# Patient Record
Sex: FEMALE | Race: WHITE | NOT HISPANIC OR LATINO | Employment: OTHER | URBAN - METROPOLITAN AREA
[De-identification: names, ages, dates, MRNs, and addresses within clinical notes are randomized per-mention and may not be internally consistent; named-entity substitution may affect disease eponyms.]

---

## 2023-06-14 ENCOUNTER — APPOINTMENT (INPATIENT)
Dept: MRI IMAGING | Facility: HOSPITAL | Age: 78
DRG: 065 | End: 2023-06-14
Payer: MEDICARE

## 2023-06-14 ENCOUNTER — APPOINTMENT (EMERGENCY)
Dept: CT IMAGING | Facility: HOSPITAL | Age: 78
DRG: 065 | End: 2023-06-14
Payer: MEDICARE

## 2023-06-14 ENCOUNTER — APPOINTMENT (EMERGENCY)
Dept: RADIOLOGY | Facility: HOSPITAL | Age: 78
DRG: 065 | End: 2023-06-14
Payer: MEDICARE

## 2023-06-14 ENCOUNTER — HOSPITAL ENCOUNTER (INPATIENT)
Facility: HOSPITAL | Age: 78
LOS: 5 days | Discharge: HOME | DRG: 065 | End: 2023-06-19
Attending: EMERGENCY MEDICINE | Admitting: FAMILY MEDICINE
Payer: MEDICARE

## 2023-06-14 ENCOUNTER — APPOINTMENT (INPATIENT)
Dept: NON INVASIVE DIAGNOSTICS | Facility: HOSPITAL | Age: 78
DRG: 065 | End: 2023-06-14
Payer: MEDICARE

## 2023-06-14 DIAGNOSIS — I48.91 A-FIB (HCC): ICD-10-CM

## 2023-06-14 DIAGNOSIS — I16.1 HYPERTENSIVE EMERGENCY WITHOUT CONGESTIVE HEART FAILURE: ICD-10-CM

## 2023-06-14 DIAGNOSIS — E87.6 HYPOKALEMIA: ICD-10-CM

## 2023-06-14 DIAGNOSIS — R29.90 STROKE-LIKE SYMPTOMS: ICD-10-CM

## 2023-06-14 DIAGNOSIS — I63.9 ISCHEMIC STROKE (HCC): ICD-10-CM

## 2023-06-14 DIAGNOSIS — I63.9 LEFT BASAL GANGLIA EMBOLIC STROKE (HCC): Primary | ICD-10-CM

## 2023-06-14 PROBLEM — R77.8 ELEVATED TROPONIN: Status: ACTIVE | Noted: 2023-06-14

## 2023-06-14 PROBLEM — R79.89 ELEVATED TROPONIN: Status: ACTIVE | Noted: 2023-06-14

## 2023-06-14 PROBLEM — I16.0 HYPERTENSIVE URGENCY: Status: ACTIVE | Noted: 2023-06-14

## 2023-06-14 LAB
2HR DELTA HS TROPONIN: -1 NG/L
4HR DELTA HS TROPONIN: 3 NG/L
ANION GAP SERPL CALCULATED.3IONS-SCNC: 9 MMOL/L (ref 4–13)
AORTIC ROOT: 3.5 CM
AORTIC VALVE MEAN VELOCITY: 6.9 M/S
APICAL FOUR CHAMBER EJECTION FRACTION: 54 %
APTT PPP: 25 SECONDS (ref 23–37)
AV LVOT MEAN GRADIENT: 1 MMHG
AV LVOT PEAK GRADIENT: 1 MMHG
AV MEAN GRADIENT: 2 MMHG
AV PEAK GRADIENT: 3 MMHG
BUN SERPL-MCNC: 25 MG/DL (ref 5–25)
CALCIUM SERPL-MCNC: 10 MG/DL (ref 8.4–10.2)
CARDIAC TROPONIN I PNL SERPL HS: 58 NG/L
CARDIAC TROPONIN I PNL SERPL HS: 59 NG/L
CARDIAC TROPONIN I PNL SERPL HS: 62 NG/L
CHLORIDE SERPL-SCNC: 104 MMOL/L (ref 96–108)
CO2 SERPL-SCNC: 28 MMOL/L (ref 21–32)
CREAT SERPL-MCNC: 0.89 MG/DL (ref 0.6–1.3)
DOP CALC AO VTI: 14.1 CM
DOP CALC LVOT PEAK VEL VTI: 9.72 CM
DOP CALC LVOT PEAK VEL: 0.54 M/S
E WAVE DECELERATION TIME: 181 MS
ERYTHROCYTE [DISTWIDTH] IN BLOOD BY AUTOMATED COUNT: 13.2 % (ref 11.6–15.1)
FRACTIONAL SHORTENING: 28 % (ref 28–44)
GFR SERPL CREATININE-BSD FRML MDRD: 63 ML/MIN/1.73SQ M
GLUCOSE SERPL-MCNC: 111 MG/DL (ref 65–140)
GLUCOSE SERPL-MCNC: 122 MG/DL (ref 65–140)
HCT VFR BLD AUTO: 48.3 % (ref 34.8–46.1)
HGB BLD-MCNC: 16.1 G/DL (ref 11.5–15.4)
INR PPP: 0.93 (ref 0.84–1.19)
INTERVENTRICULAR SEPTUM IN DIASTOLE (PARASTERNAL SHORT AXIS VIEW): 1.2 CM
INTERVENTRICULAR SEPTUM: 1.2 CM (ref 0.6–1.1)
LA/AORTA RATIO 2D: 1.29
LAAS-AP2: 31.8 CM2
LAAS-AP4: 29.7 CM2
LEFT ATRIUM SIZE: 4.5 CM
LEFT INTERNAL DIMENSION IN SYSTOLE: 3.1 CM (ref 2.1–4)
LEFT VENTRICLE DIASTOLIC VOLUME (MOD BIPLANE): 84 ML
LEFT VENTRICLE SYSTOLIC VOLUME (MOD BIPLANE): 40 ML
LEFT VENTRICULAR INTERNAL DIMENSION IN DIASTOLE: 4.3 CM (ref 3.5–6)
LEFT VENTRICULAR POSTERIOR WALL IN END DIASTOLE: 1.1 CM
LEFT VENTRICULAR STROKE VOLUME: 47 ML
LV EF: 53 %
LVSV (TEICH): 47 ML
MCH RBC QN AUTO: 30.9 PG (ref 26.8–34.3)
MCHC RBC AUTO-ENTMCNC: 33.3 G/DL (ref 31.4–37.4)
MCV RBC AUTO: 93 FL (ref 82–98)
MV PEAK E VEL: 86 CM/S
MV STENOSIS PRESSURE HALF TIME: 53 MS
MV VALVE AREA P 1/2 METHOD: 4.15 CM2
PLATELET # BLD AUTO: 431 THOUSANDS/UL (ref 149–390)
PLATELET # BLD AUTO: 480 THOUSANDS/UL (ref 149–390)
PMV BLD AUTO: 9 FL (ref 8.9–12.7)
PMV BLD AUTO: 9 FL (ref 8.9–12.7)
POTASSIUM SERPL-SCNC: 3.4 MMOL/L (ref 3.5–5.3)
PROTHROMBIN TIME: 13.1 SECONDS (ref 11.6–14.5)
RBC # BLD AUTO: 5.21 MILLION/UL (ref 3.81–5.12)
RIGHT VENTRICLE ID DIMENSION: 4 CM
SL CV LV EF: 55
SL CV PED ECHO LEFT VENTRICLE DIASTOLIC VOLUME (MOD BIPLANE) 2D: 84 ML
SL CV PED ECHO LEFT VENTRICLE SYSTOLIC VOLUME (MOD BIPLANE) 2D: 37 ML
SODIUM SERPL-SCNC: 141 MMOL/L (ref 135–147)
TR MAX PG: 24 MMHG
TR PEAK VELOCITY: 2.5 M/S
TRICUSPID ANNULAR PLANE SYSTOLIC EXCURSION: 2.4 CM
TRICUSPID VALVE PEAK REGURGITATION VELOCITY: 2.45 M/S
WBC # BLD AUTO: 13.14 THOUSAND/UL (ref 4.31–10.16)

## 2023-06-14 PROCEDURE — 99285 EMERGENCY DEPT VISIT HI MDM: CPT

## 2023-06-14 PROCEDURE — 99223 1ST HOSP IP/OBS HIGH 75: CPT | Performed by: PSYCHIATRY & NEUROLOGY

## 2023-06-14 PROCEDURE — 93306 TTE W/DOPPLER COMPLETE: CPT

## 2023-06-14 PROCEDURE — 80048 BASIC METABOLIC PNL TOTAL CA: CPT | Performed by: EMERGENCY MEDICINE

## 2023-06-14 PROCEDURE — 85049 AUTOMATED PLATELET COUNT: CPT | Performed by: FAMILY MEDICINE

## 2023-06-14 PROCEDURE — 92610 EVALUATE SWALLOWING FUNCTION: CPT

## 2023-06-14 PROCEDURE — 85610 PROTHROMBIN TIME: CPT | Performed by: EMERGENCY MEDICINE

## 2023-06-14 PROCEDURE — 84484 ASSAY OF TROPONIN QUANT: CPT | Performed by: EMERGENCY MEDICINE

## 2023-06-14 PROCEDURE — 96361 HYDRATE IV INFUSION ADD-ON: CPT

## 2023-06-14 PROCEDURE — 36415 COLL VENOUS BLD VENIPUNCTURE: CPT | Performed by: EMERGENCY MEDICINE

## 2023-06-14 PROCEDURE — 70496 CT ANGIOGRAPHY HEAD: CPT

## 2023-06-14 PROCEDURE — 70551 MRI BRAIN STEM W/O DYE: CPT

## 2023-06-14 PROCEDURE — 93306 TTE W/DOPPLER COMPLETE: CPT | Performed by: INTERNAL MEDICINE

## 2023-06-14 PROCEDURE — 70498 CT ANGIOGRAPHY NECK: CPT

## 2023-06-14 PROCEDURE — 71045 X-RAY EXAM CHEST 1 VIEW: CPT

## 2023-06-14 PROCEDURE — 96374 THER/PROPH/DIAG INJ IV PUSH: CPT

## 2023-06-14 PROCEDURE — 82948 REAGENT STRIP/BLOOD GLUCOSE: CPT

## 2023-06-14 PROCEDURE — 99223 1ST HOSP IP/OBS HIGH 75: CPT | Performed by: FAMILY MEDICINE

## 2023-06-14 PROCEDURE — 85730 THROMBOPLASTIN TIME PARTIAL: CPT | Performed by: EMERGENCY MEDICINE

## 2023-06-14 PROCEDURE — G1004 CDSM NDSC: HCPCS

## 2023-06-14 PROCEDURE — 85027 COMPLETE CBC AUTOMATED: CPT | Performed by: EMERGENCY MEDICINE

## 2023-06-14 PROCEDURE — 93005 ELECTROCARDIOGRAM TRACING: CPT

## 2023-06-14 RX ORDER — ASPIRIN 81 MG/1
81 TABLET, CHEWABLE ORAL DAILY
Status: DISCONTINUED | OUTPATIENT
Start: 2023-06-15 | End: 2023-06-14

## 2023-06-14 RX ORDER — HEPARIN SODIUM 5000 [USP'U]/ML
5000 INJECTION, SOLUTION INTRAVENOUS; SUBCUTANEOUS EVERY 8 HOURS SCHEDULED
Status: DISCONTINUED | OUTPATIENT
Start: 2023-06-14 | End: 2023-06-19 | Stop reason: HOSPADM

## 2023-06-14 RX ORDER — ASPIRIN 81 MG/1
81 TABLET, CHEWABLE ORAL DAILY
Status: DISCONTINUED | OUTPATIENT
Start: 2023-06-15 | End: 2023-06-19

## 2023-06-14 RX ORDER — ATORVASTATIN CALCIUM 40 MG/1
40 TABLET, FILM COATED ORAL EVERY EVENING
Status: DISCONTINUED | OUTPATIENT
Start: 2023-06-15 | End: 2023-06-14

## 2023-06-14 RX ORDER — SODIUM CHLORIDE, SODIUM GLUCONATE, SODIUM ACETATE, POTASSIUM CHLORIDE, MAGNESIUM CHLORIDE, SODIUM PHOSPHATE, DIBASIC, AND POTASSIUM PHOSPHATE .53; .5; .37; .037; .03; .012; .00082 G/100ML; G/100ML; G/100ML; G/100ML; G/100ML; G/100ML; G/100ML
100 INJECTION, SOLUTION INTRAVENOUS CONTINUOUS
Status: DISCONTINUED | OUTPATIENT
Start: 2023-06-14 | End: 2023-06-15

## 2023-06-14 RX ORDER — ATORVASTATIN CALCIUM 40 MG/1
40 TABLET, FILM COATED ORAL
Status: DISCONTINUED | OUTPATIENT
Start: 2023-06-15 | End: 2023-06-19 | Stop reason: HOSPADM

## 2023-06-14 RX ORDER — LABETALOL HYDROCHLORIDE 5 MG/ML
10 INJECTION, SOLUTION INTRAVENOUS EVERY 4 HOURS PRN
Status: DISCONTINUED | OUTPATIENT
Start: 2023-06-14 | End: 2023-06-17

## 2023-06-14 RX ORDER — CLOPIDOGREL BISULFATE 75 MG/1
75 TABLET ORAL DAILY
Status: DISCONTINUED | OUTPATIENT
Start: 2023-06-15 | End: 2023-06-15

## 2023-06-14 RX ORDER — LABETALOL HYDROCHLORIDE 5 MG/ML
10 INJECTION, SOLUTION INTRAVENOUS ONCE
Status: COMPLETED | OUTPATIENT
Start: 2023-06-14 | End: 2023-06-14

## 2023-06-14 RX ORDER — CLOPIDOGREL BISULFATE 75 MG/1
300 TABLET ORAL ONCE
Status: COMPLETED | OUTPATIENT
Start: 2023-06-14 | End: 2023-06-14

## 2023-06-14 RX ORDER — ASPIRIN 300 MG/1
300 SUPPOSITORY RECTAL DAILY
Status: DISCONTINUED | OUTPATIENT
Start: 2023-06-15 | End: 2023-06-14

## 2023-06-14 RX ADMIN — SODIUM CHLORIDE 1000 ML: 0.9 INJECTION, SOLUTION INTRAVENOUS at 07:31

## 2023-06-14 RX ADMIN — LABETALOL HYDROCHLORIDE 10 MG: 5 INJECTION, SOLUTION INTRAVENOUS at 19:39

## 2023-06-14 RX ADMIN — HEPARIN SODIUM 5000 UNITS: 5000 INJECTION INTRAVENOUS; SUBCUTANEOUS at 21:15

## 2023-06-14 RX ADMIN — CLOPIDOGREL BISULFATE 300 MG: 75 TABLET ORAL at 15:19

## 2023-06-14 RX ADMIN — LABETALOL HYDROCHLORIDE 10 MG: 5 INJECTION, SOLUTION INTRAVENOUS at 07:51

## 2023-06-14 RX ADMIN — HEPARIN SODIUM 5000 UNITS: 5000 INJECTION INTRAVENOUS; SUBCUTANEOUS at 13:03

## 2023-06-14 RX ADMIN — LABETALOL HYDROCHLORIDE 10 MG: 5 INJECTION, SOLUTION INTRAVENOUS at 10:47

## 2023-06-14 RX ADMIN — IOHEXOL 80 ML: 350 INJECTION, SOLUTION INTRAVENOUS at 08:22

## 2023-06-14 RX ADMIN — SODIUM CHLORIDE, SODIUM GLUCONATE, SODIUM ACETATE, POTASSIUM CHLORIDE, MAGNESIUM CHLORIDE, SODIUM PHOSPHATE, DIBASIC, AND POTASSIUM PHOSPHATE 100 ML/HR: .53; .5; .37; .037; .03; .012; .00082 INJECTION, SOLUTION INTRAVENOUS at 10:47

## 2023-06-14 NOTE — PLAN OF CARE
Problem: MOBILITY - ADULT  Goal: Maintain or return to baseline ADL function  Description: INTERVENTIONS:  -  Assess patient's ability to carry out ADLs; assess patient's baseline for ADL function and identify physical deficits which impact ability to perform ADLs (bathing, care of mouth/teeth, toileting, grooming, dressing, etc )  - Assess/evaluate cause of self-care deficits   - Assess range of motion  - Assess patient's mobility; develop plan if impaired  - Assess patient's need for assistive devices and provide as appropriate  - Encourage maximum independence but intervene and supervise when necessary  - Involve family in performance of ADLs  - Assess for home care needs following discharge   - Consider OT consult to assist with ADL evaluation and planning for discharge  - Provide patient education as appropriate  Outcome: Progressing  Goal: Maintains/Returns to pre admission functional level  Description: INTERVENTIONS:  - Perform BMAT or MOVE assessment daily    - Set and communicate daily mobility goal to care team and patient/family/caregiver  - Collaborate with rehabilitation services on mobility goals if consulted  - Perform Range of Motion  times a day  - Reposition patient every  hours    - Dangle patient  times a day  - Stand patient  times a day  - Ambulate patient  times a day  - Out of bed to chair  times a day   - Out of bed for meals  times a day  - Out of bed for toileting  - Record patient progress and toleration of activity level   Outcome: Progressing     Problem: PAIN - ADULT  Goal: Verbalizes/displays adequate comfort level or baseline comfort level  Description: Interventions:  - Encourage patient to monitor pain and request assistance  - Assess pain using appropriate pain scale  - Administer analgesics based on type and severity of pain and evaluate response  - Implement non-pharmacological measures as appropriate and evaluate response  - Consider cultural and social influences on pain and pain management  - Notify physician/advanced practitioner if interventions unsuccessful or patient reports new pain  Outcome: Progressing     Problem: INFECTION - ADULT  Goal: Absence or prevention of progression during hospitalization  Description: INTERVENTIONS:  - Assess and monitor for signs and symptoms of infection  - Monitor lab/diagnostic results  - Monitor all insertion sites, i e  indwelling lines, tubes, and drains  - Monitor endotracheal if appropriate and nasal secretions for changes in amount and color  - [place] appropriate cooling/warming therapies per order  - Administer medications as ordered  - Instruct and encourage patient and family to use good hand hygiene technique  - Identify and instruct in appropriate isolation precautions for identified infection/condition  Outcome: Progressing  Goal: Absence of fever/infection during neutropenic period  Description: INTERVENTIONS:  - Monitor WBC    Outcome: Progressing     Problem: SAFETY ADULT  Goal: Maintain or return to baseline ADL function  Description: INTERVENTIONS:  -  Assess patient's ability to carry out ADLs; assess patient's baseline for ADL function and identify physical deficits which impact ability to perform ADLs (bathing, care of mouth/teeth, toileting, grooming, dressing, etc )  - Assess/evaluate cause of self-care deficits   - Assess range of motion  - Assess patient's mobility; develop plan if impaired  - Assess patient's need for assistive devices and provide as appropriate  - Encourage maximum independence but intervene and supervise when necessary  - Involve family in performance of ADLs  - Assess for home care needs following discharge   - Consider OT consult to assist with ADL evaluation and planning for discharge  - Provide patient education as appropriate  Outcome: Progressing  Goal: Maintains/Returns to pre admission functional level  Description: INTERVENTIONS:  - Perform BMAT or MOVE assessment daily    - Set and communicate daily mobility goal to care team and patient/family/caregiver  - Collaborate with rehabilitation services on mobility goals if consulted  - Perform Range of Motion  times a day  - Reposition patient every  hours    - Dangle patient  times a day  - Stand patient  times a day  - Ambulate patient  times a day  - Out of bed to chair  times a day   - Out of bed for meals  times a day  - Out of bed for toileting  - Record patient progress and toleration of activity level   Outcome: Progressing  Goal: Patient will remain free of falls  Description: INTERVENTIONS:  - Educate patient/family on patient safety including physical limitations  - Instruct patient to call for assistance with activity   - Consult OT/PT to assist with strengthening/mobility   - Keep Call bell within reach  - Keep bed low and locked with side rails adjusted as appropriate  - Keep care items and personal belongings within reach  - Initiate and maintain comfort rounds  - Make Fall Risk Sign visible to staff  - Offer Toileting every  Hours, in advance of need  - Initiate/Maintain alarm  - Obtain necessary fall risk management equipment  - Apply yellow socks and bracelet for high fall risk patients  - Consider moving patient to room near nurses station  Outcome: Progressing     Problem: DISCHARGE PLANNING  Goal: Discharge to home or other facility with appropriate resources  Description: INTERVENTIONS:  - Identify barriers to discharge w/patient and caregiver  - Arrange for needed discharge resources and transportation as appropriate  - Identify discharge learning needs (meds, wound care, etc )  - Arrange for interpretive services to assist at discharge as needed  - Refer to Case Management Department for coordinating discharge planning if the patient needs post-hospital services based on physician/advanced practitioner order or complex needs related to functional status, cognitive ability, or social support system  Outcome: Progressing Problem: Knowledge Deficit  Goal: Patient/family/caregiver demonstrates understanding of disease process, treatment plan, medications, and discharge instructions  Description: Complete learning assessment and assess knowledge base  Interventions:  - Provide teaching at level of understanding  - Provide teaching via preferred learning methods  Outcome: Progressing     Problem: Neurological Deficit  Goal: Neurological status is stable or improving  Description: Interventions:  - Monitor and assess patient's level of consciousness, motor function, sensory function, and level of assistance needed for ADLs  - Monitor and report changes from baseline  Collaborate with interdisciplinary team to initiate plan and implement interventions as ordered  - Provide and maintain a safe environment  - Consider seizure precautions  - Consider fall precautions  - Consider aspiration precautions  - Consider bleeding precautions  Outcome: Progressing     Problem: Activity Intolerance/Impaired Mobility  Goal: Mobility/activity is maintained at optimum level for patient  Description: Interventions:  - Assess and monitor patient  barriers to mobility and need for assistive/adaptive devices  - Assess patient's emotional response to limitations  - Collaborate with interdisciplinary team and initiate plans and interventions as ordered  - Encourage independent activity per ability   - Maintain proper body alignment  - Perform active/passive rom as tolerated/ordered  - Plan activities to conserve energy   - Turn patient as appropriate  Outcome: Progressing     Problem: Communication Impairment  Goal: Ability to express needs and understand communication  Description: Assess patient's communication skills and ability to understand information  Patient will demonstrate use of effective communication techniques, alternative methods of communication and understanding even if not able to speak       - Encourage communication and provide alternate methods of communication as needed  - Collaborate with case management/ for discharge needs  - Include patient/family/caregiver in decisions related to communication  Outcome: Progressing     Problem: Potential for Aspiration  Goal: Non-ventilated patient's risk of aspiration is minimized  Description: Assess and monitor vital signs, respiratory status, and labs (WBC)  Monitor for signs of aspiration (tachypnea, cough, rales, wheezing, cyanosis, fever)  - Assess and monitor patient's ability to swallow  - Place patient up in chair to eat if possible  - HOB up at 90 degrees to eat if unable to get patient up into chair   - Supervise patient during oral intake  - Instruct patient/ family to take small bites  - Instruct patient/ family to take small single sips when taking liquids  - Follow patient-specific strategies generated by speech pathologist   Outcome: Progressing  Goal: Ventilated patient's risk of aspiration is minimized  Description: Assess and monitor vital signs, respiratory status, airway cuff pressure, and labs (WBC)  Monitor for signs of aspiration (tachypnea, cough, rales, wheezing, cyanosis, fever)  - Elevate head of bed 30 degrees if patient has tube feeding   - Monitor tube feeding  Outcome: Progressing     Problem: Nutrition  Goal: Nutrition/Hydration status is improving  Description: Monitor and assess patient's nutrition/hydration status for malnutrition (ex- brittle hair, bruises, dry skin, pale skin and conjunctiva, muscle wasting, smooth red tongue, and disorientation)  Collaborate with interdisciplinary team and initiate plan and interventions as ordered  Monitor patient's weight and dietary intake as ordered or per policy  Utilize nutrition screening tool and intervene per policy  Determine patient's food preferences and provide high-protein, high-caloric foods as appropriate       - Assist patient with eating   - Allow adequate time for meals   - Encourage patient to take dietary supplement as ordered  - Collaborate with clinical nutritionist   - Include patient/family/caregiver in decisions related to nutrition    Outcome: Progressing

## 2023-06-14 NOTE — H&P
[place]  H&P  Name: Ainsley Gonzalez 78 y o  female I MRN: [id number]  Unit/Bed#: -01 I Date of Admission: 6/14/2023   Date of Service: 6/14/2023 I Hospital Day: 0      Assessment/Plan   * Stroke-like symptoms  Assessment & Plan  · 61-year-old female, with no PMH, no PCP follow-up for years, presents with dysarthria, word finding difficulty  · Noted blood pressure 271/121  · Chest x-ray shows no effusion, or pulmonary edema  · EKG reveals sinus tachycardia  · CTA head and neck negative for acute infarct, however advanced chronic microangiopathic changes with chronic lacunar infarcts  · Admitted under stroke pathway  · Patient failed dysphagia screening in ER, placed n p o  and IV fluid  · Patient was given aspirin 300 mg rectal in ED  · If patient passes swallow eval, will need Plavix and aspirin daily  · If passed swallow eval, also needed atorvastatin 40 mg daily  · Obtain MRI brain  · Obtain echocardiogram  · Continue telemetry monitor, obtain lipid, A1c  · PT/OT/ST  · As per neurology, does not need permissive BP      Elevated troponin  Assessment & Plan  Noted mildly elevated troponin at 58 > 59  Patient denies any chest pain or dyspnea  EKG sinus tachycardia  continue telemetry monitor, troponin trend ST elevation  Obtain echocardiogram  This is likely secondary to hypertensive urgency    Hypertensive urgency  Assessment & Plan  Presented with blood pressure 217/121  As per neurology, does not need permissive BP  However will slowly reduce blood pressure  Currently patient failed dysphagia eval   Continue IV labetalol as needed for systolic BP above 683       VTE Prophylaxis: Heparin  / sequential compression device   Code Status: Full code    Discussion with family: Called , went to voice message    Anticipated Length of Stay:  Patient will be admitted on an Inpatient basis with an anticipated length of stay of  > 2 midnights     Justification for [facility] QUIG[facility] Stay: Strokelike symptoms    Total Time for Visit, including Counseling / Coordination of Care: 60 minutes  Greater than 50% of this total time spent on direct patient counseling and coordination of care  Chief Complaint:   Difficulty speaking    History of Present Illness:    History obtained from patient and ER documentation  Unable to reach  over the phone  Katlin Novak is a 78 y o  female with no PMH, no outpatient follow-up with medical provider, presented to ER with her  due to difficulty with speech difficulty, dysarthria, word finding difficulty started 3 days ago  Denies any weakness of extremities, denies ambulatory dysfunction  Patient also is alert, and oriented to time self place and date  In ER, patient noted to have blood pressure of 217/121  CTA head and neck was negative for acute stroke  It does show advanced chronic microangiopathic changes with chronic lacunar infarct  Admission requested for stroke pathway and work-up  On my exam, patient is alert, oriented x4  Patient does exhibit word finding difficulty, dysarthria  Review of Systems:    Review of Systems   Constitutional: Negative for chills and fever  HENT: Negative for ear pain and sore throat  Eyes: Negative for pain and visual disturbance  Respiratory: Negative for cough and shortness of breath  Cardiovascular: Negative for chest pain and palpitations  Gastrointestinal: Negative for abdominal pain and vomiting  Genitourinary: Negative for dysuria and hematuria  Musculoskeletal: Negative for arthralgias and back pain  Skin: Negative for color change and rash  Neurological: Positive for facial asymmetry and speech difficulty  Negative for seizures and syncope  All other systems reviewed and are negative  Past Medical and Surgical History:     History reviewed  No pertinent past medical history  History reviewed  No pertinent surgical history      Meds/Allergies:    Prior to "Admission medications    Not on File     I have reviewed home medications using allscripts  Allergies: No Known Allergies    Social History:     Marital Status: /Civil Union   Occupation: Patient currently works as a  for her  who is a   Patient Pre-hospital Living Situation: Lives with   Patient Pre-hospital Level of Mobility: Independent prior to this symptoms  Patient Pre-hospital Diet Restrictions: None  Substance Use History:   Social History     Substance and Sexual Activity   Alcohol Use Yes   • Alcohol/week: 4 0 standard drinks of alcohol   • Types: 4 Glasses of wine per week     Social History     Tobacco Use   Smoking Status Never   Smokeless Tobacco Never     Social History     Substance and Sexual Activity   Drug Use Never       Family History:    History reviewed  No pertinent family history  Physical Exam:     Vitals:   Blood Pressure: (!) 196/110 (06/14/23 1333)  Pulse: 96 (06/14/23 1333)  Temperature: 97 6 °F (36 4 °C) (06/14/23 0710)  Respirations: 22 (06/14/23 1000)  Height: 5' 6\" (167 6 cm) (06/14/23 1101)  Weight - Scale: 55 1 kg (121 lb 7 6 oz) (06/14/23 1106)  SpO2: 96 % (06/14/23 1333)    Physical Exam  Vitals and nursing note reviewed  Constitutional:       General: She is not in acute distress  Appearance: She is well-developed  HENT:      Head: Normocephalic and atraumatic  Eyes:      Conjunctiva/sclera: Conjunctivae normal    Cardiovascular:      Rate and Rhythm: Normal rate and regular rhythm  Heart sounds: No murmur heard  Pulmonary:      Effort: Pulmonary effort is normal  No respiratory distress  Breath sounds: Normal breath sounds  Abdominal:      Palpations: Abdomen is soft  Tenderness: There is no abdominal tenderness  Musculoskeletal:         General: No swelling  Cervical back: Neck supple  Skin:     General: Skin is warm and dry  Capillary Refill: Capillary refill takes less than 2 seconds   " Neurological:      Mental Status: She is alert and oriented to person, place, and time  Comments: No sensory deficit  No focal weakness noted on upper or lower extremity  Dysarthria noted   Psychiatric:         Mood and Affect: Mood normal            Additional Data:     Lab Results: I have personally reviewed pertinent reports  Results from last 7 days   Lab Units 06/14/23  1057 06/14/23  0721   HEMATOCRIT %  --  48 3*   HEMOGLOBIN g/dL  --  16 1*   PLATELETS Thousands/uL 431* 480*   WBC Thousand/uL  --  13 14*     Results from last 7 days   Lab Units 06/14/23  0721   ANION GAP mmol/L 9   BUN mg/dL 25   CALCIUM mg/dL 10 0   CHLORIDE mmol/L 104   CO2 mmol/L 28   CREATININE mg/dL 0 89   GLUCOSE RANDOM mg/dL 111   POTASSIUM mmol/L 3 4*   SODIUM mmol/L 141     Results from last 7 days   Lab Units 06/14/23  0721   INR  0 93     Results from last 7 days   Lab Units 06/14/23  0719   POC GLUCOSE mg/dl 122               Imaging: I have personally reviewed pertinent reports  CTA head and neck with and without contrast   Final Result by Umm Farr DO (06/14 4532)      Advanced chronic microangiopathic changes with chronic lacunar infarcts  No acute intracranial normality  No significant carotid or vertebral artery stenosis  No large vessel occlusion or aneurysm identified  Scattered vascular calcifications intracranial compatible with intracranial atherosclerosis without focal stenosis  Workstation performed: [id number]         X-ray chest 1 view portable   ED Interpretation by Gloria Lopez DO (06/14 5408)   Slightly rotated  Possible cardiomegaly  No infiltrates, effusion, CHF      MRI Inpatient Order    (Results Pending)       EKG, Pathology, and Other Studies Reviewed on Admission:   · EKG: Sinus tachycardia    Allscripts / Epic Records Reviewed: Yes     ** Please Note: This note has been constructed using a voice recognition system   **

## 2023-06-14 NOTE — SPEECH THERAPY NOTE
Speech Language/Pathology    Speech-Language Pathology Bedside Swallow Evaluation      Patient Name: Homero Schmidt    [id number] Date: 6/14/2023     Problem List  Principal Problem:    Stroke-like symptoms  Active Problems:    Hypertensive urgency    Elevated troponin      Past Medical History  History reviewed  No pertinent past medical history  Past Surgical History  History reviewed  No pertinent surgical history  Summary   Pt presented with s/s suggestive of mild oral and suspected moderate-severe pharyngeal dysphagia  Reduced oral opening for retrieval  Decreased mastication w/ slowed lingual manipulation  Mild oral residue requiring liquid wash  Swallows suspected delayed/reduced  +throat clearing and strong cough to thin liquids and nectar thick liquids today  Cough x1 w/ hard/dry solid  S/w pt regarding potential dysphagia diet modifications at this time 2/2 noted oropharyngeal difficulties/signs of aspiration  Pt agreeable at this time  Risk/s for Aspiration: Mod      Recommended Diet: soft/level 3 diet and honey thick liquids   Recommended Form of Meds: crushed with puree   Aspiration precautions and swallowing strategies: upright posture, only feed when fully alert, slow rate of feeding and small bites/sips  Other Recommendations: Continue frequent oral care         Current Medical Status  Pt is a 78 y o  female who presented to [address] [place] [address] with no PMH, no outpatient follow-up with medical provider, presented to ER with her  due to difficulty with speech difficulty, dysarthria, word finding difficulty started 3 days ago  Denies any weakness of extremities, denies ambulatory dysfunction  Patient also is alert, and oriented to time self place and date  In ER, patient noted to have blood pressure of 217/121  CTA head and neck was negative for acute stroke  It does show advanced chronic microangiopathic changes with chronic lacunar infarct    Admission requested for stroke pathway and work-up  On my exam, patient is alert, oriented x4  Patient does exhibit word finding difficulty, dysarthria      Current Precautions: Allergies:  No known food allergies    Past medical history:  Please see H&P for details    Special Studies:  CTA head and neck 6/14: Advanced chronic microangiopathic changes with chronic lacunar infarcts  No acute intracranial normality      No significant carotid or vertebral artery stenosis  No large vessel occlusion or aneurysm identified  Scattered vascular calcifications intracranial compatible with intracranial atherosclerosis without focal stenosis        Social/Education/Vocational Hx:  Pt lives home w/ spouse    Swallow Information   Current Risks for Dysphagia & Aspiration: stroke-like symptoms, dysarthria  Current Symptoms/Concerns: coughing with po and failed RN dysphagia screen   Current Diet: NPO   Baseline Diet: regular diet and thin liquids      Baseline Assessment   Behavior/Cognition: alert  Speech/Language Status: able to participate in basic conversation and able to follow commands  Patient Positioning: upright in bed  Pain Status/Interventions/Response to Interventions:  No report of or nonverbal indications of pain  Swallow Mechanism Exam  Facial: right facial droop  Labial: decreased ROM right side  Lingual: right sided tongue deviation  Velum: symmetrical  Mandible: adequate ROM  Dentition: adequate  Vocal quality:clear/adequate   Volitional Cough: weak   Respiratory Status: on RA      Consistencies Assessed and Performance   Consistencies Administered: thin liquids, nectar thick, honey thick, puree, soft solids and hard solids    Oral Stage: mild  Mastication was adequate with the materials administered today  Bolus formation and transfer were functional with no significant oral residue noted  No overt s/s reduced oral control  Pharyngeal Stage: moderate-severe  Swallow Mechanics:  Swallowing initiation suspected delayed   Laryngeal rise was palpated and judged to be reduced  Throat clear and cough w/ thin, throat clear and cough w/ nectar  Cough x1 w/ dry/hard solid  Esophageal Concerns: none reported    Strategies and Efficacy: -     Summary and Recommendations (see above)    Results Reviewed with: patient, RN and MD     Treatment Recommended: Yes     Frequency of treatment: As able/appropriate     Patient Stated Goal: none stated     Dysphagia LTG  -Patient will demonstrate safe and effective oral intake (without overt s/s significant oral/pharyngeal dysphagia including s/s penetration or aspiration) for the highest appropriate diet level       Short Term Goals:  -Pt will tolerate Dysphagia 3/advanced (dental soft) diet and honey thick liquid with no significant s/s oral or pharyngeal dysphagia across 1-3 diagnostic session/s     -Patient will tolerate trials of upgraded food and/or liquid texture with no significant s/s of oral or pharyngeal dysphagia including aspiration across 1-3 diagnostic sessions     -Patient will comply with a Video/Modified Barium Swallow study for more complete assessment of swallowing anatomy/physiology/aspiration risk and to assess efficacy of treatment techniques so as to best guide treatment plan          Speech Therapy Prognosis   Prognosis: Guarded    Prognosis Considerations: age, medical status, prior medical history and cognitive status

## 2023-06-14 NOTE — ASSESSMENT & PLAN NOTE
· 72-year-old female, with no PMH, no PCP follow-up for years, presents with dysarthria, word finding difficulty  · Noted blood pressure 271/121  · Chest x-ray shows no effusion, or pulmonary edema  · EKG reveals sinus tachycardia  · CTA head and neck negative for acute infarct, however advanced chronic microangiopathic changes with chronic lacunar infarcts  · Admitted under stroke pathway  · Patient failed dysphagia screening in ER, placed n p o  and IV fluid  · Patient was given aspirin 300 mg rectal in ED  · If patient passes swallow eval, will need Plavix and aspirin daily  · If passed swallow eval, also needed atorvastatin 40 mg daily    · Obtain MRI brain  · Obtain echocardiogram  · Continue telemetry monitor, obtain lipid, A1c  · PT/OT/ST  · As per neurology, does not need permissive BP

## 2023-06-14 NOTE — ASSESSMENT & PLAN NOTE
Presented with blood pressure 217/121  As per neurology, does not need permissive BP  However will slowly reduce blood pressure  Currently patient failed dysphagia eval   Continue IV labetalol as needed for systolic BP above 058

## 2023-06-14 NOTE — ED PROVIDER NOTES
History  Chief Complaint   Patient presents with   • CVA/TIA-like Symptoms     Difficulty speaking/ slow speech for 2 days  Some confusion, sleeping a lot and not eating  74-year-old female from home presents with her    helps with history  Patient has had difficulty speaking for the past 3 days  She is moving much more slowly than normal although has not fallen  She has not eaten anything in the past 3 days except for a milkshake yesterday  Patient's admits to having difficulty saying what she wants to say  She says she has all the birds in her head but cannot see them at times  On exam she has mild expressive aphasia  She has very slight dysarthria but it appears to be due to a dry mouth  Patient denies headache, change in vision, vertigo, diplopia, chest pain, palpitations, incontinence and focal neurologic changes  She has not seen a doctor in many years and takes no medications  No known allergies  Her  endorses this history and states there has been no other recent illness, injury, change in diet or medications  History provided by:  Spouse and patient   used: No        None       History reviewed  No pertinent past medical history  History reviewed  No pertinent surgical history  History reviewed  No pertinent family history  I have reviewed and agree with the history as documented  E-Cigarette/Vaping   • E-Cigarette Use Never User      E-Cigarette/Vaping Substances   • Nicotine No    • THC No    • CBD No    • Flavoring No    • Other No    • Unknown No      Social History     Tobacco Use   • Smoking status: Never   • Smokeless tobacco: Never   Vaping Use   • Vaping Use: Never used   Substance Use Topics   • Alcohol use: Yes     Alcohol/week: 4 0 standard drinks of alcohol     Types: 4 Glasses of wine per week   • Drug use: Never       Review of Systems   Constitutional: Positive for activity change and appetite change   Negative for fever    Respiratory: Negative for cough and shortness of breath  Cardiovascular: Negative for chest pain and palpitations  Gastrointestinal: Positive for diarrhea ( Briefly)  Negative for abdominal pain and vomiting  Musculoskeletal: Negative for neck pain and neck stiffness  Neurological: Positive for speech difficulty  Negative for dizziness, seizures, syncope, numbness and headaches  All other systems reviewed and are negative  Physical Exam  Physical Exam  Vitals and nursing note reviewed  Constitutional:       General: She is not in acute distress  Appearance: She is well-developed and normal weight  She is not ill-appearing or diaphoretic  HENT:      Head: Normocephalic and atraumatic  Right Ear: External ear normal       Left Ear: External ear normal       Mouth/Throat:      Mouth: Mucous membranes are dry  Eyes:      General: No scleral icterus  Conjunctiva/sclera: Conjunctivae normal       Pupils: Pupils are equal, round, and reactive to light  Neck:      Vascular: No carotid bruit  Cardiovascular:      Rate and Rhythm: Regular rhythm  Tachycardia present  Pulses: Normal pulses  Heart sounds: No murmur heard  Pulmonary:      Effort: Pulmonary effort is normal       Breath sounds: Normal breath sounds  Abdominal:      General: Bowel sounds are normal       Palpations: Abdomen is soft  There is no mass  Tenderness: There is no abdominal tenderness  Musculoskeletal:         General: No tenderness  Normal range of motion  Cervical back: Normal range of motion and neck supple  Right lower leg: No edema  Left lower leg: No edema  Skin:     General: Skin is warm and dry  Capillary Refill: Capillary refill takes less than 2 seconds  Findings: No rash  Neurological:      Mental Status: She is alert and oriented to person, place, and time  Cranial Nerves: No cranial nerve deficit  Sensory: No sensory deficit  Motor: No weakness  Coordination: Coordination normal       Gait: Gait abnormal       Deep Tendon Reflexes: Reflexes are normal and symmetric     Psychiatric:         Mood and Affect: Mood normal          Behavior: Behavior normal          Vital Signs  ED Triage Vitals [06/14/23 0710]   Temperature Pulse Respirations Blood Pressure SpO2   97 6 °F (36 4 °C) 94 20 (!) 202/120 98 %      Temp src Heart Rate Source Patient Position - Orthostatic VS BP Location FiO2 (%)   -- Monitor Lying Left arm --      Pain Score       No Pain           Vitals:    06/14/23 0800 06/14/23 0830 06/14/23 0845 06/14/23 0900   BP: (!) 179/108 (!) 195/114 (!) 200/97 (!) 180/116   Pulse: 93 94 95 100   Patient Position - Orthostatic VS: Sitting  Sitting Sitting         Visual Acuity  Visual Acuity    Flowsheet Row Most Recent Value   L Pupil Size (mm) 2   R Pupil Size (mm) 2          ED Medications  Medications   sodium chloride 0 9 % bolus 1,000 mL (0 mL Intravenous Stopped 6/14/23 0900)   labetalol (NORMODYNE) injection 10 mg (10 mg Intravenous Given 6/14/23 0751)   iohexol (OMNIPAQUE) 350 MG/ML injection (MULTI-DOSE) 80 mL (80 mL Intravenous Given 6/14/23 0822)       Diagnostic Studies  Results Reviewed     Procedure Component Value Units Date/Time    HS Troponin I 2hr [[id number]] Collected: 06/14/23 0936    Lab Status: No result Specimen: Blood from Arm, Left     HS Troponin 0hr (reflex protocol) [[id number]]  (Abnormal) Collected: 06/14/23 0721    Lab Status: Final result Specimen: Blood from Arm, Left Updated: 06/14/23 0839     hs TnI 0hr 59 ng/L     HS Troponin I 4hr [[id number]]     Lab Status: No result Specimen: Blood     Basic metabolic panel [[id number]]  (Abnormal) Collected: 06/14/23 0721    Lab Status: Final result Specimen: Blood from Arm, Left Updated: 06/14/23 0809     Sodium 141 mmol/L      Potassium 3 4 mmol/L      Chloride 104 mmol/L      CO2 28 mmol/L      ANION GAP 9 mmol/L      BUN 25 mg/dL      Creatinine 0 89 mg/dL Glucose 111 mg/dL      Calcium 10 0 mg/dL      eGFR 63 ml/min/1 73sq m     Narrative:      [place] guidelines for Chronic Kidney Disease (CKD):   •  Stage 1 with normal or high GFR (GFR > 90 mL/min/1 73 square meters)  •  Stage 2 Mild CKD (GFR = 60-89 mL/min/1 73 square meters)  •  Stage 3A Moderate CKD (GFR = 45-59 mL/min/1 73 square meters)  •  Stage 3B Moderate CKD (GFR = 30-44 mL/min/1 73 square meters)  •  Stage 4 Severe CKD (GFR = 15-29 mL/min/1 73 square meters)  •  Stage 5 End Stage CKD (GFR <15 mL/min/1 73 square meters)  Note: GFR calculation is accurate only with a steady state creatinine    Protime-INR [[id number]]  (Normal) Collected: 06/14/23 0721    Lab Status: Final result Specimen: Blood from Arm, Left Updated: 06/14/23 0759     Protime 13 1 seconds      INR 0 93    APTT [[id number]]  (Normal) Collected: 06/14/23 0721    Lab Status: Final result Specimen: Blood from Arm, Left Updated: 06/14/23 0759     PTT 25 seconds     CBC and Platelet [[id number]]  (Abnormal) Collected: 06/14/23 0721    Lab Status: Final result Specimen: Blood from Arm, Left Updated: 06/14/23 0727     WBC 13 14 Thousand/uL      RBC 5 21 Million/uL      Hemoglobin 16 1 g/dL      Hematocrit 48 3 %      MCV 93 fL      MCH 30 9 pg      MCHC 33 3 g/dL      RDW 13 2 %      Platelets 251 Thousands/uL      MPV 9 0 fL     Fingerstick Glucose (POCT) [[id number]]  (Normal) Collected: 06/14/23 0719    Lab Status: Final result Updated: 06/14/23 0720     POC Glucose 122 mg/dl                  CTA head and neck with and without contrast   Final Result by [facility],  (06/14 1079)      Advanced chronic microangiopathic changes with chronic lacunar infarcts  No acute intracranial normality  No significant carotid or vertebral artery stenosis  No large vessel occlusion or aneurysm identified   Scattered vascular calcifications intracranial compatible with intracranial atherosclerosis without focal stenosis  Workstation performed: [id number]         X-ray chest 1 view portable   ED Interpretation by Korey Billingsley DO (06/14 8604)   Slightly rotated  Possible cardiomegaly    No infiltrates, effusion, CHF                 Procedures  ECG 12 Lead Documentation Only    Date/Time: 6/14/2023 7:17 AM    Performed by: Korey Billingsley DO  Authorized by: Korey Billingsley DO    ECG reviewed by me, the ED Provider: yes    Patient location:  ED  Previous ECG:     Previous ECG:  Unavailable    Comparison to cardiac monitor: Yes    Rhythm:     Rhythm: sinus tachycardia    Ectopy:     Ectopy: PAC    QRS:     QRS axis:  Normal    QRS intervals:  Normal  Conduction:     Conduction: normal    ST segments:     ST segments:  Normal  T waves:     T waves: non-specific    Q waves:     Q waves:  V2    CriticalCare Time    Date/Time: 6/14/2023 9:16 AM    Performed by: Korey Billingsley DO  Authorized by: Korey Billingsley DO    Critical care provider statement:     Critical care time (minutes):  55    Critical care start time:  6/14/2023 7:13 AM    Critical care end time:  6/14/2023 9:17 AM    Critical care time was exclusive of:  Separately billable procedures and treating other patients and teaching time    Critical care was necessary to treat or prevent imminent or life-threatening deterioration of the following conditions:  CNS failure or compromise    Critical care was time spent personally by me on the following activities:  Obtaining history from patient or surrogate, development of treatment plan with patient or surrogate, discussions with consultants, evaluation of patient's response to treatment, examination of patient, ordering and review of laboratory studies, ordering and review of radiographic studies, re-evaluation of patient's condition and review of old charts    I assumed direction of critical care for this patient from another provider in my specialty: no               ED Course  ED Course as of 06/14/23 0940   Wed Jun 14, 2023   0908 Discussed with neurology  Recommend slowly controlling blood pressure and his dual antiplatelet therapy  Patient did not pass her dysphagia test at the bedside however  Neurology recommends aspirin 300 mg rectally daily until able to tolerate dual antiplatelet therapy  Recommend stroke pathway  Stroke Assessment     Row Name 06/14/23 0731             NIH Stroke Scale    Interval Baseline      Level of Consciousness (1a ) 0      LOC Questions (1b ) 0      LOC Commands (1c ) 0      Best Gaze (2 ) 0      Visual (3 ) 0      Facial Palsy (4 ) 0      Motor Arm, Left (5a ) 0      Motor Arm, Right (5b ) 0      Motor Leg, Left (6a ) 0      Motor Leg, Right (6b ) 0      Limb Ataxia (7 ) 0      Sensory (8 ) 0      Best Language (9 ) 1      Dysarthria (10 ) 0      Extinction and Inattention (11 ) (Formerly Neglect) 0      Total 1              Flowsheet Row Most Recent Value   Thrombolytic Decision Options    Thrombolytic Decision Patient not a candidate  Patient is not a candidate options Unclear time of onset outside appropriate time window  SBIRT 20yo+    Flowsheet Row Most Recent Value   Initial Alcohol Screen: US AUDIT-C     1  How often do you have a drink containing alcohol? 3 Filed at: 06/14/2023 0716   2  How many drinks containing alcohol do you have on a typical day you are drinking? 0 Filed at: 06/14/2023 0716   3a  Male UNDER 65: How often do you have five or more drinks on one occasion? 0 Filed at: 06/14/2023 0716   3b  FEMALE Any Age, or MALE 65+: How often do you have 4 or more drinks on one occassion? 0 Filed at: 06/14/2023 0716   Audit-C Score 3 Filed at: 06/14/2023 7341   ANAM: How many times in the past year have you    Used an illegal drug or used a prescription medication for non-medical reasons?  Never Filed at: 06/14/2023 3822                    Medical Decision Making  44-year-old female with no known medical problems presents with 3-day history of expressive aphasia with decreased oral intake and physical activity  Blood pressure elevated  Concern for hypertensive emergency, intracranial hemorrhage, ischemic stroke, electrolyte abnormality, dehydration, acute kidney injury, ACS, dysrhythmia  Will order CT of head, CTA of head and neck, EKG, chest x-ray and labs  Will consult neurology  We will repeat him vitals  If blood pressure remains elevated will start to cautiously lower blood pressure with IV agent to approximately 725 systolic or above  Patient remained stable  Blood pressure diminished with IV labetalol  IV fluids infusing  Labs imaging reviewed  Seen here by neurology  They gave recommendations  Will discuss with Slim for admission  Hypertensive emergency without congestive heart failure: acute illness or injury that poses a threat to life or bodily functions  Hypokalemia: acute illness or injury  Ischemic stroke [facility]): acute illness or injury that poses a threat to life or bodily functions  Amount and/or Complexity of Data Reviewed  Independent Historian: spouse  Labs: ordered  Decision-making details documented in ED Course  Radiology: ordered and independent interpretation performed  Decision-making details documented in ED Course  ECG/medicine tests: ordered and independent interpretation performed  Decision-making details documented in ED Course  Discussion of management or test interpretation with external provider(s): Discussed with neurology consultants and with [facility] attending  Risk  Prescription drug management  Decision regarding hospitalization            Disposition  Final diagnoses:   Hypertensive emergency without congestive heart failure   Ischemic stroke ([facility] [address] )   Hypokalemia     Time reflects when diagnosis was documented in both MDM as applicable and the Disposition within this note     Time User Action Codes Description Comment    6/14/2023  7:47 AM Anna Thapa Add [I16 1] Hypertensive emergency without congestive heart failure     6/14/2023  9:10 AM Wicho Schroeder Add [I63 9] Ischemic stroke ([facility] [address] )     6/14/2023  9:10 AM Wicho Schroeder Add [E87 6] Hypokalemia       ED Disposition     ED Disposition   Admit    Condition   Stable    Date/Time   Wed Jun 14, 2023  9:21 AM    Comment   Case was discussed with Dr Cat Gu and the patient's admission status was agreed to be Admission Status: inpatient status to the service of Dr Cat Gu   Follow-up Information    None         Patient's Medications    No medications on file       No discharge procedures on file      PDMP Review     None          ED Provider  Electronically Signed by           Renzo Peck DO  06/14/23 0940

## 2023-06-14 NOTE — ASSESSMENT & PLAN NOTE
78year old female with no documented medical history (per ED team, has not sought medical care/PCP evaluation in many years) and known alcohol use  Presented this morning, 6/14, with few days duration of language dysfunction (slow speech, word finding difficulty, dysarthria); concurrently with poor oral intake and overall bradykinesia at home  6/14 neuro exam with minimal expressive aphasia, dysarthria and R facial droop/tongue deviation  Failed initial dysphagia screen with nursing  Notable accelerated hypertension this morning (max /121)  Work-up:   · CTA head/neck with no acute intracranial pathology, does reveal chronic lacunar infarcts and advanced microvascular disease  From vessel standpoint without LVO nor any critical stenosis/flow restrictive disease  · MRI brain:  · Acute infarct in left basal ganglia    · Multifocal chronic lacunar infarcts in bilateral basal ganglia and bilateral thalami (etat lacunaire pattern), chronic small infarcts in bilateral cerebellum (left larger than right), severe chronic microangiopathy, and hypertensive arteriopathy  · Chronic cystic encephalomalacia and gliosis in bilateral anterior frontal lobes (right worse than left) and right anterior temporal lobe, likely sequela of remote trauma  · Mild dolichoectasia of basilar artery, unchanged  · LDL 56  · 6/14 Serum studies: WBC 13, Plts 480/431  · Echo: EF 55%, normal sized atria bilaterally  · 6/15 Afib noted on tele overnight with rates in the 80s  · TSH WNL    Symptoms consistent with acute left basal ganglia infarct  Afib noted on the monitor overnight, which is thought to be likely etiology  Imaging also reveals multiple chronic strokes as well, both cortical and subcortical, in addition to multiple microhemorrhages thought to be secondary to uncontrolled hypertension   Patient will ultimately need both AC and AP; however, will need to ensure patient's BP is controlled prior to initiating dual therapy  Details re: timing of AC/AP initiation noted below  No further inpatient neurologic recommendations      Plan:  · Continue stroke pathway  · Patient initially failed dysphagia screen; now tolerating PO  Speech therapy recs: soft/level 3 diet and honey thick liquids; meds crushed with puree   · Upon review of med history, on 6/14 patient was given Plavix 300mg x 1 and then received Aspirin 81mg and Plavix 75mg 6/15AM   · Transition to single antiplatelet with Aspirin 81mg daily beginning 6/16  · If normotensive by early next week (ideally 6/19 with PCP follow-up on that day) can stop Aspirin and start Eliquis  · At follow-up neurology appointment if patient remaining normotensive, neurology to consider adding back Aspirin in the setting of her small vessel disease and multiple chronic subcortical strokes, while weighing risk with known multiple microhemorrhages thought to be secondary to uncontrolled hypertension  · Continue Lipitor 40 mg daily   · Goal of normotension (as symptoms 2-3 days in duration)  · Will be very important to stress the importance of normotensive goal following discharge with PCP followup and antihypertensive regimen compliance, especially in the setting of multiple microhemorrhages noted on imaging thought to be hypertensive in etiology  · Initiate antihypertensive regimen  · Hemglobin A1C pending  · neuro checks  · telemetry  · provide stroke education  · therapy evaluations

## 2023-06-14 NOTE — CONSULTS
Consultation - Neurology   Socorro Rivas 78 y o  female MRN: [id number]  Unit/Bed#: ED 05 Encounter: [id number]      Assessment/Plan   Stroke-like symptoms  Assessment & Plan  78year old female with no documented medical history (per ED team, has not sought medical care/PCP evaluation in many years); alcohol use  Presented this morning, 6/14, with few days duration of language dysfunction (slow speech, word finding difficulty, dysarthria); concurrently with poor oral intake and overall bradykinesia at home  Neuro exam this morning with minimal expressive aphasia, dysarthria and R facial droop/tongue deviation  Failed initial dysphagia screen with nursing  Notable accelerated hypertension this morning (max /121)  Differential includes: cerebrovascular event, hypertensive encephalopathy  Plan:  -initiate stroke pathway:  -CTA head/neck with no acute intracranial pathology, does reveal chronic lacunar infarcts and advanced microvascular disease   From vessel standpoint without LVO nor any critical stenosis/flow restrictive disease  -obtain MRI brain  -obtain 2D echocardiogram  -per discussion with ED provider, reportedly failed dysphagia screen; thus will recommend 300 mg rectal aspirin if patient cannot reliably take PO medication  -if able to take oral meds today: can load with plavix 300 mg x1  -start DAPT with aspirin 81 mg/plavix 75 mg beginning tomorrow (if taking PO meds, otherwise continue rectal aspirin)  -Lipitor 40 mg daily (if taking PO meds)  -as symptoms 2-3 days in duration, does NOT need permissive hypertension, but would cautiously lower over next 24 hours (status post labetalol in the ED for SBP >210 this morning)  -check hemglobin A1C, lipid panel, TSH  -neuro checks  -telemetry monitoring  -provide stroke education  -therapy evaluations    Discussed plan of care with attending neurologist     Recommendations for outpatient neurological follow up have yet to be "determined  History of Present Illness     Reason for Consult / Principal Problem: Speech difficulty, poor PO intake  Hx and PE limited by: patient limited historian  HPI: Alta Almanza is a 78 y o  female with history as mentioned above in assessment who neurology is asked to evaluate in regards to the above  Given language dysfunction, patient limited historian and no family present at bedside this morning during neurologic exam to provide collateral information  Per discussion with ED provider and note review: patient does not regularly seek medical care and has not done so for many years  Over the past \"2-3 days\" (per patient), she and family have noticed speech difficulty (trouble saying words she is thinking of), dysarthria, poor oral intake (questionable due to dysphagia) and some confusion/bradykinesia compared to baseline  Given said symptoms, she was brought to the ED early this morning for evaluation  Workup this morning with notable hypertension, max /121, afebrile but with leukocytosis  CTA head/neck as mentioned above  To be admitted on stroke pathway  No prior encounters for review; Care everywhere not available for review  No daily medications per patient  Consult to neurology  Consult performed by: Aubrie Silverio PA-C  Consult ordered by: Satish Atkinson DO          Review of Systems   Constitutional: Positive for fatigue  HENT: Negative  Respiratory: Negative  Cardiovascular: Negative  Gastrointestinal: Negative  Musculoskeletal: Negative  Neurological: Positive for speech difficulty  Negative for dizziness, tremors, seizures, syncope, facial asymmetry, weakness, light-headedness, numbness and headaches  Psychiatric/Behavioral: Positive for confusion  Limited ROS; negative aside from the above  Historical Information   History reviewed  No pertinent past medical history  History reviewed  No pertinent surgical history    Social History " "  Social History     Substance and Sexual Activity   Alcohol Use Yes   • Alcohol/week: 4 0 standard drinks of alcohol   • Types: 4 Glasses of wine per week     Social History     Substance and Sexual Activity   Drug Use Never     E-Cigarette/Vaping   • E-Cigarette Use Never User      E-Cigarette/Vaping Substances   • Nicotine No    • THC No    • CBD No    • Flavoring No    • Other No    • Unknown No      Social History     Tobacco Use   Smoking Status Never   Smokeless Tobacco Never     Family History: History reviewed  No pertinent family history  Review of previous medical records was completed  Meds/Allergies   current meds:   Current Facility-Administered Medications   Medication Dose Route Frequency   • sodium chloride 0 9 % bolus 1,000 mL  1,000 mL Intravenous Once    and PTA meds:   None       No Known Allergies    Objective   Vitals:Blood pressure (!) 217/121, pulse (!) 120, temperature 97 6 °F (36 4 °C), resp  rate 20, height 5' 6\" (1 676 m), weight 63 5 kg (140 lb), SpO2 98 %  ,Body mass index is 22 6 kg/m²  No intake or output data in the 24 hours ending 06/14/23 0749    Invasive Devices: Invasive Devices     Peripheral Intravenous Line  Duration           Peripheral IV 06/14/23 Left Antecubital <1 day              Examined alongside Dr Martina London  Physical Exam  Constitutional:       Appearance: Normal appearance  HENT:      Head: Normocephalic and atraumatic  Eyes:      Extraocular Movements: Extraocular movements intact and EOM normal       Conjunctiva/sclera: Conjunctivae normal       Pupils: Pupils are equal, round, and reactive to light  Cardiovascular:      Rate and Rhythm: Tachycardia present  Rhythm irregular  Pulmonary:      Effort: Pulmonary effort is normal    Musculoskeletal:         General: Normal range of motion  Cervical back: Normal range of motion and neck supple  Skin:     General: Skin is warm and dry  Neurological:      Mental Status: She is alert        " "Coordination: Finger-Nose-Finger Test and Heel to Shin Test normal        Neurologic Exam     Mental Status   Minimal drowsy, but engaged throughout exam    Oriented to self, location, month/year; repeats phrases, names objects correctly  Just minimal difficulty with expression; mild aphasia  She is dysarthric but intelligible with responses  Following simple central/appendicular and cross body commands accurately  Cranial Nerves     CN II   Visual fields full to confrontation  CN III, IV, VI   Pupils are equal, round, and reactive to light  Extraocular motions are normal    Ophthalmoparesis: none  Conjugate gaze: present    CN V   Facial sensation intact  CN VIII   CN VIII normal      CN IX, X   CN IX normal    CN X normal      Slight R tongue deviation and R facial droop  Motor Exam   R UE: full appearing strength  L UE: full appearing strength     Bilateral LE with full strength with hip flexion, dorsiflexion/plantarflexion  Sensory Exam   Light touch normal      Gait, Coordination, and Reflexes     Coordination   Finger to nose coordination: normal  Heel to shin coordination: normal    Tremor   Resting tremor: absent  Intention tremor: absent      Lab Results:   CBC:   Results from last 7 days   Lab Units 06/14/23  0721   HEMATOCRIT % 48 3*   HEMOGLOBIN g/dL 16 1*   MCV fL 93   PLATELETS Thousands/uL 480*   RBC Million/uL 5 21*   WBC Thousand/uL 13 14*   , BMP/CMP:       Invalid input(s): \"ALBUMIN\", Vitamin B12:   , HgBA1C:   , TSH:   , Coagulation:   , Lipid Profile:     Imaging Studies: I have personally reviewed pertinent films in PACS   CTA head and neck with and without contrast   Final Result by Tonya 6 DO (06/14 2977)      Advanced chronic microangiopathic changes with chronic lacunar infarcts  No acute intracranial normality  No significant carotid or vertebral artery stenosis  No large vessel occlusion or aneurysm identified   Scattered vascular calcifications " intracranial compatible with intracranial atherosclerosis without focal stenosis  Workstation performed: [id number]         X-ray chest 1 view portable   ED Interpretation by Hilary Grande DO (06/14 1063)   Slightly rotated  Possible cardiomegaly  No infiltrates, effusion, CHF          EKG, Pathology, and Other Studies: I have personally reviewed pertinent reports  VTE Prophylaxis: None yet, in ED    Code Status: No Order    Total time spent today 45 minutes  Discussed plan of care with patient and ED provider/primary team: initiate stroke pathway, MRI/echo, DAPT if taking PO meds, cautious lowering of BP, neuro checks, therapies, infectious/metabolic workup

## 2023-06-14 NOTE — PLAN OF CARE
Problem: MOBILITY - ADULT  Goal: Maintain or return to baseline ADL function  Description: INTERVENTIONS:  -  Assess patient's ability to carry out ADLs; assess patient's baseline for ADL function and identify physical deficits which impact ability to perform ADLs (bathing, care of mouth/teeth, toileting, grooming, dressing, etc )  - Assess/evaluate cause of self-care deficits   - Assess range of motion  - Assess patient's mobility; develop plan if impaired  - Assess patient's need for assistive devices and provide as appropriate  - Encourage maximum independence but intervene and supervise when necessary  - Involve family in performance of ADLs  - Assess for home care needs following discharge   - Consider OT consult to assist with ADL evaluation and planning for discharge  - Provide patient education as appropriate  Outcome: Progressing     Problem: PAIN - ADULT  Goal: Verbalizes/displays adequate comfort level or baseline comfort level  Description: Interventions:  - Encourage patient to monitor pain and request assistance  - Assess pain using appropriate pain scale  - Administer analgesics based on type and severity of pain and evaluate response  - Implement non-pharmacological measures as appropriate and evaluate response  - Consider cultural and social influences on pain and pain management  - Notify physician/advanced practitioner if interventions unsuccessful or patient reports new pain  Outcome: Progressing     Problem: INFECTION - ADULT  Goal: Absence or prevention of progression during hospitalization  Description: INTERVENTIONS:  - Assess and monitor for signs and symptoms of infection  - Monitor lab/diagnostic results  - Monitor all insertion sites, i e  indwelling lines, tubes, and drains  - Monitor endotracheal if appropriate and nasal secretions for changes in amount and color  - Cashion appropriate cooling/warming therapies per order  - Administer medications as ordered  - Instruct and encourage patient and family to use good hand hygiene technique  - Identify and instruct in appropriate isolation precautions for identified infection/condition  Outcome: Progressing     Problem: SAFETY ADULT  Goal: Maintain or return to baseline ADL function  Description: INTERVENTIONS:  -  Assess patient's ability to carry out ADLs; assess patient's baseline for ADL function and identify physical deficits which impact ability to perform ADLs (bathing, care of mouth/teeth, toileting, grooming, dressing, etc )  - Assess/evaluate cause of self-care deficits   - Assess range of motion  - Assess patient's mobility; develop plan if impaired  - Assess patient's need for assistive devices and provide as appropriate  - Encourage maximum independence but intervene and supervise when necessary  - Involve family in performance of ADLs  - Assess for home care needs following discharge   - Consider OT consult to assist with ADL evaluation and planning for discharge  - Provide patient education as appropriate  Outcome: Progressing

## 2023-06-14 NOTE — CASE MANAGEMENT
Case Management Assessment & Discharge Planning Note    Patient name Jose Lucio  Location [facility] [id number] MRN [id number]  : 1945 Date 2023       Current Admission Date: 2023  Current Admission Diagnosis:Stroke-like symptoms   Patient Active Problem List    Diagnosis Date Noted   • Stroke-like symptoms 2023   • Hypertensive urgency 2023      LOS (days): 0  Geometric Mean LOS (GMLOS) (days):   Days to GMLOS:     OBJECTIVE:              Current admission status: Inpatient  Referral Reason: Stroke    Preferred Pharmacy: No Pharmacies Listed  Primary Care Provider: No primary care provider on file  Primary Insurance: MEDICARE  Secondary Insurance: [employer]ET[employer]    ASSESSMENT:  Active Health Care Proxies    There are no active Health Care Proxies on file  Advance Directives  Does patient have a [address]?: No  Was patient offered paperwork?: Yes (declined)  Does patient currently have a Health Care decision maker?: Yes, please see Health Care Proxy section  Does patient have Advance Directives?: No  Was patient offered paperwork?: Yes (declined)  Primary Contact: [facility]         Readmission Root Cause  30 Day Readmission: No    Patient Information  Admitted from[de-identified] Home  Mental Status: Alert  During Assessment patient was accompanied by: Not accompanied during assessment  Assessment information provided by[de-identified] Patient  Primary Caregiver: Self  Support Systems: Self, Spouse/significant other, Family members  [place] of Residence: [address] [place] [address] do you live in?: [address] [place] [address] entry access options   Select all that apply : Stairs  Number of steps to enter home : 2  Do the steps have railings?: No  Type of Current Residence: 2 story home  Upon entering residence, is there a bedroom on the main floor (no further steps)?: No  A bedroom is located on the following floor levels of residence (select all that apply):: 2nd Floor  Upon entering residence, is there a bathroom on the main floor (no further steps)?: Yes  Number of steps to 2nd floor from main floor: One Flight  In the last 12 months, was there a time when you were not able to pay the mortgage or rent on time?: No  In the last 12 months, how many places have you lived?: 1  In the last 12 months, was there a time when you did not have a steady place to sleep or slept in a shelter (including now)?: No  Homeless/housing insecurity resource given?: N/A  Living Arrangements: Lives w/ Spouse/significant other  Is patient a ?: No    Activities of Daily Living Prior to Admission  Functional Status: Independent  Completes ADLs independently?: Yes  Ambulates independently?: Yes  Does patient use assisted devices?: No  Does patient currently own DME?: Yes  What DME does the patient currently own?: Jennifer Carbajal  Does patient have a history of Outpatient Therapy (PT/OT)?: No  Does the patient have a history of Short-Term Rehab?: No  Does patient have a history of HHC?: No  Does patient currently have [employer]?: No         Patient Information Continued  Income Source: Pension/senior care  Does patient have prescription coverage?: Yes  Within the past 12 months, you worried that your food would run out before you got the money to buy more : Never true  Within the past 12 months, the food you bought just didn't last and you didn't have money to get more : Never true  Food insecurity resource given?: N/A  Does patient receive dialysis treatments?: No  Does patient have a history of substance abuse?: No  Does patient have a history of Mental Health Diagnosis?: No         Means of Transportation  Means of Transport to Appts[de-identified] Drives Self  In the past 12 months, has lack of transportation kept you from medical appointments or from getting medications?: No  In the past 12 months, has lack of transportation kept you from meetings, work, or from getting things needed for daily living?: No  Was application for public transport provided?: N/A        DISCHARGE DETAILS:    Discharge planning discussed with[de-identified] patient  Freedom of Choice: Yes  Comments - Freedom of Choice: discussed dc planning and role of case managment  Discussed Pt/Ot to eval and possible recommendations  CM contacted family/caregiver?: No- see comments (pt alert and able to participate in assessment)  Were Treatment Team discharge recommendations reviewed with patient/caregiver?: Yes  Did patient/caregiver verbalize understanding of patient care needs?: Yes       Contacts  Reason/Outcome: Discharge [address]         Is the patient interested in [facility] at discharge?:  (Pending Pt/Ot recs)    DME Referral Provided  Referral made for DME?: No    Other Referral/Resources/Interventions Provided:  Interventions: HHC, Short Term Rehab  Referral Comments: Pt is hoping to have no needs  She is open to dicussing recomendations from Pt/Ot once eval is completed            Discharge Destination Plan[de-identified] Home, Home with [address], Short Term Rehab  Transport at Discharge : Family                                      Additional Comments: Cm met with pt in the ED  She reports that PTA she was indpt at baseline  No DME needed  Pt reports she lives with her spouse in a 2s with a flight of stairs to bed and shower  There are about 2 kwaku the home  Pt beleives she has a walker at home but it is not used  She states she has no hx of HHC/STR/PT/Ot/MH/DA  Pt does not have a PCP  Cm discussed adding [facility] to her follow up and pt agreed that she will make an appointment to establish care  Pt reports that she drives and she uses [address]  Cm and pt disccused that Pt/Ot will eval pt and make recommendations for dc  CM reviewed that pt could be recommended HHC or rehab  Pt would prefer to go home with no needs but she is open to discussing the recommendations once evaluated  Pt still with difficulty speaking

## 2023-06-14 NOTE — ASSESSMENT & PLAN NOTE
Noted mildly elevated troponin at 58 > 59  Patient denies any chest pain or dyspnea    EKG sinus tachycardia  continue telemetry monitor, troponin trend ST elevation  Obtain echocardiogram  This is likely secondary to hypertensive urgency

## 2023-06-15 ENCOUNTER — APPOINTMENT (INPATIENT)
Dept: RADIOLOGY | Facility: HOSPITAL | Age: 78
DRG: 065 | End: 2023-06-15
Payer: MEDICARE

## 2023-06-15 PROBLEM — Z86.73 HISTORY OF MULTIPLE STROKES: Status: ACTIVE | Noted: 2023-06-15

## 2023-06-15 PROBLEM — I63.9 LEFT BASAL GANGLIA EMBOLIC STROKE (HCC): Status: ACTIVE | Noted: 2023-06-14

## 2023-06-15 PROBLEM — I48.91 NEW ONSET A-FIB (HCC): Status: ACTIVE | Noted: 2023-06-15

## 2023-06-15 LAB
ATRIAL RATE: 122 BPM
ATRIAL RATE: 71 BPM
ATRIAL RATE: 81 BPM
ATRIAL RATE: 84 BPM
CHOLEST SERPL-MCNC: 119 MG/DL
EST. AVERAGE GLUCOSE BLD GHB EST-MCNC: 108 MG/DL
HBA1C MFR BLD: 5.4 %
HDLC SERPL-MCNC: 44 MG/DL
LDLC SERPL CALC-MCNC: 56 MG/DL (ref 0–100)
PR INTERVAL: 200 MS
QRS AXIS: -1 DEGREES
QRS AXIS: 0 DEGREES
QRS AXIS: 51 DEGREES
QRS AXIS: 6 DEGREES
QRSD INTERVAL: 70 MS
QRSD INTERVAL: 80 MS
QRSD INTERVAL: 80 MS
QRSD INTERVAL: 84 MS
QT INTERVAL: 330 MS
QT INTERVAL: 376 MS
QT INTERVAL: 386 MS
QT INTERVAL: 386 MS
QTC INTERVAL: 447 MS
QTC INTERVAL: 450 MS
QTC INTERVAL: 464 MS
QTC INTERVAL: 474 MS
T WAVE AXIS: -1 DEGREES
T WAVE AXIS: -1 DEGREES
T WAVE AXIS: -4 DEGREES
T WAVE AXIS: 12 DEGREES
TRIGL SERPL-MCNC: 97 MG/DL
TSH SERPL DL<=0.05 MIU/L-ACNC: 1.09 UIU/ML (ref 0.45–4.5)
VENTRICULAR RATE: 119 BPM
VENTRICULAR RATE: 82 BPM
VENTRICULAR RATE: 85 BPM
VENTRICULAR RATE: 91 BPM

## 2023-06-15 PROCEDURE — 93010 ELECTROCARDIOGRAM REPORT: CPT | Performed by: INTERNAL MEDICINE

## 2023-06-15 PROCEDURE — 84443 ASSAY THYROID STIM HORMONE: CPT | Performed by: PHYSICIAN ASSISTANT

## 2023-06-15 PROCEDURE — 97116 GAIT TRAINING THERAPY: CPT

## 2023-06-15 PROCEDURE — 92611 MOTION FLUOROSCOPY/SWALLOW: CPT

## 2023-06-15 PROCEDURE — 80061 LIPID PANEL: CPT | Performed by: FAMILY MEDICINE

## 2023-06-15 PROCEDURE — 99233 SBSQ HOSP IP/OBS HIGH 50: CPT | Performed by: PSYCHIATRY & NEUROLOGY

## 2023-06-15 PROCEDURE — 99223 1ST HOSP IP/OBS HIGH 75: CPT | Performed by: INTERNAL MEDICINE

## 2023-06-15 PROCEDURE — 99233 SBSQ HOSP IP/OBS HIGH 50: CPT | Performed by: INTERNAL MEDICINE

## 2023-06-15 PROCEDURE — 74230 X-RAY XM SWLNG FUNCJ C+: CPT

## 2023-06-15 PROCEDURE — 97167 OT EVAL HIGH COMPLEX 60 MIN: CPT

## 2023-06-15 PROCEDURE — 83036 HEMOGLOBIN GLYCOSYLATED A1C: CPT | Performed by: FAMILY MEDICINE

## 2023-06-15 PROCEDURE — 93005 ELECTROCARDIOGRAM TRACING: CPT

## 2023-06-15 PROCEDURE — 97163 PT EVAL HIGH COMPLEX 45 MIN: CPT

## 2023-06-15 PROCEDURE — 92526 ORAL FUNCTION THERAPY: CPT

## 2023-06-15 RX ORDER — LISINOPRIL 20 MG/1
20 TABLET ORAL DAILY
Status: DISCONTINUED | OUTPATIENT
Start: 2023-06-15 | End: 2023-06-17

## 2023-06-15 RX ORDER — AMLODIPINE BESYLATE 2.5 MG/1
2.5 TABLET ORAL DAILY
Status: DISCONTINUED | OUTPATIENT
Start: 2023-06-15 | End: 2023-06-16

## 2023-06-15 RX ADMIN — LABETALOL HYDROCHLORIDE 10 MG: 5 INJECTION, SOLUTION INTRAVENOUS at 21:26

## 2023-06-15 RX ADMIN — CLOPIDOGREL BISULFATE 75 MG: 75 TABLET ORAL at 08:06

## 2023-06-15 RX ADMIN — ASPIRIN 81 MG CHEWABLE TABLET 81 MG: 81 TABLET CHEWABLE at 08:06

## 2023-06-15 RX ADMIN — LISINOPRIL 20 MG: 20 TABLET ORAL at 13:36

## 2023-06-15 RX ADMIN — HEPARIN SODIUM 5000 UNITS: 5000 INJECTION INTRAVENOUS; SUBCUTANEOUS at 13:36

## 2023-06-15 RX ADMIN — SODIUM CHLORIDE, SODIUM GLUCONATE, SODIUM ACETATE, POTASSIUM CHLORIDE, MAGNESIUM CHLORIDE, SODIUM PHOSPHATE, DIBASIC, AND POTASSIUM PHOSPHATE 100 ML/HR: .53; .5; .37; .037; .03; .012; .00082 INJECTION, SOLUTION INTRAVENOUS at 10:42

## 2023-06-15 RX ADMIN — LABETALOL HYDROCHLORIDE 10 MG: 5 INJECTION, SOLUTION INTRAVENOUS at 07:56

## 2023-06-15 RX ADMIN — HEPARIN SODIUM 5000 UNITS: 5000 INJECTION INTRAVENOUS; SUBCUTANEOUS at 21:26

## 2023-06-15 RX ADMIN — HEPARIN SODIUM 5000 UNITS: 5000 INJECTION INTRAVENOUS; SUBCUTANEOUS at 05:02

## 2023-06-15 RX ADMIN — AMLODIPINE BESYLATE 2.5 MG: 2.5 TABLET ORAL at 13:36

## 2023-06-15 RX ADMIN — ATORVASTATIN CALCIUM 40 MG: 40 TABLET, FILM COATED ORAL at 15:53

## 2023-06-15 NOTE — PLAN OF CARE
Problem: MOBILITY - ADULT  Goal: Maintain or return to baseline ADL function  Description: INTERVENTIONS:  -  Assess patient's ability to carry out ADLs; assess patient's baseline for ADL function and identify physical deficits which impact ability to perform ADLs (bathing, care of mouth/teeth, toileting, grooming, dressing, etc )  - Assess/evaluate cause of self-care deficits   - Assess range of motion  - Assess patient's mobility; develop plan if impaired  - Assess patient's need for assistive devices and provide as appropriate  - Encourage maximum independence but intervene and supervise when necessary  - Involve family in performance of ADLs  - Assess for home care needs following discharge   - Consider OT consult to assist with ADL evaluation and planning for discharge  - Provide patient education as appropriate  Outcome: Progressing  Goal: Maintains/Returns to pre admission functional level  Description: INTERVENTIONS:  - Perform BMAT or MOVE assessment daily    - Set and communicate daily mobility goal to care team and patient/family/caregiver  - Collaborate with rehabilitation services on mobility goals if consulted  - Perform Range of Motion 4 times a day  - Reposition patient every 4 hours    - Dangle patient 4 times a day  - Stand patient 4 times a day  - Ambulate patient 4 times a day  - Out of bed to chair 3 times a day   - Out of bed for meals 3 times a day  - Out of bed for toileting  - Record patient progress and toleration of activity level   Outcome: Progressing     Problem: PAIN - ADULT  Goal: Verbalizes/displays adequate comfort level or baseline comfort level  Description: Interventions:  - Encourage patient to monitor pain and request assistance  - Assess pain using appropriate pain scale  - Administer analgesics based on type and severity of pain and evaluate response  - Implement non-pharmacological measures as appropriate and evaluate response  - Consider cultural and social influences on pain and pain management  - Notify physician/advanced practitioner if interventions unsuccessful or patient reports new pain  Outcome: Progressing     Problem: INFECTION - ADULT  Goal: Absence or prevention of progression during hospitalization  Description: INTERVENTIONS:  - Assess and monitor for signs and symptoms of infection  - Monitor lab/diagnostic results  - Monitor all insertion sites, i e  indwelling lines, tubes, and drains  - Monitor endotracheal if appropriate and nasal secretions for changes in amount and color  - Endeavor appropriate cooling/warming therapies per order  - Administer medications as ordered  - Instruct and encourage patient and family to use good hand hygiene technique  - Identify and instruct in appropriate isolation precautions for identified infection/condition  Outcome: Progressing  Goal: Absence of fever/infection during neutropenic period  Description: INTERVENTIONS:  - Monitor WBC    Outcome: Progressing     Problem: SAFETY ADULT  Goal: Maintain or return to baseline ADL function  Description: INTERVENTIONS:  -  Assess patient's ability to carry out ADLs; assess patient's baseline for ADL function and identify physical deficits which impact ability to perform ADLs (bathing, care of mouth/teeth, toileting, grooming, dressing, etc )  - Assess/evaluate cause of self-care deficits   - Assess range of motion  - Assess patient's mobility; develop plan if impaired  - Assess patient's need for assistive devices and provide as appropriate  - Encourage maximum independence but intervene and supervise when necessary  - Involve family in performance of ADLs  - Assess for home care needs following discharge   - Consider OT consult to assist with ADL evaluation and planning for discharge  - Provide patient education as appropriate  Outcome: Progressing  Goal: Maintains/Returns to pre admission functional level  Description: INTERVENTIONS:  - Perform BMAT or MOVE assessment daily    - Set and communicate daily mobility goal to care team and patient/family/caregiver  - Collaborate with rehabilitation services on mobility goals if consulted  - Perform Range of Motion 4 times a day  - Reposition patient every 4 hours    - Dangle patient 4 times a day  - Stand patient 4 times a day  - Ambulate patient 3 times a day  - Out of bed to chair 3 times a day   - Out of bed for meals 3 times a day  - Out of bed for toileting  - Record patient progress and toleration of activity level   Outcome: Progressing  Goal: Patient will remain free of falls  Description: INTERVENTIONS:  - Educate patient/family on patient safety including physical limitations  - Instruct patient to call for assistance with activity   - Consult OT/PT to assist with strengthening/mobility   - Keep Call bell within reach  - Keep bed low and locked with side rails adjusted as appropriate  - Keep care items and personal belongings within reach  - Initiate and maintain comfort rounds  - Make Fall Risk Sign visible to staff  - Offer Toileting every 2 Hours, in advance of need  - Initiate/Maintain bed alarm  - Obtain necessary fall risk management equipment: sock/bracelet  - Apply yellow socks and bracelet for high fall risk patients  - Consider moving patient to room near nurses station  Outcome: Progressing     Problem: DISCHARGE PLANNING  Goal: Discharge to home or other facility with appropriate resources  Description: INTERVENTIONS:  - Identify barriers to discharge w/patient and caregiver  - Arrange for needed discharge resources and transportation as appropriate  - Identify discharge learning needs (meds, wound care, etc )  - Arrange for interpretive services to assist at discharge as needed  - Refer to Case Management Department for coordinating discharge planning if the patient needs post-hospital services based on physician/advanced practitioner order or complex needs related to functional status, cognitive ability, or social support system  Outcome: Progressing     Problem: Knowledge Deficit  Goal: Patient/family/caregiver demonstrates understanding of disease process, treatment plan, medications, and discharge instructions  Description: Complete learning assessment and assess knowledge base  Interventions:  - Provide teaching at level of understanding  - Provide teaching via preferred learning methods  Outcome: Progressing     Problem: Neurological Deficit  Goal: Neurological status is stable or improving  Description: Interventions:  - Monitor and assess patient's level of consciousness, motor function, sensory function, and level of assistance needed for ADLs  - Monitor and report changes from baseline  Collaborate with interdisciplinary team to initiate plan and implement interventions as ordered  - Provide and maintain a safe environment  - Consider seizure precautions  - Consider fall precautions  - Consider aspiration precautions  - Consider bleeding precautions  Outcome: Progressing     Problem: Activity Intolerance/Impaired Mobility  Goal: Mobility/activity is maintained at optimum level for patient  Description: Interventions:  - Assess and monitor patient  barriers to mobility and need for assistive/adaptive devices  - Assess patient's emotional response to limitations  - Collaborate with interdisciplinary team and initiate plans and interventions as ordered  - Encourage independent activity per ability   - Maintain proper body alignment  - Perform active/passive rom as tolerated/ordered  - Plan activities to conserve energy   - Turn patient as appropriate  Outcome: Progressing     Problem: Communication Impairment  Goal: Ability to express needs and understand communication  Description: Assess patient's communication skills and ability to understand information  Patient will demonstrate use of effective communication techniques, alternative methods of communication and understanding even if not able to speak       - Encourage communication and provide alternate methods of communication as needed  - Collaborate with case management/ for discharge needs  - Include patient/family/caregiver in decisions related to communication  Outcome: Progressing     Problem: Potential for Aspiration  Goal: Non-ventilated patient's risk of aspiration is minimized  Description: Assess and monitor vital signs, respiratory status, and labs (WBC)  Monitor for signs of aspiration (tachypnea, cough, rales, wheezing, cyanosis, fever)  - Assess and monitor patient's ability to swallow  - Place patient up in chair to eat if possible  - HOB up at 90 degrees to eat if unable to get patient up into chair   - Supervise patient during oral intake  - Instruct patient/ family to take small bites  - Instruct patient/ family to take small single sips when taking liquids  - Follow patient-specific strategies generated by speech pathologist   Outcome: Progressing  Goal: Ventilated patient's risk of aspiration is minimized  Description: Assess and monitor vital signs, respiratory status, airway cuff pressure, and labs (WBC)  Monitor for signs of aspiration (tachypnea, cough, rales, wheezing, cyanosis, fever)  - Elevate head of bed 30 degrees if patient has tube feeding   - Monitor tube feeding  Outcome: Progressing     Problem: Nutrition  Goal: Nutrition/Hydration status is improving  Description: Monitor and assess patient's nutrition/hydration status for malnutrition (ex- brittle hair, bruises, dry skin, pale skin and conjunctiva, muscle wasting, smooth red tongue, and disorientation)  Collaborate with interdisciplinary team and initiate plan and interventions as ordered  Monitor patient's weight and dietary intake as ordered or per policy  Utilize nutrition screening tool and intervene per policy  Determine patient's food preferences and provide high-protein, high-caloric foods as appropriate       - Assist patient with eating   - Allow adequate time for meals   - Encourage patient to take dietary supplement as ordered  - Collaborate with clinical nutritionist   - Include patient/family/caregiver in decisions related to nutrition    Outcome: Progressing     Problem: Prexisting or High Potential for Compromised Skin Integrity  Goal: Skin integrity is maintained or improved  Description: INTERVENTIONS:  - Identify patients at risk for skin breakdown  - Assess and monitor skin integrity  - Assess and monitor nutrition and hydration status  - Monitor labs   - Assess for incontinence   - Turn and reposition patient  - Assist with mobility/ambulation  - Relieve pressure over bony prominences  - Avoid friction and shearing  - Provide appropriate hygiene as needed including keeping skin clean and dry  - Evaluate need for skin moisturizer/barrier cream  - Collaborate with interdisciplinary team   - Patient/family teaching  - Consider wound care consult   Outcome: Progressing

## 2023-06-15 NOTE — SPEECH THERAPY NOTE
"Speech Language/Pathology    Speech/Language Pathology Progress Note    Patient Name: See Thomas  WCOAR'E Date: 6/15/2023     Problem List  Principal Problem:    Left basal ganglia embolic stroke [facility])  Active Problems:    Hypertensive urgency    Elevated troponin    History of multiple strokes    New onset a-fib [facility])       Past Medical History  History reviewed  No pertinent past medical history  Past Surgical History  History reviewed  No pertinent surgical history  Subjective:  Pt awake and alert, sitting upright in bed  \"I don't like the thickened too much but I know I have to  \"    Current Diet:  Dysphagia 3 w/ honey thick     Objective:  Pt seen for dx dysphagia tx f/u  Pt assessed w/ trials of nectar thick and thin liquids via cup sip  Adequate labial seal w/ apparent good oral control without anterior loss of material  Prompt transfers of all  Swallows w/ ?able delay  Cough x1 and thorat clear x1 across approx 3 oz thin liquids  Cued for several coughs to clear wet vocal quality prior to trials of nectar thick  Throat clearing noted x2 w/ cup sips of nectar thick  Education provided to pt regarding recommendation for instrumental assessment (MBS) to further assess swallow function/safety given ongoing s/s  Pt agreeable to participation in study at this time - scheduled for 1400 today  Assessment:  Pt continues w/ inconsistent s/s aspiration w/ liquids  Pt would benefit from MBSS to further assess/guide tx plan       Plan/Recommendations:  MBSS scheduled for 1400 today  Further plan/recommendations to follow      "

## 2023-06-15 NOTE — ASSESSMENT & PLAN NOTE
· Newly identified on Tele overnight, controlled rate  · Likely source of acute left basal ganglia stroke  · Plan/timing of initiation of AC as detailed above

## 2023-06-15 NOTE — ASSESSMENT & PLAN NOTE
"· 37-year-old female, with no PMH, no PCP follow-up for years, presents with dysarthria, word finding difficulty  · Chest x-ray shows no effusion, or pulmonary edema  · EKG reveals sinus tachycardia  · CTA head and neck negative for acute infarct, however advanced chronic microangiopathic changes with chronic lacunar infarcts  · MRI shows acute CVA  · Video swallow will be completed by speech therapy      · Okay for oral meds, diet level 3, honey thickened fluid    Detailed plan of care per Dr Elizabeth Galindo :    Aleyda Palmer infarction in L BG extending to vazquez  Numerous chronic infarcts scattered throughout deep areas as well as L cerebellum, which appears embolic; also has frontal encephalomalacia more likely related to traumatic injury  Scattered microhemorrhages are present, primarily within the cerebellar hemispheres and deep matter but a few scattered peripheral microbleeds are also present  Suspect this particular infarct may be due to cardioembolism in the setting of newly diagnosed Afib, and she does have at least one other embolic-appearing infarct that was more likely due to this as well  She also has multiple small vessel ischemic infarcts due to poor risk factor control over the course of many years, which is further supported by extensive white matter disease seen on MRI  While she would likely benefit from long-term [facility] given risk of further embolism with Afib, her case is complicated by poor control of BP as well as significant burden of microhemorrhages on MRI though much or all of this is suspected to be moreso related to hypertensive arteriopathy rather than amyloid angiopathy  Regardless, given her poor follow-up, poor risk factor control, and MRI appearance she is at a higher risk for hemorrhage than the average patient and timing will need to be careful with instituting antithrombotics  Focus at this point should be BP control with plan for long-term normotension   Ideally would see PCP early " "next week and if pt has improved BP control would be ok with initiation of apixaban at that time and can d/c ASA  Upon outpatient follow-up if pt continues to have good BP control and has demonstrated she can tolerate apixaban she can then also have ASA added for additional support regarding her risk of continued small vessel ischemia  Continue on statin   \"          "

## 2023-06-15 NOTE — PHYSICAL THERAPY NOTE
"                                                                                  PHYSICAL THERAPY EVALUATION NOTE      Patient Name: Morgan Quiroz  FYRLV'A Date: 6/15/2023    AGE:   78 y o  Mrn:   [id number]  ADMIT DX:  Hypokalemia [E87 6]  Hypertensive emergency without congestive heart failure [I16 1]  Ischemic stroke ([facility] [address] ) [I63 9]    History reviewed  No pertinent past medical history  Length Of Stay: 1  PHYSICAL THERAPY EVALUATION :   Patient's identity confirmed via 2 patient identifiers (full name and ) at start of session       06/15/23 1318   PT Last Visit   PT Visit Date 06/15/23   Note Type   Note type Evaluation   Pain Assessment   Pain Assessment Tool 0-10   Pain Score No Pain   Restrictions/Precautions   Weight Bearing Precautions Per Order No   Other Precautions Chair Alarm; Bed Alarm   Home Living   Type of [address] [place] [address] Two level;Stairs to enter with rails;Bed/bath upstairs  (2 CONNIE)   Home Equipment Walker;Cane   Additional Comments Pt reports no use of AD at baseline   Prior Function   Level of [place] Independent with ADLs; Independent with functional mobility   Lives With Spouse   IADLs Independent with driving; Independent with meal prep; Independent with medication management   Falls in the last 6 months 0   Vocational Full time employment  ([place] for [employer] firm)   General   Additional Pertinent History MRI of brain: \"Acute infarct in left basal ganglia\"   Family/Caregiver Present No   Cognition   Arousal/Participation Alert   Orientation Level Oriented X4   Memory Decreased recall of recent events   Following Commands Follows multistep commands with increased time or repetition   Comments Pt w/ expressive aphasia and dysarthria   RLE Assessment   RLE Assessment WFL   Strength RLE   RLE Overall Strength 4-/5   LLE Assessment   LLE Assessment WFL   Strength LLE   LLE Overall Strength 4-/5   Vision-Basic Assessment   Current Vision Wears glasses for distance only " Coordination   Movements are Fluid and Coordinated 0   Coordination and Movement Description wide YOLI, stiff   Finger to Nose & Finger to Finger  Intact   Bed Mobility   Supine to Sit Unable to assess   Additional Comments Pt OOB in recliner chair at start and end of session   Transfers   Sit to Stand 5  Supervision   Additional items Assist x 1; Armrests; Increased time required   Stand to Sit 5  Supervision   Additional items Assist x 1; Armrests; Increased time required   Ambulation/Elevation   Gait pattern Decreased foot clearance; Short stride; Excessively slow; Wide YOLI   Gait Assistance 4  Minimal assist  (CGA)   Additional items Assist x 1   Assistive Device None   Distance 25 ft x 2   Ambulation/Elevation Additional Comments see tx note below for much improved ambulation w/ use of RW   Balance   Static Sitting Fair +   Static Standing Fair   Ambulatory Fair -   Activity Tolerance   Activity Tolerance Patient tolerated treatment well   Medical Staff Made Aware JUSTIN Ayala   Nurse Made Aware ANTONIO Enriquez   Assessment   Problem List Decreased strength;Decreased endurance; Impaired balance;Decreased mobility; Decreased cognition;Decreased safety awareness   Assessment Cedric West is a 78 y o  Female who presents to [address] [place] [address] on 6/14/2023 from home w/ c/o change in speech and diagnosis of L basal ganglia embolic stroke  Orders for PT eval and treat received  Pt presents w/ comorbidities of ETOH abuse  At baseline, pt mobilizes I w/ no AD, and reports 0 falls in the last 6 months  Upon evaluation, pt presents w/ the following deficits: weakness, impaired balance and decreased endurance  Upon eval, pt requires S for transfers, and S for gait  Based on this PT evaluation today, patient's discharge recommendation is for Level III   During this admission, pt would benefit from continued skilled inpatient PT in the acute care setting in order to address the abovementioned deficits to maximize function and mobility before DC from acute care  Goals   Patient Goals to go home   STG Expiration Date 06/25/23   Short Term Goal #1 Patient will: Perform all bed mobility tasks modified independent to improve pt's independence w/ repositioning for decrease risk of skin breakdown, Perform all transfers modified independent consistently from various height surfaces in order to improve I w/ engagement w/ real-world environments/situations, Ambulate at least 150 ft  +/- LRAD w/ supervision w/o LOB to facilitate return and engagement w/ previous living environment and Navigate 1 flight of stairs w/ supervision with unilateral handrail to either improve independence w/ entering home and/or so patient can fully access living areas in home   PT Treatment Day 0   Plan   Treatment/Interventions Functional transfer training;LE strengthening/ROM; Elevations; Endurance training; Therapeutic exercise;Cognitive reorientation;Patient/family training;Equipment eval/education; Bed mobility;Gait training   PT Frequency 2-3x/wk   Recommendation   UB Rehab Discharge Recommendation (PT/OT) Level 3   Equipment Recommended Ji Marion)  Walker   AM-PAC Basic Mobility Inpatient   Turning in Flat Bed Without Bedrails 3   Lying on Back to Sitting on Edge of Flat Bed Without Bedrails 3   Moving Bed to Chair 3   Standing Up From Chair Using Arms 3   Walk in Room 3   Climb 3-5 Stairs With Railing 3   Basic Mobility Inpatient Raw Score 18   Basic Mobility Standardized Score 41 05   Highest Level Of Mobility   JH-HLM Goal 6: Walk 10 steps or more   JH-HLM Achieved 7: Walk 25 feet or more   Additional Treatment Session   Start Time 1330   End Time 1338   Treatment Assessment Pt seen for PT intervention  Provided w/ RW for functional mobility  Pt is able to ambulate 150 ft w/ RW w/ constant S  No LOB noted, improved subjective gait speed compared to without AD  Pt is able to perform turns and navigate in hallway without increased level of A   Pt returned to recliner chair at end of session Equipment Use RW   Additional Treatment Day 1   End of Consult   Patient Position at End of Consult Bedside chair;Bed/Chair alarm activated; All needs within reach       The patient's AM-PAC Basic Mobility Inpatient Short Form Raw Score is 18, Standardized Score is 41 05  A standardized score greater than 38 32 (raw score of 16) suggests the patient may benefit from discharge to home which may not coincide with above PT recommendations  However please refer to therapist recommendation for discharge planning given other factors that may influence destination      Pt would benefit from skilled inpatient PT during this admission in order to facilitate progress towards goals to maximize functional independence    Cecy Saeed, PT, DPT

## 2023-06-15 NOTE — QUICK NOTE
Notified of Afib on tele - tele review showing occasional P waves but irregular rhythm consistent with Afib, although rate controlled in the 80s  Rhythm appears unchanged since arrival to floor earlier yesterday  Cardiology consulted  Held on anticoagulation due to present of acute stroke and multiple chronic lacunar infarcts and microhemorrhages on MRI brain  Will await neuro OK to start a/c

## 2023-06-15 NOTE — CONSULTS
Consult - Cardiology   Ainsley Gonzalez 78 y o  female MRN: [id number]  Unit/Bed#: -Michelle Encounter: [id number]    Reason For Consult: Possible new onset atrial fibrillation   Outpatient Cardiologist: None             ASSESSMENT:  1  Dysphagia secondary to acute CVA  a  MRI brain 6/14/2023: Acute infarct in left basal ganglia  Multifocal chronic lacunar infarcts, chronic small infarcts in bilateral cerebellum, severe chronic microangiopathy and hypertensive arteriopathy  Chronic cystic encephalomalacia and gliosis in bilateral anterior frontal lobes and right anterior temporal lobe likely sequela of remote trauma, mild dolichoectasia of basilar artery  b  S/P loading dose of Plavix 300 mg x1 (6/14/2023)  c  Started on atorvastatin 40 mg daily and DAPT with ASA 81 mg and Plavix 75 mg (6/15/2023)   d  Neurology consulted-recommendations appreciated  e  At this time, plan for DAPT until 6/16/2023, then transition to ASA 81 mg only  Plan for outpatient follow up with PCP on 6/19/2023, if patient BP at goal, can stop ASA and start Eliquis at that time  2  Possible new onset atrial fibrillation  a  Asymptomatic  b  EKG shows sinus tachycardia  c  Telemetry reviewed; HR 70-80s with irregularity, possible AF vs SR with PACs, single 5 beat run of NSVT (see below)  d  TTE 6/14/2023: EF 55%, mild MR/mild TR  3  Hypertensive urgency  a  BP on arrival 202/120 >> last /118   b  Permissive hypertension as per neurology team  c  On labetalol 10 mg every 4 hours as needed for goal SBP <180  d  Not on antihypertensives at home    PLAN/ DISCUSSION:     • EKG/telemetry reviewed with attending and confirms atrial fibrillation  • Recommend starting Eliquis 5 mg twice daily for high [id number] of 6 (htn, age+2, cva+2, gender)   However, MRI brain noted CVA with possible micro-hemorrhages and hypertensive disease, in light of this, neurology has recommended holding off starting AC until 6/19/2023 as long as her BP is at goal (see above)  • Patient is currently asymptomatic at this time and is intrinsically rate controlled in the 70-80s  Could consider addition of carvedilol for dual benefit HR/BP control  Overall, would defer to neurology for hypertension management the setting of acute CVA  DIAGNOSTIC DATA:      ECG:             Telemetry:                 History of Present Illness:  Morgan Quiroz is a 78y o  year old female without significant past cardiac history who presents with aphasia over the past 72 hours prior to admission  Patient unable to give history due to aphasia  Her  was at bedside assisting with history today  Per his account she was in her usual state of health until she starting having slurred speech and irregular speech patterns  He finally took her to the ED for evaluation  Neurology was consulted on arrival  She was deemed not a tPA candidate  MRI brain showed left basal ganglia CVA with evidence of multiple prior CVA and possible microhemorrhages  EKG on arrival showed sinus tachycardia with heart rate in the 110s  Overnight on telemetry patient was noted with irregular heart rhythm concerning for atrial fibrillation and thus cardiology was consulted for additional recommendations  Inpatient TTE as part of her CVA work-up showed preserved EF of 55% without significant valvular or regional wall motion abnormalities  Patient is asymptomatic from a cardiac standpoint to include any recent chest pain, discomfort, palpitations, shortness of breath, diaphoresis, syncope or presyncope  Per , patient does not have any history of any cardiac disease or needing to follow-up with a cardiologist before  She has not followed up with her PCP in many years and was not taking any medications for any chronic illnesses prior to arrival     Past Medical History:        History reviewed  No pertinent past medical history  History reviewed  No pertinent surgical history       Allergy:        No Known Allergies    Medications:       Prior to Admission medications    Not on File       Family History:     History reviewed  No pertinent family history  Social History:       Social History     Socioeconomic History   • Marital status: /Civil Union     Spouse name: None   • Number of children: None   • Years of education: None   • Highest education level: None   Occupational History   • None   Tobacco Use   • Smoking status: Never   • Smokeless tobacco: Never   Vaping Use   • Vaping Use: Never used   Substance and Sexual Activity   • Alcohol use: Yes     Alcohol/week: 4 0 standard drinks of alcohol     Types: 4 Glasses of wine per week   • Drug use: Never   • Sexual activity: None   Other Topics Concern   • None   Social History Narrative   • None     Social Determinants of Health     Financial Resource Strain: Not on file   Food Insecurity: No Food Insecurity (6/14/2023)    Hunger Vital Sign    • Worried About Running Out of Food in the Last Year: Never true    • Ran Out of Food in the Last Year: Never true   Transportation Needs: No Transportation Needs (6/14/2023)    PRAPARE - Transportation    • Lack of Transportation (Medical): No    • Lack of Transportation (Non-Medical): No   Physical Activity: Not on file   Stress: Not on file   Social Connections: Not on file   Intimate Partner Violence: Not on file   Housing Stability: Low Risk  (6/14/2023)    Housing Stability Vital Sign    • Unable to Pay for Housing in the Last Year: No    • Number of Places Lived in the Last Year: 1    • Unstable Housing in the Last Year: No       Weights/BMI:    Wt Readings from Last 3 Encounters:   06/14/23 55 1 kg (121 lb 7 6 oz)   , Body mass index is 19 61 kg/m²  ROS:  14 point ROS negative except as outlined above  Remainder review of systems is negative    Exam:  General: Alert, oriented and in no acute distress, cooperative  Head: Normocephalic, atraumatic  Eyes: PERRLA  No icterus  Normal Conjunctiva  Oropharynx: Moist and normal-appearing mucosa  Neck: Supple, symmetrical, trachea midline, JVD not appreciated     Heart: irregularly irregular, no murmur, rub or gallop, S1 & S2 normal   Lungs: Normal air entry, lungs clear to auscultation and no rales, rhonchi or wheezing   Abdomen: Flat, normal findings: bowel sounds normal and soft, non-tender  Lower Limbs:  No pitting edema, 2+ peripheral pulses, capillary refill within normal limits  Musculoskeletal: ROM grossly normal

## 2023-06-15 NOTE — ASSESSMENT & PLAN NOTE
A-fib seen on EKG, neurology note reviewed, hold off on full dose anticoagulation, due to multiple abnormalities  High risk of intracranial hemorrhage      See note above

## 2023-06-15 NOTE — OCCUPATIONAL THERAPY NOTE
Occupational Therapy Evaluation     Patient Name: Carolyn Morley  IZTWEDILLON Date: 6/15/2023  Problem List  Principal Problem:    Left basal ganglia embolic stroke [facility])  Active Problems:    Hypertensive urgency    Elevated troponin    History of multiple strokes    New onset a-fib [facility])    Past Medical History  History reviewed  No pertinent past medical history  Past Surgical History  History reviewed  No pertinent surgical history  06/15/23 1319   OT Last Visit   OT Visit Date 06/15/23   Note Type   Note type Evaluation   Pain Assessment   Pain Assessment Tool 0-10   Pain Score No Pain   Restrictions/Precautions   Weight Bearing Precautions Per Order No   Other Precautions Chair Alarm; Bed Alarm   Home Living   Type of [address] [place] [address] Two level;Stairs to enter with rails;Bed/bath upstairs  (2 CONNIE)   Home Equipment Walker;Cane   Additional Comments Does not use at baseline   Prior Function   Level of [place] Independent with ADLs; Independent with functional mobility; Independent with IADLS   Lives With Spouse   Receives Help From Family   IADLs Independent with driving; Independent with meal prep; Independent with medication management   Falls in the last 6 months 0   Vocational Full time employment   Lifestyle   Autonomy Pt is independent at baseline   Reciprocal Relationships Lives with spouse   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   Intrinsic Gratification [place]   General   Additional Pertinent History Left basal ganglia embolic stroke, h/o multiple CVAs, new onset a-fib, HTN   Family/Caregiver Present No   Subjective   Subjective Pt received OOB to recliner  Pt pleasant & cooperative   Dysarthria noted, subtle aphasia at times   ADL   Eating Assistance 7  Independent   Grooming Assistance 5  Supervision/Setup   [facility] [place] [id number] "Supervision/Setup   LB Dressing Deficit Thread LLE into underwear; Thread RLE into underwear;Pull up over hips   Toileting Assistance  5  Supervision/Setup   Toileting Deficit Supervison/safety;Verbal cueing; Increased time to complete;Perineal hygiene   Additional Comments Pt benefits from VCs & S for safety   Bed Mobility   Additional Comments Received OOB to recliner   Transfers   Sit to Stand 5  Supervision   Additional items Armrests   Stand to Sit 5  Supervision   Additional items Armrests   Toilet transfer 5  Supervision   Additional items Armrests   Functional Mobility   Functional Mobility 5  Supervision   Additional Comments Both without & with DME  Fair balance without DME, fair (+) balance with   Additional items Rolling walker   Balance   Static Sitting Good   Dynamic Sitting Fair +   Static Standing Fair +   Dynamic Standing Fair +   Ambulatory Fair   Activity Tolerance   Activity Tolerance Patient tolerated treatment well   Medical Staff Made Aware PT Mee   Nurse Made Aware ANTONIO Enriquez   RUKAVITA Assessment   RUE Assessment WFL   LUE Assessment   LUE Assessment WFL   Hand Function   Gross Motor Coordination Functional   Fine Motor Coordination Functional   Sensation   Light Touch No apparent deficits   Vision-Basic Assessment   Current Vision No visual deficits  (States she wears glasses \"sometimes\")   Cognition   Overall Cognitive Status WFL  (Appears to be [facility])   Arousal/Participation Alert   Attention Within functional limits   Orientation Level Oriented X4   Memory Decreased recall of precautions   Following Commands Follows one step commands with increased time or repetition   Comments Expressive aphasia & dysarthria  Dysarthria > aphasia  Increased processing time, benefits from VCs   Assessment   Limitation Decreased ADL status; Decreased cognition;Decreased endurance;Decreased self-care trans;Decreased high-level ADLs; Decreased Safe judgement during ADL   Prognosis Good   Assessment Pt is a 78 y o  " [facility], admitted 6/14/2023 w/ Left basal ganglia embolic stroke ([facility] [address] )  OT completed extensive review of pt's medical and social history  Comorbidities affecting pt's functional performance at time of assessment include: hypertensive urgency, elevated troponin, hx of multiple strokes, new onset a-fib, hx of ETOH abuse  Personal factors affecting pt at time of IE include: steps to enter environment, limited home support, difficulty performing ADLS, difficulty performing IADLS  and health management   Prior to admission, pt was living with her spouse in a [facility] with 2 CONNIE  Pt was I w/  ADLS and w/ IADLS, (+) drove, & required use of no DME/AD PTA  Upon evaluation: Pt requires S for functional mobility/transfers, S for UB ADLs and S for LB ADLS 2* the following deficits impacting occupational performance: decreased balance, decreased tolerance, impaired sequencing and decreased safety awareness  Full objective findings from OT assessment regarding body systems outlined above  Pt to benefit from continued skilled OT tx while in the hospital to address deficits as defined above and maximize level of functional independence w/ ADL's and functional mobility  Occupational Performance areas to address include: grooming, bathing/shower, toilet hygiene, dressing, health maintenance, functional mobility, community mobility and clothing management  Based on findings, pt is of high complexity  The patient's raw score on the AM-PAC Daily Activity inpatient short form is 19, standardized score is 40 22, greater than 39 4  Patients at this level are likely to benefit from DC to home  However, please refer to therapist recommendation for discharge planning given other factors that may influence destination  At this time, OT recommendations at time of discharge are DC with Level III resources     Goals   Patient Goals Pt wants to go home   Plan   Treatment Interventions ADL retraining;Functional transfer training; Endurance training;Patient/family training;Equipment evaluation/education; Compensatory technique education;Continued evaluation   Goal Expiration Date 06/25/23   OT Treatment Day 0   OT Frequency 2-3x/wk   Recommendation   UB Rehab Discharge Recommendation (PT/OT) Level 3   Equipment Recommended Bedside commode   Commode Type Standard   AM-PAC Daily Activity Inpatient   Lower Body Dressing 3   Bathing 3   Toileting 3   Upper Body Dressing 3   Grooming 3   Eating 4   Daily Activity Raw Score 19   Daily Activity Standardized Score (Calc for Raw Score >=11) 40 22   AM-PAC Applied Cognition Inpatient   Following a Speech/Presentation 3   Understanding Ordinary Conversation 4   Taking Medications 4   Remembering Where Things Are Placed or Put Away 4   Remembering List of 4-5 Errands 3   Taking Care of Complicated Tasks 3   Applied Cognition Raw Score 21   Applied Cognition Standardized Score 44 3   End of Consult   Education Provided Yes   Patient Position at End of Consult Bedside chair;Bed/Chair alarm activated; All needs within reach   Nurse Communication Nurse aware of consult     Pt will achieve the following goals within 10 days  *Pt will complete LB bathing and dressing with Mod I & DME PRN  *Pt will complete toileting w/ Mod I w/ G hygiene/thoroughness using DME PRN    *Pt will complete bed mobility independently, with bed flat and no side rail to prep for purposeful tasks    *Pt will perform functional transfers with on/off all surfaces with Mod I using DME as needed w/ G balance/safety  *Pt will improve functional mobility during ADL/IADL/leisure tasks to Mod I using DME as needed w/ G balance/safety  *Pt will improve standing balance to G for 8-10 minutes during purposeful activity w/ Mod I & G endurance       *Pt will demonstrate G carryover of pt/caregiver education and training as appropriate w/ Mod I w/o cues w/ good tolerance      *Assess DME needs    Alisson Mins, OTR/L

## 2023-06-15 NOTE — PROCEDURES
Video Swallow Study      Patient Name: Alta Almanza  DTEUY'Y Date: 6/15/2023        Past Medical History  History reviewed  No pertinent past medical history  Past Surgical History  History reviewed  No pertinent surgical history  Modified (Video) Barium Swallow Study    Summary:  Images are on PACS for review  Pt presents w/ mild oral, mod-severe pharyngeal dysphagia  Mildly prolonged mastication and oral organization though brisk lingual manipulation for transfer  Swallow initiation is mod delayed w/ bolus head reaching the pyriforms  Reduced hyo-laryngeal elevation  Delayed and incomplete laryngeal vestibule closure resulting in koffi aspiration before and during the swallow of thin and nectar thick liquids  Cough response is appreciated though is weak and does not effectively eject material from trachea/larynx despite effort  No penetration/aspiration w/ honey thick liquids or solid material      Findings immediately reviewed w/ pt  At this time, pt agreeable to continuing w/ current dysphagia diet modifications and plan for ST f/u  Recommendations:  Diet: Dysphagia 3   Liquids: Honey   Meds: Whole/crush in puree  Strategies: -   Frequent oral care  Upright position  F/u ST tx: Yes   Therapy Prognosis: Fair   Prognosis considerations: Age, current medical, motivation for rx   Aspiration Precautions  Reflux Precautions  Consider consult with: -   Results reviewed with: pt, nursing   Aspiration precautions posted  Repeat MBS as necessary  If a dedicated assessment of the esophagus is desired, consider esophagram/barium swallow or EGD  Goals:  Pt will tolerate least restrictive diet w/out s/s aspiration or oral/pharyngeal difficulties  Patient's goal: to get off thickened liquids       Previous VBS:  No       Does the pt have pain? No   If yes, was nursing made aware/was it addressed?  N/A     Swallow Mechanism Exam  Facial: right facial droop  Labial: decreased ROM right side  Lingual: right sided tongue deviation  Velum: symmetrical  Mandible: adequate ROM  Dentition: adequate  Vocal quality:clear/adequate   Volitional Cough: weak   Respiratory Status: on RA      Swallow Information   Current Risks for Dysphagia & Aspiration: CVA  Current Symptoms/Concerns: coughing with po  Current Diet: soft/level 3 diet and honey thick liquids   Baseline Diet: regular diet and thin liquids      Consistencies Administered:  Pt was viewed sitting upright in the lateral and AP positions  Trials administered were consistent with MBSImP Validated Protocol: 5-mL thin liquid x2, 20-mL cup sip thin, 40-mL sequential swallow thin, 5-mL nectar thick, 20-mL cup sip nectar thick, 40-mL sequential swallow nectar thick, 5-mL Honey thick, 5-mL pudding, ½ cookie coated with 3-mL pudding, 5-mL *honey thick in the AP position, and 5-mL pudding in the AP position  Pt was also given honey thick liquids via straw and additional trials of thin to assess efficacy of strategies         Oral Impairment:  Lip Closure: complete   Tongue Control During Bolus Hold: reduced w/ premature spill over base of tongue into pharynx   Bolus Preparation/Mastication: mild prolonged  Bolus Transport/Lingual Motion: brisk   Oral Residue: mild throughout oral structures   Initiation of the Pharyngeal Swallow: delayed w/ bolus head in the pyriforms     Pharyngeal Impairment:  Soft Palate Elevation: complete   Laryngeal Elevation: reduced   Anterior Hyoid Excursion: mild reduced   Epiglottic Movement: complete   Laryngeal Vestibular Closure: delayed, incomplete   Pharyngeal Stripping Wave: fair   Pharyngeal Contraction: complete   PES Opening: complete   Tongue Base Retraction: reduced   Pharyngeal Residue:  Minimal     Screening of Esophageal Impairment   Esophageal Clearance: complete        Penetration/Aspiration:  Thin: koffi aspiration via tsp and cup sip (PAS 7), trace aspiration w/ sequential sips (PAS 7) Nectar: koffi aspiration w/ cup sip (PAS 7)   Honey: no penetration/aspiration (PAS 1)   Puree: no penetration/aspiration (PAS 1)   Solid: no penetration/aspiration (PAS 1)   Response to Aspiration: cough, not effective in fully ejecting material from larynx   Strategies/Efficacy: prep set w/ effortful swallow not effective, chin tuck effective     8-Point Penetration-Aspiration Scale   1 Material does not enter the airway   2 Material enters the airway, remains above the vocal folds, and is ejected  from the  airway    3 Material enters the airway, remains above the vocal folds, and is not ejected from the airway   4 Material enters the airway, contacts the vocal folds, and is ejected from the airway   5 Material enters the airway, contacts the vocal folds, and is not ejected from the airway    6 Material enters the airway, passes below the vocal folds and is ejected into the larynx or out of the airway    7 Material enters the airway, passes below the vocal folds, and is not ejected from the trachea despite effort    8 Material enters the airway, passes below the vocal folds, and no effort is made to eject

## 2023-06-15 NOTE — CASE MANAGEMENT
Case Management Discharge Planning Note    Patient name C[facility] /-01 MRN [id number]  : 1945 Date 6/15/2023       Current Admission Date: 2023  Current Admission Diagnosis:Left basal ganglia embolic stroke [facility])   Patient Active Problem List    Diagnosis Date Noted   • History of multiple strokes 06/15/2023   • New onset a-fib ([facility] [address] ) 06/15/2023   • Left basal ganglia embolic stroke ([facility] [address] )    • Hypertensive urgency 2023   • Elevated troponin 2023      LOS (days): 1  Geometric Mean LOS (GMLOS) (days): 4 50  Days to GMLOS:3 2     OBJECTIVE:  Risk of Unplanned Readmission Score: 8 69      Current admission status: Inpatient   Preferred Pharmacy:   [facility] DR WILLIAM Crocker  [address]  [facility] [address]  [facility] [address]  Phone: [phone] Fax: [phone]    Primary Care Provider: No primary care provider on file  Primary Insurance: MEDICARE  Secondary Insurance: AETNA    DISCHARGE DETAILS:    Discharge planning discussed with[de-identified] pt     Other Referral/Resources/Interventions Provided:  Interventions: Outpatient OT, Outpatient PT, Outpatient ST  Referral Comments: Pt wants outpt PT, OT, and SLP  SHe states she will tell her family  Treatment Team Recommendation: Home  Discharge Destination Plan[de-identified] Home  Transport at Discharge : Family     Additional Comments: Pt wants outpt PT, OT, and SLP  SHe states she will tell her family

## 2023-06-15 NOTE — PLAN OF CARE
Problem: OCCUPATIONAL THERAPY ADULT  Goal: Performs self-care activities at highest level of function for planned discharge setting  See evaluation for individualized goals  Description: Treatment Interventions: ADL retraining, Functional transfer training, Endurance training, Patient/family training, Equipment evaluation/education, Compensatory technique education, Continued evaluation  Equipment Recommended: Bedside commode       See flowsheet documentation for full assessment, interventions and recommendations  Note: Limitation: Decreased ADL status, Decreased cognition, Decreased endurance, Decreased self-care trans, Decreased high-level ADLs, Decreased Safe judgement during ADL  Prognosis: Good  Assessment: Pt is a 78 y o  female seen for OT evaluation at [facility], admitted 6/14/2023 w/ Left basal ganglia embolic stroke ([facility] [address] )  OT completed extensive review of pt's medical and social history  Comorbidities affecting pt's functional performance at time of assessment include: hypertensive urgency, elevated troponin, hx of multiple strokes, new onset a-fib, hx of ETOH abuse  Personal factors affecting pt at time of IE include: steps to enter environment, limited home support, difficulty performing ADLS, difficulty performing IADLS  and health management   Prior to admission, pt was living with her spouse in a [facility] with 2 CONNIE  Pt was I w/  ADLS and w/ IADLS, (+) drove, & required use of no DME/AD PTA  Upon evaluation: Pt requires S for functional mobility/transfers, S for UB ADLs and S for LB ADLS 2* the following deficits impacting occupational performance: decreased balance, decreased tolerance, impaired sequencing and decreased safety awareness  Full objective findings from OT assessment regarding body systems outlined above   Pt to benefit from continued skilled OT tx while in the hospital to address deficits as defined above and maximize level of functional independence w/ ADL's and functional mobility  Occupational Performance areas to address include: grooming, bathing/shower, toilet hygiene, dressing, health maintenance, functional mobility, community mobility and clothing management  Based on findings, pt is of high complexity  The patient's raw score on the AM-PAC Daily Activity inpatient short form is 19, standardized score is 40 22, greater than 39 4  Patients at this level are likely to benefit from DC to home  However, please refer to therapist recommendation for discharge planning given other factors that may influence destination  At this time, OT recommendations at time of discharge are DC with Level III resources

## 2023-06-15 NOTE — PLAN OF CARE
Problem: PHYSICAL THERAPY ADULT  Goal: Performs mobility at highest level of function for planned discharge setting  See evaluation for individualized goals  Description: Treatment/Interventions: Functional transfer training, LE strengthening/ROM, Elevations, Endurance training, Therapeutic exercise, Cognitive reorientation, Patient/family training, Equipment eval/education, Bed mobility, Gait training  Equipment Recommended: Marino Pelayo       See flowsheet documentation for full assessment, interventions and recommendations  6/15/2023 1440 by Efrain Mobley PT  Note:    Problem List: Decreased strength, Decreased endurance, Impaired balance, Decreased mobility, Decreased cognition, Decreased safety awareness  Assessment: Corie Olivo is a 78 y o  Female who presents to [address] [place] [address] on 6/14/2023 from home w/ c/o change in speech and diagnosis of L basal ganglia embolic stroke  Orders for PT eval and treat received  Pt presents w/ comorbidities of ETOH abuse  At baseline, pt mobilizes I w/ no AD, and reports 0 falls in the last 6 months  Upon evaluation, pt presents w/ the following deficits: weakness, impaired balance and decreased endurance  Upon eval, pt requires S for transfers, and S for gait  Based on this PT evaluation today, patient's discharge recommendation is for Level III  During this admission, pt would benefit from continued skilled inpatient PT in the acute care setting in order to address the abovementioned deficits to maximize function and mobility before DC from acute care  See flowsheet documentation for full assessment

## 2023-06-15 NOTE — PROGRESS NOTES
Progress Note - Neurology   Morgan Quiroz 78 y o  female [id number]  Unit/Bed#: /-01    Assessment/Plan:  * Left basal ganglia embolic stroke [facility])  Assessment & Plan  78year old female with no documented medical history (per ED team, has not sought medical care/PCP evaluation in many years) and known alcohol use  Presented this morning, 6/14, with few days duration of language dysfunction (slow speech, word finding difficulty, dysarthria); concurrently with poor oral intake and overall bradykinesia at home  6/14 neuro exam with minimal expressive aphasia, dysarthria and R facial droop/tongue deviation  Failed initial dysphagia screen with nursing  Notable accelerated hypertension this morning (max /121)  Work-up:   · CTA head/neck with no acute intracranial pathology, does reveal chronic lacunar infarcts and advanced microvascular disease  From vessel standpoint without LVO nor any critical stenosis/flow restrictive disease  · MRI brain:  · Acute infarct in left basal ganglia    · Multifocal chronic lacunar infarcts in bilateral basal ganglia and bilateral thalami (etat lacunaire pattern), chronic small infarcts in bilateral cerebellum (left larger than right), severe chronic microangiopathy, and hypertensive arteriopathy  · Chronic cystic encephalomalacia and gliosis in bilateral anterior frontal lobes (right worse than left) and right anterior temporal lobe, likely sequela of remote trauma  · Mild dolichoectasia of basilar artery, unchanged  · LDL 56  · 6/14 Serum studies: WBC 13, Plts 480/431  · Echo: EF 55%, normal sized atria bilaterally  · 6/15 Afib noted on tele overnight with rates in the 80s  · TSH WNL    Symptoms consistent with acute left basal ganglia infarct  Afib noted on the monitor overnight, which is thought to be likely etiology   Imaging also reveals multiple chronic strokes as well, both cortical and subcortical, in addition to multiple microhemorrhages thought to be secondary to uncontrolled hypertension  Patient will ultimately need both AC and AP; however, will need to ensure patient's BP is controlled prior to initiating dual therapy  Details re: timing of AC/AP initiation noted below  No further inpatient neurologic recommendations      Plan:  · Continue stroke pathway  · Patient initially failed dysphagia screen; now tolerating PO  Speech therapy recs: soft/level 3 diet and honey thick liquids; meds crushed with puree   · Upon review of med history, on 6/14 patient was given Plavix 300mg x 1 and then received Aspirin 81mg and Plavix 75mg 6/15AM   · Transition to single antiplatelet with Aspirin 81mg daily beginning 6/16  · If normotensive by early next week (ideally 6/19 with PCP follow-up on that day) can stop Aspirin and start Eliquis  · At follow-up neurology appointment if patient remaining normotensive, neurology to consider adding back Aspirin in the setting of her small vessel disease and multiple chronic subcortical strokes, while weighing risk with known multiple microhemorrhages thought to be secondary to uncontrolled hypertension  · Continue Lipitor 40 mg daily   · Goal of normotension (as symptoms 2-3 days in duration)  · Will be very important to stress the importance of normotensive goal following discharge with PCP followup and antihypertensive regimen compliance, especially in the setting of multiple microhemorrhages noted on imaging thought to be hypertensive in etiology  · Initiate antihypertensive regimen  · Hemglobin A1C pending  · neuro checks  · telemetry  · provide stroke education  · therapy evaluations    History of multiple strokes  Assessment & Plan  · MRI revealing chronic cortical and subcortical strokes  · Plan as detailed above    New onset a-fib ([address] )  Assessment & Plan  · Newly identified on Tele overnight, controlled rate  · Likely source of acute left basal ganglia stroke  · Plan/timing of initiation of AC as detailed above    Hypertensive urgency  Assessment & Plan  · Goal of normotension      Rohini Abdullahi will need follow up in in 2-3 weeks with neurovascular attending (if not possible, patient can see seasoned AP/AP specializing in stroke)  She will not require outpatient neurological testing  Subjective:   Patient reports she is feeling a bit better today; though she notes she continues to have some difficulty with her speech pronunciation  Systolics 264I-834C  Afib noted on tele overnight with rates in the 80s  History reviewed  No pertinent past medical history  History reviewed  No pertinent surgical history  History reviewed  No pertinent family history  Social History     Socioeconomic History   • Marital status: /Civil Union     Spouse name: None   • Number of children: None   • Years of education: None   • Highest education level: None   Occupational History   • None   Tobacco Use   • Smoking status: Never   • Smokeless tobacco: Never   Vaping Use   • Vaping Use: Never used   Substance and Sexual Activity   • Alcohol use: Yes     Alcohol/week: 4 0 standard drinks of alcohol     Types: 4 Glasses of wine per week   • Drug use: Never   • Sexual activity: None   Other Topics Concern   • None   Social History Narrative   • None     Social Determinants of Health     Financial Resource Strain: Not on file   Food Insecurity: No Food Insecurity (6/14/2023)    Hunger Vital Sign    • Worried About Running Out of Food in the Last Year: Never true    • Ran Out of Food in the Last Year: Never true   Transportation Needs: No Transportation Needs (6/14/2023)    PRAPARE - Transportation    • Lack of Transportation (Medical): No    • Lack of Transportation (Non-Medical):  No   Physical Activity: Not on file   Stress: Not on file   Social Connections: Not on file   Intimate Partner Violence: Not on file   Housing Stability: Low Risk  (6/14/2023)    Housing Stability Vital Sign    • Unable to Pay for Housing in the "Last Year: No    • Number of Places Lived in the Last Year: 1    • Unstable Housing in the Last Year: No       Medications: Reviewed in detail by me  ROS: Review of Systems   Neurological: Positive for speech difficulty  Vitals: BP (!) 188/118   Pulse 95   Temp 97 7 °F (36 5 °C)   Resp 18   Ht 5' 6\" (1 676 m)   Wt 55 1 kg (121 lb 7 6 oz)   SpO2 95%   BMI 19 61 kg/m²     Physical Exam:   Physical Exam  Constitutional:       General: She is not in acute distress  Appearance: Normal appearance  She is well-developed  She is not ill-appearing, toxic-appearing or diaphoretic  HENT:      Head: Normocephalic and atraumatic  Eyes:      General: No scleral icterus  Right eye: No discharge  Left eye: No discharge  Extraocular Movements: Extraocular movements intact and EOM normal       Conjunctiva/sclera: Conjunctivae normal    Pulmonary:      Effort: Pulmonary effort is normal  No respiratory distress  Breath sounds: No stridor  Musculoskeletal:         General: No tenderness or deformity  Normal range of motion  Cervical back: Normal range of motion and neck supple  Lymphadenopathy:      Cervical: No cervical adenopathy  Skin:     General: Skin is warm and dry  Findings: No erythema or rash  Neurological:      Mental Status: She is alert and oriented to person, place, and time  Motor: Motor strength is normal      Coordination: Finger-Nose-Finger Test normal    Psychiatric:         Speech: Speech normal        Neurologic Exam     Mental Status   Oriented to person, place, and time  Follows 2 step commands  Attention: normal  Concentration: normal    Speech: speech is normal (No dysarthria or aphasia)  Level of consciousness: alert  Knowledge: good  Normal comprehension       Cranial Nerves     CN II   Visual acuity: normal (grossly)  Right visual field deficit: none  Left visual field deficit: none     CN III, IV, VI   Extraocular motions are " normal    Nystagmus: none   Conjugate gaze: present    CN V   Facial sensation intact  CN XII   Tongue deviation: right  Flattening of R NL fold with mild lower facial droop  Motor Exam   Muscle bulk: normal  Overall muscle tone: normal  Right arm pronator drift: absent  Left arm pronator drift: upward drift, no pronation  Strength   Strength 5/5 throughout  Sensory Exam   Light touch normal    Right arm vibration: normal  Left arm vibration: normal  Right leg vibration: normal  Left leg vibration: decreased from toes  Temeprature: BUEs intact and symmetric throughout  BLEs diminished distally symmetrically     Gait, Coordination, and Reflexes     Coordination   Finger to nose coordination: normal    Reflexes   Right plantar: normal  Left plantar: normal  DTRs: BUEs 2+, BLEs: patellars 2-3, achilles 2       Labs: Reviewed in detail by me  Imaging: I have personally reviewed pertinent imaging and PACS reports  VTE Prophylaxis: Heparin    Total time spent today 45 minutes  Greater than 50% of total time was spent with the patient and / or family counseling and / or coordination of care   A description of the counseling / coordination of care: explaining imaging findings of acute stroke, chronic strokes, microhemorrhages and discussing uncontrolled hypertension, need for goal of normotension and discussing stroke etiology as well as medication recommendations and timing

## 2023-06-15 NOTE — PROGRESS NOTES
"Justin Watkins  Progress Note  Name: Niru Spencer  MRN: [id number]  Unit/Bed#: -01 I Date of Admission: 6/14/2023   Date of Service: 6/15/2023 I Hospital Day: 1    Assessment/Plan   * Left basal ganglia embolic stroke [facility])  Assessment & Plan  · 51-year-old female, with no PMH, no PCP follow-up for years, presents with dysarthria, word finding difficulty  · Chest x-ray shows no effusion, or pulmonary edema  · EKG reveals sinus tachycardia  · CTA head and neck negative for acute infarct, however advanced chronic microangiopathic changes with chronic lacunar infarcts  · MRI shows acute CVA        · Okay for oral meds, diet level 3, honey thickened fluid    Detailed plan of care per Dr Miguel Cohn :    Lavetta Hoist infarction in L BG extending to vazquez  Numerous chronic infarcts scattered throughout deep areas as well as L cerebellum, which appears embolic; also has frontal encephalomalacia more likely related to traumatic injury  Scattered microhemorrhages are present, primarily within the cerebellar hemispheres and deep matter but a few scattered peripheral microbleeds are also present  Suspect this particular infarct may be due to cardioembolism in the setting of newly diagnosed Afib, and she does have at least one other embolic-appearing infarct that was more likely due to this as well  She also has multiple small vessel ischemic infarcts due to poor risk factor control over the course of many years, which is further supported by extensive white matter disease seen on MRI  While she would likely benefit from long-term [facility] given risk of further embolism with Afib, her case is complicated by poor control of BP as well as significant burden of microhemorrhages on MRI though much or all of this is suspected to be moreso related to hypertensive arteriopathy rather than amyloid angiopathy   Regardless, given her poor follow-up, poor risk factor control, and MRI appearance she is at a higher " "risk for hemorrhage than the average patient and timing will need to be careful with instituting antithrombotics  Focus at this point should be BP control with plan for long-term normotension  Ideally would see PCP early next week and if pt has improved BP control would be ok with initiation of apixaban at that time and can d/c ASA  Upon outpatient follow-up if pt continues to have good BP control and has demonstrated she can tolerate apixaban she can then also have ASA added for additional support regarding her risk of continued small vessel ischemia  Continue on statin  \"            Hypertensive urgency  Assessment & Plan  Blood pressure still uncontrolled, will start amlodipine and lisinopril    Elevated troponin  Assessment & Plan  Non-MI troponin elevation    History of multiple strokes  Assessment & Plan  MRI shows evidence of prior strokes  New onset a-fib [facility])  Assessment & Plan  A-fib seen on EKG, neurology note reviewed, hold off on full dose anticoagulation, due to multiple abnormalities  High risk of intracranial hemorrhage  See note above               VTE Pharmacologic Prophylaxis: VTE Score: 3 Moderate Risk (Score 3-4) - Pharmacological DVT Prophylaxis Ordered: heparin  Patient Centered Rounds: I performed bedside rounds with nursing staff today  Discussions with Specialists or Other Care Team Provider: Neurology note reviewed    Education and Discussions with Family / Patient: Attempted to update  () via phone  Left voicemail  Total Time Spent on Date of Encounter in care of patient: 85 minutes This time was spent on one or more of the following: performing physical exam; counseling and coordination of care; obtaining or reviewing history; documenting in the medical record; reviewing/ordering tests, medications or procedures; communicating with other healthcare professionals and discussing with patient's family/caregivers      Current Length of Stay: 1 " day(s)  Current Patient Status: Inpatient   Certification Statement: The patient will continue to require additional inpatient hospital stay due to Recent acute CVA, uncontrolled blood pressure  Discharge Plan: Anticipate discharge in 48 hrs to rehab facility  Code Status: Level 1 - Full Code    Subjective:   No pain, still with garbled speech, word finding difficulty    Objective:     Vitals:   Temp (24hrs), Av 4 °F (36 9 °C), Min:97 7 °F (36 5 °C), Max:98 9 °F (37 2 °C)    Temp:  [97 7 °F (36 5 °C)-98 9 °F (37 2 °C)] 98 9 °F (37 2 °C)  HR:  [] 93  Resp:  [16-18] 16  BP: (150-188)/() 178/101  SpO2:  [94 %-97 %] 94 %  Body mass index is 19 61 kg/m²  Input and Output Summary (last 24 hours): Intake/Output Summary (Last 24 hours) at 6/15/2023 1525  Last data filed at 6/15/2023 1241  Gross per 24 hour   Intake 270 ml   Output 1750 ml   Net -1480 ml       Physical Exam:   Physical Exam  Vitals and nursing note reviewed  Constitutional:       General: She is not in acute distress  Appearance: She is not ill-appearing, toxic-appearing or diaphoretic  HENT:      Head: Normocephalic and atraumatic  Cardiovascular:      Rate and Rhythm: Normal rate  Pulses: Normal pulses  Pulmonary:      Effort: Pulmonary effort is normal       Breath sounds: Normal breath sounds  Musculoskeletal:         General: Normal range of motion  Cervical back: Normal range of motion  Right lower leg: No edema  Left lower leg: No edema  Neurological:      Mental Status: She is alert  Mental status is at baseline  Motor: Weakness present        Comments: Garbled speech, expressive aphasia noted          Additional Data:     Labs:  Results from last 7 days   Lab Units 23  1057 23  0721   HEMATOCRIT %  --  48 3*   HEMOGLOBIN g/dL  --  16 1*   PLATELETS Thousands/uL 431* 480*   WBC Thousand/uL  --  13 14*     Results from last 7 days   Lab Units 23  0721   ANION GAP mmol/L 9   BUN mg/dL 25   CALCIUM mg/dL 10 0   CHLORIDE mmol/L 104   CO2 mmol/L 28   CREATININE mg/dL 0 89   GLUCOSE RANDOM mg/dL 111   POTASSIUM mmol/L 3 4*   SODIUM mmol/L 141     Results from last 7 days   Lab Units 06/14/23  0721   INR  0 93     Results from last 7 days   Lab Units 06/14/23  0719   POC GLUCOSE mg/dl 122     Results from last 7 days   Lab Units 06/15/23  0503   HEMOGLOBIN A1C % 5 4           Lines/Drains:  Invasive Devices     Peripheral Intravenous Line  Duration           Peripheral IV 06/14/23 Left;Ventral (anterior) Forearm 1 day                  Telemetry:  Telemetry Orders (From admission, onward)             24 Hour Telemetry Monitoring  Continuous x 24 Hours (Telem)        Question:  Reason for 24 Hour Telemetry  Answer:  TIA/Suspected CVA/ Confirmed CVA                          Imaging: Reviewed radiology reports from this admission including: MRI brain    Recent Cultures (last 7 days):         Last 24 Hours Medication List:   Current Facility-Administered Medications   Medication Dose Route Frequency Provider Last Rate   • amLODIPine  2 5 mg Oral Daily Jagdeep Hooper DO     • aspirin  81 mg Oral Daily Brianne Gipson MD     • atorvastatin  40 mg Oral Daily With Wilberto Rivera MD     • heparin (porcine)  5,000 Units Subcutaneous Q8H 2500 Located within [facility] [address], MD     • labetalol  10 mg Intravenous Q4H PRN Brianne Gipson MD     • lisinopril  20 mg Oral Daily Eloise Zuleta DO          Today, Patient Was Seen By: Eloise Zuleta DO    **Please Note: This note may have been constructed using a voice recognition system  **

## 2023-06-16 LAB
ALBUMIN SERPL BCP-MCNC: 3.6 G/DL (ref 3.5–5)
ALP SERPL-CCNC: 42 U/L (ref 34–104)
ALT SERPL W P-5'-P-CCNC: 9 U/L (ref 7–52)
ANION GAP SERPL CALCULATED.3IONS-SCNC: 7 MMOL/L (ref 4–13)
AST SERPL W P-5'-P-CCNC: 14 U/L (ref 13–39)
BASOPHILS # BLD AUTO: 0.06 THOUSANDS/ÂΜL (ref 0–0.1)
BASOPHILS NFR BLD AUTO: 1 % (ref 0–1)
BILIRUB SERPL-MCNC: 1.02 MG/DL (ref 0.2–1)
BUN SERPL-MCNC: 15 MG/DL (ref 5–25)
CALCIUM SERPL-MCNC: 9 MG/DL (ref 8.4–10.2)
CHLORIDE SERPL-SCNC: 108 MMOL/L (ref 96–108)
CO2 SERPL-SCNC: 27 MMOL/L (ref 21–32)
CREAT SERPL-MCNC: 0.56 MG/DL (ref 0.6–1.3)
EOSINOPHIL # BLD AUTO: 0.13 THOUSAND/ÂΜL (ref 0–0.61)
EOSINOPHIL NFR BLD AUTO: 1 % (ref 0–6)
ERYTHROCYTE [DISTWIDTH] IN BLOOD BY AUTOMATED COUNT: 13.2 % (ref 11.6–15.1)
GFR SERPL CREATININE-BSD FRML MDRD: 91 ML/MIN/1.73SQ M
GLUCOSE SERPL-MCNC: 97 MG/DL (ref 65–140)
HCT VFR BLD AUTO: 39.3 % (ref 34.8–46.1)
HGB BLD-MCNC: 13.2 G/DL (ref 11.5–15.4)
IMM GRANULOCYTES # BLD AUTO: 0.03 THOUSAND/UL (ref 0–0.2)
IMM GRANULOCYTES NFR BLD AUTO: 0 % (ref 0–2)
INR PPP: 1.01 (ref 0.84–1.19)
LYMPHOCYTES # BLD AUTO: 2.28 THOUSANDS/ÂΜL (ref 0.6–4.47)
LYMPHOCYTES NFR BLD AUTO: 25 % (ref 14–44)
MAGNESIUM SERPL-MCNC: 2.1 MG/DL (ref 1.9–2.7)
MCH RBC QN AUTO: 31.1 PG (ref 26.8–34.3)
MCHC RBC AUTO-ENTMCNC: 33.6 G/DL (ref 31.4–37.4)
MCV RBC AUTO: 93 FL (ref 82–98)
MONOCYTES # BLD AUTO: 0.59 THOUSAND/ÂΜL (ref 0.17–1.22)
MONOCYTES NFR BLD AUTO: 7 % (ref 4–12)
NEUTROPHILS # BLD AUTO: 5.96 THOUSANDS/ÂΜL (ref 1.85–7.62)
NEUTS SEG NFR BLD AUTO: 66 % (ref 43–75)
NRBC BLD AUTO-RTO: 0 /100 WBCS
PHOSPHATE SERPL-MCNC: 3.8 MG/DL (ref 2.3–4.1)
PLATELET # BLD AUTO: 373 THOUSANDS/UL (ref 149–390)
PMV BLD AUTO: 9.2 FL (ref 8.9–12.7)
POTASSIUM SERPL-SCNC: 3.3 MMOL/L (ref 3.5–5.3)
PROCALCITONIN SERPL-MCNC: <0.05 NG/ML
PROT SERPL-MCNC: 6.3 G/DL (ref 6.4–8.4)
PROTHROMBIN TIME: 14 SECONDS (ref 11.6–14.5)
RBC # BLD AUTO: 4.25 MILLION/UL (ref 3.81–5.12)
SODIUM SERPL-SCNC: 142 MMOL/L (ref 135–147)
WBC # BLD AUTO: 9.05 THOUSAND/UL (ref 4.31–10.16)

## 2023-06-16 PROCEDURE — 99232 SBSQ HOSP IP/OBS MODERATE 35: CPT | Performed by: PHYSICIAN ASSISTANT

## 2023-06-16 PROCEDURE — 83735 ASSAY OF MAGNESIUM: CPT | Performed by: INTERNAL MEDICINE

## 2023-06-16 PROCEDURE — 92523 SPEECH SOUND LANG COMPREHEN: CPT

## 2023-06-16 PROCEDURE — 85610 PROTHROMBIN TIME: CPT | Performed by: INTERNAL MEDICINE

## 2023-06-16 PROCEDURE — 92526 ORAL FUNCTION THERAPY: CPT

## 2023-06-16 PROCEDURE — 80053 COMPREHEN METABOLIC PANEL: CPT | Performed by: INTERNAL MEDICINE

## 2023-06-16 PROCEDURE — 84100 ASSAY OF PHOSPHORUS: CPT | Performed by: INTERNAL MEDICINE

## 2023-06-16 PROCEDURE — 85025 COMPLETE CBC W/AUTO DIFF WBC: CPT | Performed by: INTERNAL MEDICINE

## 2023-06-16 PROCEDURE — 99232 SBSQ HOSP IP/OBS MODERATE 35: CPT | Performed by: INTERNAL MEDICINE

## 2023-06-16 PROCEDURE — 84145 PROCALCITONIN (PCT): CPT | Performed by: INTERNAL MEDICINE

## 2023-06-16 RX ORDER — AMLODIPINE BESYLATE 5 MG/1
5 TABLET ORAL DAILY
Status: DISCONTINUED | OUTPATIENT
Start: 2023-06-16 | End: 2023-06-17

## 2023-06-16 RX ORDER — CARVEDILOL 3.12 MG/1
3.12 TABLET ORAL 2 TIMES DAILY WITH MEALS
Status: DISCONTINUED | OUTPATIENT
Start: 2023-06-16 | End: 2023-06-19 | Stop reason: HOSPADM

## 2023-06-16 RX ADMIN — ASPIRIN 81 MG CHEWABLE TABLET 81 MG: 81 TABLET CHEWABLE at 08:17

## 2023-06-16 RX ADMIN — HEPARIN SODIUM 5000 UNITS: 5000 INJECTION INTRAVENOUS; SUBCUTANEOUS at 05:13

## 2023-06-16 RX ADMIN — AMLODIPINE BESYLATE 5 MG: 5 TABLET ORAL at 08:18

## 2023-06-16 RX ADMIN — CARVEDILOL 3.12 MG: 3.12 TABLET, FILM COATED ORAL at 17:25

## 2023-06-16 RX ADMIN — HEPARIN SODIUM 5000 UNITS: 5000 INJECTION INTRAVENOUS; SUBCUTANEOUS at 21:09

## 2023-06-16 RX ADMIN — ATORVASTATIN CALCIUM 40 MG: 40 TABLET, FILM COATED ORAL at 17:25

## 2023-06-16 RX ADMIN — LISINOPRIL 20 MG: 20 TABLET ORAL at 08:17

## 2023-06-16 RX ADMIN — HEPARIN SODIUM 5000 UNITS: 5000 INJECTION INTRAVENOUS; SUBCUTANEOUS at 13:23

## 2023-06-16 NOTE — PLAN OF CARE
Problem: MOBILITY - ADULT  Goal: Maintain or return to baseline ADL function  Description: INTERVENTIONS:  -  Assess patient's ability to carry out ADLs; assess patient's baseline for ADL function and identify physical deficits which impact ability to perform ADLs (bathing, care of mouth/teeth, toileting, grooming, dressing, etc )  - Assess/evaluate cause of self-care deficits   - Assess range of motion  - Assess patient's mobility; develop plan if impaired  - Assess patient's need for assistive devices and provide as appropriate  - Encourage maximum independence but intervene and supervise when necessary  - Involve family in performance of ADLs  - Assess for home care needs following discharge   - Consider OT consult to assist with ADL evaluation and planning for discharge  - Provide patient education as appropriate  Outcome: Progressing     Problem: PAIN - ADULT  Goal: Verbalizes/displays adequate comfort level or baseline comfort level  Description: Interventions:  - Encourage patient to monitor pain and request assistance  - Assess pain using appropriate pain scale  - Administer analgesics based on type and severity of pain and evaluate response  - Implement non-pharmacological measures as appropriate and evaluate response  - Consider cultural and social influences on pain and pain management  - Notify physician/advanced practitioner if interventions unsuccessful or patient reports new pain  Outcome: Progressing     Problem: INFECTION - ADULT  Goal: Absence or prevention of progression during hospitalization  Description: INTERVENTIONS:  - Assess and monitor for signs and symptoms of infection  - Monitor lab/diagnostic results  - Monitor all insertion sites, i e  indwelling lines, tubes, and drains  - Monitor endotracheal if appropriate and nasal secretions for changes in amount and color  - [place] appropriate cooling/warming therapies per order  - Administer medications as ordered  - Instruct and encourage patient and family to use good hand hygiene technique  - Identify and instruct in appropriate isolation precautions for identified infection/condition  Outcome: Progressing     Problem: Knowledge Deficit  Goal: Patient/family/caregiver demonstrates understanding of disease process, treatment plan, medications, and discharge instructions  Description: Complete learning assessment and assess knowledge base  Interventions:  - Provide teaching at level of understanding  - Provide teaching via preferred learning methods  Outcome: Progressing     Problem: Neurological Deficit  Goal: Neurological status is stable or improving  Description: Interventions:  - Monitor and assess patient's level of consciousness, motor function, sensory function, and level of assistance needed for ADLs  - Monitor and report changes from baseline  Collaborate with interdisciplinary team to initiate plan and implement interventions as ordered  - Provide and maintain a safe environment  - Consider seizure precautions  - Consider fall precautions  - Consider aspiration precautions  - Consider bleeding precautions    Outcome: Progressing

## 2023-06-16 NOTE — ASSESSMENT & PLAN NOTE
Blood pressure still uncontrolled, will start amlodipine and lisinopril    Coreg added by cardiology

## 2023-06-16 NOTE — PROGRESS NOTES
Cardiology Progress Note   Anjana Leary 78 y o  female MRN: [id number]    Unit/Bed#: -Michelle Encounter: [id number]      Assessment:  1  Acute complex CVA  a  MRI brain 6/14/2023: Acute infarct in left basal ganglia  Multifocal chronic lacunar infarcts, chronic small infarcts in bilateral cerebellum, severe chronic microangiopathy and hypertensive arteriopathy  Chronic cystic encephalomalacia and gliosis in bilateral anterior frontal lobes and right anterior temporal lobe likely sequela of remote trauma, mild dolichoectasia of basilar artery  b  S/P loading dose of Plavix 300 mg x1 on admission  c  On ASA and Lipitor (started 6/14/2023)  d  Neurology consulted for recommendations -- Per note 6/15/2023, has likely cardioembolic appearing CVA (from underlying AF) as well as evidence of scattered micro-hemorrhages from uncontrolled hypertension  e  Per neurology, plan to switch ASA with Eliquis on 6/19/2023 if her BP is within normal range  2  New onset atrial fibrillation  a  Admission EKG 6/14/2023: sinus tachycardia with PACs  b  EKG 6/15/2023: atrial fibrillation, HR 91  c  [id number] score is 6 (age+2, cva+2, htn, gender)  d  TTE 6/14/2023: EF 55%, mild MR/mild TR  e  Telemetry reviewed: AF with avg HR 80-90s, 4-beat run of NSVT vs aberrancy noted (6/15/2023)  3  Hypertensive urgency  a  Improving  b  BP on arrival 202/120 >> last /97  c  Not on antihypertensives at home  d  Added for lisinopril 20mg QD (6/15/2023) and Norvasc 5mg (6/16/2023)    Plan/discussion:  · Add Coreg 3 125mg BID today for added HR/BP suppression  · Continue lisinopril 20mg and Norvasc 5mg QD  · Continue atorvastatin 40mg QD   · On ASA 81mg currently; plan to transition to Eliquis 5mg BID as noted above  · Additional workup for AFib to be performed as outpatient  · Staff message sent to schedule follow up with our office in 1-2 weeks    Cardiology signing off at present time  Please call/re-consult as needed   Call with any "questions or concerns in the meantime  Subjective:   Patient seen and examined  Overnight events reviewed  Patient appears comfortable, sitting in her recliner  She is frustrated regarding her broken speech but overall, cooperative and appropriately responding to questions  Objective:     Vitals: Blood pressure 157/97, pulse 99, temperature 97 5 °F (36 4 °C), resp  rate 17, height 5' 6\" (1 676 m), weight 55 1 kg (121 lb 7 6 oz), SpO2 96 %  , Body mass index is 19 61 kg/m² ,   Orthostatic Blood Pressures    Flowsheet Row Most Recent Value   Blood Pressure 157/97 filed at 06/16/2023 1658   Patient Position - Orthostatic VS Lying filed at 06/15/2023 2115            Intake/Output Summary (Last 24 hours) at 6/16/2023 9350  Last data filed at 6/16/2023 0850  Gross per 24 hour   Intake 180 ml   Output 400 ml   Net -220 ml     Physical Exam:  GEN: Homero Schmidt appears well, alert and oriented x 3, pleasant and cooperative   HEENT: Sclera anicteric, conjunctivae pink, mucous membranes moist  Oropharynx clear  NECK: supple, no significant JVD, Trachea midline, no thyromegaly  HEART: irregular rhythm, no murmurs, clicks, gallops or rubs   LUNGS: clear to auscultation bilaterally; no wheezes, rales, or rhonchi  No increased work of breathing or signs of respiratory distress  ABDOMEN: Soft, nontender, nondistended  EXTREMITIES: Skin warm and well perfused, no clubbing, cyanosis, or edema  NEURO: No focal findings  Normal speech  Mood and affect normal    SKIN: Normal without suspicious lesions on exposed skin      Medications:    Current Facility-Administered Medications:   •  amLODIPine (NORVASC) tablet 5 mg, 5 mg, Oral, Daily, Ruddy Hooper DO, 5 mg at 06/16/23 0818  •  aspirin chewable tablet 81 mg, 81 mg, Oral, Daily, Estuardo Martin MD, 81 mg at 06/16/23 0817  •  atorvastatin (LIPITOR) tablet 40 mg, 40 mg, Oral, Daily With Hunter Burnette MD, 40 mg at 06/15/23 1553  •  heparin (porcine) subcutaneous " injection 5,000 Units, 5,000 Units, Subcutaneous, Q8H [facility], 5,000 Units at 06/16/23 0513 **AND** [COMPLETED] Platelet count, , , Once, Hillary Mondragon MD  •  labetalol (NORMODYNE) injection 10 mg, 10 mg, Intravenous, Q4H PRN, Hillary Mondragon MD, 10 mg at 06/15/23 2126  •  lisinopril (ZESTRIL) tablet 20 mg, 20 mg, Oral, Daily, Everette Hooper DO, 20 mg at 06/16/23 0817     Labs & Results:        Results from last 7 days   Lab Units 06/16/23  0453 06/14/23  1057 06/14/23  0721   WBC Thousand/uL 9 05  --  13 14*   HEMOGLOBIN g/dL 13 2  --  16 1*   HEMATOCRIT % 39 3  --  48 3*   PLATELETS Thousands/uL 373 431* 480*     Results from last 7 days   Lab Units 06/15/23  0503   TRIGLYCERIDES mg/dL 97   HDL mg/dL 44*     Results from last 7 days   Lab Units 06/16/23  0453 06/14/23  0721   POTASSIUM mmol/L 3 3* 3 4*   CHLORIDE mmol/L 108 104   CO2 mmol/L 27 28   BUN mg/dL 15 25   CREATININE mg/dL 0 56* 0 89   CALCIUM mg/dL 9 0 10 0   ALK PHOS U/L 42  --    ALT U/L 9  --    AST U/L 14  --      Results from last 7 days   Lab Units 06/16/23  0453 06/14/23  0721   INR  1 01 0 93   PTT seconds  --  25     Results from last 7 days   Lab Units 06/16/23  0453   MAGNESIUM mg/dL 2 1

## 2023-06-16 NOTE — QUICK NOTE
"Discussed updated plan with attending:   Previous recommendations were to pursue prompt PCP follow-up this coming week to review blood pressures, and if normotensive transition from aspirin to full dose Eliquis (given concern for microvascular disease and hypertensive microhemorrhages on MRI brain)  However, patient does not have established PCP, and feel there may be a delay in establishing PCP evaluation beyond early next week; thus will pursue the following plan:    If normotensive BP's by this coming Sunday morning, 6/18, would be ok from neurology standpoint to stop aspirin and start Eliquis 5 mg daily prior to discharge  Would appreciate primary team/CM assistance in ensuring Eliquis is affordable for patient prior to discharge  Would then continue to seek PCP \"new patient evaluation\" in the coming week or few to follow-up her blood pressures and ensure normotension/minimize bleed risk  Neurology will remain available for questions/concerns over the weekend     "

## 2023-06-16 NOTE — ASSESSMENT & PLAN NOTE
"· 79-year-old female, with no PMH, no PCP follow-up for years, presents with dysarthria, word finding difficulty  MRI shows acute CVA    Continue with diet per speech therapy okay with thin liquids      Detailed plan of care per Dr Ramila Rosado :    Yesenia Peal infarction in L BG extending to vazquez  Numerous chronic infarcts scattered throughout deep areas as well as L cerebellum, which appears embolic; also has frontal encephalomalacia more likely related to traumatic injury  Scattered microhemorrhages are present, primarily within the cerebellar hemispheres and deep matter but a few scattered peripheral microbleeds are also present  Suspect this particular infarct may be due to cardioembolism in the setting of newly diagnosed Afib, and she does have at least one other embolic-appearing infarct that was more likely due to this as well  She also has multiple small vessel ischemic infarcts due to poor risk factor control over the course of many years, which is further supported by extensive white matter disease seen on MRI  While she would likely benefit from long-term [facility] given risk of further embolism with Afib, her case is complicated by poor control of BP as well as significant burden of microhemorrhages on MRI though much or all of this is suspected to be moreso related to hypertensive arteriopathy rather than amyloid angiopathy  Regardless, given her poor follow-up, poor risk factor control, and MRI appearance she is at a higher risk for hemorrhage than the average patient and timing will need to be careful with instituting antithrombotics  Focus at this point should be BP control with plan for long-term normotension  Ideally would see PCP early next week and if pt has improved BP control would be ok with initiation of apixaban at that time and can d/c ASA   Upon outpatient follow-up if pt continues to have good BP control and has demonstrated she can tolerate apixaban she can then also have ASA added for " "additional support regarding her risk of continued small vessel ischemia  Continue on statin   \"          "

## 2023-06-16 NOTE — SPEECH THERAPY NOTE
"Speech Language/Pathology    Speech/Language Pathology Progress Note    Patient Name: Tee Salinas  ZPLSE'K Date: 6/16/2023     Problem List  Principal Problem:    Left basal ganglia embolic stroke [facility])  Active Problems:    Hypertensive urgency    Elevated troponin    History of multiple strokes    New onset a-fib [facility])       Past Medical History  History reviewed  No pertinent past medical history  Past Surgical History  History reviewed  No pertinent surgical history  Subjective:  Pt OOB in chair  Just finished motor speech/language evaluation  \"I just can't drink the thickened liquids anymore  \"     Current Diet:  Dysphagia 3 w/ honey thick     Objective:  Pt seen for dx dysphagia tx f/u  MBS findings thoroughly reviewed w/ pt  Education provided on pt specific oropharyngeal impairments and identified aspiration w/ thin and nectar thick liquid consistencies  Further education provided on potential medical complications associated w/ aspiration including possible pna and respiratory distress  Further education provided on risks vs benefits of liquid/solid texture modifications (reduced aspiration risk and subsequent medical implications, increased retention, potential dehydration, etc ) vs resuming thin liquids w/ strategies to optimize swallow safety/minimize risks  Pt reports that she declines liquid modifications 2/2 dislike/poor intake  Pt demonstrates full understanding of potential risk and is accepting of any/all risks at this time  RN Krystyna Blackwell present for demonstration of understanding and acceptance of risk  Demonstrated strategy for chin tuck which was effective on MBS in eliminating aspiration  Pt completed approx 6 oz thin liquids during session w/ use of chin tuck  Min cues required for consistent use of strategy, pt demonstrated strategy 100% of trials  No overt s/s aspiration  Pt agreeable to consistent use of strategy upon upgrade   Education provided on the importance of frequent/thorough " oral care to minimize risk of infection  Pt understanding and agreeable  Physician notified of decision to accept risk/resume thin liquids  Assessment:  Pt w/ complete understanding of current oropharyngeal impairments identified on MBS and current risk of aspiration  Pt understanding options for potential modifications to reduce aspiration risk vs option to accept risk and decline modifications  At this time, pt declining liquid modifications and wishes to accept risk w/ resuming thin w/ use of strategies to optimize safety  Pt able to demonstrate consistent use of chin tuck strategy w/ thin liquids which yielded no overt s/s aspiration       Plan/Recommendations:  Dysphagia 3 w/ thin   Frequent/thorough oral care   ST to f/u as able/appropriate

## 2023-06-16 NOTE — ASSESSMENT & PLAN NOTE
A-fib seen on EKG, neurology note reviewed, hold off on full dose anticoagulation, due to multiple abnormalities  High risk of intracranial hemorrhage

## 2023-06-16 NOTE — PROGRESS NOTES
"Justin Watkins  Progress Note  Name: Mike Velazquez  MRN: [id number]  Unit/Bed#: -01 I Date of Admission: 6/14/2023   Date of Service: 6/16/2023 I Hospital Day: 2    Assessment/Plan   * Left basal ganglia embolic stroke [facility])  Assessment & Plan  · 81-year-old female, with no PMH, no PCP follow-up for years, presents with dysarthria, word finding difficulty  MRI shows acute CVA    Continue with diet per speech therapy okay with thin liquids      Detailed plan of care per Dr Cara Oseguera :    Elige Hamming infarction in L BG extending to vazquez  Numerous chronic infarcts scattered throughout deep areas as well as L cerebellum, which appears embolic; also has frontal encephalomalacia more likely related to traumatic injury  Scattered microhemorrhages are present, primarily within the cerebellar hemispheres and deep matter but a few scattered peripheral microbleeds are also present  Suspect this particular infarct may be due to cardioembolism in the setting of newly diagnosed Afib, and she does have at least one other embolic-appearing infarct that was more likely due to this as well  She also has multiple small vessel ischemic infarcts due to poor risk factor control over the course of many years, which is further supported by extensive white matter disease seen on MRI  While she would likely benefit from long-term [facility] given risk of further embolism with Afib, her case is complicated by poor control of BP as well as significant burden of microhemorrhages on MRI though much or all of this is suspected to be moreso related to hypertensive arteriopathy rather than amyloid angiopathy  Regardless, given her poor follow-up, poor risk factor control, and MRI appearance she is at a higher risk for hemorrhage than the average patient and timing will need to be careful with instituting antithrombotics  Focus at this point should be BP control with plan for long-term normotension   Ideally would see PCP " "early next week and if pt has improved BP control would be ok with initiation of apixaban at that time and can d/c ASA  Upon outpatient follow-up if pt continues to have good BP control and has demonstrated she can tolerate apixaban she can then also have ASA added for additional support regarding her risk of continued small vessel ischemia  Continue on statin  \"            Hypertensive urgency  Assessment & Plan  Blood pressure still uncontrolled, will start amlodipine and lisinopril    Coreg added by cardiology    New onset a-fib [facility])  Assessment & Plan  A-fib seen on EKG, neurology note reviewed, hold off on full dose anticoagulation, due to multiple abnormalities  High risk of intracranial hemorrhage  Elevated troponin  Assessment & Plan  Non-MI troponin elevation    History of multiple strokes  Assessment & Plan  MRI shows evidence of prior strokes  Plan of care discussed with patient and her  at bedside  Cardiology note reviewed, plan of care discussed with nursing team     Of care discussed with case management as well as Occupational Therapy  Code Status: Level 1 - Full Code    Subjective:   Patient has no acute complaints, she is anxious to go home    Objective:     Vitals:   Temp (24hrs), Av 2 °F (36 8 °C), Min:97 5 °F (36 4 °C), Max:98 9 °F (37 2 °C)    Temp:  [97 5 °F (36 4 °C)-98 9 °F (37 2 °C)] 98 2 °F (36 8 °C)  HR:  [88-99] 88  Resp:  [16-20] 20  BP: (157-188)/() 163/90  SpO2:  [94 %-98 %] 97 %  Body mass index is 19 61 kg/m²  Input and Output Summary (last 24 hours): Intake/Output Summary (Last 24 hours) at 2023 1457  Last data filed at 2023 0850  Gross per 24 hour   Intake 30 ml   Output --   Net 30 ml       Physical Exam:   Physical Exam  Vitals and nursing note reviewed  HENT:      Head: Normocephalic and atraumatic  Cardiovascular:      Rate and Rhythm: Normal rate  Pulses: Normal pulses     Pulmonary:      Effort: Pulmonary " effort is normal    Musculoskeletal:      Cervical back: Normal range of motion  Neurological:      Mental Status: She is alert and oriented to person, place, and time  Mental status is at baseline        Comments: Patient with significant dysarthria, also with some expressive aphasia          Additional Data:     Labs:  Results from last 7 days   Lab Units 06/16/23  0453   WBC Thousand/uL 9 05   HEMOGLOBIN g/dL 13 2   HEMATOCRIT % 39 3   PLATELETS Thousands/uL 373   NEUTROS PCT % 66   LYMPHS PCT % 25   MONOS PCT % 7   EOS PCT % 1     Results from last 7 days   Lab Units 06/16/23  0453   SODIUM mmol/L 142   POTASSIUM mmol/L 3 3*   CHLORIDE mmol/L 108   CO2 mmol/L 27   BUN mg/dL 15   CREATININE mg/dL 0 56*   ANION GAP mmol/L 7   CALCIUM mg/dL 9 0   ALBUMIN g/dL 3 6   TOTAL BILIRUBIN mg/dL 1 02*   ALK PHOS U/L 42   ALT U/L 9   AST U/L 14   GLUCOSE RANDOM mg/dL 97     Results from last 7 days   Lab Units 06/16/23  0453   INR  1 01     Results from last 7 days   Lab Units 06/14/23  0719   POC GLUCOSE mg/dl 122     Results from last 7 days   Lab Units 06/15/23  0503   HEMOGLOBIN A1C % 5 4     Results from last 7 days   Lab Units 06/16/23  0453   PROCALCITONIN ng/ml <0 05       Lines/Drains:  Invasive Devices     Peripheral Intravenous Line  Duration           Peripheral IV 06/14/23 Left;Ventral (anterior) Forearm 2 days                Recent Cultures (last 7 days):         Last 24 Hours Medication List:   Current Facility-Administered Medications   Medication Dose Route Frequency Provider Last Rate   • amLODIPine  5 mg Oral Daily Ermias Hooper DO     • aspirin  81 mg Oral Daily Lincoln Farias MD     • atorvastatin  40 mg Oral Daily With Sherlyn Acuña MD     • carvedilol  3 125 mg Oral BID With Meals James Henao PA-C     • heparin (porcine)  5,000 Units Subcutaneous Q8H [address] Lincoln Farias MD     • labetalol  10 mg Intravenous Q4H PRN Lincoln Farias MD     • lisinopril  20 mg Oral Daily Ermias neri DO Today, Patient Was Seen By: Gayatri Parada DO    **Please Note: This note may have been constructed using a voice recognition system  **

## 2023-06-16 NOTE — PLAN OF CARE
Problem: MOBILITY - ADULT  Goal: Maintain or return to baseline ADL function  Description: INTERVENTIONS:  -  Assess patient's ability to carry out ADLs; assess patient's baseline for ADL function and identify physical deficits which impact ability to perform ADLs (bathing, care of mouth/teeth, toileting, grooming, dressing, etc )  - Assess/evaluate cause of self-care deficits   - Assess range of motion  - Assess patient's mobility; develop plan if impaired  - Assess patient's need for assistive devices and provide as appropriate  - Encourage maximum independence but intervene and supervise when necessary  - Involve family in performance of ADLs  - Assess for home care needs following discharge   - Consider OT consult to assist with ADL evaluation and planning for discharge  - Provide patient education as appropriate  Outcome: Progressing  Goal: Maintains/Returns to pre admission functional level  Description: INTERVENTIONS:  - Perform BMAT or MOVE assessment daily    - Set and communicate daily mobility goal to care team and patient/family/caregiver  - Collaborate with rehabilitation services on mobility goals if consulted  - Perform Range of Motion 4 times a day  - Reposition patient every 4 hours    - Dangle patient 4 times a day  - Stand patient 4 times a day  - Ambulate patient 4 times a day  - Out of bed to chair 4 times a day   - Out of bed for meals 3 times a day  - Out of bed for toileting  - Record patient progress and toleration of activity level   Outcome: Progressing     Problem: PAIN - ADULT  Goal: Verbalizes/displays adequate comfort level or baseline comfort level  Description: Interventions:  - Encourage patient to monitor pain and request assistance  - Assess pain using appropriate pain scale  - Administer analgesics based on type and severity of pain and evaluate response  - Implement non-pharmacological measures as appropriate and evaluate response  - Consider cultural and social influences on pain and pain management  - Notify physician/advanced practitioner if interventions unsuccessful or patient reports new pain  Outcome: Progressing     Problem: INFECTION - ADULT  Goal: Absence or prevention of progression during hospitalization  Description: INTERVENTIONS:  - Assess and monitor for signs and symptoms of infection  - Monitor lab/diagnostic results  - Monitor all insertion sites, i e  indwelling lines, tubes, and drains  - Monitor endotracheal if appropriate and nasal secretions for changes in amount and color  - [place] appropriate cooling/warming therapies per order  - Administer medications as ordered  - Instruct and encourage patient and family to use good hand hygiene technique  - Identify and instruct in appropriate isolation precautions for identified infection/condition  Outcome: Progressing  Goal: Absence of fever/infection during neutropenic period  Description: INTERVENTIONS:  - Monitor WBC    Outcome: Progressing     Problem: SAFETY ADULT  Goal: Maintain or return to baseline ADL function  Description: INTERVENTIONS:  -  Assess patient's ability to carry out ADLs; assess patient's baseline for ADL function and identify physical deficits which impact ability to perform ADLs (bathing, care of mouth/teeth, toileting, grooming, dressing, etc )  - Assess/evaluate cause of self-care deficits   - Assess range of motion  - Assess patient's mobility; develop plan if impaired  - Assess patient's need for assistive devices and provide as appropriate  - Encourage maximum independence but intervene and supervise when necessary  - Involve family in performance of ADLs  - Assess for home care needs following discharge   - Consider OT consult to assist with ADL evaluation and planning for discharge  - Provide patient education as appropriate  Outcome: Progressing  Goal: Maintains/Returns to pre admission functional level  Description: INTERVENTIONS:  - Perform BMAT or MOVE assessment daily    - Set and communicate daily mobility goal to care team and patient/family/caregiver  - Collaborate with rehabilitation services on mobility goals if consulted  - Perform Range of Motion 4 times a day  - Reposition patient every 4 hours    - Dangle patient 4 times a day  - Stand patient 4 times a day  - Ambulate patient 4 times a day  - Out of bed to chair 4 times a day   - Out of bed for meals 3 times a day  - Out of bed for toileting  - Record patient progress and toleration of activity level   Outcome: Progressing  Goal: Patient will remain free of falls  Description: INTERVENTIONS:  - Educate patient/family on patient safety including physical limitations  - Instruct patient to call for assistance with activity   - Consult OT/PT to assist with strengthening/mobility   - Keep Call bell within reach  - Keep bed low and locked with side rails adjusted as appropriate  - Keep care items and personal belongings within reach  - Initiate and maintain comfort rounds  - Make Fall Risk Sign visible to staff  - Offer Toileting every 2 Hours, in advance of need  - Initiate/Maintain bed alarm  - Obtain necessary fall risk management equipment: socks/bracllet  - Apply yellow socks and bracelet for high fall risk patients  - Consider moving patient to room near nurses station  Outcome: Progressing     Problem: DISCHARGE PLANNING  Goal: Discharge to home or other facility with appropriate resources  Description: INTERVENTIONS:  - Identify barriers to discharge w/patient and caregiver  - Arrange for needed discharge resources and transportation as appropriate  - Identify discharge learning needs (meds, wound care, etc )  - Arrange for interpretive services to assist at discharge as needed  - Refer to Case Management Department for coordinating discharge planning if the patient needs post-hospital services based on physician/advanced practitioner order or complex needs related to functional status, cognitive ability, or social support system  Outcome: Progressing     Problem: Knowledge Deficit  Goal: Patient/family/caregiver demonstrates understanding of disease process, treatment plan, medications, and discharge instructions  Description: Complete learning assessment and assess knowledge base  Interventions:  - Provide teaching at level of understanding  - Provide teaching via preferred learning methods  Outcome: Progressing     Problem: Neurological Deficit  Goal: Neurological status is stable or improving  Description: Interventions:  - Monitor and assess patient's level of consciousness, motor function, sensory function, and level of assistance needed for ADLs  - Monitor and report changes from baseline  Collaborate with interdisciplinary team to initiate plan and implement interventions as ordered  - Provide and maintain a safe environment  - Consider seizure precautions  - Consider fall precautions  - Consider aspiration precautions  - Consider bleeding precautions  Outcome: Progressing     Problem: Activity Intolerance/Impaired Mobility  Goal: Mobility/activity is maintained at optimum level for patient  Description: Interventions:  - Assess and monitor patient  barriers to mobility and need for assistive/adaptive devices  - Assess patient's emotional response to limitations  - Collaborate with interdisciplinary team and initiate plans and interventions as ordered  - Encourage independent activity per ability   - Maintain proper body alignment  - Perform active/passive rom as tolerated/ordered  - Plan activities to conserve energy   - Turn patient as appropriate  Outcome: Progressing     Problem: Communication Impairment  Goal: Ability to express needs and understand communication  Description: Assess patient's communication skills and ability to understand information  Patient will demonstrate use of effective communication techniques, alternative methods of communication and understanding even if not able to speak       - Encourage communication and provide alternate methods of communication as needed  - Collaborate with case management/ for discharge needs  - Include patient/family/caregiver in decisions related to communication  Outcome: Progressing     Problem: Potential for Aspiration  Goal: Non-ventilated patient's risk of aspiration is minimized  Description: Assess and monitor vital signs, respiratory status, and labs (WBC)  Monitor for signs of aspiration (tachypnea, cough, rales, wheezing, cyanosis, fever)  - Assess and monitor patient's ability to swallow  - Place patient up in chair to eat if possible  - HOB up at 90 degrees to eat if unable to get patient up into chair   - Supervise patient during oral intake  - Instruct patient/ family to take small bites  - Instruct patient/ family to take small single sips when taking liquids  - Follow patient-specific strategies generated by speech pathologist   Outcome: Progressing  Goal: Ventilated patient's risk of aspiration is minimized  Description: Assess and monitor vital signs, respiratory status, airway cuff pressure, and labs (WBC)  Monitor for signs of aspiration (tachypnea, cough, rales, wheezing, cyanosis, fever)  - Elevate head of bed 30 degrees if patient has tube feeding   - Monitor tube feeding  Outcome: Progressing     Problem: Nutrition  Goal: Nutrition/Hydration status is improving  Description: Monitor and assess patient's nutrition/hydration status for malnutrition (ex- brittle hair, bruises, dry skin, pale skin and conjunctiva, muscle wasting, smooth red tongue, and disorientation)  Collaborate with interdisciplinary team and initiate plan and interventions as ordered  Monitor patient's weight and dietary intake as ordered or per policy  Utilize nutrition screening tool and intervene per policy  Determine patient's food preferences and provide high-protein, high-caloric foods as appropriate       - Assist patient with eating   - Allow adequate time for meals   - Encourage patient to take dietary supplement as ordered  - Collaborate with clinical nutritionist   - Include patient/family/caregiver in decisions related to nutrition    Outcome: Progressing     Problem: Prexisting or High Potential for Compromised Skin Integrity  Goal: Skin integrity is maintained or improved  Description: INTERVENTIONS:  - Identify patients at risk for skin breakdown  - Assess and monitor skin integrity  - Assess and monitor nutrition and hydration status  - Monitor labs   - Assess for incontinence   - Turn and reposition patient  - Assist with mobility/ambulation  - Relieve pressure over bony prominences  - Avoid friction and shearing  - Provide appropriate hygiene as needed including keeping skin clean and dry  - Evaluate need for skin moisturizer/barrier cream  - Collaborate with interdisciplinary team   - Patient/family teaching  - Consider wound care consult   Outcome: Progressing     Problem: Potential for Falls  Goal: Patient will remain free of falls  Description: INTERVENTIONS:  - Educate patient/family on patient safety including physical limitations  - Instruct patient to call for assistance with activity   - Consult OT/PT to assist with strengthening/mobility   - Keep Call bell within reach  - Keep bed low and locked with side rails adjusted as appropriate  - Keep care items and personal belongings within reach  - Initiate and maintain comfort rounds  - Make Fall Risk Sign visible to staff  - Offer Toileting every 2 Hours, in advance of need  - Initiate/Maintain bedalarm  - Obtain necessary fall risk management equipment: socks  - Apply yellow socks and bracelet for high fall risk patients  - Consider moving patient to room near nurses station  Outcome: Progressing

## 2023-06-16 NOTE — CASE MANAGEMENT
Case Management Discharge Planning Note    Patient name Keesha Monzon  Location /-01 MRN [id number]  : 1945 Date 2023       Current Admission Date: 2023  Current Admission Diagnosis:Left basal ganglia embolic stroke [facility])   Patient Active Problem List    Diagnosis Date Noted   • History of multiple strokes 06/15/2023   • New onset a-fib ([facility] [address] ) 06/15/2023   • Left basal ganglia embolic stroke ([facility] [address] ) 15/96/5338   • Hypertensive urgency 2023   • Elevated troponin 2023      LOS (days): 2  Geometric Mean LOS (GMLOS) (days): 4 50  Days to GMLOS:2 2     OBJECTIVE:  Risk of Unplanned Readmission Score: 9 83      Current admission status: Inpatient   Preferred Pharmacy:   [address]  [address]  [facility] [address]  Phone: [phone] Fax: [phone]    Primary Care Provider: No primary care provider on file  Primary Insurance: MEDICARE  Secondary Insurance: AETNA    DISCHARGE DETAILS:    IMM Given (Date):: 23  IMM Given to[de-identified] Patient  Additional Comments: CARMEN reviewed IMM with pt  Carmen spoke to pt about setting up a PCP appointment  She does not have an established PCP  She said she would want to use [facility] where her  goes  CARMEN called them to schedule an appointment  Pt would need to  new patient paperwork at their office to register since she cannot complete the online forms  Cm left pt's spouse a message with the same information and added TV PCP office information to pt's d/c instructions

## 2023-06-16 NOTE — SPEECH THERAPY NOTE
Speech Language/Pathology  Speech/Language Pathology  Assessment    Patient Name: Leopoldo Alfonso  CVBJH'B Date: 6/16/2023     Problem List  Principal Problem:    Left basal ganglia embolic stroke [facility])  Active Problems:    Hypertensive urgency    Elevated troponin    History of multiple strokes    New onset a-fib [facility])    Past Medical History  History reviewed  No pertinent past medical history  Past Surgical History  History reviewed  No pertinent surgical history  Current Medical:  78year old female with no documented medical history (per ED team, has not sought medical care/PCP evaluation in many years) and known alcohol use       Presented this morning, 6/14, with few days duration of language dysfunction (slow speech, word finding difficulty, dysarthria); concurrently with poor oral intake and overall bradykinesia at home       6/14 neuro exam with minimal expressive aphasia, dysarthria and R facial droop/tongue deviation  Failed initial dysphagia screen with nursing       Notable accelerated hypertension this morning (max /121)     Work-up:   • CTA head/neck with no acute intracranial pathology, does reveal chronic lacunar infarcts and advanced microvascular disease  From vessel standpoint without LVO nor any critical stenosis/flow restrictive disease  • MRI brain:  • Acute infarct in left basal ganglia    • Multifocal chronic lacunar infarcts in bilateral basal ganglia and bilateral thalami (etat lacunaire pattern), chronic small infarcts in bilateral cerebellum (left larger than right), severe chronic microangiopathy, and hypertensive arteriopathy  • Chronic cystic encephalomalacia and gliosis in bilateral anterior frontal lobes (right worse than left) and right anterior temporal lobe, likely sequela of remote trauma    • Mild dolichoectasia of basilar artery, unchanged  • LDL 56  • 6/14 Serum studies: WBC 13, Plts 480/431  • Echo: EF 55%, normal sized atria bilaterally  • 6/15 Afib noted on tele overnight with rates in the 80s  • TSH WNL     Symptoms consistent with acute left basal ganglia infarct  Afib noted on the monitor overnight, which is thought to be likely etiology  Imaging also reveals multiple chronic strokes as well, both cortical and subcortical, in addition to multiple microhemorrhages thought to be secondary to uncontrolled hypertension  Patient will ultimately need both AC and AP; however, will need to ensure patient's BP is controlled prior to initiating dual therapy  Details re: timing of AC/AP initiation noted below  No further inpatient neurologic recommendations  Current Cognitive Status/Orientation:  Awake/alert  Person: +   Place: +   Time/Date: +    Situation: +     Pt was assessed with the Western Aphasia Battery Revised Bedside with the following findings/results:      Spontaneous Speech: Content Score: 10/10  How are you today?   What is your full address? 2/2  Why are you here? 2/2  Tell me what is happening in this picture?      Spontaneous Speech: Fluency Score: 9/10    10 = Normal Speech 5: Halting, paraphasic, but more complete sentences; significant word-finding difficulty    9 = Some hesitations and word-finding difficulty  4: Agrammatic, effortful; verb-noun phrases, but only one or two propositional sentences    8 = Circumlocutory, fluent speech w/ semantic paraphasia and word-finding difficulty 3: Mostly unintelligible, low-volume mumbling; some single words    7 = Fluent phonemic jargon, semblance to [place] syntax and phonology  2: Single words, often paraphasias, effortful and hesitant    6 = Logopenic but normal syntax; few, if any, paraphasias; significant word-finding difficulty 1: Recurrent, stereotypic utterances w/ meaningful intonation     0: No words or short, meaningless utterances         Auditory Verbal Comprehension: Yes/No Questions: 10/10    Sequential Commands: 10/10    Repetition: 10/10    Object Namin/10    Readin/10  Reading Score: 5/5  Comprehension: 5/5    Writing: 10/10  Name: 1/1  Address: 2/2  Sentence to Dictation: 2/2  Sentence Formulation: 5/5      Bedside Aphasia Score: 96  (Sum the Content, Fluency, Auditory Verbal Comprehension, Sequential Commands, Repetition, and Object Naming scores  Divide the sum by 6; then multiply by 10 to obtain )     Bedside Language Score: 96  (Sum the Content, Fluency, Auditory Verbal Comprehension, Sequential Commands, Repetition, Object Naming, Reading and Writing scores   Divide the sum by 8; then multiply by 10 to obtain )    Bedside Aphasia Classification Criteria: ANOMIC   (Compare the patient's Fluency, Auditory Verbal Comprehension, and Repetition scores to the the three scores associated with each aphasia type)      Aphasia Type Scores    Fluency Auditory Verbal Comprehension Repetition    Global <5 <4 <5   Broca's <5 >3 <8   Isolation <5 <4 >4   Transcortical Motor <5 >3 >7   Wernicke's >4 <7 <8   Transcortical Sensory >4 <7 >7   Conduction >4 >6 <7   Anomic  >4 >6 >6       MOTOR SPEECH:  Oral Mechanism Exam: mild R facial droop w/ R sided decreased ROM     Dysarthria:              Imprecise artic: noted at sentence and conversational level, precise at word and short phrase level               Rate: variable rate               Nasality:  WFL               Breath support: reduced, 11 second sustained vowel                Volume: low volume   Diadochokinesis:     /p/: 3 reps/sec WFL     /t/: 31 reps/10 sec WFL     /k/: 29 reps/10 sec WFL     /p t k/:  7 reps     Repetition of Multisyllabic Words: WFL    Words of Progressive Length: WFL       Apraxia:              Oral: no               Verbal: no         Cognitive-linguistic skills:  Appeared to be grossly intact     Symptoms noted:  Neurogenic stuttering   Aware/not aware/partially aware of deficits: Partially aware        Summary/Impression:    Pt presents w/ mild dysarthria latisha by imprecise articulation and reduced breath support impacting volume and overall intelligibility at the sentence and conversational level  Minimal word finding difficulty and inconsistent difficulty initiating speech/stuttering-like behavior  Treatment Recommended and Frequency:      Therapy Prognosis:  Good   Prognosis considerations:  Age, cognitive status, motivation for tx    Pt would benefit from post-acute ST services, CM aware     Goals:   LONG TERM GOALS:  -The patient will demonstrate intelligible speech in all activities of daily living including dynamic conversation  -The patient will independently utilize compensatory strategies to maximize communication in all ADLs  SHORT TERM GOALS:    Respiration and Phonation  -Patient will utilize the strategy of increased respiratory support (ie “inhale more deeply”)  before beginning an utterance at the word, phrase and sentence level tasks  for 90% of trials with minimal to no cues needed in both formal speech tasks and in informal ADL activities    Articulation    -Patient will use trained strategies (eg slowed rate, over articulation, increased oral opening, writing key word, increased loudness, phrasing)  to improve speech intelligibility in word,  phrases, sentences and  conversation with 80% accuracy           Education Initiated with:   Pt  Introduced strategies for increased breath support and over articulation

## 2023-06-17 PROCEDURE — 99232 SBSQ HOSP IP/OBS MODERATE 35: CPT | Performed by: INTERNAL MEDICINE

## 2023-06-17 RX ORDER — ATORVASTATIN CALCIUM 40 MG/1
40 TABLET, FILM COATED ORAL DAILY
Qty: 30 TABLET | Refills: 0 | Status: CANCELLED | OUTPATIENT
Start: 2023-06-17

## 2023-06-17 RX ORDER — ASPIRIN 81 MG/1
81 TABLET, CHEWABLE ORAL DAILY
Qty: 30 TABLET | Refills: 0 | Status: CANCELLED | OUTPATIENT
Start: 2023-06-17

## 2023-06-17 RX ORDER — AMLODIPINE BESYLATE 5 MG/1
5 TABLET ORAL DAILY
Qty: 30 TABLET | Refills: 0 | Status: CANCELLED | OUTPATIENT
Start: 2023-06-17

## 2023-06-17 RX ORDER — LISINOPRIL 20 MG/1
20 TABLET ORAL DAILY
Qty: 30 TABLET | Refills: 0 | Status: CANCELLED | OUTPATIENT
Start: 2023-06-17

## 2023-06-17 RX ORDER — LISINOPRIL 20 MG/1
40 TABLET ORAL DAILY
Status: DISCONTINUED | OUTPATIENT
Start: 2023-06-17 | End: 2023-06-19 | Stop reason: HOSPADM

## 2023-06-17 RX ORDER — ACETAMINOPHEN 325 MG/1
650 TABLET ORAL EVERY 6 HOURS PRN
Status: DISCONTINUED | OUTPATIENT
Start: 2023-06-17 | End: 2023-06-19 | Stop reason: HOSPADM

## 2023-06-17 RX ORDER — AMLODIPINE BESYLATE 5 MG/1
10 TABLET ORAL DAILY
Status: DISCONTINUED | OUTPATIENT
Start: 2023-06-17 | End: 2023-06-19 | Stop reason: HOSPADM

## 2023-06-17 RX ADMIN — ACETAMINOPHEN 650 MG: 325 TABLET, FILM COATED ORAL at 08:13

## 2023-06-17 RX ADMIN — ATORVASTATIN CALCIUM 40 MG: 40 TABLET, FILM COATED ORAL at 16:48

## 2023-06-17 RX ADMIN — HEPARIN SODIUM 5000 UNITS: 5000 INJECTION INTRAVENOUS; SUBCUTANEOUS at 06:05

## 2023-06-17 RX ADMIN — CARVEDILOL 3.12 MG: 3.12 TABLET, FILM COATED ORAL at 08:13

## 2023-06-17 RX ADMIN — ACETAMINOPHEN 650 MG: 325 TABLET, FILM COATED ORAL at 19:56

## 2023-06-17 RX ADMIN — AMLODIPINE BESYLATE 10 MG: 5 TABLET ORAL at 08:13

## 2023-06-17 RX ADMIN — ASPIRIN 81 MG CHEWABLE TABLET 81 MG: 81 TABLET CHEWABLE at 08:13

## 2023-06-17 RX ADMIN — HEPARIN SODIUM 5000 UNITS: 5000 INJECTION INTRAVENOUS; SUBCUTANEOUS at 21:41

## 2023-06-17 RX ADMIN — CARVEDILOL 3.12 MG: 3.12 TABLET, FILM COATED ORAL at 16:48

## 2023-06-17 RX ADMIN — LISINOPRIL 40 MG: 20 TABLET ORAL at 08:13

## 2023-06-17 RX ADMIN — HEPARIN SODIUM 5000 UNITS: 5000 INJECTION INTRAVENOUS; SUBCUTANEOUS at 13:34

## 2023-06-17 NOTE — ASSESSMENT & PLAN NOTE
A-fib seen on EKG, neurology note reviewed, hold off on full dose anticoagulation, due to multiple abnormalities  See plan above, may be able to start Eliquis in the next 24 to 48 hours, need better blood pressure control      Continue aspirin 81 mg daily for now

## 2023-06-17 NOTE — PLAN OF CARE
Problem: PAIN - ADULT  Goal: Verbalizes/displays adequate comfort level or baseline comfort level  Description: Interventions:  - Encourage patient to monitor pain and request assistance  - Assess pain using appropriate pain scale  - Administer analgesics based on type and severity of pain and evaluate response  - Implement non-pharmacological measures as appropriate and evaluate response  - Consider cultural and social influences on pain and pain management  - Notify physician/advanced practitioner if interventions unsuccessful or patient reports new pain  Outcome: Progressing     Problem: INFECTION - ADULT  Goal: Absence or prevention of progression during hospitalization  Description: INTERVENTIONS:  - Assess and monitor for signs and symptoms of infection  - Monitor lab/diagnostic results  - Monitor all insertion sites, i e  indwelling lines, tubes, and drains  - Monitor endotracheal if appropriate and nasal secretions for changes in amount and color  - [place] appropriate cooling/warming therapies per order  - Administer medications as ordered  - Instruct and encourage patient and family to use good hand hygiene technique  - Identify and instruct in appropriate isolation precautions for identified infection/condition  Outcome: Progressing     Problem: SAFETY ADULT  Goal: Maintain or return to baseline ADL function  Description: INTERVENTIONS:  -  Assess patient's ability to carry out ADLs; assess patient's baseline for ADL function and identify physical deficits which impact ability to perform ADLs (bathing, care of mouth/teeth, toileting, grooming, dressing, etc )  - Assess/evaluate cause of self-care deficits   - Assess range of motion  - Assess patient's mobility; develop plan if impaired  - Assess patient's need for assistive devices and provide as appropriate  - Encourage maximum independence but intervene and supervise when necessary  - Involve family in performance of ADLs  - Assess for home care needs following discharge   - Consider OT consult to assist with ADL evaluation and planning for discharge  - Provide patient education as appropriate  Outcome: Progressing     Problem: Neurological Deficit  Goal: Neurological status is stable or improving  Description: Interventions:  - Monitor and assess patient's level of consciousness, motor function, sensory function, and level of assistance needed for ADLs  - Monitor and report changes from baseline  Collaborate with interdisciplinary team to initiate plan and implement interventions as ordered  - Provide and maintain a safe environment  - Consider seizure precautions  - Consider fall precautions  - Consider aspiration precautions  - Consider bleeding precautions  Outcome: Progressing     Problem: Knowledge Deficit  Goal: Patient/family/caregiver demonstrates understanding of disease process, treatment plan, medications, and discharge instructions  Description: Complete learning assessment and assess knowledge base    Interventions:  - Provide teaching at level of understanding  - Provide teaching via preferred learning methods  Outcome: Progressing     Problem: DISCHARGE PLANNING  Goal: Discharge to home or other facility with appropriate resources  Description: INTERVENTIONS:  - Identify barriers to discharge w/patient and caregiver  - Arrange for needed discharge resources and transportation as appropriate  - Identify discharge learning needs (meds, wound care, etc )  - Arrange for interpretive services to assist at discharge as needed  - Refer to Case Management Department for coordinating discharge planning if the patient needs post-hospital services based on physician/advanced practitioner order or complex needs related to functional status, cognitive ability, or social support system  Outcome: Progressing

## 2023-06-17 NOTE — ASSESSMENT & PLAN NOTE
"· 37-year-old female, with no PMH, no PCP follow-up for years, presents with dysarthria, word finding difficulty  MRI shows acute CVA    Continue with diet per speech therapy okay with thin liquids      Neurology plan quoted below:  \"Previous recommendations were to pursue prompt PCP follow-up this coming week to review blood pressures, and if normotensive transition from aspirin to full dose Eliquis (given concern for microvascular disease and hypertensive microhemorrhages on MRI brain)  However, patient does not have established PCP, and feel there may be a delay in establishing PCP evaluation beyond early next week; thus will pursue the following plan:     If normotensive BP's by this coming Sunday morning, 6/18, would be ok from neurology standpoint to stop aspirin and start Eliquis 5 mg daily prior to discharge  Would appreciate primary team/CM assistance in ensuring Eliquis is affordable for patient prior to discharge  Would then continue to seek PCP \"new patient evaluation\" in the coming week or few to follow-up her blood pressures and ensure normotension/minimize bleed risk  \"      "

## 2023-06-17 NOTE — ASSESSMENT & PLAN NOTE
Blood pressure still uncontrolled,   Increase Norvasc to 10 mg daily  Increase lisinopril to 40 mg daily    Continue Coreg which was added by cardiology at 3 125 mg twice daily

## 2023-06-17 NOTE — PROGRESS NOTES
"[place]  Progress Note  Name: Ebony Mackay  MRN: [id number]  Unit/Bed#: -01 I Date of Admission: 6/14/2023   Date of Service: 6/17/2023 I Hospital Day: 3    Assessment/Plan   * Left basal ganglia embolic stroke [facility])  Assessment & Plan  · 46-year-old female, with no PMH, no PCP follow-up for years, presents with dysarthria, word finding difficulty  MRI shows acute CVA    Continue with diet per speech therapy okay with thin liquids      Neurology plan quoted below:  \"Previous recommendations were to pursue prompt PCP follow-up this coming week to review blood pressures, and if normotensive transition from aspirin to full dose Eliquis (given concern for microvascular disease and hypertensive microhemorrhages on MRI brain)  However, patient does not have established PCP, and feel there may be a delay in establishing PCP evaluation beyond early next week; thus will pursue the following plan:     If normotensive BP's by this coming Sunday morning, 6/18, would be ok from neurology standpoint to stop aspirin and start Eliquis 5 mg daily prior to discharge  Would appreciate primary team/CM assistance in ensuring Eliquis is affordable for patient prior to discharge  Would then continue to seek PCP \"new patient evaluation\" in the coming week or few to follow-up her blood pressures and ensure normotension/minimize bleed risk  \"        Hypertensive urgency  Assessment & Plan  Blood pressure still uncontrolled,   Increase Norvasc to 10 mg daily  Increase lisinopril to 40 mg daily    Continue Coreg which was added by cardiology at 3 125 mg twice daily    New onset a-fib [facility])  Assessment & Plan  A-fib seen on EKG, neurology note reviewed, hold off on full dose anticoagulation, due to multiple abnormalities  See plan above, may be able to start Eliquis in the next 24 to 48 hours, need better blood pressure control      Continue aspirin 81 mg daily for now            Elevated " troponin  Assessment & Plan  Non-MI troponin elevation    History of multiple strokes  Assessment & Plan  MRI shows evidence of prior strokes   updated with plan of care, neurology quick note reviewed  Case discussed with nursing team      Current Length of Stay: 3 day(s)  Current Patient Status: Inpatient   Certification Statement: The patient will continue to require additional inpatient hospital stay due to Uncontrolled blood pressure, acute CVA  Discharge Plan: Anticipate discharge in 24-48 hrs to home  Code Status: Level 1 - Full Code    Subjective:   Patient complaining of mild to moderate low back pain today    Objective:     Vitals:   Temp (24hrs), Av 3 °F (36 8 °C), Min:98 2 °F (36 8 °C), Max:98 4 °F (36 9 °C)    Temp:  [98 2 °F (36 8 °C)-98 4 °F (36 9 °C)] 98 4 °F (36 9 °C)  HR:  [88-99] 91  Resp:  [16-20] 16  BP: (163-184)/() 184/103  SpO2:  [97 %-98 %] 97 %  Body mass index is 19 61 kg/m²  Input and Output Summary (last 24 hours):      Intake/Output Summary (Last 24 hours) at 2023 0852  Last data filed at 2023 0601  Gross per 24 hour   Intake --   Output 200 ml   Net -200 ml       Physical Exam:   Physical Exam     Additional Data:     Labs:  Results from last 7 days   Lab Units 23  0453   WBC Thousand/uL 9 05   HEMOGLOBIN g/dL 13 2   HEMATOCRIT % 39 3   PLATELETS Thousands/uL 373   NEUTROS PCT % 66   LYMPHS PCT % 25   MONOS PCT % 7   EOS PCT % 1     Results from last 7 days   Lab Units 23  0453   SODIUM mmol/L 142   POTASSIUM mmol/L 3 3*   CHLORIDE mmol/L 108   CO2 mmol/L 27   BUN mg/dL 15   CREATININE mg/dL 0 56*   ANION GAP mmol/L 7   CALCIUM mg/dL 9 0   ALBUMIN g/dL 3 6   TOTAL BILIRUBIN mg/dL 1 02*   ALK PHOS U/L 42   ALT U/L 9   AST U/L 14   GLUCOSE RANDOM mg/dL 97     Results from last 7 days   Lab Units 23  0453   INR  1 01     Results from last 7 days   Lab Units 23  0719   POC GLUCOSE mg/dl 122     Results from last 7 days Lab Units 06/15/23  0503   HEMOGLOBIN A1C % 5 4     Results from last 7 days   Lab Units 06/16/23  0453   PROCALCITONIN ng/ml <0 05       Lines/Drains:  Invasive Devices     Peripheral Intravenous Line  Duration           Peripheral IV 06/14/23 Left;Ventral (anterior) Forearm 2 days                      Imaging: No pertinent imaging reviewed  Recent Cultures (last 7 days):         Last 24 Hours Medication List:   Current Facility-Administered Medications   Medication Dose Route Frequency Provider Last Rate   • acetaminophen  650 mg Oral Q6H PRN Nichol Cocgregorio Rufus, DO     • amLODIPine  10 mg Oral Daily Nichol More Rufus, DO     • aspirin  81 mg Oral Daily Wen Sinha MD     • atorvastatin  40 mg Oral Daily With Gee Bergman MD     • carvedilol  3 125 mg Oral BID With Meals Hal Durán PA-C     • heparin (porcine)  5,000 Units Subcutaneous Q8H [address], [place]     • lisinopril  40 mg Oral Daily Geo Cole DO          Today, Patient Was Seen By: Geo Cole DO    **Please Note: This note may have been constructed using a voice recognition system  **

## 2023-06-17 NOTE — PLAN OF CARE
Problem: MOBILITY - ADULT  Goal: Maintain or return to baseline ADL function  Description: INTERVENTIONS:  -  Assess patient's ability to carry out ADLs; assess patient's baseline for ADL function and identify physical deficits which impact ability to perform ADLs (bathing, care of mouth/teeth, toileting, grooming, dressing, etc )  - Assess/evaluate cause of self-care deficits   - Assess range of motion  - Assess patient's mobility; develop plan if impaired  - Assess patient's need for assistive devices and provide as appropriate  - Encourage maximum independence but intervene and supervise when necessary  - Involve family in performance of ADLs  - Assess for home care needs following discharge   - Consider OT consult to assist with ADL evaluation and planning for discharge  - Provide patient education as appropriate  Outcome: Progressing  Goal: Maintains/Returns to pre admission functional level  Description: INTERVENTIONS:  - Perform BMAT or MOVE assessment daily    - Set and communicate daily mobility goal to care team and patient/family/caregiver  - Collaborate with rehabilitation services on mobility goals if consulted  - Perform Range of Motion 3 times a day  - Reposition patient every 2 hours    - Dangle patient 3 times a day  - Stand patient 3 times a day  - Ambulate patient 3 times a day  - Out of bed to chair 3 times a day   - Out of bed for meals 3 times a day  - Out of bed for toileting  - Record patient progress and toleration of activity level   Outcome: Progressing     Problem: PAIN - ADULT  Goal: Verbalizes/displays adequate comfort level or baseline comfort level  Description: Interventions:  - Encourage patient to monitor pain and request assistance  - Assess pain using appropriate pain scale  - Administer analgesics based on type and severity of pain and evaluate response  - Implement non-pharmacological measures as appropriate and evaluate response  - Consider cultural and social influences on pain and pain management  - Notify physician/advanced practitioner if interventions unsuccessful or patient reports new pain  Outcome: Progressing     Problem: INFECTION - ADULT  Goal: Absence or prevention of progression during hospitalization  Description: INTERVENTIONS:  - Assess and monitor for signs and symptoms of infection  - Monitor lab/diagnostic results  - Monitor all insertion sites, i e  indwelling lines, tubes, and drains  - Monitor endotracheal if appropriate and nasal secretions for changes in amount and color  - [place] appropriate cooling/warming therapies per order  - Administer medications as ordered  - Instruct and encourage patient and family to use good hand hygiene technique  - Identify and instruct in appropriate isolation precautions for identified infection/condition  Outcome: Progressing  Goal: Absence of fever/infection during neutropenic period  Description: INTERVENTIONS:  - Monitor WBC    Outcome: Progressing     Problem: SAFETY ADULT  Goal: Maintain or return to baseline ADL function  Description: INTERVENTIONS:  -  Assess patient's ability to carry out ADLs; assess patient's baseline for ADL function and identify physical deficits which impact ability to perform ADLs (bathing, care of mouth/teeth, toileting, grooming, dressing, etc )  - Assess/evaluate cause of self-care deficits   - Assess range of motion  - Assess patient's mobility; develop plan if impaired  - Assess patient's need for assistive devices and provide as appropriate  - Encourage maximum independence but intervene and supervise when necessary  - Involve family in performance of ADLs  - Assess for home care needs following discharge   - Consider OT consult to assist with ADL evaluation and planning for discharge  - Provide patient education as appropriate  Outcome: Progressing  Goal: Maintains/Returns to pre admission functional level  Description: INTERVENTIONS:  - Perform BMAT or MOVE assessment daily    - Set and communicate daily mobility goal to care team and patient/family/caregiver  - Collaborate with rehabilitation services on mobility goals if consulted  - Perform Range of Motion 3 times a day  - Reposition patient every 2 hours    - Dangle patient 3 times a day  - Stand patient 3 times a day  - Ambulate patient 3 times a day  - Out of bed to chair 3 times a day   - Out of bed for meals 3 times a day  - Out of bed for toileting  - Record patient progress and toleration of activity level   Outcome: Progressing  Goal: Patient will remain free of falls  Description: INTERVENTIONS:  -  Assess patient's ability to carry out ADLs; assess patient's baseline for ADL function and identify physical deficits which impact ability to perform ADLs (bathing, care of mouth/teeth, toileting, grooming, dressing, etc )  - Assess/evaluate cause of self-care deficits   - Assess range of motion  - Assess patient's mobility; develop plan if impaired  - Assess patient's need for assistive devices and provide as appropriate  - Encourage maximum independence but intervene and supervise when necessary  - Involve family in performance of ADLs  - Assess for home care needs following discharge   - Consider OT consult to assist with ADL evaluation and planning for discharge  - Provide patient education as appropriate  Outcome: Progressing     Problem: DISCHARGE PLANNING  Goal: Discharge to home or other facility with appropriate resources  Description: INTERVENTIONS:  - Identify barriers to discharge w/patient and caregiver  - Arrange for needed discharge resources and transportation as appropriate  - Identify discharge learning needs (meds, wound care, etc )  - Arrange for interpretive services to assist at discharge as needed  - Refer to Case Management Department for coordinating discharge planning if the patient needs post-hospital services based on physician/advanced practitioner order or complex needs related to functional status, cognitive ability, or social support system  Outcome: Progressing     Problem: Knowledge Deficit  Goal: Patient/family/caregiver demonstrates understanding of disease process, treatment plan, medications, and discharge instructions  Description: Complete learning assessment and assess knowledge base  Interventions:  - Provide teaching at level of understanding  - Provide teaching via preferred learning methods  Outcome: Progressing     Problem: Neurological Deficit  Goal: Neurological status is stable or improving  Description: Interventions:  - Monitor and assess patient's level of consciousness, motor function, sensory function, and level of assistance needed for ADLs  - Monitor and report changes from baseline  Collaborate with interdisciplinary team to initiate plan and implement interventions as ordered  - Provide and maintain a safe environment  - Consider seizure precautions  - Consider fall precautions  - Consider aspiration precautions  - Consider bleeding precautions  Outcome: Progressing     Problem: Activity Intolerance/Impaired Mobility  Goal: Mobility/activity is maintained at optimum level for patient  Description: Interventions:  - Assess and monitor patient  barriers to mobility and need for assistive/adaptive devices  - Assess patient's emotional response to limitations  - Collaborate with interdisciplinary team and initiate plans and interventions as ordered  - Encourage independent activity per ability   - Maintain proper body alignment  - Perform active/passive rom as tolerated/ordered  - Plan activities to conserve energy   - Turn patient as appropriate  Outcome: Progressing     Problem: Potential for Aspiration  Goal: Non-ventilated patient's risk of aspiration is minimized  Description: Assess and monitor vital signs, respiratory status, and labs (WBC)  Monitor for signs of aspiration (tachypnea, cough, rales, wheezing, cyanosis, fever)      - Assess and monitor patient's ability to swallow  - Place patient up in chair to eat if possible  - HOB up at 90 degrees to eat if unable to get patient up into chair   - Supervise patient during oral intake  - Instruct patient/ family to take small bites  - Instruct patient/ family to take small single sips when taking liquids  - Follow patient-specific strategies generated by speech pathologist   Outcome: Progressing  Goal: Ventilated patient's risk of aspiration is minimized  Description: Assess and monitor vital signs, respiratory status, airway cuff pressure, and labs (WBC)  Monitor for signs of aspiration (tachypnea, cough, rales, wheezing, cyanosis, fever)  - Elevate head of bed 30 degrees if patient has tube feeding   - Monitor tube feeding  Outcome: Progressing     Problem: Nutrition  Goal: Nutrition/Hydration status is improving  Description: Monitor and assess patient's nutrition/hydration status for malnutrition (ex- brittle hair, bruises, dry skin, pale skin and conjunctiva, muscle wasting, smooth red tongue, and disorientation)  Collaborate with interdisciplinary team and initiate plan and interventions as ordered  Monitor patient's weight and dietary intake as ordered or per policy  Utilize nutrition screening tool and intervene per policy  Determine patient's food preferences and provide high-protein, high-caloric foods as appropriate  - Assist patient with eating   - Allow adequate time for meals   - Encourage patient to take dietary supplement as ordered  - Collaborate with clinical nutritionist   - Include patient/family/caregiver in decisions related to nutrition    Outcome: Progressing

## 2023-06-18 LAB
ALBUMIN SERPL BCP-MCNC: 3.6 G/DL (ref 3.5–5)
ALP SERPL-CCNC: 44 U/L (ref 34–104)
ALT SERPL W P-5'-P-CCNC: 17 U/L (ref 7–52)
ANION GAP SERPL CALCULATED.3IONS-SCNC: 6 MMOL/L (ref 4–13)
AST SERPL W P-5'-P-CCNC: 21 U/L (ref 13–39)
BASOPHILS # BLD AUTO: 0.06 THOUSANDS/ÂΜL (ref 0–0.1)
BASOPHILS NFR BLD AUTO: 1 % (ref 0–1)
BILIRUB SERPL-MCNC: 1.01 MG/DL (ref 0.2–1)
BUN SERPL-MCNC: 27 MG/DL (ref 5–25)
CALCIUM SERPL-MCNC: 9 MG/DL (ref 8.4–10.2)
CHLORIDE SERPL-SCNC: 109 MMOL/L (ref 96–108)
CO2 SERPL-SCNC: 27 MMOL/L (ref 21–32)
CREAT SERPL-MCNC: 0.55 MG/DL (ref 0.6–1.3)
EOSINOPHIL # BLD AUTO: 0.12 THOUSAND/ÂΜL (ref 0–0.61)
EOSINOPHIL NFR BLD AUTO: 1 % (ref 0–6)
ERYTHROCYTE [DISTWIDTH] IN BLOOD BY AUTOMATED COUNT: 13.2 % (ref 11.6–15.1)
GFR SERPL CREATININE-BSD FRML MDRD: 92 ML/MIN/1.73SQ M
GLUCOSE SERPL-MCNC: 95 MG/DL (ref 65–140)
HCT VFR BLD AUTO: 38.4 % (ref 34.8–46.1)
HGB BLD-MCNC: 12.9 G/DL (ref 11.5–15.4)
IMM GRANULOCYTES # BLD AUTO: 0.03 THOUSAND/UL (ref 0–0.2)
IMM GRANULOCYTES NFR BLD AUTO: 0 % (ref 0–2)
LYMPHOCYTES # BLD AUTO: 2.16 THOUSANDS/ÂΜL (ref 0.6–4.47)
LYMPHOCYTES NFR BLD AUTO: 25 % (ref 14–44)
MAGNESIUM SERPL-MCNC: 2.1 MG/DL (ref 1.9–2.7)
MCH RBC QN AUTO: 31.4 PG (ref 26.8–34.3)
MCHC RBC AUTO-ENTMCNC: 33.6 G/DL (ref 31.4–37.4)
MCV RBC AUTO: 93 FL (ref 82–98)
MONOCYTES # BLD AUTO: 0.67 THOUSAND/ÂΜL (ref 0.17–1.22)
MONOCYTES NFR BLD AUTO: 8 % (ref 4–12)
NEUTROPHILS # BLD AUTO: 5.7 THOUSANDS/ÂΜL (ref 1.85–7.62)
NEUTS SEG NFR BLD AUTO: 65 % (ref 43–75)
NRBC BLD AUTO-RTO: 0 /100 WBCS
PHOSPHATE SERPL-MCNC: 3.4 MG/DL (ref 2.3–4.1)
PLATELET # BLD AUTO: 352 THOUSANDS/UL (ref 149–390)
PMV BLD AUTO: 9.9 FL (ref 8.9–12.7)
POTASSIUM SERPL-SCNC: 3.1 MMOL/L (ref 3.5–5.3)
PROT SERPL-MCNC: 6.4 G/DL (ref 6.4–8.4)
RBC # BLD AUTO: 4.11 MILLION/UL (ref 3.81–5.12)
SODIUM SERPL-SCNC: 142 MMOL/L (ref 135–147)
WBC # BLD AUTO: 8.74 THOUSAND/UL (ref 4.31–10.16)

## 2023-06-18 PROCEDURE — 85025 COMPLETE CBC W/AUTO DIFF WBC: CPT | Performed by: INTERNAL MEDICINE

## 2023-06-18 PROCEDURE — 83735 ASSAY OF MAGNESIUM: CPT | Performed by: INTERNAL MEDICINE

## 2023-06-18 PROCEDURE — 99232 SBSQ HOSP IP/OBS MODERATE 35: CPT | Performed by: INTERNAL MEDICINE

## 2023-06-18 PROCEDURE — 80053 COMPREHEN METABOLIC PANEL: CPT | Performed by: INTERNAL MEDICINE

## 2023-06-18 PROCEDURE — 84100 ASSAY OF PHOSPHORUS: CPT | Performed by: INTERNAL MEDICINE

## 2023-06-18 RX ORDER — CARVEDILOL 3.12 MG/1
3.12 TABLET ORAL 2 TIMES DAILY WITH MEALS
Qty: 60 TABLET | Refills: 0 | Status: SHIPPED | OUTPATIENT
Start: 2023-06-18

## 2023-06-18 RX ORDER — ATORVASTATIN CALCIUM 40 MG/1
40 TABLET, FILM COATED ORAL
Qty: 30 TABLET | Refills: 0 | Status: SHIPPED | OUTPATIENT
Start: 2023-06-18

## 2023-06-18 RX ADMIN — ASPIRIN 81 MG CHEWABLE TABLET 81 MG: 81 TABLET CHEWABLE at 08:21

## 2023-06-18 RX ADMIN — CARVEDILOL 3.12 MG: 3.12 TABLET, FILM COATED ORAL at 08:21

## 2023-06-18 RX ADMIN — ATORVASTATIN CALCIUM 40 MG: 40 TABLET, FILM COATED ORAL at 16:13

## 2023-06-18 RX ADMIN — HEPARIN SODIUM 5000 UNITS: 5000 INJECTION INTRAVENOUS; SUBCUTANEOUS at 05:11

## 2023-06-18 RX ADMIN — AMLODIPINE BESYLATE 10 MG: 5 TABLET ORAL at 08:21

## 2023-06-18 RX ADMIN — LISINOPRIL 40 MG: 20 TABLET ORAL at 08:21

## 2023-06-18 RX ADMIN — CARVEDILOL 3.12 MG: 3.12 TABLET, FILM COATED ORAL at 16:14

## 2023-06-18 RX ADMIN — HEPARIN SODIUM 5000 UNITS: 5000 INJECTION INTRAVENOUS; SUBCUTANEOUS at 21:29

## 2023-06-18 NOTE — PLAN OF CARE
Problem: MOBILITY - ADULT  Goal: Maintain or return to baseline ADL function  Description: INTERVENTIONS:  -  Assess patient's ability to carry out ADLs; assess patient's baseline for ADL function and identify physical deficits which impact ability to perform ADLs (bathing, care of mouth/teeth, toileting, grooming, dressing, etc )  - Assess/evaluate cause of self-care deficits   - Assess range of motion  - Assess patient's mobility; develop plan if impaired  - Assess patient's need for assistive devices and provide as appropriate  - Encourage maximum independence but intervene and supervise when necessary  - Involve family in performance of ADLs  - Assess for home care needs following discharge   - Consider OT consult to assist with ADL evaluation and planning for discharge  - Provide patient education as appropriate  Outcome: Progressing  Goal: Maintains/Returns to pre admission functional level  Description: INTERVENTIONS:  - Perform BMAT or MOVE assessment daily    - Set and communicate daily mobility goal to care team and patient/family/caregiver  - Collaborate with rehabilitation services on mobility goals if consulted  - Perform Range of Motion 3 times a day  - Reposition patient every 2 hours    - Dangle patient 3 times a day  - Stand patient 3 times a day  - Ambulate patient 3 times a day  - Out of bed to chair 3 times a day   - Out of bed for meals 3 times a day  - Out of bed for toileting  - Record patient progress and toleration of activity level   Outcome: Progressing     Problem: PAIN - ADULT  Goal: Verbalizes/displays adequate comfort level or baseline comfort level  Description: Interventions:  - Encourage patient to monitor pain and request assistance  - Assess pain using appropriate pain scale  - Administer analgesics based on type and severity of pain and evaluate response  - Implement non-pharmacological measures as appropriate and evaluate response  - Consider cultural and social influences on pain and pain management  - Notify physician/advanced practitioner if interventions unsuccessful or patient reports new pain  Outcome: Progressing     Problem: INFECTION - ADULT  Goal: Absence or prevention of progression during hospitalization  Description: INTERVENTIONS:  - Assess and monitor for signs and symptoms of infection  - Monitor lab/diagnostic results  - Monitor all insertion sites, i e  indwelling lines, tubes, and drains  - Monitor endotracheal if appropriate and nasal secretions for changes in amount and color  - Totz appropriate cooling/warming therapies per order  - Administer medications as ordered  - Instruct and encourage patient and family to use good hand hygiene technique  - Identify and instruct in appropriate isolation precautions for identified infection/condition  Outcome: Progressing  Goal: Absence of fever/infection during neutropenic period  Description: INTERVENTIONS:  - Monitor WBC    Outcome: Progressing     Problem: SAFETY ADULT  Goal: Maintain or return to baseline ADL function  Description: INTERVENTIONS:  -  Assess patient's ability to carry out ADLs; assess patient's baseline for ADL function and identify physical deficits which impact ability to perform ADLs (bathing, care of mouth/teeth, toileting, grooming, dressing, etc )  - Assess/evaluate cause of self-care deficits   - Assess range of motion  - Assess patient's mobility; develop plan if impaired  - Assess patient's need for assistive devices and provide as appropriate  - Encourage maximum independence but intervene and supervise when necessary  - Involve family in performance of ADLs  - Assess for home care needs following discharge   - Consider OT consult to assist with ADL evaluation and planning for discharge  - Provide patient education as appropriate  Outcome: Progressing  Goal: Maintains/Returns to pre admission functional level  Description: INTERVENTIONS:  - Perform BMAT or MOVE assessment daily    - Set and communicate daily mobility goal to care team and patient/family/caregiver  - Collaborate with rehabilitation services on mobility goals if consulted  - Perform Range of Motion 3 times a day  - Reposition patient every 2 hours    - Dangle patient 3 times a day  - Stand patient 3 times a day  - Ambulate patient 3 times a day  - Out of bed to chair 3 times a day   - Out of bed for meals 3 times a day  - Out of bed for toileting  - Record patient progress and toleration of activity level   Outcome: Progressing  Goal: Patient will remain free of falls  Description: INTERVENTIONS:  - Educate patient/family on patient safety including physical limitations  - Instruct patient to call for assistance with activity   - Consult OT/PT to assist with strengthening/mobility   - Keep Call bell within reach  - Keep bed low and locked with side rails adjusted as appropriate  - Keep care items and personal belongings within reach  - Initiate and maintain comfort rounds  - Make Fall Risk Sign visible to staff  - Offer Toileting every 2 Hours, in advance of need  - Initiate/Maintain bed alarm  - Obtain necessary fall risk management equipment: alarm, socks  - Apply yellow socks and bracelet for high fall risk patients  - Consider moving patient to room near nurses station  Outcome: Progressing     Problem: DISCHARGE PLANNING  Goal: Discharge to home or other facility with appropriate resources  Description: INTERVENTIONS:  - Identify barriers to discharge w/patient and caregiver  - Arrange for needed discharge resources and transportation as appropriate  - Identify discharge learning needs (meds, wound care, etc )  - Arrange for interpretive services to assist at discharge as needed  - Refer to Case Management Department for coordinating discharge planning if the patient needs post-hospital services based on physician/advanced practitioner order or complex needs related to functional status, cognitive ability, or social support system  Outcome: Progressing     Problem: Knowledge Deficit  Goal: Patient/family/caregiver demonstrates understanding of disease process, treatment plan, medications, and discharge instructions  Description: Complete learning assessment and assess knowledge base  Interventions:  - Provide teaching at level of understanding  - Provide teaching via preferred learning methods  Outcome: Progressing     Problem: Neurological Deficit  Goal: Neurological status is stable or improving  Description: Interventions:  - Monitor and assess patient's level of consciousness, motor function, sensory function, and level of assistance needed for ADLs  - Monitor and report changes from baseline  Collaborate with interdisciplinary team to initiate plan and implement interventions as ordered  - Provide and maintain a safe environment  - Consider seizure precautions  - Consider fall precautions  - Consider aspiration precautions  - Consider bleeding precautions    Outcome: Progressing

## 2023-06-18 NOTE — ASSESSMENT & PLAN NOTE
A-fib seen on EKG, neurology note reviewed, hold off on full dose anticoagulation, due to multiple abnormalities  See plan above, may be able to start Eliquis in the next 24 to 48 hours, need better blood pressure control  Continue aspirin 81 mg daily for now  Will transition to Eliquis in the next 24 hours once outpatient coverage is finalized

## 2023-06-18 NOTE — PROGRESS NOTES
"[place]  Progress Note  Name: Katy Saab  MRN: [id number]  Unit/Bed#: -01 I Date of Admission: 6/14/2023   Date of Service: 6/18/2023 I Hospital Day: 4    Assessment/Plan   * Left basal ganglia embolic stroke [facility])  Assessment & Plan  · 19-year-old female, with no PMH, no PCP follow-up for years, presents with dysarthria, word finding difficulty  MRI shows acute CVA    Continue with diet per speech therapy okay with thin liquids      Neurology plan quoted below:  \"Previous recommendations were to pursue prompt PCP follow-up this coming week to review blood pressures, and if normotensive transition from aspirin to full dose Eliquis (given concern for microvascular disease and hypertensive microhemorrhages on MRI brain)  However, patient does not have established PCP, and feel there may be a delay in establishing PCP evaluation beyond early next week; thus will pursue the following plan:     If normotensive BP's by this coming Sunday morning, 6/18, would be ok from neurology standpoint to stop aspirin and start Eliquis 5 mg daily prior to discharge  Would appreciate primary team/CM assistance in ensuring Eliquis is affordable for patient prior to discharge  Would then continue to seek PCP \"new patient evaluation\" in the coming week or few to follow-up her blood pressures and ensure normotension/minimize bleed risk  \"  Discussed with neurologist on-call and plan for transition to Eliquis 5 mg twice daily on discharge  Patient however does not have a prescription plan  We will have case management evaluate and assist patients  in  finding prescription coverage  In the interim we will hold discharge till outpatient medication can be arranged  New onset a-fib [facility])  Assessment & Plan  A-fib seen on EKG, neurology note reviewed, hold off on full dose anticoagulation, due to multiple abnormalities      See plan above, may be able to start Eliquis in the next 24 " to 48 hours, need better blood pressure control  Continue aspirin 81 mg daily for now  Will transition to Eliquis in the next 24 hours once outpatient coverage is finalized  History of multiple strokes  Assessment & Plan  MRI shows evidence of prior strokes  Elevated troponin  Assessment & Plan  Non-MI troponin elevation in the setting of acute stroke  Hypertensive urgency  Assessment & Plan  Blood pressure still uncontrolled,   Increase Norvasc to 10 mg daily  Increase lisinopril to 40 mg daily    Continue Coreg which was added by cardiology at 3 125 mg twice daily             VTE Pharmacologic Prophylaxis: VTE Score: 3 High Risk (Score >/= 5) - Pharmacological DVT Prophylaxis Ordered: enoxaparin (Lovenox)  Sequential Compression Devices Ordered  Patient Centered Rounds: I performed bedside rounds with nursing staff today  Discussions with Specialists or Other Care Team Provider: Discussed with case management    Education and Discussions with Family / Patient: Updated  () at bedside  Total Time Spent on Date of Encounter in care of patient: 45 minutes This time was spent on one or more of the following: performing physical exam; counseling and coordination of care; obtaining or reviewing history; documenting in the medical record; reviewing/ordering tests, medications or procedures; communicating with other healthcare professionals and discussing with patient's family/caregivers  Current Length of Stay: 4 day(s)  Current Patient Status: Inpatient   Certification Statement: The patient will continue to require additional inpatient hospital stay due to Await transition to Eliquis and arrangement of outpatient meds  Discharge Plan: Anticipate discharge tomorrow to home with home services  Code Status: Level 1 - Full Code    Subjective:   Seen and examined at bedside  Denies any headache or any new numbness or weakness of any extremity    Tolerating p o  diet well     Objective:     Vitals:   Temp (24hrs), Av 1 °F (36 7 °C), Min:98 °F (36 7 °C), Max:98 3 °F (36 8 °C)    Temp:  [98 °F (36 7 °C)-98 3 °F (36 8 °C)] 98 1 °F (36 7 °C)  HR:  [77-89] 77  Resp:  [17-19] 19  BP: (136-149)/(69-86) 144/86  SpO2:  [96 %-97 %] 97 %  Body mass index is 19 61 kg/m²  Input and Output Summary (last 24 hours): Intake/Output Summary (Last 24 hours) at 2023 1355  Last data filed at 2023 0601  Gross per 24 hour   Intake --   Output 300 ml   Net -300 ml       Physical Exam:   Physical Exam  HENT:      Head: Normocephalic  Nose: Nose normal       Mouth/Throat:      Mouth: Mucous membranes are moist    Eyes:      Extraocular Movements: Extraocular movements intact  Pupils: Pupils are equal, round, and reactive to light  Cardiovascular:      Rate and Rhythm: Normal rate and regular rhythm  Pulses: Normal pulses  Pulmonary:      Effort: Pulmonary effort is normal    Abdominal:      General: Abdomen is flat  Palpations: Abdomen is soft  Musculoskeletal:      Cervical back: Neck supple  Right lower leg: No edema  Left lower leg: No edema  Skin:     General: Skin is warm  Neurological:      Mental Status: She is alert and oriented to person, place, and time  Cranial Nerves: No cranial nerve deficit  Comments: Expressive aphasia    Dysarthria         Additional Data:     Labs:  Results from last 7 days   Lab Units 23  0422   WBC Thousand/uL 8 74   HEMOGLOBIN g/dL 12 9   HEMATOCRIT % 38 4   PLATELETS Thousands/uL 352   NEUTROS PCT % 65   LYMPHS PCT % 25   MONOS PCT % 8   EOS PCT % 1     Results from last 7 days   Lab Units 23  0422   SODIUM mmol/L 142   POTASSIUM mmol/L 3 1*   CHLORIDE mmol/L 109*   CO2 mmol/L 27   BUN mg/dL 27*   CREATININE mg/dL 0 55*   ANION GAP mmol/L 6   CALCIUM mg/dL 9 0   ALBUMIN g/dL 3 6   TOTAL BILIRUBIN mg/dL 1 01*   ALK PHOS U/L 44   ALT U/L 17   AST U/L 21   GLUCOSE RANDOM mg/dL 95 Results from last 7 days   Lab Units 06/16/23  0453   INR  1 01     Results from last 7 days   Lab Units 06/14/23  0719   POC GLUCOSE mg/dl 122     Results from last 7 days   Lab Units 06/15/23  0503   HEMOGLOBIN A1C % 5 4     Results from last 7 days   Lab Units 06/16/23  0453   PROCALCITONIN ng/ml <0 05       Lines/Drains:  Invasive Devices     Peripheral Intravenous Line  Duration           Peripheral IV 06/18/23 Dorsal (posterior); Right Forearm <1 day                      Imaging: No pertinent imaging reviewed  Recent Cultures (last 7 days):         Last 24 Hours Medication List:   Current Facility-Administered Medications   Medication Dose Route Frequency Provider Last Rate   • acetaminophen  650 mg Oral Q6H PRN Everette Hooper DO     • amLODIPine  10 mg Oral Daily Everette Hooper DO     • aspirin  81 mg Oral Daily Hillary Mondragon MD     • atorvastatin  40 mg Oral Daily With Christiana Hutchins MD     • carvedilol  3 125 mg Oral BID With Meals Warren Delgado PA-C     • heparin (porcine)  5,000 Units Subcutaneous Q8H Peter Contreras MD     • lisinopril  40 mg Oral Daily Tree Lemus DO          Today, Patient Was Seen By: Alma Almonte MD    **Please Note: This note may have been constructed using a voice recognition system  **

## 2023-06-18 NOTE — CASE MANAGEMENT
Case Management Progress Note    Patient name Rohini Abdullahi  Location [address] 332/-01 MRN [id number]  : 1945 Date 2023       LOS (days): 4  Geometric Mean LOS (GMLOS) (days): 4 50  Days to GMLOS:0 4        OBJECTIVE:        Current admission status: Inpatient  Preferred Pharmacy:   Kristal Couch [address]  [address] [facility]  [address]  [address]  [facility] [address]  Phone: [phone] Fax: [phone]    Primary Care Provider: No primary care provider on file  Primary Insurance: MEDICARE  Secondary Insurance: AETNA    PROGRESS NOTE: Cm call to [facility] in [facility]  Per pharmcy staff pt does not have prescription coverage based on the information provided  Cm informed Attending that pt would have to pay OOP for Eliquis $600 +  [facility] states pt or her spouse need to call and provide an RX bin number if they have one  The Ins card in [facility] do not have any prescription information

## 2023-06-18 NOTE — CASE MANAGEMENT
Case Management Progress Note    Patient name Haven Arredondo  Location [address] 332/-01 MRN [id number]  : 1945 Date 2023       LOS (days): 4  Geometric Mean LOS (GMLOS) (days): 4 50  Days to GMLOS:0 4        OBJECTIVE:        Current admission status: Inpatient  Preferred Pharmacy:   Kristal Couch [address]  [address] [facility]  [address]  END [facility] [address]  Phone: [phone] Fax: [phone]    Primary Care Provider: No primary care provider on file  Primary Insurance: MEDICARE  Secondary Insurance: AETNA    PROGRESS NOTE: VM left for pt and her spouse on both phone numbers provided in the pts chart instructing them to call [address] in [facility] or go there with their proper insurance coverage if they have prescription coverage

## 2023-06-18 NOTE — ASSESSMENT & PLAN NOTE
"· 44-year-old female, with no PMH, no PCP follow-up for years, presents with dysarthria, word finding difficulty  MRI shows acute CVA    Continue with diet per speech therapy okay with thin liquids      Neurology plan quoted below:  \"Previous recommendations were to pursue prompt PCP follow-up this coming week to review blood pressures, and if normotensive transition from aspirin to full dose Eliquis (given concern for microvascular disease and hypertensive microhemorrhages on MRI brain)  However, patient does not have established PCP, and feel there may be a delay in establishing PCP evaluation beyond early next week; thus will pursue the following plan:     If normotensive BP's by this coming Sunday morning, 6/18, would be ok from neurology standpoint to stop aspirin and start Eliquis 5 mg daily prior to discharge  Would appreciate primary team/CM assistance in ensuring Eliquis is affordable for patient prior to discharge  Would then continue to seek PCP \"new patient evaluation\" in the coming week or few to follow-up her blood pressures and ensure normotension/minimize bleed risk  \"  Discussed with neurologist on-call and plan for transition to Eliquis 5 mg twice daily on discharge  Patient however does not have a prescription plan  We will have case management evaluate and assist patients  in  finding prescription coverage  In the interim we will hold discharge till outpatient medication can be arranged      "

## 2023-06-18 NOTE — CASE MANAGEMENT
Case Management Progress Note    Patient name Ainsley Gonzalez  Location [address] 332/-01 MRN [id number]  : 1945 Date 2023       LOS (days): 4  Geometric Mean LOS (GMLOS) (days): 4 50  Days to GMLOS:0 5        OBJECTIVE:        Current admission status: Inpatient  Preferred Pharmacy:   Kristal Lucero[address]  [address] [facility]  [address]  END [facility] [address]  Phone: [phone] Fax: [phone]    Primary Care Provider: No primary care provider on file  Primary Insurance: MEDICARE  Secondary Insurance: AETNA    PROGRESS NOTE: Cm contacted for Eliquis price check  Pts pharmacy is [facility] and it does not open until 10am  Cm to contact Pharmacy at that time

## 2023-06-18 NOTE — PLAN OF CARE
Problem: MOBILITY - ADULT  Goal: Maintain or return to baseline ADL function  Description: INTERVENTIONS:  -  Assess patient's ability to carry out ADLs; assess patient's baseline for ADL function and identify physical deficits which impact ability to perform ADLs (bathing, care of mouth/teeth, toileting, grooming, dressing, etc )  - Assess/evaluate cause of self-care deficits   - Assess range of motion  - Assess patient's mobility; develop plan if impaired  - Assess patient's need for assistive devices and provide as appropriate  - Encourage maximum independence but intervene and supervise when necessary  - Involve family in performance of ADLs  - Assess for home care needs following discharge   - Consider OT consult to assist with ADL evaluation and planning for discharge  - Provide patient education as appropriate  Outcome: Progressing  Goal: Maintains/Returns to pre admission functional level  Description: INTERVENTIONS:  - Perform BMAT or MOVE assessment daily    - Set and communicate daily mobility goal to care team and patient/family/caregiver  - Collaborate with rehabilitation services on mobility goals if consulted  - Perform Range of Motion 3 times a day  - Reposition patient every 2 hours    - Dangle patient 3 times a day  - Stand patient 3 times a day  - Ambulate patient 3 times a day  - Out of bed to chair 3 times a day   - Out of bed for meals 3 times a day  - Out of bed for toileting  - Record patient progress and toleration of activity level   Outcome: Progressing     Problem: PAIN - ADULT  Goal: Verbalizes/displays adequate comfort level or baseline comfort level  Description: Interventions:  - Encourage patient to monitor pain and request assistance  - Assess pain using appropriate pain scale  - Administer analgesics based on type and severity of pain and evaluate response  - Implement non-pharmacological measures as appropriate and evaluate response  - Consider cultural and social influences on pain and pain management  - Notify physician/advanced practitioner if interventions unsuccessful or patient reports new pain  Outcome: Progressing     Problem: INFECTION - ADULT  Goal: Absence or prevention of progression during hospitalization  Description: INTERVENTIONS:  - Assess and monitor for signs and symptoms of infection  - Monitor lab/diagnostic results  - Monitor all insertion sites, i e  indwelling lines, tubes, and drains  - Monitor endotracheal if appropriate and nasal secretions for changes in amount and color  - [place] appropriate cooling/warming therapies per order  - Administer medications as ordered  - Instruct and encourage patient and family to use good hand hygiene technique  - Identify and instruct in appropriate isolation precautions for identified infection/condition  Outcome: Progressing  Goal: Absence of fever/infection during neutropenic period  Description: INTERVENTIONS:  - Monitor WBC    Outcome: Progressing     Problem: SAFETY ADULT  Goal: Maintain or return to baseline ADL function  Description: INTERVENTIONS:  -  Assess patient's ability to carry out ADLs; assess patient's baseline for ADL function and identify physical deficits which impact ability to perform ADLs (bathing, care of mouth/teeth, toileting, grooming, dressing, etc )  - Assess/evaluate cause of self-care deficits   - Assess range of motion  - Assess patient's mobility; develop plan if impaired  - Assess patient's need for assistive devices and provide as appropriate  - Encourage maximum independence but intervene and supervise when necessary  - Involve family in performance of ADLs  - Assess for home care needs following discharge   - Consider OT consult to assist with ADL evaluation and planning for discharge  - Provide patient education as appropriate  Outcome: Progressing  Goal: Maintains/Returns to pre admission functional level  Description: INTERVENTIONS:  - Perform BMAT or MOVE assessment daily    - Set and communicate daily mobility goal to care team and patient/family/caregiver  - Collaborate with rehabilitation services on mobility goals if consulted  - Perform Range of Motion 3 times a day  - Reposition patient every 2 hours    - Dangle patient 3 times a day  - Stand patient 3 times a day  - Ambulate patient 3 times a day  - Out of bed to chair 3 times a day   - Out of bed for meals 3 times a day  - Out of bed for toileting  - Record patient progress and toleration of activity level   Outcome: Progressing  Goal: Patient will remain free of falls  Description: INTERVENTIONS:  - Educate patient/family on patient safety including physical limitations  - Instruct patient to call for assistance with activity   - Consult OT/PT to assist with strengthening/mobility   - Keep Call bell within reach  - Keep bed low and locked with side rails adjusted as appropriate  - Keep care items and personal belongings within reach  - Initiate and maintain comfort rounds  - Make Fall Risk Sign visible to staff  - Offer Toileting every 2 Hours, in advance of need  - Initiate/Maintain alarm  - Obtain necessary fall risk management equipment:   - Apply yellow socks and bracelet for high fall risk patients  - Consider moving patient to room near nurses station  Outcome: Progressing     Problem: DISCHARGE PLANNING  Goal: Discharge to home or other facility with appropriate resources  Description: INTERVENTIONS:  - Identify barriers to discharge w/patient and caregiver  - Arrange for needed discharge resources and transportation as appropriate  - Identify discharge learning needs (meds, wound care, etc )  - Arrange for interpretive services to assist at discharge as needed  - Refer to Case Management Department for coordinating discharge planning if the patient needs post-hospital services based on physician/advanced practitioner order or complex needs related to functional status, cognitive ability, or social support system  Outcome: Progressing     Problem: Knowledge Deficit  Goal: Patient/family/caregiver demonstrates understanding of disease process, treatment plan, medications, and discharge instructions  Description: Complete learning assessment and assess knowledge base  Interventions:  - Provide teaching at level of understanding  - Provide teaching via preferred learning methods  Outcome: Progressing     Problem: Neurological Deficit  Goal: Neurological status is stable or improving  Description: Interventions:  - Monitor and assess patient's level of consciousness, motor function, sensory function, and level of assistance needed for ADLs  - Monitor and report changes from baseline  Collaborate with interdisciplinary team to initiate plan and implement interventions as ordered  - Provide and maintain a safe environment  - Consider seizure precautions  - Consider fall precautions  - Consider aspiration precautions  - Consider bleeding precautions  Outcome: Progressing     Problem: Activity Intolerance/Impaired Mobility  Goal: Mobility/activity is maintained at optimum level for patient  Description: Interventions:  - Assess and monitor patient  barriers to mobility and need for assistive/adaptive devices  - Assess patient's emotional response to limitations  - Collaborate with interdisciplinary team and initiate plans and interventions as ordered  - Encourage independent activity per ability   - Maintain proper body alignment  - Perform active/passive rom as tolerated/ordered  - Plan activities to conserve energy   - Turn patient as appropriate  Outcome: Progressing     Problem: Communication Impairment  Goal: Ability to express needs and understand communication  Description: Assess patient's communication skills and ability to understand information  Patient will demonstrate use of effective communication techniques, alternative methods of communication and understanding even if not able to speak       - Encourage communication and provide alternate methods of communication as needed  - Collaborate with case management/ for discharge needs  - Include patient/family/caregiver in decisions related to communication  Outcome: Progressing     Problem: Potential for Aspiration  Goal: Non-ventilated patient's risk of aspiration is minimized  Description: Assess and monitor vital signs, respiratory status, and labs (WBC)  Monitor for signs of aspiration (tachypnea, cough, rales, wheezing, cyanosis, fever)  - Assess and monitor patient's ability to swallow  - Place patient up in chair to eat if possible  - HOB up at 90 degrees to eat if unable to get patient up into chair   - Supervise patient during oral intake  - Instruct patient/ family to take small bites  - Instruct patient/ family to take small single sips when taking liquids  - Follow patient-specific strategies generated by speech pathologist   Outcome: Progressing  Goal: Ventilated patient's risk of aspiration is minimized  Description: Assess and monitor vital signs, respiratory status, airway cuff pressure, and labs (WBC)  Monitor for signs of aspiration (tachypnea, cough, rales, wheezing, cyanosis, fever)  - Elevate head of bed 30 degrees if patient has tube feeding   - Monitor tube feeding  Outcome: Progressing     Problem: Nutrition  Goal: Nutrition/Hydration status is improving  Description: Monitor and assess patient's nutrition/hydration status for malnutrition (ex- brittle hair, bruises, dry skin, pale skin and conjunctiva, muscle wasting, smooth red tongue, and disorientation)  Collaborate with interdisciplinary team and initiate plan and interventions as ordered  Monitor patient's weight and dietary intake as ordered or per policy  Utilize nutrition screening tool and intervene per policy  Determine patient's food preferences and provide high-protein, high-caloric foods as appropriate       - Assist patient with eating   - Allow adequate time for meals   - Encourage patient to take dietary supplement as ordered  - Collaborate with clinical nutritionist   - Include patient/family/caregiver in decisions related to nutrition    Outcome: Progressing     Problem: Prexisting or High Potential for Compromised Skin Integrity  Goal: Skin integrity is maintained or improved  Description: INTERVENTIONS:  - Identify patients at risk for skin breakdown  - Assess and monitor skin integrity  - Assess and monitor nutrition and hydration status  - Monitor labs   - Assess for incontinence   - Turn and reposition patient  - Assist with mobility/ambulation  - Relieve pressure over bony prominences  - Avoid friction and shearing  - Provide appropriate hygiene as needed including keeping skin clean and dry  - Evaluate need for skin moisturizer/barrier cream  - Collaborate with interdisciplinary team   - Patient/family teaching  - Consider wound care consult   Outcome: Progressing     Problem: Potential for Falls  Goal: Patient will remain free of falls  Description: INTERVENTIONS:  - Educate patient/family on patient safety including physical limitations  - Instruct patient to call for assistance with activity   - Consult OT/PT to assist with strengthening/mobility   - Keep Call bell within reach  - Keep bed low and locked with side rails adjusted as appropriate  - Keep care items and personal belongings within reach  - Initiate and maintain comfort rounds  - Make Fall Risk Sign visible to staff  - Offer Toileting every 2 Hours, in advance of need  - Initiate/Maintain alarm  - Obtain necessary fall risk management equipment:   - Apply yellow socks and bracelet for high fall risk patients  - Consider moving patient to room near nurses station  Outcome: Progressing

## 2023-06-19 VITALS
SYSTOLIC BLOOD PRESSURE: 152 MMHG | WEIGHT: 121.47 LBS | OXYGEN SATURATION: 94 % | TEMPERATURE: 97.6 F | DIASTOLIC BLOOD PRESSURE: 84 MMHG | HEIGHT: 66 IN | BODY MASS INDEX: 19.52 KG/M2 | HEART RATE: 84 BPM | RESPIRATION RATE: 16 BRPM

## 2023-06-19 PROCEDURE — 92526 ORAL FUNCTION THERAPY: CPT

## 2023-06-19 PROCEDURE — 99239 HOSP IP/OBS DSCHRG MGMT >30: CPT | Performed by: HOSPITALIST

## 2023-06-19 RX ORDER — LISINOPRIL 40 MG/1
40 TABLET ORAL DAILY
Qty: 30 TABLET | Refills: 0 | Status: SHIPPED | OUTPATIENT
Start: 2023-06-20

## 2023-06-19 RX ORDER — AMLODIPINE BESYLATE 10 MG/1
10 TABLET ORAL DAILY
Qty: 30 TABLET | Refills: 0 | Status: SHIPPED | OUTPATIENT
Start: 2023-06-20

## 2023-06-19 RX ADMIN — ASPIRIN 81 MG CHEWABLE TABLET 81 MG: 81 TABLET CHEWABLE at 08:20

## 2023-06-19 RX ADMIN — HEPARIN SODIUM 5000 UNITS: 5000 INJECTION INTRAVENOUS; SUBCUTANEOUS at 05:04

## 2023-06-19 RX ADMIN — CARVEDILOL 3.12 MG: 3.12 TABLET, FILM COATED ORAL at 08:21

## 2023-06-19 RX ADMIN — LISINOPRIL 40 MG: 20 TABLET ORAL at 08:21

## 2023-06-19 RX ADMIN — AMLODIPINE BESYLATE 10 MG: 5 TABLET ORAL at 08:21

## 2023-06-19 RX ADMIN — APIXABAN 5 MG: 5 TABLET, FILM COATED ORAL at 12:25

## 2023-06-19 NOTE — PLAN OF CARE
Problem: SAFETY ADULT  Goal: Patient will remain free of falls  Description: INTERVENTIONS:  - Educate patient/family on patient safety including physical limitations  - Instruct patient to call for assistance with activity   - Consult OT/PT to assist with strengthening/mobility   - Keep Call bell within reach  - Keep bed low and locked with side rails adjusted as appropriate  - Keep care items and personal belongings within reach  - Initiate and maintain comfort rounds  - Make Fall Risk Sign visible to staff  - Offer Toileting every 2 Hours, in advance of need  - Initiate/Maintain  bed/chair alarm  - Obtain necessary fall risk management equipment: walker  - Apply yellow socks and bracelet for high fall risk patients  - Consider moving patient to room near nurses station  Outcome: Progressing     Problem: PAIN - ADULT  Goal: Verbalizes/displays adequate comfort level or baseline comfort level  Description: Interventions:  - Encourage patient to monitor pain and request assistance  - Assess pain using appropriate pain scale  - Administer analgesics based on type and severity of pain and evaluate response  - Implement non-pharmacological measures as appropriate and evaluate response  - Consider cultural and social influences on pain and pain management  - Notify physician/advanced practitioner if interventions unsuccessful or patient reports new pain  Outcome: Progressing

## 2023-06-19 NOTE — ASSESSMENT & PLAN NOTE
A-fib seen on EKG, neurology note reviewed, hold off on full dose anticoagulation, due to multiple abnormalities  Transition to eliquis 5 mg daily until can be seen by PCP/neuro per neuro note

## 2023-06-19 NOTE — ASSESSMENT & PLAN NOTE
"· 16-year-old female, with no PMH, no PCP follow-up for years, presents with dysarthria, word finding difficulty  MRI shows acute CVA    Continue with diet per speech therapy okay with thin liquids      Neurology plan quoted below:  \"Previous recommendations were to pursue prompt PCP follow-up this coming week to review blood pressures, and if normotensive transition from aspirin to full dose Eliquis (given concern for microvascular disease and hypertensive microhemorrhages on MRI brain)  However, patient does not have established PCP, and feel there may be a delay in establishing PCP evaluation beyond early next week; thus will pursue the following plan:     If normotensive BP's by this coming Sunday morning, 6/18, would be ok from neurology standpoint to stop aspirin and start Eliquis 5 mg daily prior to discharge  Would then continue to seek PCP \"new patient evaluation\" in the coming week or few to follow-up her blood pressures and ensure normotension/minimize bleed risk  \"  Neuro correspondence reviewed- transition to eliquis 5 mg/day d/t risk of hemorrhage and then f/u with cards/neuro/pcp before transitioning to full dose AC  Coupon provided       "

## 2023-06-19 NOTE — DISCHARGE SUMMARY
"[place]  Discharge- Antonio Contreras 1945, 78 y o  female MRN: [id number]  Unit/Bed#: -01 Encounter: [id number]  Primary Care Provider: No primary care provider on file  Date and time admitted to hospital: 6/14/2023  7:08 AM    New onset a-fib [facility])  Assessment & Plan  A-fib seen on EKG, neurology note reviewed, hold off on full dose anticoagulation, due to multiple abnormalities  Transition to eliquis 5 mg daily until can be seen by PCP/neuro per neuro note  History of multiple strokes  Assessment & Plan  MRI shows evidence of prior strokes  Elevated troponin  Assessment & Plan  Non-MI troponin elevation in the setting of acute stroke  Hypertensive urgency  Assessment & Plan  Blood pressure still uncontrolled,   Increase Norvasc to 10 mg daily  Increase lisinopril to 40 mg daily    Continue Coreg which was added by cardiology at 3 125 mg twice daily    * Left basal ganglia embolic stroke [facility])  Assessment & Plan  · 72-year-old female, with no PMH, no PCP follow-up for years, presents with dysarthria, word finding difficulty  MRI shows acute CVA    Continue with diet per speech therapy okay with thin liquids      Neurology plan quoted below:  \"Previous recommendations were to pursue prompt PCP follow-up this coming week to review blood pressures, and if normotensive transition from aspirin to full dose Eliquis (given concern for microvascular disease and hypertensive microhemorrhages on MRI brain)  However, patient does not have established PCP, and feel there may be a delay in establishing PCP evaluation beyond early next week; thus will pursue the following plan:     If normotensive BP's by this coming Sunday morning, 6/18, would be ok from neurology standpoint to stop aspirin and start Eliquis 5 mg daily prior to discharge       Would then continue to seek PCP \"new patient evaluation\" in the coming week or few to follow-up her blood pressures and " "ensure normotension/minimize bleed risk  \"  Neuro correspondence reviewed- transition to eliquis 5 mg/day d/t risk of hemorrhage and then f/u with cards/neuro/pcp before transitioning to full dose AC  Coupon provided  Hospital Course:     Katlin Novak is a 78 y o  female patient who originally presented to the hospital on   Admission Orders (From admission, onward)     Ordered        06/14/23 0921  INPATIENT ADMISSION  Once                     due to strokelike symptoms  Patient found to have acute stroke as noted above  Patient also with new onset A-fib  Patient also noted to have poorly controlled blood pressure  Patient's blood pressure is stabilized on oral medications  She is instructed to follow-up with a PCP following discharge and is instructed on the importance of doing so after leaving the hospital   She will also follow-up with cardiology and neurology  Per neurology, patient history of poor follow-up, elevated blood pressures, multiple strokes with more potential for possible bleeding issues, therefore they recommend Eliquis 5 mg daily at discharge  A coupon is provided for the patient and she is instructed that she needs to follow-up with neurology in regards to transitioning to full dose anticoagulation  She is also made aware that in the event that after the  Free coupon if she cannot afford Eliquis she needs to discuss this with her PCP and/or cardiologist for possible transition to alternative agent and understands all the above  Please see above list of diagnoses and related plan for additional information       Physical Exam:    GEN: No acute distress, comfortable  HEEENT: No JVD, PERRLA, no scleral icterus  RESP: Lungs clear to auscultation bilaterally  CV: RRR, +s1/s2   ABD: SOFT NON TENDER, POSITIVE BOWEL SOUNDS, NO DISTENTION  PSYCH: CALM  NEURO: Mentation baseline, NO FOCAL DEFICITS  SKIN: NO RASH  EXTREM: NO EDEMA    CONSULTING PROVIDERS   IP CONSULT TO NEUROLOGY  IP " CONSULT TO CASE MANAGEMENT  IP CONSULT TO NUTRITION SERVICES  IP CONSULT TO CARDIOLOGY  IP CONSULT TO CASE MANAGEMENT    PROCEDURES PERFORMED  * No surgery found *    RADIOLOGY RESULTS  FL barium swallow video w speech    Result Date: 6/15/2023  Narrative: A video barium swallow study was performed by the Department of Speech Pathology  Please refer to the report for the official interpretation  The images are stored for archival purposes only  Study images were not formally reviewed by the Radiology Department  X-ray chest 1 view portable    Result Date: 6/15/2023  Narrative: CHEST INDICATION:   weakness  COMPARISON:  None EXAM PERFORMED/VIEWS:  XR CHEST PORTABLE FINDINGS: Examination performed on a slightly rotated projection  Monitoring leads and clips project over the chest  Cardiomediastinal silhouette appears unremarkable  The lungs are clear  No pneumothorax or pleural effusion  Osseous structures appear within normal limits for patient age  Impression: No acute cardiopulmonary disease  Workstation performed: [id number]     Echo complete w/ contrast if indicated    Result Date: 6/14/2023  Narrative: •  Left Ventricle: Left ventricular cavity size is normal  Wall thickness is moderately increased  The left ventricular ejection fraction is 55%  Systolic function is normal  Wall motion is normal  Diastolic function is normal  •  Mitral Valve: There is mild regurgitation  •  Tricuspid Valve: There is mild regurgitation  MRI brain wo contrast    Result Date: 6/14/2023  Narrative: MRI BRAIN WITHOUT CONTRAST INDICATION: cva  Per epic chart review: Difficulty speaking for the past 3 days COMPARISON: CTA head and neck with and without contrast 6/14/2023  TECHNIQUE:  Multiplanar, multisequence imaging of the brain was performed  IMAGE QUALITY:  Diagnostic  FINDINGS: BRAIN PARENCHYMA: Acute infarct in left basal ganglia   Multifocal chronic lacunar infarcts in bilateral basal ganglia and bilateral thalami (etat lacunaire pattern)  Chronic small infarcts in bilateral cerebellum (left larger than right)  Chronic cystic encephalomalacia and gliosis in right bilateral anterior frontal lobes (right worse than left) and right anterior temporal lobe, likely sequela of remote trauma  Multiple scattered chronic microhemorrhages in bilateral cerebral hemispheres, bilateral basal ganglia, bilateral thalami, micha, and bilateral cerebellum with central predominance, compatible with hypertensive arteriopathy  There is no discrete mass, mass effect or midline shift  No acute intracranial hemorrhage  Small scattered hyperintensities on T2/FLAIR imaging are noted in the periventricular and subcortical white matter demonstrating an appearance that is statistically most likely to represent advanced microangiopathic change  VENTRICLES:  Normal for the patient's age  SELLA AND PITUITARY GLAND:  Normal  ORBITS:  Normal  PARANASAL SINUSES:  Normal  VASCULATURE:  Evaluation of the major intracranial vasculature demonstrates appropriate flow voids  Mild dolichoectasia of basilar artery, unchanged  CALVARIUM AND SKULL BASE:  Normal  EXTRACRANIAL SOFT TISSUES:  Normal      Impression: Acute infarct in left basal ganglia  Multifocal chronic lacunar infarcts in bilateral basal ganglia and bilateral thalami (etat lacunaire pattern), chronic small infarcts in bilateral cerebellum (left larger than right), severe chronic microangiopathy, and hypertensive arteriopathy  Chronic cystic encephalomalacia and gliosis in bilateral anterior frontal lobes (right worse than left) and right anterior temporal lobe, likely sequela of remote trauma  Mild dolichoectasia of basilar artery, unchanged The study was marked in EPIC for immediate notification   Workstation performed: [id number]     CTA head and neck with and without contrast    Result Date: 6/14/2023  Narrative: CTA NECK AND BRAIN WITH AND WITHOUT CONTRAST INDICATION: Neuro deficit, acute, stroke suspected Difficulty speaking and walking COMPARISON:   None  TECHNIQUE:  Routine CT imaging of the Brain without contrast   Post contrast imaging was performed after administration of iodinated contrast through the neck and brain  Post contrast axial 0 625 mm images timed to opacify the arterial system  3D rendering was performed on an independent workstation  MIP reconstructions performed  Coronal reconstructions were performed of the noncontrast portion of the brain  Radiation dose length product (DLP) for this visit:  1705 05 mGy-cm   This examination, like all CT scans performed in the [facility], was performed utilizing techniques to minimize radiation dose exposure, including the use of iterative reconstruction and automated exposure control  IV Contrast:  80 mL of iohexol (OMNIPAQUE) IMAGE QUALITY:   Diagnostic FINDINGS: NONCONTRAST BRAIN PARENCHYMA: Decreased attenuation is noted in periventricular and subcortical white matter demonstrating an appearance that is statistically most likely to represent advanced microangiopathic change  Chronic lacunar infarct right basal ganglia, right thalamus  Encephalomalacia right inferior frontal lobe potentially sequela of prior intracranial trauma  Correlate clinically  No CT signs of acute infarction  No intracranial mass, mass effect or midline shift  No acute parenchymal hemorrhage  VENTRICLES AND EXTRA-AXIAL SPACES:  Normal for the patient's age  VISUALIZED ORBITS: Normal visualized orbits  PARANASAL SINUSES: Normal visualized paranasal sinuses  CERVICAL VASCULATURE AORTIC ARCH AND GREAT VESSELS:  Normal aortic arch and great vessel origins  Normal visualized subclavian vessels  RIGHT VERTEBRAL ARTERY CERVICAL SEGMENT:  Mild stenosis at the origin  The vessel is normal in caliber throughout the neck  LEFT VERTEBRAL ARTERY CERVICAL SEGMENT:  Normal origin  The vessel is normal in caliber throughout the neck   RIGHT EXTRACRANIAL CAROTID SEGMENT: Mild atherosclerotic disease of the distal common carotid artery and proximal cervical internal carotid artery without significant stenosis compared to the more distal ICA  LEFT EXTRACRANIAL CAROTID SEGMENT:  Mild atherosclerotic disease of the distal common carotid artery and proximal cervical internal carotid artery without significant stenosis compared to the more distal ICA  NASCET criteria was used to determine the degree of internal carotid artery diameter stenosis  INTRACRANIAL VASCULATURE INTERNAL CAROTID ARTERIES:  Normal enhancement of the intracranial portions of the internal carotid arteries  Normal ophthalmic artery origins  Mild atherosclerotic calcification of the cavernous and supraclinoid ICAs  Normal ICA terminus  ANTERIOR CIRCULATION:  Symmetric A1 segments and anterior cerebral arteries with normal enhancement  Normal anterior communicating artery  MIDDLE CEREBRAL ARTERY CIRCULATION:  M1 segment and middle cerebral artery branches demonstrate normal enhancement bilaterally  DISTAL VERTEBRAL ARTERIES:  Normal distal vertebral arteries  Posterior inferior cerebellar artery origins are normal  Normal vertebral basilar junction  BASILAR ARTERY:  Basilar artery is normal in caliber  Normal superior cerebellar arteries  POSTERIOR CEREBRAL ARTERIES: Both posterior cerebral arteries arises from the basilar tip  Both arteries demonstrate normal enhancement  Normal posterior communicating arteries  VENOUS STRUCTURES:  Normal  NON VASCULAR ANATOMY BONY STRUCTURES:  No acute osseous abnormality  SOFT TISSUES OF THE NECK:  Unremarkable  THORACIC INLET:  Normal      Impression: Advanced chronic microangiopathic changes with chronic lacunar infarcts  No acute intracranial normality  No significant carotid or vertebral artery stenosis  No large vessel occlusion or aneurysm identified  Scattered vascular calcifications intracranial compatible with intracranial atherosclerosis without focal stenosis  "Workstation performed: [id number]       LABS  Results from last 7 days   Lab Units 06/18/23  0422 06/16/23  0453 06/14/23  1057 06/14/23  0721   WBC Thousand/uL 8 74 9 05  --  13 14*   HEMOGLOBIN g/dL 12 9 13 2  --  16 1*   HEMATOCRIT % 38 4 39 3  --  48 3*   MCV fL 93 93  --  93   PLATELETS Thousands/uL 352 373 431* 480*   INR   --  1 01  --  0 93     Results from last 7 days   Lab Units 06/18/23  0422 06/16/23  0453 06/14/23  0721   SODIUM mmol/L 142 142 141   POTASSIUM mmol/L 3 1* 3 3* 3 4*   CHLORIDE mmol/L 109* 108 104   CO2 mmol/L 27 27 28   BUN mg/dL 27* 15 25   CREATININE mg/dL 0 55* 0 56* 0 89   CALCIUM mg/dL 9 0 9 0 10 0   ALBUMIN g/dL 3 6 3 6  --    TOTAL BILIRUBIN mg/dL 1 01* 1 02*  --    ALK PHOS U/L 44 42  --    ALT U/L 17 9  --    AST U/L 21 14  --    EGFR ml/min/1 73sq m 92 91 63   GLUCOSE RANDOM mg/dL 95 97 111     Results from last 7 days   Lab Units 06/14/23  1057 06/14/23  0936 06/14/23  0721   HS TNI 0HR ng/L  --   --  59*   HS TNI 2HR ng/L  --  58*  --    HS TNI 4HR ng/L 62*  --   --               Results from last 7 days   Lab Units 06/14/23  0719   POC GLUCOSE mg/dl 122     Results from last 7 days   Lab Units 06/15/23  0503   HEMOGLOBIN A1C % 5 4     Results from last 7 days   Lab Units 06/15/23  0503   TSH 3RD GENERATON uIU/mL 1 088     Results from last 7 days   Lab Units 06/16/23  0453   PROCALCITONIN ng/ml <0 05     Results from last 7 days   Lab Units 06/15/23  0503   TRIGLYCERIDES mg/dL 97   CHOLESTEROL mg/dL 119   LDL CALC mg/dL 56   HDL mg/dL 44*       Cultures:         Invalid input(s): \"URIBILINOGEN\"                Condition at Discharge:  fair      Discharge instructions/Information to patient and family:   See after visit summary for information provided to patient and family  Provisions for Follow-Up Care:  See after visit summary for information related to follow-up care and any pertinent home health orders        Disposition:     Home       Discharge Statement:  I spent " 35 minutes discharging the patient  This time was spent on the day of discharge  I had direct contact with the patient on the day of discharge  Greater than 50% of the total time was spent examining patient, answering all patient questions, arranging and discussing plan of care with patient as well as directly providing post-discharge instructions  Additional time then spent on discharge activities  Discharge Medications:  See after visit summary for reconciled discharge medications provided to patient and family        ** Please Note: This note has been constructed using a voice recognition system **

## 2023-06-19 NOTE — CASE MANAGEMENT
Case Management Discharge Planning Note    Patient name Ayaan Barrios  Location /-01 MRN [id number]  : 1945 Date 2023       Current Admission Date: 2023  Current Admission Diagnosis:Left basal ganglia embolic stroke [facility])   Patient Active Problem List    Diagnosis Date Noted   • History of multiple strokes 06/15/2023   • New onset a-fib ([facility] [address] ) 06/15/2023   • Left basal ganglia embolic stroke ([facility] [address] )    • Hypertensive urgency 2023   • Elevated troponin 2023      LOS (days): 5  Geometric Mean LOS (GMLOS) (days): 4 50  Days to GMLOS:-0 7     OBJECTIVE:  Risk of Unplanned Readmission Score: 11 73      Current admission status: Inpatient   Preferred Pharmacy:   [facility] DR WILLIAM Crocker  [address]  [address]  [facility] [address]  Phone: [phone] Fax: [phone]    Primary Care Provider: No primary care provider on file  Primary Insurance: MEDICARE  Secondary Insurance: AETNA    DISCHARGE DETAILS:    Discharge planning discussed with[de-identified] pt and her spouse  Freedom of Choice: Yes     CM contacted family/caregiver?: Yes  Were Treatment Team discharge recommendations reviewed with patient/caregiver?: Yes  Did patient/caregiver verbalize understanding of patient care needs?: Yes  Were patient/caregiver advised of the risks associated with not following Treatment Team discharge recommendations?: Yes    Contacts  Patient Contacts: Kingston Fischer  Relationship to Patient[de-identified] Family  Contact Method: In Person  Reason/Outcome: Discharge [address]         Is the patient interested in [facility] at discharge?: No    DME Referral Provided  Referral made for DME?: No    Other Referral/Resources/Interventions Provided:  Referral Comments: Pt wants outpt PT, OT, and SLP  Also provided 30 day ELisuis trial card      Treatment Team Recommendation: Home  Discharge Destination Plan[de-identified] Home  Transport at Discharge : Family     IMM Given (Date):: 06/19/23  IMM Given to[de-identified] Patient     Additional Comments: CM reviewed recommendations for outpt PT, OT, and SLP and need for PCP paperwork to be completed to pt's spouse  Pt given free 30 day trial ELiquis card

## 2023-06-19 NOTE — NURSING NOTE
Pt slept during most hrly rounds overnight; assisted OOB to void ad radha; chandler well  Denied discomfort

## 2023-06-19 NOTE — SPEECH THERAPY NOTE
"Speech Language/Pathology    Speech/Language Pathology Progress Note    Patient Name: Magaly Herzog  [id number]'G Date: 6/19/2023     Problem List  Principal Problem:    Left basal ganglia embolic stroke [facility])  Active Problems:    Hypertensive urgency    Elevated troponin    History of multiple strokes    New onset a-fib [facility])       Past Medical History  History reviewed  No pertinent past medical history  Past Surgical History  History reviewed  No pertinent surgical history  Subjective:  Pt OOB in chair, lunch tray present  Pt's  present  \"I still cough sometimes but I remember to do my head\"       Current Diet:  Dysphagia 3 w/ thin     Objective:  Pt seen for dx dysphagia tx f/u  Reviewed pt's dysphagia tx course w/ pt's , including MBS findings and risk of aspiration  Pt w/ good recall of MBS and risks of aspiration  She states at this time she continues to be accepting of her aspriatino risk and declines modifications  Pt assessed w/ thin liquids today  She takes thin liquids via straw without difficulty  Independent demonstration of chin tuck strategy as recommended  Throat clear x1 w/ sips  Pt declines material on lunch tray  Pt's  w/ several relevant questions about diet prep and strategies  All answered to his satisfaction w/ understanding  Pt able to teach back all strategies, including the importance of frequent/thorough oral care  Pt/pt's  aware of signs of infection/pna to monitor for and notify medical team of should they arise  Assessment:  Pt/pt's  w/ good understanding of MBS findings, current aspiration risk, and strategies to optimize swallow safety  Pt continues to decline liquid modifications and accept risk of aspiration w/ thin liquids  Pt able to independently demonstrate recommended strategies       Plan/Recommendations:  Continue current diet  Frequent/thorough oral care  ST to f/u as able/appropriate      "

## 2023-06-27 ENCOUNTER — TELEPHONE (OUTPATIENT)
Dept: NEUROLOGY | Facility: CLINIC | Age: 78
End: 2023-06-27

## 2023-06-27 NOTE — TELEPHONE ENCOUNTER
Patients  called looking to schedule a hospital follow up appointment  Dr Heddy Cockayne would be the best scheduling instructions to properly schedule this patient? Thank you!

## 2023-06-28 ENCOUNTER — EVALUATION (OUTPATIENT)
Dept: SPEECH THERAPY | Facility: CLINIC | Age: 78
End: 2023-06-28
Payer: MEDICARE

## 2023-06-28 DIAGNOSIS — R47.1 DYSARTHRIA: Primary | ICD-10-CM

## 2023-06-28 DIAGNOSIS — R41.841 COGNITIVE COMMUNICATION DEFICIT: ICD-10-CM

## 2023-06-28 DIAGNOSIS — I63.9 LEFT BASAL GANGLIA EMBOLIC STROKE (HCC): ICD-10-CM

## 2023-06-28 DIAGNOSIS — R13.12 OROPHARYNGEAL DYSPHAGIA: ICD-10-CM

## 2023-06-28 PROCEDURE — 92522 EVALUATE SPEECH PRODUCTION: CPT

## 2023-06-28 PROCEDURE — 96125 COGNITIVE TEST BY HC PRO: CPT

## 2023-06-28 NOTE — PROGRESS NOTES
"Speech-Language Pathology Initial Evaluation    Today's date: 2023   Patient’s name: Haven Arredondo  : 1945  MRN: [id number]  Safety measures:   Referring provider: Carter Wetzel    Encounter Diagnosis     ICD-10-CM    1  Left basal ganglia embolic stroke [facility])  N58 4 Ambulatory referral to Speech Therapy        Visit tracking:  -Referring provider: Fracisco Cordero  -Billing guidelines: CMS  -Visit # 1  -Insurance: Medicare  -RE due: 23    Subjective comments: Pleasant/Cooperative    How did the patient hear about us? Physician    Patient's goal(s): \"To have clear speech again  Lisa New \"    Reason for referral: Decreased speech intelligibility and Difficulty swallowing solids or liquids  Prior functional status: N/A  Clinically complex situations: N/A    History: Patient is a 78 y o  female , s/p CVA, who was referred to outpatient skilled Speech Therapy services for a cognitive-linguistic & Motor Speech Evaluation  Patient mahesh also be receiving a dysphagia evaluation next session  VFSS 6/15/23: Pt presents w/ mild oral, mod-severe pharyngeal dysphagia  Mildly prolonged mastication and oral organization though brisk lingual manipulation for transfer  Swallow initiation is mod delayed w/ bolus head reaching the pyriforms  Reduced hyo-laryngeal elevation  Delayed and incomplete laryngeal vestibule closure resulting in koffi aspiration before and during the swallow of thin and nectar thick liquids  Cough response is appreciated though is weak and does not effectively eject material from trachea/larynx despite effort  No penetration/aspiration w/ honey thick liquids or solid material       Findings immediately reviewed w/ pt   At this time, pt agreeable to continuing w/ current dysphagia diet modifications and plan for ST f/u          Recommendations:  Diet: Dysphagia 3   Liquids: Honey   Meds: Whole/crush in puree  Strategies: -   Frequent oral care  Upright position  F/u ST tx: Yes   Therapy Prognosis: Fair " Prognosis considerations: Age, current medical, motivation for rx   Aspiration Precautions  Reflux Precautions  Consider consult with: -   Results reviewed with: pt, nursing   Aspiration precautions posted  Repeat MBS as necessary  If a dedicated assessment of the esophagus is desired, consider esophagram/barium swallow or EGD      Discharge Date 6/19: Pt presents w/ mild oral, mod-severe pharyngeal dysphagia  Mildly prolonged mastication and oral organization though brisk lingual manipulation for transfer  Swallow initiation is mod delayed w/ bolus head reaching the pyriforms  Reduced hyo-laryngeal elevation  Delayed and incomplete laryngeal vestibule closure resulting in koffi aspiration before and during the swallow of thin and nectar thick liquids  Cough response is appreciated though is weak and does not effectively eject material from trachea/larynx despite effort  No penetration/aspiration w/ honey thick liquids or solid material       Findings immediately reviewed w/ pt  At this time, pt agreeable to continuing w/ current dysphagia diet modifications and plan for ST f/u          Recommendations:  Diet: Dysphagia 3   Liquids: Honey   Meds: Whole/crush in puree  Strategies: -   Frequent oral care  Upright position  F/u ST tx: Yes   Therapy Prognosis: Fair   Prognosis considerations: Age, current medical, motivation for rx   Aspiration Precautions  Reflux Precautions  Consider consult with: -   Results reviewed with: pt, nursing   Aspiration precautions posted    Repeat MBS as necessary  If a dedicated assessment of the esophagus is desired, consider esophagram/barium swallow or EGD      IMPRESSION:     Acute infarct in left basal ganglia      Multifocal chronic lacunar infarcts in bilateral basal ganglia and bilateral thalami (etat lacunaire pattern), chronic small infarcts in bilateral cerebellum (left larger than right), severe chronic microangiopathy, and hypertensive arteriopathy      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 (right worse than left) and right anterior temporal lobe, likely sequela of remote trauma      Mild dolichoectasia of basilar artery, unchanged     The study was marked in EPIC for immediate notification  Hearing: WFL  Vision: WFL    Home environment/lifestyle: lives with , 2 children out of the house, 2 grand children, met  at [employer] level of education: Bachelor's degree, (Political Science)   for ,        Mental status: Alert  Behavior status: Cooperative  Patient reported symptoms of: depression, anxiety, fatigue and frustration  (Patient notes \"a little bit of everything\" post CVA)  Communication modalities: Verbal  Rehabilitation prognosis: Fair rehab potential to reach the established goals    Assessments        The Cognitive Linguistic Quick Test (CLQT) is designed to measure an individual’s five cognitive domains (attention, memory, executive functions, language, and visuospatial skills)  This norm-referenced tool has been standardized on adults ages 25 through 80years old with neurological impairment as a result of CVA, TBI, or dementia  The following results were obtained during the administration of the assessment  Cognitive Domain: Score: Range of Severity:   Attention /215    Memory /185    Executive Functions /40    Language  /37    Visuospatial Skills  /105         *Composite Severity Rating: /4 0               Clock Drawing: /13      Patient scored below Criterion Cut Scores on the following subtests:       *Patient named TBD concrete category members (animals) in 60 sec (norm=15+)  -- TBD    Patient scored following scores on CLQT: Personal Facts, 8  Symbol Cancellation: 11, Confrontation Naming: 10, Clock Drawin, Story Retellin  Patient fatigued and will complete rest of CLQT next session in 2 days      Motor Speech Evaluation:    -Facial appearance " Symmetrical   -Mandible function Adequate ROM   -Dentition Adequate   -Labial function WFL   -Lingual function WFL   -Velar function Unable to visualize   -Oral apraxia? Absent   -Vocal quality Clear/adequate   -Volitional cough DNT   -Respiration WFL   -Drooling? No   -Tremor/involuntary movement? Not present   -Tracheostomy present? No       Sustained phonation    -Vowel prolongation (norm=15-20 sec) DNT       Diadochokinetic (DDK) Rates    -“puh-puh-puh” (norm=3 0-5 5 rep/1 sec) n/a   -“tuh-tuh-tuh” (norm=25 rep/10 sec) N/A   -“kuh-kuh-kuh” (norm=25 rep/10 sec) na   -“puh-tuh-kuh” (norm=8 rep/10 sec) WFL       Articulation    -Single syllable words 100%   -Multisyllabic words 100%   -Repetition of multisyllabic words 5x DNT   -Words of progressive length DNT   -Sentences 100%   -Reading (“[place] Passage”) 90%   -Conversation 90%       Counting    -1 to 20 DNT       Overall speech intelligibility rating 90% intelligible         Patient presents with mild dysarthria characterized by Imprecise articulation  Goals    Short-term goals:  1  Complete CLQT  2  Complete dysphagia evaluation  3  Utilize strategies for optimal intelligibility at conversational level, 80% of the time during session in structured trials x 3     4  Further goals to be determined based on further testing  5  Complete falls and risk assessment  Long-term goals:  Patient will tolerate regular/thins with minimized risk of aspiration  Patient will express self clearly at conversation level independently during SLP sessions  Patient will improve cognition to optimal levels and express understanding of recommendations for this  Impressions/Recommendations    Impressions:   -Patient presents with mild dysarthria, mild cognitive linguistic deficits & mild oral / pharyngeal dysphagia s/p CVA   Patient's primary concern is her intelligibility but will complete further testing to further establish all appropriate goals as warranted  Recommend 2x weekly cognitive, dysphagia and motor speech therapy for a span of 6-8 weeks  Goals as stated above to maximize independence in communication, cognition and swallowing      Recommendations:  -Patient would benefit from outpatient skilled Speech Therapy services: Speech-language therapy, Dysphagia therapy and Cognitive-linguistic therapy    -Frequency: 2x weekly  -Duration: 6-8 weeks    -Intervention certification from: 7/68/1458  -Intervention certification to: 1/33/9232    -Intervention comments:   CLQT, dysphagia eval, conversation- motor speech

## 2023-06-28 NOTE — LETTER
"2023    RACHELLE Fritz  [address] [id number] [facility] [address]    Patient: Juvencio Pineda   YOB: 1945   Date of Visit: 2023     Encounter Diagnosis     ICD-10-CM    1  Dysarthria  R47 1       2  Left basal ganglia embolic stroke [facility])  K77 7 Ambulatory referral to Speech Therapy      3  Cognitive communication deficit  R41 841       4  Oropharyngeal dysphagia  R13 12           Dear Dr Reed Links: Thank you for your recent referral of Juvencio Pineda  Please review the attached evaluation summary from Lo's recent visit  Please verify that you agree with the plan of care by signing the attached order  If you have any questions or concerns, please do not hesitate to call  I sincerely appreciate the opportunity to share in the care of one of your patients and hope to have another opportunity to work with you in the near future  Sincerely,    NICOLE Ragland      Referring Provider:     Based upon review of the patient's progress and continued therapy plan, it is my medical opinion that Juvencio Pineda should continue speech therapy treatment at the Physical Therapy at [facility] [address]:                    RACHELLE Fritz  [address] [facility] [address]  Via Fax: [phone]        Speech-Language Pathology Initial Evaluation    Today's date: 2023   Patient’s name: Juvencio Pineda  : 1945  MRN: [id number]  Safety measures:   Referring provider: Dylan Hernández, [address]    Encounter Diagnosis     ICD-10-CM    1  Left basal ganglia embolic stroke [facility])  Q45 0 Ambulatory referral to Speech Therapy        Visit tracking:  -Referring provider: Christopher Arreola  -Billing guidelines: CMS  -Visit # 1  -Insurance: Medicare  -RE due: 23    Subjective comments: Pleasant/Cooperative    How did the patient hear about us? Physician    Patient's goal(s): \"To have clear speech again  Ju Greenjosue \"    Reason for referral: Decreased speech intelligibility and Difficulty swallowing " solids or liquids  Prior functional status: N/A  Clinically complex situations: N/A    History: Patient is a 78 y o  female , s/p CVA, who was referred to outpatient skilled Speech Therapy services for a cognitive-linguistic & Motor Speech Evaluation  Patient mahesh also be receiving a dysphagia evaluation next session  VFSS 6/15/23: Pt presents w/ mild oral, mod-severe pharyngeal dysphagia  Mildly prolonged mastication and oral organization though brisk lingual manipulation for transfer  Swallow initiation is mod delayed w/ bolus head reaching the pyriforms  Reduced hyo-laryngeal elevation  Delayed and incomplete laryngeal vestibule closure resulting in koffi aspiration before and during the swallow of thin and nectar thick liquids  Cough response is appreciated though is weak and does not effectively eject material from trachea/larynx despite effort  No penetration/aspiration w/ honey thick liquids or solid material       Findings immediately reviewed w/ pt  At this time, pt agreeable to continuing w/ current dysphagia diet modifications and plan for ST f/u          Recommendations:  Diet: Dysphagia 3   Liquids: Honey   Meds: Whole/crush in puree  Strategies: -   Frequent oral care  Upright position  F/u ST tx: Yes   Therapy Prognosis: Fair   Prognosis considerations: Age, current medical, motivation for rx   Aspiration Precautions  Reflux Precautions  Consider consult with: -   Results reviewed with: pt, nursing   Aspiration precautions posted  Repeat MBS as necessary  If a dedicated assessment of the esophagus is desired, consider esophagram/barium swallow or EGD      Discharge Date 6/19: Pt presents w/ mild oral, mod-severe pharyngeal dysphagia  Mildly prolonged mastication and oral organization though brisk lingual manipulation for transfer  Swallow initiation is mod delayed w/ bolus head reaching the pyriforms  Reduced hyo-laryngeal elevation   Delayed and incomplete laryngeal vestibule closure resulting in "koffi aspiration before and during the swallow of thin and nectar thick liquids  Cough response is appreciated though is weak and does not effectively eject material from trachea/larynx despite effort  No penetration/aspiration w/ honey thick liquids or solid material       Findings immediately reviewed w/ pt  At this time, pt agreeable to continuing w/ current dysphagia diet modifications and plan for ST f/u          Recommendations:  Diet: Dysphagia 3   Liquids: Honey   Meds: Whole/crush in puree  Strategies: -   Frequent oral care  Upright position  F/u ST tx: Yes   Therapy Prognosis: Fair   Prognosis considerations: Age, current medical, motivation for rx   Aspiration Precautions  Reflux Precautions  Consider consult with: -   Results reviewed with: pt, nursing   Aspiration precautions posted  Repeat MBS as necessary  If a dedicated assessment of the esophagus is desired, consider esophagram/barium swallow or EGD      IMPRESSION:     Acute infarct in left basal ganglia      Multifocal chronic lacunar infarcts in bilateral basal ganglia and bilateral thalami (etat lacunaire pattern), chronic small infarcts in bilateral cerebellum (left larger than right), severe chronic microangiopathy, and hypertensive arteriopathy      Chronic cystic encephalomalacia and gliosis in bilateral anterior frontal lobes (right worse than left) and right anterior temporal lobe, likely sequela of remote trauma      Mild dolichoectasia of basilar artery, unchanged     The study was marked in EPIC for immediate notification        Hearing: WFL  Vision: WFL    Home environment/lifestyle: lives with , 2 children out of the house, 2 grand children, met  at [employer] level of education: Bachelor's degree, (Political Science)   for ,        Mental status: Alert  Behavior status: Cooperative  Patient reported symptoms of: depression, anxiety, fatigue and frustration  (Patient notes \"a " "little bit of everything\" post CVA)  Communication modalities: Verbal  Rehabilitation prognosis: [address] rehab potential to reach the established goals    Assessments        The Cognitive Linguistic Quick Test (CLQT) is designed to measure an individual’s five cognitive domains (attention, memory, executive functions, language, and visuospatial skills)  This norm-referenced tool has been standardized on adults ages 25 through 80years old with neurological impairment as a result of CVA, TBI, or dementia  The following results were obtained during the administration of the assessment  Cognitive Domain: Score: Range of Severity:   Attention /215    Memory /185    Executive Functions /40    Language  /37    Visuospatial Skills  /105         *Composite Severity Rating: /4 0               Clock Drawing: /13      Patient scored below Criterion Cut Scores on the following subtests:       *Patient named TBD concrete category members (animals) in 60 sec (norm=15+)  -- TBD    Patient scored following scores on CLQT: Personal Facts, 8  Symbol Cancellation: 11, Confrontation Naming: 10, Clock Drawin, Story Retellin  Patient fatigued and will complete rest of CLQT next session in 2 days  Motor Speech Evaluation:    -Facial appearance Symmetrical   -Mandible function Adequate ROM   -Dentition Adequate   -Labial function WFL   -Lingual function WFL   -Velar function Unable to visualize   -Oral apraxia? Absent   -Vocal quality Clear/adequate   -Volitional cough DNT   -Respiration WFL   -Drooling? No   -Tremor/involuntary movement? Not present   -Tracheostomy present?  No       Sustained phonation    -Vowel prolongation (norm=15-20 sec) DNT       Diadochokinetic (DDK) Rates    -“puh-puh-puh” (norm=3 0-5 5 rep/1 sec) n/a   -“tuh-tuh-tuh” (norm=25 rep/10 sec) N/A   -“kuh-kuh-kuh” (norm=25 rep/10 sec) na   -“puh-tuh-kuh” (norm=8 rep/10 sec) WFL       Articulation    -Single syllable words 100%   -Multisyllabic words 100% " -Repetition of multisyllabic words 5x DNT   -Words of progressive length DNT   -Sentences 100%   -Reading (“[place] Passage”) 90%   -Conversation 90%       Counting    -1 to 20 DNT       Overall speech intelligibility rating 90% intelligible         Patient presents with mild dysarthria characterized by Imprecise articulation  Goals    Short-term goals:  1  Complete CLQT  2  Complete dysphagia evaluation  3  Utilize strategies for optimal intelligibility at conversational level, 80% of the time during session in structured trials x 3     4  Further goals to be determined based on further testing  5  Complete falls and risk assessment  Long-term goals:  Patient will tolerate regular/thins with minimized risk of aspiration  Patient will express self clearly at conversation level independently during SLP sessions  Patient will improve cognition to optimal levels and express understanding of recommendations for this  Impressions/Recommendations    Impressions:   -Patient presents with mild dysarthria, mild cognitive linguistic deficits & mild oral / pharyngeal dysphagia s/p CVA  Patient's primary concern is her intelligibility but will complete further testing to further establish all appropriate goals as warranted  Recommend 2x weekly cognitive, dysphagia and motor speech therapy for a span of 6-8 weeks  Goals as stated above to maximize independence in communication, cognition and swallowing      Recommendations:  -Patient would benefit from outpatient skilled Speech Therapy services: Speech-language therapy, Dysphagia therapy and Cognitive-linguistic therapy    -Frequency: 2x weekly  -Duration: 6-8 weeks    -Intervention certification from: 3/18/1318  -Intervention certification to: 8/52/1286    -Intervention comments:   CLQT, dysphagia eval, conversation- motor speech

## 2023-06-30 ENCOUNTER — OFFICE VISIT (OUTPATIENT)
Dept: SPEECH THERAPY | Facility: CLINIC | Age: 78
End: 2023-06-30
Payer: MEDICARE

## 2023-06-30 DIAGNOSIS — R41.841 COGNITIVE COMMUNICATION DEFICIT: ICD-10-CM

## 2023-06-30 DIAGNOSIS — R47.1 DYSARTHRIA: ICD-10-CM

## 2023-06-30 DIAGNOSIS — I63.9 LEFT BASAL GANGLIA EMBOLIC STROKE (HCC): Primary | ICD-10-CM

## 2023-06-30 PROCEDURE — 92507 TX SP LANG VOICE COMM INDIV: CPT

## 2023-06-30 PROCEDURE — 92610 EVALUATE SWALLOWING FUNCTION: CPT

## 2023-06-30 NOTE — PROGRESS NOTES
Daily Speech Treatment Note    Today's date: 2023  Patient’s name: Leonela Nguyen  : 1945  MRN: [id number]  Safety measures:   Referring provider: Patsy Del Real MD    Encounter Diagnosis     ICD-10-CM    1  Left basal ganglia embolic stroke (HCC)  C55 4       2  Dysarthria  R47 1       3  Cognitive communication deficit  R41  841         Visit trackin    Subjective/Behavioral:  -  Pleasant / Cooperative,  present    Objective/Assessment:  Completed structured activities with patient to target goals below  Results as stated below  Short-term goals: The Cognitive Linguistic Quick Test (CLQT) is designed to measure an individual’s five cognitive domains (attention, memory, executive functions, language, and visuospatial skills)  This norm-referenced tool has been standardized on adults ages 25 through 80years old with neurological impairment as a result of CVA, TBI, or dementia  The following results were obtained during the administration of the assessment      Cognitive Domain: Score: Range of Severity:   Attention 176/215 [facility]   Memory 146/185  WNL   Executive Functions 23/40 WNL    Language  28/37  WNL   Visuospatial Skills  77/105 WNL            *Composite Severity Ratin/4 0   WNL                   Clock Drawin/13  WNL      Patient scored below Criterion Cut Scores on the following subtests:  Generative Naming        *Patient named 6 concrete category members (animals) in 60 sec (norm=15+)  -- TBD          Patient presents with mild dysarthria characterized by Imprecise articulation         DYSPHAGIA EVALUATION:    -Reason for referral: h/o CVA, and stroke    -Subjective report of swallowing difficulty: Prior change in diet and VFSS; reassessment today      -Difficulty swallowing: No reported difficulty    -Current diet (solids): Regular  -Current diet (liquids):  Thin  -Current pill intake method: with applesauce  -Alternative Feeding Method?: No    -Facial appearance Symmetrical   -Dentition Adequate   -Labial function WFL   -Lingual function WFL   -Velar function Unable to visualize       LIQUID CONSISTENCY TESTIN  Liquid Consistency (Thin) - water  • Administered by: Cup, Straw and Self-fed  • Strategies, attempts, and responses: None  X 1, chin tuck also implemented  + throat clear x  With straw  CLINICAL FINDINGS:  • Oral phase impairments: Decreased oral coordination  + audible swallow  • Pharyngeal Phase Impairments: Swallow initiation Mild delay    *Laryngeal excursion upon palpation: Appeared WFL      SOLID CONSISTENCY TESTING:  • 1  Solid Consistency (Regular, Mixed and Puree) -   • Administered by: Self-fed  • Strategies, attempts, and responses: None    CLINICAL FINDINGS:  • Oral phase impairments: WFL  • Pharyngeal Phase Impairments: mild swallow initiation delay suspected    *Laryngeal excursion upon palpation: Appeared WFL      Impressions/Recommendations    Impressions:   -Patient presents with mild oral/pharyngeal swalow    -Factors affecting performance: None    -Safety concerns: Risk for aspiration - slight    -Risk factors: None    Recommendations:  -Patient would benefit from outpatient skilled Speech Therapy services: No dysphagia services    -  SWALLOWING SAFETY PRECAUTIONS:  -Recommended solids: Regular    -Recommended liquids: Thin    -Recommended medication form: with puree    -Frequent/thorough oral care     -Aspiration precautions   *Monitor for signs/symptoms concerning for aspiration (e g , low grade fever, increase in WBC, change in chest x-ray, increased congestion, increased coughing with P O  intake)    -Reflux precautions     -Strategies: Small sips and bites when eating    -Positioning: Upright position during meals    -Compensatory strategies: chin tuck with liquids    -Supervision: Independent     -Referrals: None            Goals     Short-term goals:  1   Complete CLQT - Completed today with word retrieval noted as only main deficit  Additional Goal  2  Complete dysphagia evaluation  - Completed  Patient presenting as functional and to continue regular/thins with aspiration precautions, chin tuck with liquids  3  Utilize strategies for optimal intelligibility at conversational level, 80% of the time during session in structured trials x 3      4  Further goals to be determined based on further testing  New Goal: Patient will express understanding of word retrieval strategies  Patient will complete word retrieval exercises at 80% accuracy  Patient will express self at conversational level via open discussion with use of word retrieval strategies      5  Complete falls and risk assessment      Long-term goals:  Patient will tolerate regular/thins with minimized risk of aspiration      Patient will express self clearly at conversation level independently during SLP sessions      Patient will improve cognition to optimal levels and express understanding of recommendations for this  Plan:  -Continue with current plan of care

## 2023-07-06 ENCOUNTER — OFFICE VISIT (OUTPATIENT)
Dept: CARDIOLOGY CLINIC | Facility: CLINIC | Age: 78
End: 2023-07-06
Payer: MEDICARE

## 2023-07-06 VITALS
WEIGHT: 123.2 LBS | HEART RATE: 82 BPM | BODY MASS INDEX: 19.8 KG/M2 | HEIGHT: 66 IN | DIASTOLIC BLOOD PRESSURE: 78 MMHG | SYSTOLIC BLOOD PRESSURE: 142 MMHG

## 2023-07-06 DIAGNOSIS — I48.91 A-FIB (HCC): ICD-10-CM

## 2023-07-06 PROCEDURE — 99214 OFFICE O/P EST MOD 30 MIN: CPT | Performed by: PHYSICIAN ASSISTANT

## 2023-07-06 RX ORDER — CARVEDILOL 12.5 MG/1
12.5 TABLET ORAL 2 TIMES DAILY WITH MEALS
Qty: 180 TABLET | Refills: 3 | Status: SHIPPED | OUTPATIENT
Start: 2023-07-06

## 2023-07-06 NOTE — PROGRESS NOTES
Cardiology Office Follow Up  Carin Ocampo  1945  [id number]    Assessment:  1. Recent complex CVA  a. MRI brain 6/14/2023: Acute infarct in left basal ganglia. Multifocal chronic lacunar infarcts, chronic small infarcts in bilateral cerebellum, severe chronic microangiopathy and hypertensive arteriopathy. Chronic cystic encephalomalacia and gliosis in bilateral anterior frontal lobes and right anterior temporal lobe likely sequela of remote trauma, mild dolichoectasia of basilar artery. b. Per neurology --MRI shows likely cardioembolic CVA with evidence of microhemorrhages associated with uncontrolled hypertension. c. Discharged 6/19/2023 on Eliquis 5 mg/daily  2. Recent onset atrial fibrillation  a. EKG 6/15/2023: Atrial fibrillation, HR 91  b. TTE 6/14/2023: EF 55%, mild MR/mild TR  c. OAC: Eliquis 5 mg daily --[id number] score is 6 (age+2, cva+2, htn, gender)  d. HR control Rx: Coreg 3.125 mg BID   3. Hypertension  a. Home Rx: Norvasc 10 mg daily, Coreg 3.125 mg twice daily, lisinopril 40 mg daily  b. BP in office 142/78; Home -180s    PLAN:  · BP not currently at goal; increase carvedilol to 12.5 mg twice daily  · On lisinopril 40 mg and Norvasc 10 mg daily  · Continue ambulatory BP readings at home; report any persistent BP readings >140/90  · DASH diet recommended   · On atorvastatin 40 mg daily for CVA indication   · Placed on Eliquis 5 mg daily on discharge which would be off-label dosing for CVA prevention; plan to transition to Eliquis 5mg BID once cleared from a neurology standpoint. Staff message sent to neurology team to review case and see if appropriate to transition to full [facility]. · RTO in 4-6 weeks or sooner if needed    Interval History/ HPI:   Carin Ocampo is a 21-year-old female who presents for hospital follow-up visit. She has PMH significant for complex CVA with residual speech difficulties, paroxysmal atrial fibrillation on Eliquis, hypertension and hyperlipidemia.  She has not yet established with a cardiologist at this office. She presents with her  who was present assisting with history today. Patient was admitted to [address] [place] [address] from 6/14/2023 to 6/19/202 with slurred speech with word finding difficulties, facial droop and dysphagia for 48-72 hours prior to presenting. Stroke alert was called and patient was evaluated by neurology team. She underwent CTA head and neck which did not show any acute intracranial abnormality or flow-limiting disease. Subsequent MRI brain showed acute left basal ganglia CVA with chronic small cerebellar and lacunar infarcts, severe chronic microangiopathy consistent with hypertensive arteriopathy. She was notably hypertensive on admission with BP up to 217/121 while in the ED. TTE showed EF 55% w/ mild MR/TR. EKG in the ED showed sinus tachycardia. Admission Summary:   6/14/2023: Given ASA 300mg x1 rectally due to dysphagia on admission. She was evaluated by speech therapy and deemed able to tolerate oral meds. Also loaded with Plavix 300mg x1.     6/15/2023: Developed rate-controlled AF noted overnight on telemetry. Given ASA 81mg, Plavix 75mg and atorvastatin 40mg QD. Per neurology, MRI with evidence of likely cardioembolic appearing CVA also with evidence of micro hemorrhages, uncontrolled hypertension. Transition to full Eliquis on 6/19/2023 as outpatient by her PCP if normotensive. 6/16/2023: Given ASA 81 mg. Plavix DC'd. Patient is not established with PCP. Per neurology, continue inpatient BP monitoring until 6/18/2023, if normotensive, switch ASA 81 mg to Eliquis daily. 6/18/2023: Per [facility] attending "spoke with neurologist on-call and plan for transition to Eliquis 5 mg twice daily on discharge." Plan to transition to Eliquis next 24 hours once outpatient coverage is finalized.      6/19/2023: Per D/C summary "Neuro-correspondence reviewed transition to Eliquis 5 mg/day due to risk of hemorrhage then follow-up with cards/neuro/PCP before transition to full dose Eliquis". Amie Schilder is doing reasonably well cardiac wise post-discharge; she denies any associated symptoms with her atrial fibrillation. She has been taking her Coreg, Lisinopril and Norvasc everyday without medication side effect. Her home BPs which she takes periodically during the week have been high despite taking her medications. Often in the 992-398 systolic on average. She does have some residual speech difficulty post-CVA but is able to answer questions and illicit history. She is working with speech therapy. She also has some movement difficulties and will be working with occupational therapy shortly. Denies any falls. She has upcoming appointment with neurology next week. Vitals:  142/78  82  123 LBS. No past medical history on file. Social History     Socioeconomic History   • Marital status: /Civil Union     Spouse name: Not on file   • Number of children: Not on file   • Years of education: Not on file   • Highest education level: Not on file   Occupational History   • Not on file   Tobacco Use   • Smoking status: Never   • Smokeless tobacco: Never   Vaping Use   • Vaping Use: Never used   Substance and Sexual Activity   • Alcohol use: Yes     Alcohol/week: 4.0 standard drinks of alcohol     Types: 4 Glasses of wine per week   • Drug use: Never   • Sexual activity: Not on file   Other Topics Concern   • Not on file   Social History Narrative   • Not on file     Social Determinants of Health     Financial Resource Strain: Not on file   Food Insecurity: No Food Insecurity (6/14/2023)    Hunger Vital Sign    • Worried About Running Out of Food in the Last Year: Never true    • Ran Out of Food in the Last Year: Never true   Transportation Needs: No Transportation Needs (6/14/2023)    PRAPARE - Transportation    • Lack of Transportation (Medical): No    • Lack of Transportation (Non-Medical):  No   Physical Activity: Not on file   Stress: Not on file   Social Connections: Not on file   Intimate Partner Violence: Not on file   Housing Stability: Low Risk  (6/14/2023)    Housing Stability Vital Sign    • Unable to Pay for Housing in the Last Year: No    • Number of Places Lived in the Last Year: 1    • Unstable Housing in the Last Year: No      No family history on file. No past surgical history on file. Current Outpatient Medications:   •  amLODIPine (NORVASC) 10 mg tablet, Take 1 tablet (10 mg total) by mouth daily Do not start before June 20, 2023., Disp: 30 tablet, Rfl: 0  •  apixaban (ELIQUIS) 5 mg, Take 1 tablet (5 mg total) by mouth in the morning, Disp: 30 tablet, Rfl: 0  •  atorvastatin (LIPITOR) 40 mg tablet, Take 1 tablet (40 mg total) by mouth daily with dinner, Disp: 30 tablet, Rfl: 0  •  carvedilol (COREG) 3.125 mg tablet, Take 1 tablet (3.125 mg total) by mouth 2 (two) times a day with meals, Disp: 60 tablet, Rfl: 0  •  lisinopril (ZESTRIL) 40 mg tablet, Take 1 tablet (40 mg total) by mouth daily Do not start before June 20, 2023., Disp: 30 tablet, Rfl: 0    Review of Systems   Constitutional: Negative for appetite change, chills, diaphoresis, fatigue and fever. Respiratory: Negative for cough, chest tightness and shortness of breath. Cardiovascular: Negative for chest pain, palpitations and leg swelling. Gastrointestinal: Negative for diarrhea, nausea and vomiting. Endocrine: Negative for cold intolerance and heat intolerance. Genitourinary: Negative for difficulty urinating, dysuria and enuresis. Musculoskeletal: Negative for arthralgias, back pain and gait problem. Allergic/Immunologic: Negative for environmental allergies and food allergies. Neurological: Negative for dizziness, facial asymmetry and headaches. Hematological: Negative for adenopathy. Does not bruise/bleed easily. Psychiatric/Behavioral: Negative for agitation, behavioral problems and confusion.        Physical Exam:  Physical Exam  Constitutional: Appearance: She is well-developed. HENT:      Right Ear: External ear normal.      Left Ear: External ear normal.   Eyes:      Pupils: Pupils are equal, round, and reactive to light. Cardiovascular:      Rate and Rhythm: Normal rate and regular rhythm. Heart sounds: Normal heart sounds. No murmur heard. No friction rub. No gallop. Pulmonary:      Effort: Pulmonary effort is normal.      Breath sounds: Normal breath sounds. Abdominal:      Palpations: Abdomen is soft. Musculoskeletal:         General: Normal range of motion. Cervical back: Normal range of motion. Skin:     General: Skin is warm and dry. Neurological:      Mental Status: She is alert and oriented to person, place, and time. Deep Tendon Reflexes: Reflexes are normal and symmetric. Psychiatric:         Behavior: Behavior normal.         Thought Content: Thought content normal.         Judgment: Judgment normal.         This note was completed in part utilizing [employer] Direct Software. Grammatical errors, random word insertions, spelling mistakes, and incomplete sentences can be an occasional consequence of this system secondary to software limitations, ambient noise, and hardware issues. If you have any questions or concerns about the content, text, or information contained within the body of this dictation, please contact the provider for clarification.

## 2023-07-07 ENCOUNTER — TELEPHONE (OUTPATIENT)
Dept: CARDIOLOGY CLINIC | Facility: CLINIC | Age: 78
End: 2023-07-07

## 2023-07-07 NOTE — TELEPHONE ENCOUNTER
Per Shilo Ruiz PA-C. It was discussed with pt's neurologist and pt is able to increase Eliquis to 5mg BID. Any further adjustments will be made by by neurology in office. LM on  with msg as given. Asked pt to call back to let us know she received msg.

## 2023-07-10 ENCOUNTER — OFFICE VISIT (OUTPATIENT)
Dept: SPEECH THERAPY | Facility: CLINIC | Age: 78
End: 2023-07-10
Payer: MEDICARE

## 2023-07-10 DIAGNOSIS — R41.841 COGNITIVE COMMUNICATION DEFICIT: ICD-10-CM

## 2023-07-10 DIAGNOSIS — I63.9 LEFT BASAL GANGLIA EMBOLIC STROKE (HCC): Primary | ICD-10-CM

## 2023-07-10 DIAGNOSIS — R47.1 DYSARTHRIA: ICD-10-CM

## 2023-07-10 PROCEDURE — 92507 TX SP LANG VOICE COMM INDIV: CPT

## 2023-07-10 NOTE — PROGRESS NOTES
Daily Speech Treatment Note    Today's date: 7/10/2023  Patient’s name: Gilson Armstrong  : 1945  MRN: [id number]  Safety measures:   Referring provider: Esme Cesar MD    Encounter Diagnosis     ICD-10-CM    1. Left basal ganglia embolic stroke (HCC)  A04.4       2. Dysarthria  R47.1       3. Cognitive communication deficit  R41. 841         Visit tracking:  3    Subjective/Behavioral:  -  Pleasant / Cooperative,  present    Objective/Assessment:  Completed structured activities with patient to target goals below. Results as stated below. Short-term goals: The Cognitive Linguistic Quick Test (CLQT) is designed to measure an individual’s five cognitive domains (attention, memory, executive functions, language, and visuospatial skills). This norm-referenced tool has been standardized on adults ages 25 through 80years old with neurological impairment as a result of CVA, TBI, or dementia. The following results were obtained during the administration of the assessment.     Cognitive Domain: Score: Range of Severity:   Attention 176/215 [facility]   Memory 146/185  WNL   Executive Functions 23/40 WNL    Language  28/37  WNL   Visuospatial Skills  77/105 WNL            *Composite Severity Ratin/4.0   WNL                   Clock Drawin/13  WNL      Patient scored below Criterion Cut Scores on the following subtests:  Generative Naming        *Patient named 6 concrete category members (animals) in 60 sec (norm=15+). -- TBD          Patient presents with mild dysarthria characterized by Imprecise articulation.        DYSPHAGIA EVALUATION:    -Reason for referral: h/o CVA, and stroke    -Subjective report of swallowing difficulty: Prior change in diet and VFSS; reassessment today      -Difficulty swallowing: No reported difficulty    -Current diet (solids): Regular  -Current diet (liquids):  Thin  -Current pill intake method: with applesauce  -Alternative Feeding Method?: No    -Facial appearance Symmetrical   -Dentition Adequate   -Labial function WFL   -Lingual function WFL   -Velar function Unable to visualize       LIQUID CONSISTENCY TESTIN. Liquid Consistency (Thin) - water  • Administered by: Cup, Straw and Self-fed  • Strategies, attempts, and responses: None  X 1, chin tuck also implemented. + throat clear x  With straw. CLINICAL FINDINGS:  • Oral phase impairments: Decreased oral coordination. + audible swallow. • Pharyngeal Phase Impairments: Swallow initiation Mild delay    *Laryngeal excursion upon palpation: Appeared WFL      SOLID CONSISTENCY TESTING:  • 1. Solid Consistency (Regular, Mixed and Puree) -   • Administered by: Self-fed  • Strategies, attempts, and responses: None    CLINICAL FINDINGS:  • Oral phase impairments: WFL  • Pharyngeal Phase Impairments: mild swallow initiation delay suspected    *Laryngeal excursion upon palpation: Appeared WFL      Impressions/Recommendations    Impressions:   -Patient presents with mild oral/pharyngeal swalow    -Factors affecting performance: None    -Safety concerns: Risk for aspiration - slight    -Risk factors: None    Recommendations:  -Patient would benefit from outpatient skilled Speech Therapy services: No dysphagia services    -  SWALLOWING SAFETY PRECAUTIONS:  -Recommended solids: Regular    -Recommended liquids: Thin    -Recommended medication form: with puree    -Frequent/thorough oral care     -Aspiration precautions   *Monitor for signs/symptoms concerning for aspiration (e.g., low grade fever, increase in WBC, change in chest x-ray, increased congestion, increased coughing with P.O. intake)    -Reflux precautions     -Strategies: Small sips and bites when eating    -Positioning: Upright position during meals    -Compensatory strategies: chin tuck with liquids    -Supervision: Independent     -Referrals: None            Goals     Short-term goals:  1.  Complete CLQT - Completed today with word retrieval noted as only main deficit. Additional Goal  2. Complete dysphagia evaluation. - Completed. Patient presenting as functional and to continue regular/thins with aspiration precautions, chin tuck with liquids. 3. Utilize strategies for optimal intelligibility at conversational level, 80% of the time during session in structured trials x 3.     4. Further goals to be determined based on further testing. New Goal: Patient will express understanding of word retrieval strategies. 7/10: Provided word finding strategy list and reviewed and yellow description paper. Patient will complete word retrieval exercises at 80% accuracy. 7/10: 70% accuracy in providing concrete categories given 3 members. Practiced describing words but required mod-max cues, provided handout for home. Able to name 3-5 items per concrete category, increased to 10 with verbal cues. Patient with significant difficulty with description of items and more work can be addressed with this, encouraged coming 2x weekly for most progress. Patient will express self at conversational level via open discussion with use of word retrieval strategies.     5. Complete falls and risk assessment. 7/10: Completed     Long-term goals:  Patient will tolerate regular/thins with minimized risk of aspiration.     Patient will express self clearly at conversation level independently during SLP sessions.     Patient will improve cognition to optimal levels and express understanding of recommendations for this. Plan:  -Continue with current plan of care.

## 2023-07-11 ENCOUNTER — OFFICE VISIT (OUTPATIENT)
Dept: NEUROLOGY | Facility: CLINIC | Age: 78
End: 2023-07-11
Payer: MEDICARE

## 2023-07-11 VITALS
BODY MASS INDEX: 18.69 KG/M2 | SYSTOLIC BLOOD PRESSURE: 115 MMHG | TEMPERATURE: 97.5 F | HEIGHT: 66 IN | DIASTOLIC BLOOD PRESSURE: 59 MMHG | WEIGHT: 116.3 LBS | HEART RATE: 93 BPM

## 2023-07-11 DIAGNOSIS — Z86.73 HISTORY OF MULTIPLE STROKES: Primary | ICD-10-CM

## 2023-07-11 DIAGNOSIS — I48.91 A-FIB (HCC): ICD-10-CM

## 2023-07-11 DIAGNOSIS — I48.91 NEW ONSET A-FIB (HCC): ICD-10-CM

## 2023-07-11 DIAGNOSIS — I68.0 CEREBRAL AMYLOID ANGIOPATHY (CODE): ICD-10-CM

## 2023-07-11 PROCEDURE — 99204 OFFICE O/P NEW MOD 45 MIN: CPT | Performed by: PSYCHIATRY & NEUROLOGY

## 2023-07-11 PROCEDURE — 99215 OFFICE O/P EST HI 40 MIN: CPT | Performed by: PSYCHIATRY & NEUROLOGY

## 2023-07-11 NOTE — PROGRESS NOTES
Patient ID: King Emerita is a 78 y.o. female who presents to the [address] [place] [address]. Assessment/Plan:   Patient Instructions   Stroke: Vishal Estrada presents for an initial follow-up with regard to a recent stroke. She was found while in the hospital to have atrial fibrillation which potentially played a role in her stroke although it turns out that there were some areas of chronic stroke on the MRI that she was not aware of. She does have findings on the MRI that suggest some old hypertensive hemorrhages, however there are areas of bleeding that are not consistent with hypertensive hemorrhage most suggestive of cerebral amyloid angiopathy. She has made a good recovery after her stroke however she continues to have some difficulty with walking in particular  -For ongoing stroke prevention she should continue Eliquis but she should be taking 5 mg twice per day. -She should continue her other medications including the statin medication and blood pressure treatment  -She should continue to monitor her blood pressure away from the doctor's office setting and ideally should target numbers less than 130/80 most of the time. If the numbers are frequently higher than this she should contact her primary care team  -We will defer to her primary care team for monitoring of her cholesterol panel and blood sugar numbers  -Typically with cerebral amyloid angiopathy Eliquis would be contraindicated however she also has atrial fibrillation with a reasonably high risk for ischemic stroke. Bearing this in mind we will plan to continue Eliquis at a standard dose for the time being and I will reach out directly to the cardiology group to arrange for her to have a Watchman device placed.   Although she will need to be on Eliquis short-term after device placement, long-term hopefully this will provide a combination of protection from ischemic stroke with reduced bleeding risk compared to long-term Eliquis  -I would like for her to remain physically active inasmuch as she feels capable of doing so but for the moment to use a walker for ambulation. Once she sees the physical therapy team as an outpatient she can ask them if she still requires a walker. She will return to the office in 4 months time to see me directly but we would be happy to see her sooner if the need should arise. If she were to have strokelike symptoms such as sudden painless loss of vision or double vision, difficulty speaking or swallowing, vertigo/room spinning that does not quickly resolve, or weakness/numbness/loss of coordination affecting 1 side of the face or body she should proceed by ambulance to the nearest emergency room immediately. Because she is on Eliquis, if she were to fall and strike her head or to suddenly develop an extremely severe very unusual headache she should likewise proceed by ambulance to the nearest emergency room. To monitor for any ongoing bleeding issues we will plan for a repeat MRI in 3 months time. Diagnoses and all orders for this visit:    History of multiple strokes  -     MRI brain without contrast; Future    New onset a-fib ([address])    Cerebral amyloid angiopathy (CODE)  -     MRI brain without contrast; Future    A-fib ([facility])  -     apixaban (ELIQUIS) 5 mg; Take 1 tablet (5 mg total) by mouth 2 (two) times a day  -     MRI brain without contrast; Future          Subjective:    ERIKA Walker is a 78 y.o. woman who presents for evaluation of stroke. Umair Reeves presents for follow-up with regard to her recent stroke. I reviewed the note from the hospital and directly reviewed the MRI and CT angiography. 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   She was found to have atrial fibrillation in the hospital.  On the MRI there is evidence of multiple regions of microhemorrhage. This is true in the bilateral basal ganglia which is consistent with hypertensive hemorrhage however there are also multiple areas of hemorrhage throughout the bilateral cerebellar hemispheres and some that are both cortical and juxtacortical up in the cerebral hemispheres consistent with probable cerebral amyloid angiopathy. She reports that she is made a reasonably good recovery after her stroke. She continues to experience some difficulty with ambulation which she relates more to balance than loss of strength. She also admits to/is reported to have experienced some early poststroke depression which has resolved. Her appetite is not great but her weight is reasonably stable. She does not report challenges related to sleep. She has not had any falls. We reviewed the initial inpatient physical therapy notes which recommended a walker. When she came home she started using a walker but stopped. She has not yet seen outpatient PT/OT/speech but that is coming.     Stroke risk factors were evaluated including:   Lab Results   Component Value Date/Time    CHOLESTEROL 119 06/15/2023 05:03 AM     Lab Results   Component Value Date/Time    TRIG 97 06/15/2023 05:03 AM     Lab Results   Component Value Date/Time    HDL 44 (L) 06/15/2023 05:03 AM     Lab Results   Component Value Date/Time    LDLCALC 56 06/15/2023 05:03 AM       Lab Results   Component Value Date/Time    HGBA1C 5.4 06/15/2023 05:03 AM     Lab Results   Component Value Date/Time     06/15/2023 05:03 AM           Past Medical History:   Diagnosis Date   • History of stroke 6/14/2023       Current Outpatient Medications:   •  amLODIPine (NORVASC) 10 mg tablet, Take 1 tablet (10 mg total) by mouth daily Do not start before June 20, 2023., Disp: 30 tablet, Rfl: 0  •  apixaban (ELIQUIS) 5 mg, Take 1 tablet (5 mg total) by mouth 2 (two) times a day, Disp: 60 tablet, Rfl: 2  •  atorvastatin (LIPITOR) 40 mg tablet, Take 1 tablet (40 mg total) by mouth daily with dinner, Disp: 30 tablet, Rfl: 0  •  carvedilol (COREG) 12.5 mg tablet, Take 1 tablet (12.5 mg total) by mouth 2 (two) times a day with meals, Disp: 180 tablet, Rfl: 3  •  lisinopril (ZESTRIL) 40 mg tablet, Take 1 tablet (40 mg total) by mouth daily Do not start before June 20, 2023., Disp: 30 tablet, Rfl: 0     Objective:    Blood pressure 115/59, pulse 93, temperature 97.5 °F (36.4 °C), temperature source Temporal, height 5' 6" (1.676 m), weight 52.8 kg (116 lb 4.8 oz). Neurological Exam    At the time of our evaluation she was awake and alert and in no distress. There was a degree of psychomotor slowing but no dysarthria or aphasia. Cranial nerves II through XII are symmetrically intact bilaterally. Motor testing reveals relatively symmetric strength although there is a right upper extremity fix with decreased coordination during testing. Sensation was symmetric to light touch in the bilateral upper and lower extremities. She needed relatively significant assistance to stand and her gait was mildly unsteady with a valgus deformity at the knees bilaterally. There was no obvious tremor. ROS:    Review of Systems   Constitutional: Negative for appetite change, fatigue and fever. HENT: Negative. Negative for hearing loss, tinnitus, trouble swallowing and voice change. Eyes: Positive for visual disturbance (foogy vision R eye). Negative for photophobia and pain. Respiratory: Negative. Negative for shortness of breath. Cardiovascular: Negative. Negative for palpitations. Gastrointestinal: Negative. Negative for nausea and vomiting. Endocrine: Negative. Negative for cold intolerance. Genitourinary: Negative. Negative for dysuria, frequency and urgency. Musculoskeletal: Negative for back pain, gait problem, myalgias and neck pain. Skin: Negative. Negative for rash. Allergic/Immunologic: Negative. Neurological: Negative. Negative for dizziness, tremors, seizures, syncope, facial asymmetry, speech difficulty, weakness, light-headedness, numbness and headaches. Hematological: Negative. Does not bruise/bleed easily. Psychiatric/Behavioral: Negative. Negative for confusion, hallucinations and sleep disturbance.              I have spent 55 minutes today on this case including chart review, performing history and exam, patient counseling, and documentation/communication

## 2023-07-11 NOTE — PATIENT INSTRUCTIONS
Stroke: Laura Garcia presents for an initial follow-up with regard to a recent stroke. She was found while in the hospital to have atrial fibrillation which potentially played a role in her stroke although it turns out that there were some areas of chronic stroke on the MRI that she was not aware of. She does have findings on the MRI that suggest some old hypertensive hemorrhages, however there are areas of bleeding that are not consistent with hypertensive hemorrhage most suggestive of cerebral amyloid angiopathy. She has made a good recovery after her stroke however she continues to have some difficulty with walking in particular  -For ongoing stroke prevention she should continue Eliquis but she should be taking 5 mg twice per day. -She should continue her other medications including the statin medication and blood pressure treatment  -She should continue to monitor her blood pressure away from the doctor's office setting and ideally should target numbers less than 130/80 most of the time. If the numbers are frequently higher than this she should contact her primary care team  -We will defer to her primary care team for monitoring of her cholesterol panel and blood sugar numbers  -Typically with cerebral amyloid angiopathy Eliquis would be contraindicated however she also has atrial fibrillation with a reasonably high risk for ischemic stroke. Bearing this in mind we will plan to continue Eliquis at a standard dose for the time being and I will reach out directly to the cardiology group to arrange for her to have a Watchman device placed. Although she will need to be on Eliquis short-term after device placement, long-term hopefully this will provide a combination of protection from ischemic stroke with reduced bleeding risk compared to long-term Eliquis  -I would like for her to remain physically active inasmuch as she feels capable of doing so but for the moment to use a walker for ambulation.   Once she sees the physical therapy team as an outpatient she can ask them if she still requires a walker. She will return to the office in 4 months time to see me directly but we would be happy to see her sooner if the need should arise. If she were to have strokelike symptoms such as sudden painless loss of vision or double vision, difficulty speaking or swallowing, vertigo/room spinning that does not quickly resolve, or weakness/numbness/loss of coordination affecting 1 side of the face or body she should proceed by ambulance to the nearest emergency room immediately. Because she is on Eliquis, if she were to fall and strike her head or to suddenly develop an extremely severe very unusual headache she should likewise proceed by ambulance to the nearest emergency room. To monitor for any ongoing bleeding issues we will plan for a repeat MRI in 3 months time.

## 2023-07-12 DIAGNOSIS — I48.91 NEW ONSET A-FIB (HCC): Primary | ICD-10-CM

## 2023-07-14 ENCOUNTER — EVALUATION (OUTPATIENT)
Facility: CLINIC | Age: 78
End: 2023-07-14
Payer: MEDICARE

## 2023-07-14 DIAGNOSIS — I63.9 LEFT BASAL GANGLIA EMBOLIC STROKE (HCC): Primary | ICD-10-CM

## 2023-07-14 PROCEDURE — 97167 OT EVAL HIGH COMPLEX 60 MIN: CPT

## 2023-07-14 NOTE — PROGRESS NOTES
OCCUPATIONAL THERAPY INITIAL EVALUATION        23  [facility]  1945  [id number]  Savanah Hill MD   Diagnosis ICD-10-CM Associated Orders   1. Left basal ganglia embolic stroke [facility])  W48.0 Ambulatory referral to Occupational Therapy            Assessment/Plan      SKILLED ANALYSIS:    Pt is a 78 y.o. female referred to Occupational Therapy s/p Left basal ganglia embolic stroke ([address]) [G13.0]. Pt participated in skilled OT evaluation and, following formalized testing as well as clinical observation, pt presents with the following areas of deficit: FMC/FMS, GMC/GMS, hand to target accuracy and in hand manipulation/dexterity impacting indep and completion of ADL/IADL and leisure tasks. Pt does demo the need for skilled Occupational Therapy services 2x/week for 12 weeks with focus on ADL re-training, IADL re-training, standing tolerance, standing balance, UE NMR, UE strengthening, UE endurance, FMC/GMC, family training/education and leisure pursuits to address the goals as listed below. Pt in agreement with POC, POC to  10/6/23. Short Term Goals (to be achieved within 4 weeks):  1. Pt will improve prehension patterns of RUE so as to manipulate all clothing fasteners and ADL items without difficulty. 2. Pt will improve handwriting legibility to G+ print and G script. 3. Pt will increase I with ADLs of bathing by resuming showering in stance with G+ balance. 4. Pt will demo with G tolerance to supine, seated, and in stance exercise x 45 minutes with minimal rest breaks required for increased engagement in weekly exercise regimen and increased engagement in life roles  5. Pt will increase R UE rate of manipulation for all FM tests for improved functional performance/independence with functional tasks x 25%   6.  Pt will increase B UE strength to 5/5, improve R hand  strength by 5 lbs and pinch strength by 2-3 pounds, through the use of strengthening exercises and home program for eventual return to IADLs and life roles. Long Term Goals (to be achieved within 12 weeks):  1. Pt will use RUE as dominant in 100% of ADLs, IADLs and leisure tasks, including the ability to  and manipulate small and large items without difficulty, so as to increase independence and ease with task completion. 2. Pt will improve handwriting legibility to N print and G+ script, with G spacing and sizing of letters   3. Pt will demo with G carryover of Home Exercise Program to improve functional progression towards goals in Plan of care and for improved functional use of RUE    4. Pt will complete pre-driving assessments to provide feedback to physician to assist with determining if pt is able to return to driving. SUBJECTIVE      SUBJECTIVE: "I have trouble writing"  "I have trouble picking things up with this hand" (indicating her right arm)     PATIENT GOAL: to work on her handwriting and use of her RUE        HISTORY OF PRESENT ILLNESS:     Pt is a 78 y.o. female who was referred to Occupational Therapy s/p  Left basal ganglia embolic stroke ([address] [facility]) [U71.8]. Pt with recent hospitalization from 6/14-6/19 for a left basal ganglia embolic stroke. Pt also with newly diagnosed Afib. Pt was dc home from the hospital and now presents for outpatient therapy. Pt already seeing  and has an upcoming PT eval scheduled for 7/20/23. PMH:   Past Medical History:   Diagnosis Date   • History of stroke 6/14/2023       Past Surgical Hx: History reviewed. No pertinent surgical history.      HOME SETUP/ CLOF: lives with , 2 SH, 2 CONNIE without HR, full bath on 1st and 2nd floor; bedroom on 2nd floor; has been sleeping on the couch on the first floor since CVA; tub shower combo    ADLs: has been sponge bathing only since dc from hospital     IADLs: pt's  does cooking; pt does laundry; machines are on the 1st floor  (+) ; has not driven since the CVA, is aware that she cannot drive until cleared by her physician     Functional Mobility: was I PTA; pt I with mobility inside the house; pt does use 's arm when walking in community    Physical activity/ leisure engagement: no formal exercise     DME: none        Pain Levels: none            OBJECTIVE         PHYSICAL ASSESSMENT    R handed    Comments: pt observed to have delayed motor movements, slower initiation of movement noted; min difficulty noted with motor planning movements         UE ROM LUE AROM  RUE PROM  RUE AROM COMMENTS   Shoulder Flex WNL WNL 3/4 range    Shoulder Ext WNL WNL WNL    Shoulder ABD WNL  WNL 3/4 range     Horizontal ABD WNL  WNL WNL    Horizontal ADD WNL  WNL WNL    Shoulder IR  WNL  WNL WNL    Shoulder ER WNL WNL WNL    Elbow Flex WNL WNL WNL    Elbow Ext  WNL WNL WNL    Wrist flexion WNL WNL WNL    Wrist Ext WNL WNL WNL    Supination WNL WNL WNL    Pronation WNL WNL WNL    Finger Flex WNL WNL WNL    Finger Ext WNL WNL WNL    Opposition  WNL WNL WNL                               MMT  LUE RUE   Comments   Elevation 4+/5 4+/5    Shoulder Flex/Ext 4+/5 4+/5    Shoulder Abd 4+/5 4+/5    Shoulder Add 4+/5 4+/5    Shoulder ER 4+/5 4+/5    Shoulder IR 4+/5 4+/5    Elbow Flex 5/5 5/5    Elbow Ext 5/5 5/5    Wrist Flex 5/5 5/5    Wrist Ext 5/5 5/5    Gross Grasp 5/5 5/5                      /Pinch Strength  LUE  RUE    Comments   Dynamometer      - Gross Grasp 52 lbs 35 lbs Below normal for R hand   Pinch Meter       - PINCER 8 lbs 7 lbs     - 3 JAW NESHA 10 lbs 10 lbs     - LATERAL 15 lbs  11 lbs      SENSATION LUE RUE Comments   Sharp Dull  Intact Intact    Proprioception Intact Intact    Pain Intact Intact    Hot/Cold Temp Intact Intact      COORDINATION LUE RUE    9 Hole Peg Test 42  seconds 43 seconds    Key hole Peg test  4 min 48 sec  4 min 15 sec  Min difficulty with manipulating pegs with R hand   Handwriting (Print)  G legibility     Handwriting (script)   G- legibility  micrographia noted        MODIFIED NANCI SCALE (TONE) HUMZA WALSH    No increase in muscle tone (0) 0 0    Slight Increase in muscle tone with catch and release or min resist at end range (1)      Slight Increase in muscle tone with catch and release, followed by min resistance through remainder of range (1+)      Increased muscle tone through full range, able to be moved easily (2)      Considerable increase in tone, difficult to move (3)      Rigid in Flexion/Extension (4)                  COGNITIVE ASSESSMENT        Cognitive Checklist: Patient indicated that she is experiencing the following symptoms:    · Memory: pt and  report no deficits     · Attention: pt and  report no deficits    · Processing: pt and  report no deficits    · Executive Functions: pt and  report no deficits    · Communication: pt reports being unsure if she is having any difficulty with word finding and written expression     · Visual: pt and  report no deficits    · Emotional: pt and  report no deficits    · Increased Sensitivities to: pt and  report no deficits             VISUAL ASSESSMENT      Comments: (+) glasses when driving and reading; pt reports occasionally seeing a "red line" flash over her vision; unable to determine if it is in 1 eye or both     Vision Screen       ROM Intact    Tracking Intact    Saccades Intact    Convergence/ Divergence Intact (-) diplopia    Visual Fields  Intact               PLANNED THERAPY INTERVENTIONS:  Therapeutic activity  Therapeutic exercise  Neuromuscular re-education   Functional cognition   ADL re-training   IADL re-training  FMC/prehension  Timed Trials  Manual tx  Hand to target  WBearing strategies   Closed chain activities  Open chain activities       INTERVENTION COMMENTS:  Diagnosis: Left basal ganglia embolic stroke ([address]) [Z74.7]  Precautions: Afib   Insurance: Payor: MEDICARE / Plan: MEDICARE A AND B / Product Type: Medicare A & B Fee for Service /   Visit: 1  PN due 8/15/23

## 2023-07-14 NOTE — LETTER
July 14, 2023    Jah Abbasi, [address] [address]    Patient: Albertina Oates   YOB: 1945   Date of Visit: 7/14/2023     Encounter Diagnosis     ICD-10-CM    1. Left basal ganglia embolic stroke [facility])  G64.5 Ambulatory referral to Occupational Therapy     OT Plan of Care Cert/Re-cert          Dear Dr. Paulo Deng: Thank you for your recent referral of Albertina Oates. Please review the attached evaluation summary from Lo's recent visit. Please verify that you agree with the plan of care by signing the attached order. If you have any questions or concerns, please do not hesitate to call. I sincerely appreciate the opportunity to share in the care of one of your patients and hope to have another opportunity to work with you in the near future. Sincerely,    Darnell Mejia, OT      Referring Provider:     I certify that I have read the below Plan of Care and certify the need for these services furnished under this plan of treatment while under my care. Jah Abbasi MD  [address] [place] [id number]  Via Fax: [phone]        OCCUPATIONAL THERAPY INITIAL EVALUATION        7/14/23  Albertina Oates  1945  [id number]  Jah Abbasi MD   Diagnosis ICD-10-CM Associated Orders   1. Left basal ganglia embolic stroke [facility])  W93.0 Ambulatory referral to Occupational Therapy            Assessment/Plan      SKILLED ANALYSIS:    Pt is a 78 y.o. female referred to Occupational Therapy s/p Left basal ganglia embolic stroke ([address]) [T93.6]. Pt participated in skilled OT evaluation and, following formalized testing as well as clinical observation, pt presents with the following areas of deficit: FMC/FMS, GMC/GMS, hand to target accuracy and in hand manipulation/dexterity impacting indep and completion of ADL/IADL and leisure tasks.   Pt does demo the need for skilled Occupational Therapy services 2x/week for 12 weeks with focus on ADL re-training, IADL re-training, standing tolerance, standing balance, UE NMR, UE strengthening, UE endurance, FMC/GMC, family training/education and leisure pursuits to address the goals as listed below. Pt in agreement with POC, POC to  10/6/23. Short Term Goals (to be achieved within 4 weeks):  Pt will improve prehension patterns of RUE so as to manipulate all clothing fasteners and ADL items without difficulty. Pt will improve handwriting legibility to G+ print and G script. Pt will increase I with ADLs of bathing by resuming showering in stance with G+ balance. Pt will demo with G tolerance to supine, seated, and in stance exercise x 45 minutes with minimal rest breaks required for increased engagement in weekly exercise regimen and increased engagement in life roles  Pt will increase R UE rate of manipulation for all FM tests for improved functional performance/independence with functional tasks x 25%   Pt will increase B UE strength to 5/5, improve R hand  strength by 5 lbs and pinch strength by 2-3 pounds, through the use of strengthening exercises and home program for eventual return to IADLs and life roles. Long Term Goals (to be achieved within 12 weeks):  Pt will use RUE as dominant in 100% of ADLs, IADLs and leisure tasks, including the ability to  and manipulate small and large items without difficulty, so as to increase independence and ease with task completion. Pt will improve handwriting legibility to N print and G+ script, with G spacing and sizing of letters   Pt will demo with G carryover of Home Exercise Program to improve functional progression towards goals in Plan of care and for improved functional use of RUE    Pt will complete pre-driving assessments to provide feedback to physician to assist with determining if pt is able to return to driving.              SUBJECTIVE      SUBJECTIVE: "I have trouble writing"  "I have trouble picking things up with this hand" (indicating her right arm)     PATIENT GOAL: to work on her handwriting and use of her RUE        HISTORY OF PRESENT ILLNESS:     Pt is a 78 y.o. female who was referred to Occupational Therapy s/p  Left basal ganglia embolic stroke ([address] [facility]) [Q26.7]. Pt with recent hospitalization from 6/14-6/19 for a left basal ganglia embolic stroke. Pt also with newly diagnosed Afib. Pt was dc home from the hospital and now presents for outpatient therapy. Pt already seeing  and has an upcoming PT eval scheduled for 7/20/23. PMH:   Past Medical History:   Diagnosis Date   • History of stroke 6/14/2023       Past Surgical Hx: History reviewed. No pertinent surgical history.      HOME SETUP/ CLOF: lives with , 2 SH, 2 CONNIE without HR, full bath on 1st and 2nd floor; bedroom on 2nd floor; has been sleeping on the couch on the first floor since CVA; tub shower combo    ADLs: has been sponge bathing only since dc from hospital     IADLs: pt's  does cooking; pt does laundry; machines are on the 1st floor  (+) ; has not driven since the CVA, is aware that she cannot drive until cleared by her physician     Functional Mobility: was I PTA; pt I with mobility inside the house; pt does use 's arm when walking in community    Physical activity/ leisure engagement: no formal exercise     DME: none        Pain Levels: none            OBJECTIVE         PHYSICAL ASSESSMENT    R handed    Comments: pt observed to have delayed motor movements, slower initiation of movement noted; min difficulty noted with motor planning movements         UE ROM LUE AROM  RUE PROM  RUE AROM COMMENTS   Shoulder Flex WNL WNL 3/4 range    Shoulder Ext WNL WNL WNL    Shoulder ABD WNL  WNL 3/4 range     Horizontal ABD WNL  WNL WNL    Horizontal ADD WNL  WNL WNL    Shoulder IR  WNL  WNL WNL    Shoulder ER WNL WNL WNL    Elbow Flex WNL WNL WNL    Elbow Ext  WNL WNL WNL    Wrist flexion WNL WNL WNL    Wrist Ext WNL WNL WNL    Supination WNL WNL WNL    Pronation WNL WNL WNL    Finger Flex WNL WNL WNL    Finger Ext WNL WNL WNL    Opposition  WNL WNL WNL                               MMT  LUE RUE   Comments   Elevation 4+/5 4+/5    Shoulder Flex/Ext 4+/5 4+/5    Shoulder Abd 4+/5 4+/5    Shoulder Add 4+/5 4+/5    Shoulder ER 4+/5 4+/5    Shoulder IR 4+/5 4+/5    Elbow Flex 5/5 5/5    Elbow Ext 5/5 5/5    Wrist Flex 5/5 5/5    Wrist Ext 5/5 5/5    Gross Grasp 5/5 5/5                      /Pinch Strength  LUE  RUE    Comments   Dynamometer      - Gross Grasp 52 lbs 35 lbs Below normal for R hand   Pinch Meter       - PINCER 8 lbs 7 lbs     - 3 JAW NESHA 10 lbs 10 lbs     - LATERAL 15 lbs  11 lbs      SENSATION LUE RUE Comments   Sharp Dull  Intact Intact    Proprioception Intact Intact    Pain Intact Intact    Hot/Cold Temp Intact Intact      COORDINATION LUE RUE    9 Hole Peg Test 42  seconds 43 seconds    Key hole Peg test  4 min 48 sec  4 min 15 sec  Min difficulty with manipulating pegs with R hand   Handwriting (Print)  G legibility     Handwriting (script)   G- legibility  micrographia noted        MODIFIED NANCI SCALE (TONE) HUMZA RUKAVITA    No increase in muscle tone (0) 0 0    Slight Increase in muscle tone with catch and release or min resist at end range (1)      Slight Increase in muscle tone with catch and release, followed by min resistance through remainder of range (1+)      Increased muscle tone through full range, able to be moved easily (2)      Considerable increase in tone, difficult to move (3)      Rigid in Flexion/Extension (4)                  COGNITIVE ASSESSMENT        Cognitive Checklist: Patient indicated that she is experiencing the following symptoms:    Memory: pt and  report no deficits     Attention: pt and  report no deficits    Processing: pt and  report no deficits    Executive Functions: pt and  report no deficits    Communication: pt reports being unsure if she is having any difficulty with word finding and written expression     Visual: pt and  report no deficits    Emotional: pt and  report no deficits    Increased Sensitivities to: pt and  report no deficits             VISUAL ASSESSMENT      Comments: (+) glasses when driving and reading; pt reports occasionally seeing a "red line" flash over her vision; unable to determine if it is in 1 eye or both     Vision Screen       ROM Intact    Tracking Intact    Saccades Intact    Convergence/ Divergence Intact (-) diplopia    Visual Fields  Intact               PLANNED THERAPY INTERVENTIONS:  Therapeutic activity  Therapeutic exercise  Neuromuscular re-education   Functional cognition   ADL re-training   IADL re-training  FMC/prehension  Timed Trials  Manual tx  Hand to target  WBearing strategies   Closed chain activities  Open chain activities       INTERVENTION COMMENTS:  Diagnosis: Left basal ganglia embolic stroke ([address]) [F74.1]  Precautions: Afib   Insurance: Payor: Leigh Ann Meeks / Plan: MEDICARE A AND B / Product Type: Medicare A & B Fee for Service /   Visit: 1  PN due 8/15/23

## 2023-07-20 ENCOUNTER — EVALUATION (OUTPATIENT)
Facility: CLINIC | Age: 78
End: 2023-07-20
Payer: MEDICARE

## 2023-07-20 ENCOUNTER — OFFICE VISIT (OUTPATIENT)
Facility: CLINIC | Age: 78
End: 2023-07-20
Payer: MEDICARE

## 2023-07-20 DIAGNOSIS — I63.9 LEFT BASAL GANGLIA EMBOLIC STROKE (HCC): Primary | ICD-10-CM

## 2023-07-20 PROCEDURE — 97163 PT EVAL HIGH COMPLEX 45 MIN: CPT

## 2023-07-20 PROCEDURE — 97112 NEUROMUSCULAR REEDUCATION: CPT

## 2023-07-20 NOTE — PROGRESS NOTES
PT Evaluation     Today's date: 2023  Patient name: Cinthya Moscoso  : 1945  MRN: [id number]  Referring provider: Albert Bonds MD  Dx:   Encounter Diagnosis     ICD-10-CM    1. Left basal ganglia embolic stroke (HCC)  H46.2           Start Time: 1545  Stop Time: 1630  Total time in clinic (min): 45 minutes    Assessment  Assessment details: Patient is a 78y.o. year old female presenting to OPPT s/p diagnosis of post-stroke. Patient demonstrates decreased strength, endurance, balance, and functional independence. Her gait speed is 0.63 m/s,with a RW which classifies her as a most limited community ambulator. She demonstrates reduced endurance per the 6MWT, 576 ft. Her FGA places her at risk for falls, , she shows difficulty with narrow base of support, multidirectional stepping, and negotiating obstacles. She ambulates with stiff gait, inadequate knee flexion through swing and UE abduction most likely to maintain balance. Patient is unable to return to work at this time and requires assistance for ADLs and IADLs. Discussed beginning to ambulate more around her house or outside with her  present for safety and to practice sit<>stand. She will benefit from skilled PT interventions to maximize return to PLOF and independence as able. Thank you for this referral and the ability to participate in the care of this patient. Impairments: abnormal coordination, abnormal gait, activity intolerance, impaired balance, impaired physical strength, lacks appropriate home exercise program, safety issue and weight-bearing intolerance     Prognosis: good    Goals  In 4 weeks, patient will:  1. Pt will achieve MDC value, 2.9s, on TUG to demonstrate reduced fall risk  2. Improve 5xSTS to without using bilateral UE support to complete to show improved LE strength. 3.  Improve 6MWT 50ft without requiring standing rest to show improved endurance. In 4-10 weeks, patient will:  1.   Meet gait speed 0.80 m/s with least restrictive device to classify as unlimited community ambulator and demonstrate reduced fall risk  2. Improve FGA by at least 4 points to meet MDC value for post stroke to show improved balance with least restrictive device. 3.  Report independence with walking program and consistent carryover with HEP. 4.  Improve 6MWT by at least 100ft with least restrictive device to show improved endurance to complete ADLs  5. Report independence with HEP and walking program.  6.  Improve ABC to at least 67% to demonstrate reduced fall risk. 7.  Improve 5xSTS to below cutoff score for age-matched peers, 14.8 seconds, to demonstrate improved LE stability to complete transfers safely. 8.  Report return to performing stairs at home. Plan  Planned therapy interventions: neuromuscular re-education, manual therapy, patient education, home exercise program, therapeutic exercise, therapeutic activities and gait training  Frequency: 2x week  Duration in weeks: 8  Plan of Care beginning date: 7/20/2023  Plan of Care expiration date: 9/18/2023  Treatment plan discussed with: patient        Subjective Evaluation    History of Present Illness  Mechanism of injury: Present at PT: Does not feel like she needs walker at this time. Gets around without  martha for short distances. Feels like covering on BLE. Has protective sensation.     Goals: Get stronger,  agrees with this goal    Neuro evaluation: Aniket Rey is a 78 y.o. woman who presents for evaluation of stroke.     Ventura Vamshi presents for follow-up with regard to her recent stroke. I reviewed the note from the hospital and directly reviewed the MRI and CT angiography. 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   She was found to have atrial fibrillation in the hospital.  On the MRI there is evidence of multiple regions of microhemorrhage. This is true in the bilateral basal ganglia which is consistent with hypertensive hemorrhage however there are also multiple areas of hemorrhage throughout the bilateral cerebellar hemispheres and some that are both cortical and juxtacortical up in the cerebral hemispheres consistent with probable cerebral amyloid angiopathy.     She reports that she is made a reasonably good recovery after her stroke. She continues to experience some difficulty with ambulation which she relates more to balance than loss of strength. She also admits to/is reported to have experienced some early poststroke depression which has resolved. Her appetite is not great but her weight is reasonably stable. She does not report challenges related to sleep. She has not had any falls. We reviewed the initial inpatient physical therapy notes which recommended a walker. When she came home she started using a walker but stopped. She has not yet seen outpatient PT/OT/speech but that is coming.   Pain  No pain reported          Objective       Balance Test    6 Minute Walk Test (ft): 576ft 1 standing break 5:30 complete   FGA:    Gait Speed (m/s): 0.63m/s no AD   5x Sit To Stand (s): 36.66s 3/5 stands wihtout UE support   TU.59   ABC 62%         MCTSIB Number of Seconds   Feet Together, Eyes Open    Feet Together, Eyes Closed    Feet Together, Eyes Open Foam    Feet Together, Eyes Closed Foam        Flowsheet Rows    Flowsheet Row Most Recent Value   Functional Gait Assessment    Gait Level Surface  2   Change in GaiT Speed 2   Gait with horizontal head turns 2   Gait with vertical head turns 2   Gait and pivot tuen  2   Step over obstacle 1   Gait with narrow base of supprt 0   Gait with eyes closed 1   Ambulating backwards 1   Steps 1   Total score  14            Sensation Left Right   Kinesthesia     Light Touch intact Intact Sharp/Dull     2 Point Discrimination           Manual Muscle Testing - Hip Left Right   Flexion 4- 4   Extension 4- 4-   Abduction 3+ 3+   External Rotation       Manual Muscle Testing - Knee Left Right   Flexion 3+ 4   Extension 4- 4     Manual Muscle Testing - Ankle Left Right   Doriflexion 4 4   Plantarflexion 5 5        Transfers    Sit To Stand Independent   W/C To Bed    Sit To Supine    Roll          Gait Assessment: Stiff gait. Inadequate knee flexion bl. Arms held in abduction, potentially for balance. Bilat lateral lurch. Shortened step length.            Precautions:   Re-eval Date: 8/20/2023    Date 7/20       Visit Count 1       FOTO See IE       Pain In See IE       Pain Out                Testing        TUG 11.59       5xSTS 36.66s 3/5 stands wihtout UE support       Gait speed 0.63       FGA 14/30       2/6MWT 576ft no AD       Neuro Re-Ed        Dynamic balance        Static balance        Obstacle course                                        Ther Ex        SLR x 4        HR/TR        Mini Squats        Step ups                                        Ther Activity        ADL                Gait Training        Divided Attention        Head turns        Modalities         prn Calcipotriene Counseling: Cantharidin Counseling:  I discussed with the patient the risks of Cantharidin including but not limited to pain, redness, burning, itching, and blistering.

## 2023-07-20 NOTE — PROGRESS NOTES
Occupational Therapy Daily Note:    Today's date: 2023  Patient name: Emerson Fisher  : 1945  MRN: [id number]  Referring provider: Jose L Melendez MD  Dx:   Encounter Diagnosis   Name Primary? • Left basal ganglia embolic stroke (HCC) Yes     Subjective: "Lets go". Objective: Pt engaged in skilled OT focusing on FMC/FMS, prehension patterns, motor planning, motor speed and overall activity endurance improving fxl abilities for ADL/IADL engagement. CPT Code Minutes                                           Task Details        Therapeutic Activity               Neuro Re-Ed 40 Pt completed Keyhole Peg assessment and O'Js Finger Dexterity Test w/focus on measuring fxl dexterity, motor speed and motor planning. Keyhole Peg Test: R hand-4:21, L hand 3:18    O'Js Finger Dexterity Test: R hand-4:12, L hand-4:09        Pt engaged in small item retrieval task w/pom pom placement to target board w/added cog demands of sequencing alphabet. Pom poms positioned to facilitate crossing midline using RUE, activity graded up to alternating BUE crossing midline retrieving from contralateral side placing to target board. Therapeutic Exercise               Manual          Self Care       Assessment: Tolerated treatment well. Pt noted with slow deliberate movements when inserting pegs w/demands of target board. Pt completed pom pom placement maintaining appropriate motor speed and coordination during retrieval and placement w/unilateral and bilateral engagement. Pt reported fatigue during session. Patient would benefit from continued skilled OT.     Plan: Continued skilled OT per POC    INTERVENTION COMMENTS:  Diagnosis: Left basal ganglia embolic stroke ([address]) [I00.2]  Precautions: Afib   Insurance: Payor: [employer]  / Plan: MEDICARE A AND B / Product Type: Medicare A & B Fee for Service /   Visit: 2  PN due 8/15/23
no

## 2023-07-21 ENCOUNTER — OFFICE VISIT (OUTPATIENT)
Facility: CLINIC | Age: 78
End: 2023-07-21
Payer: MEDICARE

## 2023-07-21 ENCOUNTER — OFFICE VISIT (OUTPATIENT)
Dept: SPEECH THERAPY | Facility: CLINIC | Age: 78
End: 2023-07-21
Payer: MEDICARE

## 2023-07-21 DIAGNOSIS — I63.9 LEFT BASAL GANGLIA EMBOLIC STROKE (HCC): Primary | ICD-10-CM

## 2023-07-21 DIAGNOSIS — R47.01 APHASIA: ICD-10-CM

## 2023-07-21 DIAGNOSIS — R41.841 COGNITIVE COMMUNICATION DEFICIT: ICD-10-CM

## 2023-07-21 PROCEDURE — 97530 THERAPEUTIC ACTIVITIES: CPT

## 2023-07-21 PROCEDURE — 92507 TX SP LANG VOICE COMM INDIV: CPT

## 2023-07-21 NOTE — PROGRESS NOTES
Occupational Therapy Daily Note:    Today's date: 2023  Patient name: Qamar Richmond  : 1945  MRN: [id number]  Referring provider: Cynthia Pinzon MD  Dx:   Encounter Diagnosis   Name Primary? • Left basal ganglia embolic stroke (HCC) Yes     Subjective: "it's hard to move it" (referring to her right arm)       Objective: Pt engaged in skilled OT focusing on NMRE, FMC/FMS, prehension patterns, motor planning, motor speed and overall activity endurance improving fxl abilities for ADL/IADL engagement. CPT Code Minutes                                           Task Details        Therapeutic Activity  Pt engaged in series of pre-handwriting exercises  Tracing straight lines: G/G- accuracy   Tracing curvy lines: Fair accuracy   Tracing looping lines: G- accuracy; G- ability to advance hand across paper in a continuous motion   Tracing script letters: F+ accuracy     Handwriting:   Writing script letters: Fair+ legibility, shape and sizing of letters     G tripod grasp on pen during all pre-handwriting and handwriting drills                Neuro Re-Ed 40                     Therapeutic Exercise               Testing   :  Keyhole Peg Test:   L: 3 min 18 sec  R: 4 min 21 sec       O'Js Finger Dexterity Test:   L: 4 min 9 sec  R: 4 min 12 sec           HEP       Assessment: Tolerated treatment well. Pt with G participation, continues to have difficulty with motor planning fine movements. Pt with G manipulation of writing utensil during pre-handwriting exercises. Patient would benefit from continued skilled OT.     Plan: Continued skilled OT per POC    INTERVENTION COMMENTS:  Diagnosis: Left basal ganglia embolic stroke ([address]) [E06.2]  Precautions: Afib   Insurance: Payor: [employer] / Plan: MEDICARE A AND B / Product Type: Medicare A & B Fee for Service /   Visit: 3  PN due 8/15/23

## 2023-07-21 NOTE — PROGRESS NOTES
Daily Speech Treatment Note    Today's date: 2023  Patient’s name: Nohemi Quevedo  : 1945  MRN: [id number]  Safety measures:   Referring provider: Apolinar Aguilera MD    Encounter Diagnosis     ICD-10-CM    1. Left basal ganglia embolic stroke (HCC)  C84.7       2. Cognitive communication deficit  R41.841       3. Aphasia  R47.01         Visit trackin    Subjective/Behavioral:  -  "Tired. ."    Objective/Assessment:  Completed structured activities with patient to target goals below. Results as stated below. Short-term goals: The Cognitive Linguistic Quick Test (CLQT) is designed to measure an individual’s five cognitive domains (attention, memory, executive functions, language, and visuospatial skills). This norm-referenced tool has been standardized on adults ages 25 through 80years old with neurological impairment as a result of CVA, TBI, or dementia. The following results were obtained during the administration of the assessment.     Cognitive Domain: Score: Range of Severity:   Attention 176/215 [facility]   Memory 146/185  WNL   Executive Functions 23/40 WNL    Language  28/37  WNL   Visuospatial Skills  77/105 WNL            *Composite Severity Ratin/4.0   WNL                   Clock Drawin/13  WNL      Patient scored below Criterion Cut Scores on the following subtests:  Generative Naming        *Patient named 6 concrete category members (animals) in 60 sec (norm=15+). -- TBD          Patient presents with mild dysarthria characterized by Imprecise articulation.        DYSPHAGIA EVALUATION:    -Reason for referral: h/o CVA, and stroke    -Subjective report of swallowing difficulty: Prior change in diet and VFSS; reassessment today      -Difficulty swallowing: No reported difficulty    -Current diet (solids): Regular  -Current diet (liquids):  Thin  -Current pill intake method: with applesauce  -Alternative Feeding Method?: No    -Facial appearance Symmetrical   -Dentition Adequate   -Labial function WFL   -Lingual function WFL   -Velar function Unable to visualize       LIQUID CONSISTENCY TESTIN. Liquid Consistency (Thin) - water  • Administered by: Cup, Straw and Self-fed  • Strategies, attempts, and responses: None  X 1, chin tuck also implemented. + throat clear x  With straw. CLINICAL FINDINGS:  • Oral phase impairments: Decreased oral coordination. + audible swallow. • Pharyngeal Phase Impairments: Swallow initiation Mild delay    *Laryngeal excursion upon palpation: Appeared WFL      SOLID CONSISTENCY TESTING:  • 1. Solid Consistency (Regular, Mixed and Puree) -   • Administered by: Self-fed  • Strategies, attempts, and responses: None    CLINICAL FINDINGS:  • Oral phase impairments: WFL  • Pharyngeal Phase Impairments: mild swallow initiation delay suspected    *Laryngeal excursion upon palpation: Appeared WFL      Impressions/Recommendations    Impressions:   -Patient presents with mild oral/pharyngeal swalow    -Factors affecting performance: None    -Safety concerns: Risk for aspiration - slight    -Risk factors: None    Recommendations:  -Patient would benefit from outpatient skilled Speech Therapy services: No dysphagia services    -  SWALLOWING SAFETY PRECAUTIONS:  -Recommended solids: Regular    -Recommended liquids: Thin    -Recommended medication form: with puree    -Frequent/thorough oral care     -Aspiration precautions   *Monitor for signs/symptoms concerning for aspiration (e.g., low grade fever, increase in WBC, change in chest x-ray, increased congestion, increased coughing with P.O. intake)    -Reflux precautions     -Strategies: Small sips and bites when eating    -Positioning: Upright position during meals    -Compensatory strategies: chin tuck with liquids    -Supervision: Independent     -Referrals: None            Goals     Short-term goals:  1. Complete CLQT - Completed today with word retrieval noted as only main deficit. Additional Goal  2. Complete dysphagia evaluation. - Completed. Patient presenting as functional and to continue regular/thins with aspiration precautions, chin tuck with liquids. 3. Utilize strategies for optimal intelligibility at conversational level, 80% of the time during session in structured trials x 3.     4. Further goals to be determined based on further testing. New Goal: Patient will express understanding of word retrieval strategies. 7/10: Provided word finding strategy list and reviewed and yellow description paper. Patient will complete word retrieval exercises at 80% accuracy. 7/10: 70% accuracy in providing concrete categories given 3 members. Practiced describing words but required mod-max cues, provided handout for home. Able to name 3-5 items per concrete category, increased to 10 with verbal cues. Patient with significant difficulty with description of items and more work can be addressed with this, encouraged coming 2x weekly for most progress. 7/21: Much improved this session, able to to describe items adequately and achieved % accuracy with concrete listing of items and responsive naming tasks. Advance to more abstract and more conversation next sessions, word retrieval binder. Patient will express self at conversational level via open discussion with use of word retrieval strategies.     5. Complete falls and risk assessment. 7/10: Completed     Long-term goals:  Patient will tolerate regular/thins with minimized risk of aspiration.     Patient will express self clearly at conversation level independently during SLP sessions.     Patient will improve cognition to optimal levels and express understanding of recommendations for this. Plan:  -Continue with current plan of care.

## 2023-07-24 ENCOUNTER — APPOINTMENT (OUTPATIENT)
Dept: SPEECH THERAPY | Facility: CLINIC | Age: 78
End: 2023-07-24
Payer: MEDICARE

## 2023-07-28 ENCOUNTER — APPOINTMENT (OUTPATIENT)
Dept: SPEECH THERAPY | Facility: CLINIC | Age: 78
End: 2023-07-28
Payer: MEDICARE

## 2023-07-28 ENCOUNTER — APPOINTMENT (OUTPATIENT)
Facility: CLINIC | Age: 78
End: 2023-07-28
Payer: MEDICARE

## 2023-07-31 ENCOUNTER — OFFICE VISIT (OUTPATIENT)
Dept: SPEECH THERAPY | Facility: CLINIC | Age: 78
End: 2023-07-31
Payer: MEDICARE

## 2023-07-31 ENCOUNTER — OFFICE VISIT (OUTPATIENT)
Facility: CLINIC | Age: 78
End: 2023-07-31
Payer: MEDICARE

## 2023-07-31 DIAGNOSIS — I63.9 LEFT BASAL GANGLIA EMBOLIC STROKE (HCC): Primary | ICD-10-CM

## 2023-07-31 DIAGNOSIS — R41.841 COGNITIVE COMMUNICATION DEFICIT: ICD-10-CM

## 2023-07-31 DIAGNOSIS — R47.01 APHASIA: ICD-10-CM

## 2023-07-31 PROCEDURE — 92507 TX SP LANG VOICE COMM INDIV: CPT

## 2023-07-31 PROCEDURE — 97112 NEUROMUSCULAR REEDUCATION: CPT

## 2023-07-31 NOTE — PROGRESS NOTES
Daily Note     Today's date: 2023  Patient name: Rehana Paz  : 1945  MRN: [id number]  Referring provider: Irby Schwab, MD  Dx:   Encounter Diagnosis     ICD-10-CM    1. Left basal ganglia embolic stroke (HCC)  V16.6                      Subjective: Patient noted no new complaints. Objective: See treatment diary below      Assessment: Tolerated treatment fair. VC for initiation of exercises. VC for increasing step length with backwards ambulation. Patient presented with slight lateral sway with standing FT EC. Patient was able to perform listed exercises. Update patient HEP NV. Patient would benefit from continued PT      Plan: Continue per plan of care.       Precautions:   Re-eval Date: 2023    Date        Visit Count 1       FOTO See IE       Pain In See IE       Pain Out                Testing        TUG 11.59       5xSTS 36.66s 3/5 stands wihtout UE support       Gait speed 0.63       FGA        2/6MWT 576ft no AD       Neuro Re-Ed        Dynamic balance  Backwards, side stepping, 3 laps // bars marching x 2 laps // bars      Static balance  Semi tandem,  FT level EO  FT level EC  30" x 3 ea       Obstacle course                                        Ther Ex        SLR x 4  Standing 10x ea  Flex,abd, ext      HR/TR  20 x ea      Mini Squats  2 x 10 ea      Step ups  10 x ea                                      Ther Activity        ADL                Gait Training        Divided Attention        Head turns  24 ft x 6       Modalities         prn

## 2023-07-31 NOTE — PROGRESS NOTES
Daily Speech Treatment Note    Today's date: 2023  Patient’s name: Annmarie Pineda  : 1945  MRN: [id number]  Safety measures:   Referring provider: Josefina Blevins MD    Encounter Diagnosis     ICD-10-CM    1. Left basal ganglia embolic stroke (HCC)  U56.9       2. Cognitive communication deficit  R41.841       3. Aphasia  R47.01         Visit trackin    Subjective/Behavioral:  - "Pretty good."    Objective/Assessment:  Completed structured activities with patient to target goals below. Results as stated below. Short-term goals: The Cognitive Linguistic Quick Test (CLQT) is designed to measure an individual’s five cognitive domains (attention, memory, executive functions, language, and visuospatial skills). This norm-referenced tool has been standardized on adults ages 25 through 80years old with neurological impairment as a result of CVA, TBI, or dementia. The following results were obtained during the administration of the assessment.     Cognitive Domain: Score: Range of Severity:   Attention 176/215 [facility]   Memory 146/185  WNL   Executive Functions 23/40 WNL    Language  28/37  WNL   Visuospatial Skills  77/105 WNL            *Composite Severity Ratin/4.0   WNL                   Clock Drawin/13  WNL      Patient scored below Criterion Cut Scores on the following subtests:  Generative Naming        *Patient named 6 concrete category members (animals) in 60 sec (norm=15+). -- TBD          Patient presents with mild dysarthria characterized by Imprecise articulation.        DYSPHAGIA EVALUATION:    -Reason for referral: h/o CVA, and stroke    -Subjective report of swallowing difficulty: Prior change in diet and VFSS; reassessment today      -Difficulty swallowing: No reported difficulty    -Current diet (solids): Regular  -Current diet (liquids):  Thin  -Current pill intake method: with applesauce  -Alternative Feeding Method?: No    -Facial appearance Symmetrical   -Dentition Adequate   -Labial function WFL   -Lingual function WFL   -Velar function Unable to visualize       LIQUID CONSISTENCY TESTIN. Liquid Consistency (Thin) - water  • Administered by: Cup, Straw and Self-fed  • Strategies, attempts, and responses: None  X 1, chin tuck also implemented. + throat clear x  With straw. CLINICAL FINDINGS:  • Oral phase impairments: Decreased oral coordination. + audible swallow. • Pharyngeal Phase Impairments: Swallow initiation Mild delay    *Laryngeal excursion upon palpation: Appeared WFL      SOLID CONSISTENCY TESTING:  • 1. Solid Consistency (Regular, Mixed and Puree) -   • Administered by: Self-fed  • Strategies, attempts, and responses: None    CLINICAL FINDINGS:  • Oral phase impairments: WFL  • Pharyngeal Phase Impairments: mild swallow initiation delay suspected    *Laryngeal excursion upon palpation: Appeared WFL      Impressions/Recommendations    Impressions:   -Patient presents with mild oral/pharyngeal swalow    -Factors affecting performance: None    -Safety concerns: Risk for aspiration - slight    -Risk factors: None    Recommendations:  -Patient would benefit from outpatient skilled Speech Therapy services: No dysphagia services    -  SWALLOWING SAFETY PRECAUTIONS:  -Recommended solids: Regular    -Recommended liquids: Thin    -Recommended medication form: with puree    -Frequent/thorough oral care     -Aspiration precautions   *Monitor for signs/symptoms concerning for aspiration (e.g., low grade fever, increase in WBC, change in chest x-ray, increased congestion, increased coughing with P.O. intake)    -Reflux precautions     -Strategies: Small sips and bites when eating    -Positioning: Upright position during meals    -Compensatory strategies: chin tuck with liquids    -Supervision: Independent     -Referrals: None      Higher level word retrieval exercises and reading comprehension next session.      Goals     Short-term goals:  1.  Complete CLQT - Completed today with word retrieval noted as only main deficit. Additional Goal  2. Complete dysphagia evaluation. - Completed. Patient presenting as functional and to continue regular/thins with aspiration precautions, chin tuck with liquids. 3. Utilize strategies for optimal intelligibility at conversational level, 80% of the time during session in structured trials x 3.     4. Further goals to be determined based on further testing. New Goal: Patient will express understanding of word retrieval strategies. 7/10: Provided word finding strategy list and reviewed and yellow description paper. Patient will complete word retrieval exercises at 80% accuracy. 7/10: 70% accuracy in providing concrete categories given 3 members. Practiced describing words but required mod-max cues, provided handout for home. Able to name 3-5 items per concrete category, increased to 10 with verbal cues. Patient with significant difficulty with description of items and more work can be addressed with this, encouraged coming 2x weekly for most progress. 7/21: Much improved this session, able to to describe items adequately and achieved % accuracy with concrete listing of items and responsive naming tasks. Advance to more abstract and more conversation next sessions, word retrieval binder. 7/31:  Naming category given 3 words: 100% concrete, Naming members given concrete category: naming 5-10 items per category, able to sequence steps of making spaghetti clearly, Answering list of activities given category. Provided reading of paragraphs and answering questions for homework. Patient will express self at conversational level via open discussion with use of word retrieval strategies. 7/31:Participating in mostly responding to questions but able to express self and state back answers adequately. Answering conversational questions back to SLP with independence.       5. Complete falls and risk assessment.  7/10: Completed     Long-term goals:  Patient will tolerate regular/thins with minimized risk of aspiration.     Patient will express self clearly at conversation level independently during SLP sessions.     Patient will improve cognition to optimal levels and express understanding of recommendations for this. Plan:  -Continue with current plan of care.

## 2023-09-28 ENCOUNTER — TELEPHONE (OUTPATIENT)
Dept: NEUROLOGY | Facility: CLINIC | Age: 78
End: 2023-09-28

## 2023-09-28 NOTE — TELEPHONE ENCOUNTER
LMOM for patient to call us back to reschedule her appt with Dr. Beatriz Allison on 11/16 because he will not be in the office that day. If the patient calls back we can offer 11/14 as a reschedule day.

## 2024-02-03 ENCOUNTER — HOSPITAL ENCOUNTER (INPATIENT)
Facility: HOSPITAL | Age: 79
LOS: 4 days | Discharge: NURSING, REHAB OR LONG-TERM CARE | DRG: 853 | End: 2024-02-07
Attending: EMERGENCY MEDICINE | Admitting: INTERNAL MEDICINE
Payer: MEDICARE

## 2024-02-03 ENCOUNTER — APPOINTMENT (EMERGENCY)
Dept: RADIOLOGY | Facility: HOSPITAL | Age: 79
DRG: 853 | End: 2024-02-03
Payer: MEDICARE

## 2024-02-03 ENCOUNTER — ANESTHESIA EVENT (INPATIENT)
Dept: PERIOP | Facility: HOSPITAL | Age: 79
DRG: 853 | End: 2024-02-03
Payer: MEDICARE

## 2024-02-03 ENCOUNTER — APPOINTMENT (EMERGENCY)
Dept: CT IMAGING | Facility: HOSPITAL | Age: 79
DRG: 853 | End: 2024-02-03
Payer: MEDICARE

## 2024-02-03 DIAGNOSIS — A41.9 SEVERE SEPSIS (HCC): ICD-10-CM

## 2024-02-03 DIAGNOSIS — Z86.73 HISTORY OF MULTIPLE STROKES: ICD-10-CM

## 2024-02-03 DIAGNOSIS — R41.0 CONFUSION: ICD-10-CM

## 2024-02-03 DIAGNOSIS — I48.91 ATRIAL FIBRILLATION, UNSPECIFIED TYPE (HCC): ICD-10-CM
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
## 2024-02-03 DIAGNOSIS — K05.10 GINGIVITIS: ICD-10-CM

## 2024-02-03 DIAGNOSIS — S72.009A HIP FRACTURE (HCC): ICD-10-CM

## 2024-02-03 DIAGNOSIS — N39.0 UTI (URINARY TRACT INFECTION): ICD-10-CM

## 2024-02-03 DIAGNOSIS — S72.91XA CLOSED FRACTURE OF RIGHT FEMUR, UNSPECIFIED FRACTURE MORPHOLOGY, UNSPECIFIED PORTION OF FEMUR, INITIAL ENCOUNTER (HCC): ICD-10-CM

## 2024-02-03 DIAGNOSIS — R65.20 SEVERE SEPSIS (HCC): ICD-10-CM

## 2024-02-03 PROBLEM — I10 HTN (HYPERTENSION): Status: ACTIVE | Noted: 2024-02-03

## 2024-02-03 LAB
2HR DELTA HS TROPONIN: 0 NG/L
4HR DELTA HS TROPONIN: 0 NG/L
ABO GROUP BLD: NORMAL
ABO GROUP BLD: NORMAL
ALBUMIN SERPL BCP-MCNC: 4.1 G/DL (ref 3.5–5)
ALP SERPL-CCNC: 80 U/L (ref 34–104)
ALT SERPL W P-5'-P-CCNC: 18 U/L (ref 7–52)
ANION GAP SERPL CALCULATED.3IONS-SCNC: 12 MMOL/L
APTT PPP: 29 SECONDS (ref 23–37)
AST SERPL W P-5'-P-CCNC: 18 U/L (ref 13–39)
ATRIAL RATE: 93 BPM
BACTERIA UR QL AUTO: ABNORMAL /HPF
BASOPHILS # BLD AUTO: 0.04 THOUSANDS/ÂΜL (ref 0–0.1)
BASOPHILS NFR BLD AUTO: 0 % (ref 0–1)
BILIRUB SERPL-MCNC: 1.3 MG/DL (ref 0.2–1)
BILIRUB UR QL STRIP: NEGATIVE
BLD GP AB SCN SERPL QL: NEGATIVE
BUN SERPL-MCNC: 23 MG/DL (ref 5–25)
CALCIUM SERPL-MCNC: 9.5 MG/DL (ref 8.4–10.2)
CARDIAC TROPONIN I PNL SERPL HS: 11 NG/L
CHLORIDE SERPL-SCNC: 105 MMOL/L (ref 96–108)
CK SERPL-CCNC: 188 U/L (ref 26–192)
CLARITY UR: ABNORMAL
CO2 SERPL-SCNC: 24 MMOL/L (ref 21–32)
COLOR UR: YELLOW
CREAT SERPL-MCNC: 0.56 MG/DL (ref 0.6–1.3)
EOSINOPHIL # BLD AUTO: 0.01 THOUSAND/ÂΜL (ref 0–0.61)
EOSINOPHIL NFR BLD AUTO: 0 % (ref 0–6)
ERYTHROCYTE [DISTWIDTH] IN BLOOD BY AUTOMATED COUNT: 13.2 % (ref 11.6–15.1)
FLUAV RNA RESP QL NAA+PROBE: NEGATIVE
FLUBV RNA RESP QL NAA+PROBE: NEGATIVE
GFR SERPL CREATININE-BSD FRML MDRD: 90 ML/MIN/1.73SQ M
GLUCOSE SERPL-MCNC: 122 MG/DL (ref 65–140)
GLUCOSE UR STRIP-MCNC: NEGATIVE MG/DL
HCT VFR BLD AUTO: 42.4 % (ref 34.8–46.1)
HGB BLD-MCNC: 14.2 G/DL (ref 11.5–15.4)
HGB UR QL STRIP.AUTO: ABNORMAL
IMM GRANULOCYTES # BLD AUTO: 0.11 THOUSAND/UL (ref 0–0.2)
IMM GRANULOCYTES NFR BLD AUTO: 1 % (ref 0–2)
INR PPP: 1.14 (ref 0.84–1.19)
KETONES UR STRIP-MCNC: ABNORMAL MG/DL
LACTATE SERPL-SCNC: 1.5 MMOL/L (ref 0.5–2)
LACTATE SERPL-SCNC: 2.1 MMOL/L (ref 0.5–2)
LACTATE SERPL-SCNC: 2.5 MMOL/L (ref 0.5–2)
LACTATE SERPL-SCNC: 2.7 MMOL/L (ref 0.5–2)
LACTATE SERPL-SCNC: 2.9 MMOL/L (ref 0.5–2)
LEUKOCYTE ESTERASE UR QL STRIP: ABNORMAL
LYMPHOCYTES # BLD AUTO: 1.19 THOUSANDS/ÂΜL (ref 0.6–4.47)
LYMPHOCYTES NFR BLD AUTO: 6 % (ref 14–44)
MCH RBC QN AUTO: 30.8 PG (ref 26.8–34.3)
MCHC RBC AUTO-ENTMCNC: 33.5 G/DL (ref 31.4–37.4)
MCV RBC AUTO: 92 FL (ref 82–98)
MONOCYTES # BLD AUTO: 0.67 THOUSAND/ÂΜL (ref 0.17–1.22)
MONOCYTES NFR BLD AUTO: 4 % (ref 4–12)
NEUTROPHILS # BLD AUTO: 16.77 THOUSANDS/ÂΜL (ref 1.85–7.62)
NEUTS SEG NFR BLD AUTO: 89 % (ref 43–75)
NITRITE UR QL STRIP: NEGATIVE
NON-SQ EPI CELLS URNS QL MICRO: ABNORMAL /HPF
NRBC BLD AUTO-RTO: 0 /100 WBCS
PH UR STRIP.AUTO: 5.5 [PH]
PLATELET # BLD AUTO: 449 THOUSANDS/UL (ref 149–390)
PMV BLD AUTO: 9.4 FL (ref 8.9–12.7)
POTASSIUM SERPL-SCNC: 4.2 MMOL/L (ref 3.5–5.3)
PROCALCITONIN SERPL-MCNC: 0.12 NG/ML
PROT SERPL-MCNC: 7.2 G/DL (ref 6.4–8.4)
PROT UR STRIP-MCNC: ABNORMAL MG/DL
PROTHROMBIN TIME: 15.1 SECONDS (ref 11.6–14.5)
QRS AXIS: 54 DEGREES
QRSD INTERVAL: 64 MS
QT INTERVAL: 344 MS
QTC INTERVAL: 467 MS
RBC # BLD AUTO: 4.61 MILLION/UL (ref 3.81–5.12)
RBC #/AREA URNS AUTO: ABNORMAL /HPF
RH BLD: NEGATIVE
RH BLD: NEGATIVE
RSV RNA RESP QL NAA+PROBE: NEGATIVE
SARS-COV-2 RNA RESP QL NAA+PROBE: NEGATIVE
SODIUM SERPL-SCNC: 141 MMOL/L (ref 135–147)
SP GR UR STRIP.AUTO: >=1.03 (ref 1–1.03)
SPECIMEN EXPIRATION DATE: NORMAL
T WAVE AXIS: 46 DEGREES
UROBILINOGEN UR STRIP-ACNC: <2 MG/DL
VENTRICULAR RATE: 111 BPM
WBC # BLD AUTO: 18.79 THOUSAND/UL (ref 4.31–10.16)
WBC #/AREA URNS AUTO: ABNORMAL /HPF

## 2024-02-03 PROCEDURE — 99285 EMERGENCY DEPT VISIT HI MDM: CPT | Performed by: EMERGENCY MEDICINE

## 2024-02-03 PROCEDURE — 93005 ELECTROCARDIOGRAM TRACING: CPT

## 2024-02-03 PROCEDURE — 96365 THER/PROPH/DIAG IV INF INIT: CPT

## 2024-02-03 PROCEDURE — 86901 BLOOD TYPING SEROLOGIC RH(D): CPT | Performed by: EMERGENCY MEDICINE

## 2024-02-03 PROCEDURE — 94664 DEMO&/EVAL PT USE INHALER: CPT

## 2024-02-03 PROCEDURE — 87040 BLOOD CULTURE FOR BACTERIA: CPT | Performed by: EMERGENCY MEDICINE

## 2024-02-03 PROCEDURE — 99223 1ST HOSP IP/OBS HIGH 75: CPT | Performed by: INTERNAL MEDICINE

## 2024-02-03 PROCEDURE — 86850 RBC ANTIBODY SCREEN: CPT | Performed by: EMERGENCY MEDICINE

## 2024-02-03 PROCEDURE — 87077 CULTURE AEROBIC IDENTIFY: CPT | Performed by: EMERGENCY MEDICINE

## 2024-02-03 PROCEDURE — 0241U HB NFCT DS VIR RESP RNA 4 TRGT: CPT | Performed by: EMERGENCY MEDICINE

## 2024-02-03 PROCEDURE — 73502 X-RAY EXAM HIP UNI 2-3 VIEWS: CPT

## 2024-02-03 PROCEDURE — 84484 ASSAY OF TROPONIN QUANT: CPT | Performed by: EMERGENCY MEDICINE

## 2024-02-03 PROCEDURE — 83605 ASSAY OF LACTIC ACID: CPT | Performed by: EMERGENCY MEDICINE

## 2024-02-03 PROCEDURE — 81001 URINALYSIS AUTO W/SCOPE: CPT | Performed by: EMERGENCY MEDICINE

## 2024-02-03 PROCEDURE — 80053 COMPREHEN METABOLIC PANEL: CPT | Performed by: EMERGENCY MEDICINE

## 2024-02-03 PROCEDURE — 70450 CT HEAD/BRAIN W/O DYE: CPT

## 2024-02-03 PROCEDURE — 86900 BLOOD TYPING SEROLOGIC ABO: CPT | Performed by: EMERGENCY MEDICINE

## 2024-02-03 PROCEDURE — 87086 URINE CULTURE/COLONY COUNT: CPT | Performed by: EMERGENCY MEDICINE

## 2024-02-03 PROCEDURE — 83605 ASSAY OF LACTIC ACID: CPT | Performed by: INTERNAL MEDICINE

## 2024-02-03 PROCEDURE — 85025 COMPLETE CBC W/AUTO DIFF WBC: CPT | Performed by: EMERGENCY MEDICINE

## 2024-02-03 PROCEDURE — 71260 CT THORAX DX C+: CPT

## 2024-02-03 PROCEDURE — 84145 PROCALCITONIN (PCT): CPT | Performed by: EMERGENCY MEDICINE

## 2024-02-03 PROCEDURE — 87186 SC STD MICRODIL/AGAR DIL: CPT | Performed by: EMERGENCY MEDICINE

## 2024-02-03 PROCEDURE — 85610 PROTHROMBIN TIME: CPT | Performed by: EMERGENCY MEDICINE

## 2024-02-03 PROCEDURE — 85730 THROMBOPLASTIN TIME PARTIAL: CPT | Performed by: EMERGENCY MEDICINE

## 2024-02-03 PROCEDURE — 83605 ASSAY OF LACTIC ACID: CPT | Performed by: PHYSICIAN ASSISTANT

## 2024-02-03 PROCEDURE — 36415 COLL VENOUS BLD VENIPUNCTURE: CPT | Performed by: EMERGENCY MEDICINE

## 2024-02-03 PROCEDURE — 74177 CT ABD & PELVIS W/CONTRAST: CPT

## 2024-02-03 PROCEDURE — 82550 ASSAY OF CK (CPK): CPT | Performed by: EMERGENCY MEDICINE

## 2024-02-03 PROCEDURE — G1004 CDSM NDSC: HCPCS

## 2024-02-03 PROCEDURE — 99285 EMERGENCY DEPT VISIT HI MDM: CPT

## 2024-02-03 RX ORDER — CEFTRIAXONE 1 G/50ML
1000 INJECTION, SOLUTION INTRAVENOUS EVERY 24 HOURS
Status: DISCONTINUED | OUTPATIENT
Start: 2024-02-04 | End: 2024-02-05

## 2024-02-03 RX ORDER — ACETAMINOPHEN 325 MG/1
650 TABLET ORAL EVERY 6 HOURS PRN
Status: DISCONTINUED | OUTPATIENT
Start: 2024-02-03 | End: 2024-02-07 | Stop reason: HOSPADM

## 2024-02-03 RX ORDER — OXYCODONE HYDROCHLORIDE 5 MG/1
5 TABLET ORAL EVERY 6 HOURS PRN
Status: DISCONTINUED | OUTPATIENT
Start: 2024-02-03 | End: 2024-02-04

## 2024-02-03 RX ORDER — CEFTRIAXONE 1 G/50ML
1000 INJECTION, SOLUTION INTRAVENOUS ONCE
Status: COMPLETED | OUTPATIENT
Start: 2024-02-03 | End: 2024-02-03

## 2024-02-03 RX ORDER — METOPROLOL TARTRATE 1 MG/ML
5 INJECTION, SOLUTION INTRAVENOUS EVERY 6 HOURS PRN
Status: DISCONTINUED | OUTPATIENT
Start: 2024-02-03 | End: 2024-02-07 | Stop reason: HOSPADM

## 2024-02-03 RX ORDER — CEFAZOLIN SODIUM 2 G/50ML
2000 SOLUTION INTRAVENOUS
Status: DISPENSED | OUTPATIENT
Start: 2024-02-04 | End: 2024-02-05

## 2024-02-03 RX ORDER — SODIUM CHLORIDE 9 MG/ML
125 INJECTION, SOLUTION INTRAVENOUS CONTINUOUS
Status: DISCONTINUED | OUTPATIENT
Start: 2024-02-03 | End: 2024-02-04

## 2024-02-03 RX ORDER — CARVEDILOL 12.5 MG/1
12.5 TABLET ORAL 2 TIMES DAILY WITH MEALS
Status: DISCONTINUED | OUTPATIENT
Start: 2024-02-03 | End: 2024-02-07 | Stop reason: HOSPADM

## 2024-02-03 RX ORDER — DOCUSATE SODIUM 100 MG/1
100 CAPSULE, LIQUID FILLED ORAL 2 TIMES DAILY
Status: DISCONTINUED | OUTPATIENT
Start: 2024-02-03 | End: 2024-02-07 | Stop reason: HOSPADM

## 2024-02-03 RX ORDER — ACETAMINOPHEN 325 MG/1
975 TABLET ORAL ONCE
Status: COMPLETED | OUTPATIENT
Start: 2024-02-03 | End: 2024-02-03

## 2024-02-03 RX ORDER — ATORVASTATIN CALCIUM 40 MG/1
40 TABLET, FILM COATED ORAL
Status: DISCONTINUED | OUTPATIENT
Start: 2024-02-03 | End: 2024-02-07 | Stop reason: HOSPADM

## 2024-02-03 RX ORDER — HEPARIN SODIUM 5000 [USP'U]/ML
5000 INJECTION, SOLUTION INTRAVENOUS; SUBCUTANEOUS EVERY 8 HOURS SCHEDULED
Status: DISCONTINUED | OUTPATIENT
Start: 2024-02-03 | End: 2024-02-03

## 2024-02-03 RX ORDER — TRANEXAMIC ACID 10 MG/ML
1000 INJECTION, SOLUTION INTRAVENOUS ONCE
Status: COMPLETED | OUTPATIENT
Start: 2024-02-04 | End: 2024-02-04

## 2024-02-03 RX ORDER — AMLODIPINE BESYLATE 5 MG/1
10 TABLET ORAL DAILY
Status: DISCONTINUED | OUTPATIENT
Start: 2024-02-04 | End: 2024-02-06

## 2024-02-03 RX ORDER — HEPARIN SODIUM 5000 [USP'U]/ML
5000 INJECTION, SOLUTION INTRAVENOUS; SUBCUTANEOUS EVERY 8 HOURS SCHEDULED
Status: COMPLETED | OUTPATIENT
Start: 2024-02-03 | End: 2024-02-03

## 2024-02-03 RX ADMIN — HEPARIN SODIUM 5000 UNITS: 5000 INJECTION INTRAVENOUS; SUBCUTANEOUS at 14:51

## 2024-02-03 RX ADMIN — SODIUM CHLORIDE 125 ML/HR: 0.9 INJECTION, SOLUTION INTRAVENOUS at 20:42

## 2024-02-03 RX ADMIN — CARVEDILOL 12.5 MG: 12.5 TABLET, FILM COATED ORAL at 14:50

## 2024-02-03 RX ADMIN — CEFTRIAXONE 1000 MG: 1 INJECTION, SOLUTION INTRAVENOUS at 12:10

## 2024-02-03 RX ADMIN — SODIUM CHLORIDE 500 ML: 0.9 INJECTION, SOLUTION INTRAVENOUS at 19:44

## 2024-02-03 RX ADMIN — ACETAMINOPHEN 975 MG: 325 TABLET ORAL at 12:11

## 2024-02-03 RX ADMIN — HEPARIN SODIUM 5000 UNITS: 5000 INJECTION INTRAVENOUS; SUBCUTANEOUS at 21:40

## 2024-02-03 RX ADMIN — IOHEXOL 100 ML: 350 INJECTION, SOLUTION INTRAVENOUS at 11:35

## 2024-02-03 RX ADMIN — OXYCODONE HYDROCHLORIDE 5 MG: 5 TABLET ORAL at 18:30

## 2024-02-03 RX ADMIN — TRANEXAMIC ACID 530 MG: 100 INJECTION, SOLUTION INTRAVENOUS at 13:37

## 2024-02-03 RX ADMIN — SODIUM CHLORIDE 1000 ML: 0.9 INJECTION, SOLUTION INTRAVENOUS at 11:44

## 2024-02-03 RX ADMIN — SODIUM CHLORIDE 125 ML/HR: 0.9 INJECTION, SOLUTION INTRAVENOUS at 14:48

## 2024-02-03 RX ADMIN — ATORVASTATIN CALCIUM 40 MG: 40 TABLET, FILM COATED ORAL at 17:00

## 2024-02-03 RX ADMIN — DOCUSATE SODIUM 100 MG: 100 CAPSULE, LIQUID FILLED ORAL at 17:01

## 2024-02-03 NOTE — ED PROVIDER NOTES
Emergency Department Trauma Note  Lo Talley 78 y.o. female MRN: [id number]  Unit/Bed#: /-01 Encounter: [id number]      Trauma Alert: Trauma Acuity: Trauma Evaluation  Model of Arrival:   via    Trauma Team: Current Providers  Attending Provider: Marco Arredondo DO  Attending Provider: Sherine Doan MD  Attending Provider: Anshul Antoine MD  Registered Nurse: Rico Shah RN  Registered Nurse: Camille Montez RN  Patient Care Assistant: Dia Hart  Patient Care Assistant: Blaire Alvarez  Patient Care Assistant: Amina Velasquez  Surgeon: Kevon Goldman DO  Registered Nurse: Suyapa Schneider RN  Patient Care Assistant: Keesha Jalloh  Patient Care Assistant: Sheryl Castellon  Patient Care Assistant: Sheryl Castellon  Patient Care Assistant: Dia Hart  Registered Nurse: Camille Montez RN  Consultants:     None      History of Present Illness     Chief Complaint:   Chief Complaint   Patient presents with    Weakness - Generalized     Patient presents to the ER via EMS with a strong odor of urine and a report of the patient having had a fall Thursday night in her home.       HPI:  Lo Talley is a 78 y.o. female who presents with weakness, fall.  Mechanism:Details of Incident: Pt reports falling two days ago. Unwitnessed fall possible head strike Injury Date: 02/01/24 Injury Time: 1115 Injury Occurence Location - Specify County: [place]    Hx from patient, medical records, paramedics - [facility] HTN, takes Eliquis, biba from home with  for fall and general weakness.  Patient states she fell couple days ago putting her pants on and lost balance - she hit head, no loc/ headache but paramedics state baseline confusion.  Patient states she has been weak sitting in couch last couple days.  GCS 14.  Breath sounds equal. No crepitus or chest wall tenderness with compression over the chest. No pain or instability with compression over the pelvis. No bedside imaging required. Patient is stable  to proceed to CT scan.      HTN  Review of Systems   Constitutional:  Positive for fatigue. Negative for chills and fever.   HENT:  Negative for ear pain and sore throat.    Eyes:  Negative for pain and visual disturbance.   Respiratory:  Negative for cough and shortness of breath.    Cardiovascular:  Negative for chest pain and palpitations.   Gastrointestinal:  Negative for abdominal pain and vomiting.   Genitourinary:  Negative for dysuria and hematuria.   Musculoskeletal:  Negative for arthralgias and back pain.   Skin:  Negative for color change and rash.   Neurological:  Positive for weakness. Negative for seizures and syncope.   All other systems reviewed and are negative.      Historical Information     Immunizations:   Immunization History   Administered Date(s) Administered    COVID-19 PFIZER VACCINE 0.3 ML IM 04/17/2021, 05/08/2021       Past Medical History:   Diagnosis Date    History of stroke 6/14/2023     History reviewed. No pertinent family history.  History reviewed. No pertinent surgical history.  Social History     Tobacco Use    Smoking status: Never    Smokeless tobacco: Never   Vaping Use    Vaping status: Never Used   Substance Use Topics    Alcohol use: Not Currently     Alcohol/week: 1.0 standard drink of alcohol     Types: 1 Glasses of wine per week     Comment: stopped since hospitalization    Drug use: Not Currently     E-Cigarette/Vaping    E-Cigarette Use Never User      E-Cigarette/Vaping Substances    Nicotine No     THC No     CBD No     Flavoring No     Other No     Unknown No        Family History: non-contributory    Meds/Allergies   Prior to Admission Medications   Prescriptions Last Dose Informant Patient Reported? Taking?   amLODIPine (NORVASC) 10 mg tablet  Self No Yes   Sig: Take 1 tablet (10 mg total) by mouth daily Do not start before June 20, 2023.   atorvastatin (LIPITOR) 40 mg tablet  Self No Yes   Sig: Take 1 tablet (40 mg total) by mouth daily with dinner    carvedilol (COREG) 12.5 mg tablet   No Yes   Sig: Take 1 tablet (12.5 mg total) by mouth 2 (two) times a day with meals   lisinopril (ZESTRIL) 40 mg tablet  Self No Yes   Sig: Take 1 tablet (40 mg total) by mouth daily Do not start before June 20, 2023.      Facility-Administered Medications: None       No Known Allergies    PHYSICAL EXAM    PE limited by: nothing    Objective   Vitals:   First set: Temperature: 99.1 °F (37.3 °C) (02/03/24 1104)  Pulse: (!) 108 (02/03/24 1104)  Respirations: 18 (02/03/24 1104)  Blood Pressure: 138/74 (02/03/24 1104)  SpO2: 98 % (02/03/24 1104)    Primary Survey:   (A) Airway: patent  (B) Breathing: clear  (C) Circulation: Pulses:   normal  (D) Disabliity:  GCS Total:  14  (E) Expose:  Completed    Secondary Survey: (Click on Physical Exam tab above)  Physical Exam  Vitals and nursing note reviewed.   Constitutional:       General: She is in acute distress.      Appearance: She is well-developed. She is ill-appearing.   HENT:      Head: Normocephalic and atraumatic.      Mouth/Throat:      Mouth: Mucous membranes are dry. Oral lesions present.      Dentition: Gingival swelling present.   Eyes:      Extraocular Movements: Extraocular movements intact.      Conjunctiva/sclera: Conjunctivae normal.      Pupils: Pupils are equal, round, and reactive to light.   Cardiovascular:      Rate and Rhythm: Regular rhythm. Tachycardia present.      Heart sounds: No murmur heard.  Pulmonary:      Effort: Pulmonary effort is normal. No respiratory distress.      Breath sounds: Normal breath sounds.   Abdominal:      Palpations: Abdomen is soft.      Tenderness: There is no abdominal tenderness.   Musculoskeletal:         General: No swelling.      Cervical back: Neck supple. No tenderness or bony tenderness.      Thoracic back: Bony tenderness present.      Lumbar back: No tenderness or bony tenderness.        Back:    Skin:     General: Skin is warm and dry.      Capillary Refill: Capillary  refill takes less than 2 seconds.      Comments: Poor skin turgor   Neurological:      General: No focal deficit present.      Mental Status: She is alert. She is disoriented.      Comments: General weakness   Psychiatric:         Mood and Affect: Mood normal.         Cervical spine cleared by clinical criteria? Yes     Invasive Devices       Peripheral Intravenous Line  Duration             Peripheral IV 02/03/24 Right Antecubital <1 day    Peripheral IV 02/04/24 Dorsal (posterior);Left Forearm <1 day                    Lab Results:   Results Reviewed       Procedure Component Value Units Date/Time    Blood culture #1 [[id number]] Collected: 02/03/24 1124    Lab Status: Preliminary result Specimen: Blood from Arm, Right Updated: 02/03/24 1701     Blood Culture Received in Microbiology Lab. Culture in Progress.    Blood culture #2 [[id number]] Collected: 02/03/24 1125    Lab Status: Preliminary result Specimen: Blood from Arm, Left Updated: 02/03/24 1701     Blood Culture Received in Microbiology Lab. Culture in Progress.    Lactic acid 2 Hours [[id number]]  (Abnormal) Collected: 02/03/24 1404    Lab Status: Final result Specimen: Blood from Arm, Right Updated: 02/03/24 1443     LACTIC ACID 2.7 mmol/L     Narrative:      Result may be elevated if tourniquet was used during collection.    HS Troponin I 2hr [[id number]]  (Normal) Collected: 02/03/24 1404    Lab Status: Final result Specimen: Blood from Arm, Right Updated: 02/03/24 1440     hs TnI 2hr 11 ng/L      Delta 2hr hsTnI 0 ng/L     HS Troponin I 4hr [[id number]]  (Normal) Collected: 02/03/24 1404    Lab Status: Final result Specimen: Blood from Arm, Right Updated: 02/03/24 1440     hs TnI 4hr 11 ng/L      Delta 4hr hsTnI 0 ng/L     FLU/RSV/COVID - if FLU/RSV clinically relevant [[id number]]  (Normal) Collected: 02/03/24 1124    Lab Status: Final result Specimen: Nares from Nose Updated: 02/03/24 1215     SARS-CoV-2 Negative     INFLUENZA A PCR Negative     INFLUENZA  B PCR Negative     RSV PCR Negative    Narrative:      FOR PEDIATRIC PATIENTS - copy/paste COVID Guidelines URL to browser: [email or web address]    SARS-CoV-2 assay is a Nucleic Acid Amplification assay intended for the  qualitative detection of nucleic acid from SARS-CoV-2 in nasopharyngeal  swabs. Results are for the presumptive identification of SARS-CoV-2 RNA.    Positive results are indicative of infection with SARS-CoV-2, the virus  causing COVID-19, but do not rule out bacterial infection or co-infection  with other viruses. Laboratories within the [place] and its  territories are required to report all positive results to the appropriate  public health authorities. Negative results do not preclude SARS-CoV-2  infection and should not be used as the sole basis for treatment or other  patient management decisions. Negative results must be combined with  clinical observations, patient history, and epidemiological information.  This test has not been FDA cleared or approved.    This test has been authorized by FDA under an Emergency Use Authorization  (EUA). This test is only authorized for the duration of time the  declaration that circumstances exist justifying the authorization of the  emergency use of an in vitro diagnostic tests for detection of SARS-CoV-2  virus and/or diagnosis of COVID-19 infection under section 564(b)(1) of  the Act, 21 U.S.C. 360bbb-3(b)(1), unless the authorization is terminated  or revoked sooner. The test has been validated but independent review by FDA  and CLIA is pending.    Test performed using [employer] GeneXpert: This RT-PCR assay targets N2,  a region unique to SARS-CoV-2. A conserved region in the E-gene was chosen  for pan-Sarbecovirus detection which includes SARS-CoV-2.    According to CMS-2020-01-R, this platform meets the definition of high-throughput technology.    Procalcitonin [[id number]]  (Normal) Collected:  02/03/24 1124    Lab Status: Final result Specimen: Blood from Arm, Right Updated: 02/03/24 1203     Procalcitonin 0.12 ng/ml     HS Troponin 0hr (reflex protocol) [[id number]]  (Normal) Collected: 02/03/24 1124    Lab Status: Final result Specimen: Blood from Arm, Right Updated: 02/03/24 1200     hs TnI 0hr 11 ng/L     Comprehensive metabolic panel [[id number]]  (Abnormal) Collected: 02/03/24 1124    Lab Status: Final result Specimen: Blood from Arm, Right Updated: 02/03/24 1156     Sodium 141 mmol/L      Potassium 4.2 mmol/L      Chloride 105 mmol/L      CO2 24 mmol/L      ANION GAP 12 mmol/L      BUN 23 mg/dL      Creatinine 0.56 mg/dL      Glucose 122 mg/dL      Calcium 9.5 mg/dL      AST 18 U/L      ALT 18 U/L      Alkaline Phosphatase 80 U/L      Total Protein 7.2 g/dL      Albumin 4.1 g/dL      Total Bilirubin 1.30 mg/dL      eGFR 90 ml/min/1.73sq m     Narrative:      National Kidney Disease Foundation guidelines for Chronic Kidney Disease (CKD):     Stage 1 with normal or high GFR (GFR > 90 mL/min/1.73 square meters)    Stage 2 Mild CKD (GFR = 60-89 mL/min/1.73 square meters)    Stage 3A Moderate CKD (GFR = 45-59 mL/min/1.73 square meters)    Stage 3B Moderate CKD (GFR = 30-44 mL/min/1.73 square meters)    Stage 4 Severe CKD (GFR = 15-29 mL/min/1.73 square meters)    Stage 5 End Stage CKD (GFR <15 mL/min/1.73 square meters)  Note: GFR calculation is accurate only with a steady state creatinine    CK [[id number]]  (Normal) Collected: 02/03/24 1124    Lab Status: Final result Specimen: Blood from Arm, Right Updated: 02/03/24 1156     Total  U/L     Protime-INR [[id number]]  (Abnormal) Collected: 02/03/24 1124    Lab Status: Final result Specimen: Blood from Arm, Right Updated: 02/03/24 1155     Protime 15.1 seconds      INR 1.14    APTT [[id number]]  (Normal) Collected: 02/03/24 1124    Lab Status: Final result Specimen: Blood from Arm, Right Updated: 02/03/24 1155     PTT 29 seconds     Lactic acid, plasma  (w/reflex if result > 2.0) [[id number]]  (Abnormal) Collected: 02/03/24 1124    Lab Status: Final result Specimen: Blood from Arm, Right Updated: 02/03/24 1155     LACTIC ACID 2.9 mmol/L     Narrative:      Result may be elevated if tourniquet was used during collection.    Urine Microscopic [[id number]]  (Abnormal) Collected: 02/03/24 1124    Lab Status: Final result Specimen: Urine, Straight Cath Updated: 02/03/24 1142     RBC, UA 10-20 /hpf      WBC, UA 30-50 /hpf      Epithelial Cells None Seen /hpf      Bacteria, UA Innumerable /hpf     Urine culture [[id number]] Collected: 02/03/24 1124    Lab Status: In process Specimen: Urine, Straight Cath Updated: 02/03/24 1142    UA w Reflex to Microscopic w Reflex to Culture [[id number]]  (Abnormal) Collected: 02/03/24 1124    Lab Status: Final result Specimen: Urine, Straight Cath Updated: 02/03/24 1142     Color, UA Yellow     Clarity, UA Cloudy     Specific Gravity, UA >=1.030     pH, UA 5.5     Leukocytes, UA Large     Nitrite, UA Negative     Protein,  (2+) mg/dl      Glucose, UA Negative mg/dl      Ketones, UA Trace mg/dl      Urobilinogen, UA <2.0 mg/dl      Bilirubin, UA Negative     Occult Blood, UA Moderate    CBC and differential [[id number]]  (Abnormal) Collected: 02/03/24 1124    Lab Status: Final result Specimen: Blood from Arm, Right Updated: 02/03/24 1137     WBC 18.79 Thousand/uL      RBC 4.61 Million/uL      Hemoglobin 14.2 g/dL      Hematocrit 42.4 %      MCV 92 fL      MCH 30.8 pg      MCHC 33.5 g/dL      RDW 13.2 %      MPV 9.4 fL      Platelets 449 Thousands/uL      nRBC 0 /100 WBCs      Neutrophils Relative 89 %      Immat GRANS % 1 %      Lymphocytes Relative 6 %      Monocytes Relative 4 %      Eosinophils Relative 0 %      Basophils Relative 0 %      Neutrophils Absolute 16.77 Thousands/µL      Immature Grans Absolute 0.11 Thousand/uL      Lymphocytes Absolute 1.19 Thousands/µL      Monocytes Absolute 0.67 Thousand/µL      Eosinophils Absolute  0.01 Thousand/µL      Basophils Absolute 0.04 Thousands/µL                    Imaging Studies:   Direct to CT: Yes  TRAUMA - CT head wo contrast   Final Result by Jazz Deluna MD (02/03 1232)      No acute intracranial abnormality.      Stable chronic microangiopathic changes within the brain. Stable encephalomalacia of the inferior right frontal lobe.      I personally discussed this study with YVROSE ARREDONDO on 2/3/2024 12:31 PM.         Workstation performed: [id number]         CT chest abdomen pelvis w contrast   Final Result by Jazz Deluna MD (02/03 1232)      Impacted subcapital fracture of the right femur.      Mild circumferential bladder wall thickening with mild perivesical inflammatory fat stranding, which may relate to cystitis. Recommend correlation with urinalysis.      I personally discussed this study with YVROSE ARREDONDO on 2/3/2024 12:31 PM.      Workstation performed: [id number]         XR hip/pelv 2-3 vws right if performed    (Results Pending)         Procedures  ECG 12 Lead Documentation Only    Date/Time: 2/3/2024 11:53 AM    Performed by: Yvrose Arredondo DO  Authorized by: Yvrose Arredondo DO    Indications / Diagnosis:  Weakness  ECG reviewed by me, the ED Provider: yes    Patient location:  ED  Previous ECG:     Previous ECG:  Compared to current    Comparison ECG info:  6-23    Similarity:  Changes noted  Interpretation:     Interpretation: non-specific    Rate:     ECG rate:  111    ECG rate assessment: tachycardic    Rhythm:     Rhythm: atrial fibrillation    Ectopy:     Ectopy: none    QRS:     QRS axis:  Normal    QRS intervals:  Normal  Conduction:     Conduction: normal    ST segments:     ST segments:  Normal  T waves:     T waves: normal    Comments:      This EKG was interpreted by me.           ED Course  ED Course as of 02/04/24 0709   Sat Feb 03, 2024   1134  states confusion gradual worsening several months since stroke Jun 2023.  She doesn't want to see a doctor. He  did not see her fall the other day was Thursday.           Medical Decision Making  Diff includes UTI with severe sepsis.  Dehydration, gingivitis.  After imaging returned, I re-evaluated patient - she doesn't have tenderness but there is pain with ROM of right hip.  Other diff is encephalopathy but  states the confusion has been for several months - possibly related to old stroke or dementia    Amount and/or Complexity of Data Reviewed  Labs: ordered.  Radiology: ordered.    Risk  OTC drugs.  Prescription drug management.  Decision regarding hospitalization.                Disposition  [facility] Transfer: No  Final diagnoses:   Gingivitis   UTI (urinary tract infection)   Fall, initial encounter   Severe sepsis (HCC)   Confusion   Hip fracture (HCC) - closed subcap right hip     Time reflects when diagnosis was documented in both MDM as applicable and the Disposition within this note       Time User Action Codes Description Comment    2/3/2024 11:40 AM Marco Arredondo [K05.10] Gingivitis     2/3/2024 11:59 AM Marco Arredondo [N39.0] UTI (urinary tract infection)     2/3/2024 11:59 AM Marco Arredondo [W19.XXXA] Fall, initial encounter     2/3/2024 11:59 AM Marco Arredondo [A41.9,  R65.20] Severe sepsis (HCC)     2/3/2024 11:59 AM Marco Arredondo [R41.0] Confusion     2/3/2024 12:48 PM Marco Arredondo [K05.10] Gingivitis     2/3/2024 12:48 PM Marco Arredondo [W19.XXXA] Fall, initial encounter     2/3/2024 12:48 PM Marco Arredondo [S72.009A] Hip fracture (HCC)     2/3/2024 12:48 PM Marco Arredondo [S72.009A] Hip fracture (HCC) closed subcap right hip    2/3/2024  2:28 PM Anshul Antoine [I48.91] Atrial fibrillation, unspecified type (HCC)     2/3/2024  2:28 PM Anshul Antoine Add [Z86.73] History of multiple strokes           ED Disposition       ED Disposition   Admit    Condition   Stable    Date/Time   Sat Feb 3, 2024 12:49 PM    Comment   Case was discussed with JAMIE Doan and fatoumata HAIR  Jones and the patient's admission status was agreed to be Admission Status: inpatient status to the service of Dr. JAMIE Doan .               Follow-up Information    None       Current Discharge Medication List        CONTINUE these medications which have NOT CHANGED    Details   amLODIPine (NORVASC) 10 mg tablet Take 1 tablet (10 mg total) by mouth daily Do not start before June 20, 2023.  Qty: 30 tablet, Refills: 0    Associated Diagnoses: Stroke-like symptoms; Left basal ganglia embolic stroke (HCC)      atorvastatin (LIPITOR) 40 mg tablet Take 1 tablet (40 mg total) by mouth daily with dinner  Qty: 30 tablet, Refills: 0    Associated Diagnoses: A-fib (HCC)      carvedilol (COREG) 12.5 mg tablet Take 1 tablet (12.5 mg total) by mouth 2 (two) times a day with meals  Qty: 180 tablet, Refills: 3    Associated Diagnoses: A-fib (HCC)      lisinopril (ZESTRIL) 40 mg tablet Take 1 tablet (40 mg total) by mouth daily Do not start before June 20, 2023.  Qty: 30 tablet, Refills: 0    Associated Diagnoses: Stroke-like symptoms; Left basal ganglia embolic stroke (HCC)           No discharge procedures on file.    PDMP Review         Value Time User    PDMP Reviewed  Yes 2/3/2024  2:22 PM Anshul Antoine MD            ED Provider  Electronically Signed by           Marco Arredondo DO  02/04/24 0709

## 2024-02-03 NOTE — H&P
"[facility]  H&P  Name: Lo Talley 78 y.o. female I MRN: [id number]  Unit/Bed#: -01 I Date of Admission: 2/3/2024   Date of Service: 2/3/2024 I Hospital Day: 0      Assessment/Plan   * Severe sepsis (HCC)  Assessment & Plan  Patient admitted with severe sepsis as evidenced by tachycardia, leukocytosis of 18.7 K likely due to to UTI.  CT chest abdomen pelvis showed-\"Impacted subcapital fracture of the right femur.  Mild circumferential bladder wall thickening with mild perivesical inflammatory fat stranding, which may relate to cystitis. Recommend correlation with urinalysis.\"  Lactate is 2.9  Procalcitonin 0.12  Follow-up blood and urine culture results  IV fluids  Trend lactate  On Rocephin  Trend WBC and fever curve    Femur fracture, right (HCC)  Assessment & Plan  Patient with subcapital fracture of right femur  ER spoke with orthopedics recommended medical admission and they will consult  Will keep patient n.p.o. after midnight  IV fluids  Pain management as needed  Encourage incentive spirometry use  Will obtain cardiology consult for preoperative clearance    Fall  Assessment & Plan  Patient with mechanical fall on February 1 evening  Presented with right hip pain  CT head-\" No acute intracranial abnormality.Stable chronic microangiopathic changes within the brain. Stable encephalomalacia of the inferior right frontal lobe.\"  No head injuries or any other injuries noted  Orthopedics consult  PT/OT      UTI (urinary tract infection)  Assessment & Plan  Patient has positive UTI on admission  On Rocephin  Follow-up urine culture results  Trend WBC and fever curve    HTN (hypertension)  Assessment & Plan  On Coreg, Norvasc and Zestril  Will hold Zestril  Hydralazine as needed  Monitor vitals as per protocol    Atrial fibrillation (HCC)  Assessment & Plan  On Coreg for rate control  Not on any anticoagulation as  stopped Eliquis in August on his own      History of " multiple strokes  Assessment & Plan  Patient has history of CVAs  Patient was on Eliquis but  states that he stopped her Eliquis around August 2023 on his own because they could not afford it  Continue on statin      Chief Complaint   Patient presents with    Weakness - Generalized     Patient presents to the ER via EMS with a strong odor of urine and a report of the patient having had a fall Thursday night in her home.          HPI:  Lo Talley is a 78 y.o. female with past medical history of hypertension, CVA, A-fib, cognitive decline who presented to the emergency department with a fall and complaining of right hip pain.  Patient, as per her  fell on Thursday evening while she was putting on her pants and lost balance.  She hit her head but did not lose any consciousness.  Patient has as per  early dementia and has baseline confusion.  As per  patient has been weak and sitting in couch since yesterday and complaining of right hip pain with movement.  Patient was brought to the ER and was found to have subcapital fracture of right femur.  Patient was also found to have severe sepsis secondary to UTI.  Patient was given Rocephin.  ER spoke with orthopedics who recommended medical admission and they will consult and likely OR in a.m.    Historical Information   Past Medical History:   Diagnosis Date    History of stroke 6/14/2023     History reviewed. No pertinent surgical history.  Social History   Social History     Substance and Sexual Activity   Alcohol Use Not Currently    Alcohol/week: 1.0 standard drink of alcohol    Types: 1 Glasses of wine per week    Comment: stopped since hospitalization     Social History     Substance and Sexual Activity   Drug Use Not Currently     Social History     Tobacco Use   Smoking Status Never   Smokeless Tobacco Never     History reviewed. No pertinent family history.    Meds/Allergies   No Known Allergies    Meds:    Current Facility-Administered  "Medications:     acetaminophen (TYLENOL) tablet 650 mg, 650 mg, Oral, Q6H PRN, Anshul Antoine MD    [START ON 2/4/2024] amLODIPine (NORVASC) tablet 10 mg, 10 mg, Oral, Daily, Anshul Antoine MD    atorvastatin (LIPITOR) tablet 40 mg, 40 mg, Oral, Daily With Dinner, Anshul Antoine MD    carvedilol (COREG) tablet 12.5 mg, 12.5 mg, Oral, BID With Meals, Anshul Antoine MD    [START ON 2/4/2024] cefTRIAXone (ROCEPHIN) IVPB (premix in dextrose) 1,000 mg 50 mL, 1,000 mg, Intravenous, Q24H, Anshul Antoine MD    docusate sodium (COLACE) capsule 100 mg, 100 mg, Oral, BID, Anshul Antoine MD    heparin (porcine) subcutaneous injection 5,000 Units, 5,000 Units, Subcutaneous, Q8H BK, Anshul Antoine MD    sodium chloride 0.9 % infusion, 125 mL/hr, Intravenous, Continuous, Anshul Antoine MD    Medications Prior to Admission   Medication    amLODIPine (NORVASC) 10 mg tablet    atorvastatin (LIPITOR) 40 mg tablet    carvedilol (COREG) 12.5 mg tablet    lisinopril (ZESTRIL) 40 mg tablet         Review of Systems   Constitutional:  Positive for activity change and fatigue.   HENT: Negative.     Eyes: Negative.    Respiratory: Negative.     Cardiovascular: Negative.    Gastrointestinal: Negative.    Endocrine: Negative.    Genitourinary: Negative.    Musculoskeletal:  Positive for arthralgias.   Skin: Negative.    Allergic/Immunologic: Negative.    Neurological: Negative.    Hematological: Negative.    Psychiatric/Behavioral: Negative.         Current Vitals:   Blood Pressure: 138/92 (02/03/24 1326)  Pulse: (!) 110 (02/03/24 1326)  Temperature: 98.1 °F (36.7 °C) (02/03/24 1326)  Temp Source: Temporal (02/03/24 1326)  Respirations: 16 (02/03/24 1326)  Height: 5' 6\" (167.6 cm) (02/03/24 1104)  Weight - Scale: 52.6 kg (116 lb) (02/03/24 1104)  SpO2: 99 % (02/03/24 1419)  SPO2 RA Rest      Flowsheet Row ED to Hosp-Admission (Current) from 2/3/2024 in [facility] Med Surg Unit   SpO2 99 %   SpO2 " Activity At Rest   O2 Device None (Room air)   O2 Flow Rate --            Intake/Output Summary (Last 24 hours) at 2/3/2024 1434  Last data filed at 2/3/2024 1244  Gross per 24 hour   Intake 1050 ml   Output --   Net 1050 ml     Body mass index is 18.72 kg/m².     Physical Exam  Vitals and nursing note reviewed.   Constitutional:       General: She is not in acute distress.     Appearance: She is well-developed.   HENT:      Head: Normocephalic and atraumatic.      Nose: Nose normal.   Eyes:      General: No scleral icterus.     Conjunctiva/sclera: Conjunctivae normal.      Pupils: Pupils are equal, round, and reactive to light.   Neck:      Thyroid: No thyromegaly.      Vascular: No JVD.      Trachea: No tracheal deviation.   Cardiovascular:      Rate and Rhythm: Normal rate. Rhythm irregular.      Heart sounds: Normal heart sounds.   Pulmonary:      Effort: Pulmonary effort is normal. No respiratory distress.      Breath sounds: Normal breath sounds. No wheezing or rales.   Chest:      Chest wall: No tenderness.   Abdominal:      General: Bowel sounds are normal. There is no distension.      Palpations: Abdomen is soft. There is no mass.      Tenderness: There is no abdominal tenderness. There is no guarding or rebound.   Musculoskeletal:         General: Tenderness present. Normal range of motion.      Cervical back: Normal range of motion and neck supple.      Comments: Right hip tenderness with movement and palpation   Skin:     General: Skin is warm.      Coloration: Skin is not pale.      Findings: No erythema or rash.   Neurological:      Mental Status: She is alert and oriented to person, place, and time.      Cranial Nerves: No cranial nerve deficit.      Coordination: Coordination normal.   Psychiatric:         Behavior: Behavior normal.         Thought Content: Thought content normal.         Judgment: Judgment normal.         Lab Results:   CBC:   Lab Results   Component Value Date    WBC 18.79 (H)  "02/03/2024    HGB 14.2 02/03/2024    HCT 42.4 02/03/2024    MCV 92 02/03/2024     (H) 02/03/2024    RBC 4.61 02/03/2024    MCH 30.8 02/03/2024    MCHC 33.5 02/03/2024    RDW 13.2 02/03/2024    MPV 9.4 02/03/2024    NRBC 0 02/03/2024     CMP:  Lab Results   Component Value Date     02/03/2024    CO2 24 02/03/2024    BUN 23 02/03/2024    CREATININE 0.56 (L) 02/03/2024    CALCIUM 9.5 02/03/2024    AST 18 02/03/2024    ALT 18 02/03/2024    ALKPHOS 80 02/03/2024    EGFR 90 02/03/2024     Lab Results   Component Value Date    CKTOTAL 188 02/03/2024     Coagulation:   Lab Results   Component Value Date    INR 1.14 02/03/2024    Urinalysis:  Lab Results   Component Value Date    COLORU Yellow 02/03/2024    CLARITYU Cloudy 02/03/2024    SPECGRAV >=1.030 02/03/2024    PHUR 5.5 02/03/2024    LEUKOCYTESUR Large (A) 02/03/2024    NITRITE Negative 02/03/2024    GLUCOSEU Negative 02/03/2024    KETONESU Trace (A) 02/03/2024    BILIRUBINUR Negative 02/03/2024    BLOODU Moderate (A) 02/03/2024      Amylase: No results found for: \"AMYLASE\"  Lipase: No results found for: \"LIPASE\"     Imaging: TRAUMA - CT head wo contrast    Result Date: 2/3/2024  Narrative: CT BRAIN - WITHOUT CONTRAST INDICATION:   TRAUMA. Fall. COMPARISON: MRI brain and CTA head and neck dated 6/14/2023. TECHNIQUE:  CT examination of the brain was performed.  Multiplanar 2D reformatted images were created from the source data. Radiation dose length product (DLP) for this visit:  862.07 mGy-cm .  This examination, like all CT scans performed in the [facility], was performed utilizing techniques to minimize radiation dose exposure, including the use of iterative  reconstruction and automated exposure control. IMAGE QUALITY:  Diagnostic. FINDINGS: PARENCHYMA: Decreased attenuation is noted in periventricular and subcortical white matter demonstrating an appearance that is statistically most likely to represent advanced microangiopathic " change; this appearance is similar when compared to most recent prior examination. Chronic lacunar infarcts in the bilateral basal ganglia and right thalamus. Stable encephalomalacia of the inferior right frontal lobe. No CT signs of acute infarction.  No intracranial mass, mass effect or midline shift.  No acute parenchymal hemorrhage.  Atherosclerotic vascular calcifications in carotid and vertebral arteries. VENTRICLES AND EXTRA-AXIAL SPACES:  Normal for the patient's age. VISUALIZED ORBITS: Normal visualized orbits. PARANASAL SINUSES: Normal visualized paranasal sinuses. CALVARIUM AND EXTRACRANIAL SOFT TISSUES:  Normal.     Impression: No acute intracranial abnormality. Stable chronic microangiopathic changes within the brain. Stable encephalomalacia of the inferior right frontal lobe. I personally discussed this study with YVROSE LANE on 2/3/2024 12:31 PM. Workstation performed: [id number]     CT chest abdomen pelvis w contrast    Result Date: 2/3/2024  Narrative: CT CHEST, ABDOMEN AND PELVIS WITH IV CONTRAST INDICATION: abdominal pain. Trauma. COMPARISON: None. TECHNIQUE: CT examination of the chest, abdomen and pelvis was performed. Multiplanar 2D reformatted images were created from the source data. This examination, like all CT scans performed in the [facility], was performed utilizing techniques to minimize radiation dose exposure, including the use of iterative reconstruction and automated exposure control. Radiation dose length product (DLP) for this visit: 632.13 mGy-cm IV Contrast: 100 mL of iohexol (OMNIPAQUE) Enteric Contrast: Not administered. FINDINGS: CHEST LUNGS: Bibasilar subsegmental atelectasis. No tracheal or endobronchial lesion. No acute consolidative airspace disease. PLEURA: Unremarkable. HEART/GREAT VESSELS: Mild cardiomegaly. Three-vessel coronary artery calcifications. Mitral annulus calcifications. Aortic valve calcifications. No thoracic aortic aneurysm. MEDIASTINUM  AND ENIO: Unremarkable. CHEST WALL AND LOWER NECK: A 0.7 cm left thyroid nodule. Incidental discovery of one or more thyroid nodule(s) measuring less than 1.5 cm and without suspicious features is noted in this patient who is above 35 years old; according to guidelines published in the February 2015 white paper on incidental thyroid nodules in the Journal of the American College of Radiology (JACR), no further evaluation is recommended. ABDOMEN LIVER/BILIARY TREE: Unremarkable. GALLBLADDER: No calcified gallstones. No pericholecystic inflammatory change. SPLEEN: Unremarkable. PANCREAS: Unremarkable. ADRENAL GLANDS: Unremarkable. KIDNEYS/URETERS: Simple renal cyst(s). Otherwise unremarkable kidneys. No hydronephrosis. STOMACH AND BOWEL: Moderate colonic stool burden with a moderate rectal stool ball, likely reflective of colonic hypomotility. APPENDIX: Normal. ABDOMINOPELVIC CAVITY: No ascites. No pneumoperitoneum. No lymphadenopathy. VESSELS: Atherosclerosis without abdominal aortic aneurysm. PELVIS REPRODUCTIVE ORGANS: Unremarkable for patient's age. URINARY BLADDER: Partially distended. Mild circumferential bladder wall thickening with mild perivesical inflammatory fat stranding. ABDOMINAL WALL/INGUINAL REGIONS: Unremarkable. BONES: Diffuse osteopenia. Impacted subcapital fracture of the right femur (601/80). Spinal degenerative changes. Degenerative change of the right shoulder. Healed remote fracture deformities of the right posterior 11th and 12th ribs.     Impression: Impacted subcapital fracture of the right femur. Mild circumferential bladder wall thickening with mild perivesical inflammatory fat stranding, which may relate to cystitis. Recommend correlation with urinalysis. I personally discussed this study with YVROSE LANE on 2/3/2024 12:31 PM. Workstation performed: [id number]     EKG, Pathology, and Other Studies: I have personally reviewed the results.  VTE Pharmacologic Prophylaxis: Heparin  VTE  "Mechanical Prophylaxis: sequential compression device    Code Status: Level 1 - Full Code    Anticipated Length of Stay:  Patient will be admitted on an Inpatient basis with an anticipated length of stay of greater than 2 midnights.     Counseling / Coordination of Care  Total floor / unit time spent today 75 minutes.  Greater than 50% of total time was spent with the patient and / or family counseling and / or coordination of care.     \"This note has been constructed using a voice recognition system\"      Anshul Antoine MD  2/3/2024, 2:34 PM            "

## 2024-02-03 NOTE — ASSESSMENT & PLAN NOTE
Patient has positive UTI on admission  On Rocephin  Follow-up urine culture results  Trend WBC and fever curve

## 2024-02-03 NOTE — SEPSIS NOTE
"  Sepsis Note   Lo Talley 78 y.o. female MRN: [id number]  Unit/Bed#: -01 Encounter: [id number]       Initial Sepsis Screening       Row Name 02/03/24 1158                Is the patient's history suggestive of a new or worsening infection? Yes (Proceed)  -MARCELLA        Suspected source of infection urinary tract infection  -MARCELLA        Indicate SIRS criteria Tachycardia > 90 bpm;Leukocytosis (WBC > 21849 IJL) OR Leukopenia (WBC <4000 IJL) OR Bandemia (WBC >10% bands)  -MARCELLA        Are two or more of the above signs & symptoms of infection both present and new to the patient? Yes (Proceed)  -MARCELLA        Assess for evidence of organ dysfunction: Are any of the below criteria present within 6 hours of suspected infection and SIRS criteria that are NOT considered to be chronic conditions? Lactate > 2.0  -MARCELLA        Date of presentation of severe sepsis 02/03/24  -MARCELLA        Time of presentation of severe sepsis 1158  -MARCELLA        Date of presentation of septic shock --        Time of presentation of septic shock --        Fluid Resuscitation: --        Sepsis Note: Click \"NEXT\" below (NOT \"close\") to generate sepsis note based on above information. --                  User Key  (r) = Recorded By, (t) = Taken By, (c) = Cosigned By      Initials Name Provider Type    MARCELLA Arredondo DO Physician                    Default Flowsheet Data (last 720 hours)       Sepsis Reassess       Row Name 02/03/24 1447                   Repeat Volume Status and Tissue Perfusion Assessment Performed    Date of Reassessment: 02/03/24  -MS        Time of Reassessment: 1405  -MS        Sepsis Reassessment Note: Click \"NEXT\" below (NOT \"close\") to generate sepsis reassessment note. YES (proceed by clicking \"NEXT\")  -MS        Repeat Volume Status and Tissue Perfusion Assessment Performed --                  User Key  (r) = Recorded By, (t) = Taken By, (c) = Cosigned By      Initials Name Provider Type    MS Anshul Antoine MD Physician         "            Body mass index is 18.72 kg/m².  Wt Readings from Last 1 Encounters:   02/03/24 52.6 kg (116 lb)     IBW (Ideal Body Weight): 59.3 kg    Ideal body weight: 59.3 kg (130 lb 11.7 oz)

## 2024-02-03 NOTE — SEPSIS NOTE
"  Sepsis Note   Lo Talley 78 y.o. female MRN: [id number]  Unit/Bed#: TR13A Encounter: [id number]       Initial Sepsis Screening       Row Name 02/03/24 1158                Is the patient's history suggestive of a new or worsening infection? Yes (Proceed)  -MARCELLA        Suspected source of infection urinary tract infection  -MARCELLA        Indicate SIRS criteria Tachycardia > 90 bpm;Leukocytosis (WBC > 06180 IJL) OR Leukopenia (WBC <4000 IJL) OR Bandemia (WBC >10% bands)  -MARCELLA        Are two or more of the above signs & symptoms of infection both present and new to the patient? Yes (Proceed)  -MARCELLA        Assess for evidence of organ dysfunction: Are any of the below criteria present within 6 hours of suspected infection and SIRS criteria that are NOT considered to be chronic conditions? Lactate > 2.0  -MARCELLA        Date of presentation of severe sepsis 02/03/24  -MARCELLA        Time of presentation of severe sepsis 1158  -MARCELLA        Date of presentation of septic shock --        Time of presentation of septic shock --        Fluid Resuscitation: --        Sepsis Note: Click \"NEXT\" below (NOT \"close\") to generate sepsis note based on above information. --                  User Key  (r) = Recorded By, (t) = Taken By, (c) = Cosigned By      Initials Name Provider Type    MARCELLA Arredondo DO Physician                        Body mass index is 18.72 kg/m².  Wt Readings from Last 1 Encounters:   02/03/24 52.6 kg (116 lb)     IBW (Ideal Body Weight): 59.3 kg    Ideal body weight: 59.3 kg (130 lb 11.7 oz)    "

## 2024-02-03 NOTE — ASSESSMENT & PLAN NOTE
Patient has history of CVAs  Patient was on Eliquis but  states that he stopped her Eliquis around August 2023 on his own because they could not afford it  Continue on statin

## 2024-02-03 NOTE — ASSESSMENT & PLAN NOTE
On Coreg, Norvasc and Zestril  Will hold Zestril  Hydralazine as needed  Monitor vitals as per protocol

## 2024-02-03 NOTE — ASSESSMENT & PLAN NOTE
"Patient admitted with severe sepsis as evidenced by tachycardia, leukocytosis of 18.7 K likely due to to UTI.  CT chest abdomen pelvis showed-\"Impacted subcapital fracture of the right femur.  Mild circumferential bladder wall thickening with mild perivesical inflammatory fat stranding, which may relate to cystitis. Recommend correlation with urinalysis.\"  Lactate is 2.9  Procalcitonin 0.12  Follow-up blood and urine culture results  IV fluids  Trend lactate  On Rocephin  Trend WBC and fever curve  "

## 2024-02-03 NOTE — ASSESSMENT & PLAN NOTE
"Patient with mechanical fall on February 1 evening  Presented with right hip pain  CT head-\" No acute intracranial abnormality.Stable chronic microangiopathic changes within the brain. Stable encephalomalacia of the inferior right frontal lobe.\"  No head injuries or any other injuries noted  Orthopedics consult  PT/OT    "

## 2024-02-03 NOTE — PLAN OF CARE
Problem: PAIN - ADULT  Goal: Verbalizes/displays adequate comfort level or baseline comfort level  Description: Interventions:  - Encourage patient to monitor pain and request assistance  - Assess pain using appropriate pain scale  - Administer analgesics based on type and severity of pain and evaluate response  - Implement non-pharmacological measures as appropriate and evaluate response  - Consider cultural and social influences on pain and pain management  - Notify physician/advanced practitioner if interventions unsuccessful or patient reports new pain  Outcome: Progressing     Problem: INFECTION - ADULT  Goal: Absence or prevention of progression during hospitalization  Description: INTERVENTIONS:  - Assess and monitor for signs and symptoms of infection  - Monitor lab/diagnostic results  - Monitor all insertion sites, i.e. indwelling lines, tubes, and drains  - Monitor endotracheal if appropriate and nasal secretions for changes in amount and color  - [place] appropriate cooling/warming therapies per order  - Administer medications as ordered  - Instruct and encourage patient and family to use good hand hygiene technique  - Identify and instruct in appropriate isolation precautions for identified infection/condition  Outcome: Progressing  Goal: Absence of fever/infection during neutropenic period  Description: INTERVENTIONS:  - Monitor WBC    Outcome: Progressing     Problem: SAFETY ADULT  Goal: Patient will remain free of falls  Description: INTERVENTIONS:  - Educate patient/family on patient safety including physical limitations  - Instruct patient to call for assistance with activity   - Consult OT/PT to assist with strengthening/mobility   - Keep Call bell within reach  - Keep bed low and locked with side rails adjusted as appropriate  - Keep care items and personal belongings within reach  - Initiate and maintain comfort rounds  - Make Fall Risk Sign visible to staff  - Offer Toileting every X Hours,  in advance of need  - Initiate/Maintain Xalarm  - Obtain necessary fall risk management equipment: X  - Apply yellow socks and bracelet for high fall risk patients  - Consider moving patient to room near nurses station  Outcome: Progressing  Goal: Maintain or return to baseline ADL function  Description: INTERVENTIONS:  -  Assess patient's ability to carry out ADLs; assess patient's baseline for ADL function and identify physical deficits which impact ability to perform ADLs (bathing, care of mouth/teeth, toileting, grooming, dressing, etc.)  - Assess/evaluate cause of self-care deficits   - Assess range of motion  - Assess patient's mobility; develop plan if impaired  - Assess patient's need for assistive devices and provide as appropriate  - Encourage maximum independence but intervene and supervise when necessary  - Involve family in performance of ADLs  - Assess for home care needs following discharge   - Consider OT consult to assist with ADL evaluation and planning for discharge  - Provide patient education as appropriate  Outcome: Progressing  Goal: Maintains/Returns to pre admission functional level  Description: INTERVENTIONS:  - Perform AM-PAC 6 Click Basic Mobility/ Daily Activity assessment daily.  - Set and communicate daily mobility goal to care team and patient/family/caregiver.   - Collaborate with rehabilitation services on mobility goals if consulted  - Perform Range of Motion X times a day.  - Reposition patient every X hours.  - Dangle patient X times a day  - Stand patient X times a day  - Ambulate patient X times a day  - Out of bed to chair X times a day   - Out of bed for meals X times a day  - Out of bed for toileting  - Record patient progress and toleration of activity level   Outcome: Progressing     Problem: DISCHARGE PLANNING  Goal: Discharge to home or other facility with appropriate resources  Description: INTERVENTIONS:  - Identify barriers to discharge w/patient and caregiver  -  Arrange for needed discharge resources and transportation as appropriate  - Identify discharge learning needs (meds, wound care, etc.)  - Arrange for interpretive services to assist at discharge as needed  - Refer to Case Management Department for coordinating discharge planning if the patient needs post-hospital services based on physician/advanced practitioner order or complex needs related to functional status, cognitive ability, or social support system  Outcome: Progressing     Problem: Knowledge Deficit  Goal: Patient/family/caregiver demonstrates understanding of disease process, treatment plan, medications, and discharge instructions  Description: Complete learning assessment and assess knowledge base.  Interventions:  - Provide teaching at level of understanding  - Provide teaching via preferred learning methods  Outcome: Progressing     Problem: METABOLIC, FLUID AND ELECTROLYTES - ADULT  Goal: Electrolytes maintained within normal limits  Description: INTERVENTIONS:  - Monitor labs and assess patient for signs and symptoms of electrolyte imbalances  - Administer electrolyte replacement as ordered  - Monitor response to electrolyte replacements, including repeat lab results as appropriate  - Instruct patient on fluid and nutrition as appropriate  Outcome: Progressing  Goal: Fluid balance maintained  Description: INTERVENTIONS:  - Monitor labs   - Monitor I/O and WT  - Instruct patient on fluid and nutrition as appropriate  - Assess for signs & symptoms of volume excess or deficit  Outcome: Progressing  Goal: Glucose maintained within target range  Description: INTERVENTIONS:  - Monitor Blood Glucose as ordered  - Assess for signs and symptoms of hyperglycemia and hypoglycemia  - Administer ordered medications to maintain glucose within target range  - Assess nutritional intake and initiate nutrition service referral as needed  Outcome: Progressing     Problem: SKIN/TISSUE INTEGRITY - ADULT  Goal: Skin  Integrity remains intact(Skin Breakdown Prevention)  Description: Assess:  -Perform Martin assessment every X  -Clean and moisturize skin every X  -Inspect skin when repositioning, toileting, and assisting with ADLS  -Assess under medical devices such as X every X  -Assess extremities for adequate circulation and sensation     Bed Management:  -Have minimal linens on bed & keep smooth, unwrinkled  -Change linens as needed when moist or perspiring  -Avoid sitting or lying in one position for more than X hours while in bed  -Keep HOB at Xdegrees     Toileting:  -Offer bedside commode  -Assess for incontinence every X  -Use incontinent care products after each incontinent episode such as X    Activity:  -Mobilize patient X times a day  -Encourage activity and walks on unit  -Encourage or provide ROM exercises   -Turn and reposition patient every X Hours  -Use appropriate equipment to lift or move patient in bed  -Instruct/ Assist with weight shifting every X when out of bed in chair  -Consider limitation of chair time X hour intervals    Skin Care:  -Avoid use of baby powder, tape, friction and shearing, hot water or constrictive clothing  -Relieve pressure over bony prominences using X  -Do not massage red bony areas    Next Steps:  -Teach patient strategies to minimize risks such as X   -Consider consults to  interdisciplinary teams such as X  Outcome: Progressing  Goal: Incision(s), wounds(s) or drain site(s) healing without S/S of infection  Description: INTERVENTIONS  - Assess and document dressing, incision, wound bed, drain sites and surrounding tissue  - Provide patient and family education  - Perform skin care/dressing changes every X  Outcome: Progressing  Goal: Pressure injury heals and does not worsen  Description: Interventions:  - Implement low air loss mattress or specialty surface (Criteria met)  - Apply silicone foam dressing  - Instruct/assist with weight shifting every X minutes when in chair   -  Limit chair time to X hour intervals  - Use special pressure reducing interventions such as XXX when in chair   - Apply fecal or urinary incontinence containment device   - Perform passive or active ROM every X  - Turn and reposition patient & offload bony prominences every X hours   - Utilize friction reducing device or surface for transfers   - Consider consults to  interdisciplinary teams such as X  - Use incontinent care products after each incontinent episode such as X  - Consider nutrition services referral as needed  Outcome: Progressing     Problem: HEMATOLOGIC - ADULT  Goal: Maintains hematologic stability  Description: INTERVENTIONS  - Assess for signs and symptoms of bleeding or hemorrhage  - Monitor labs  - Administer supportive blood products/factors as ordered and appropriate  Outcome: Progressing     Problem: MUSCULOSKELETAL - ADULT  Goal: Maintain or return mobility to safest level of function  Description: INTERVENTIONS:  - Assess patient's ability to carry out ADLs; assess patient's baseline for ADL function and identify physical deficits which impact ability to perform ADLs (bathing, care of mouth/teeth, toileting, grooming, dressing, etc.)  - Assess/evaluate cause of self-care deficits   - Assess range of motion  - Assess patient's mobility  - Assess patient's need for assistive devices and provide as appropriate  - Encourage maximum independence but intervene and supervise when necessary  - Involve family in performance of ADLs  - Assess for home care needs following discharge   - Consider OT consult to assist with ADL evaluation and planning for discharge  - Provide patient education as appropriate  Outcome: Progressing  Goal: Maintain proper alignment of affected body part  Description: INTERVENTIONS:  - Support, maintain and protect limb and body alignment  - Provide patient/ family with appropriate education  Outcome: Progressing

## 2024-02-03 NOTE — ASSESSMENT & PLAN NOTE
Patient with subcapital fracture of right femur  ER spoke with orthopedics recommended medical admission and they will consult  Will keep patient n.p.o. after midnight  IV fluids  Pain management as needed  Encourage incentive spirometry use  Will obtain cardiology consult for preoperative clearance

## 2024-02-04 ENCOUNTER — ANESTHESIA (INPATIENT)
Dept: PERIOP | Facility: HOSPITAL | Age: 79
DRG: 853 | End: 2024-02-04
Payer: MEDICARE

## 2024-02-04 ENCOUNTER — APPOINTMENT (INPATIENT)
Dept: RADIOLOGY | Facility: HOSPITAL | Age: 79
DRG: 853 | End: 2024-02-04
Payer: MEDICARE

## 2024-02-04 LAB
ALBUMIN SERPL BCP-MCNC: 3.5 G/DL (ref 3.5–5)
ALP SERPL-CCNC: 61 U/L (ref 34–104)
ALT SERPL W P-5'-P-CCNC: 12 U/L (ref 7–52)
ANION GAP SERPL CALCULATED.3IONS-SCNC: 8 MMOL/L
APTT PPP: 29 SECONDS (ref 23–37)
AST SERPL W P-5'-P-CCNC: 18 U/L (ref 13–39)
BASOPHILS # BLD AUTO: 0.04 THOUSANDS/ÂΜL (ref 0–0.1)
BASOPHILS NFR BLD AUTO: 0 % (ref 0–1)
BILIRUB SERPL-MCNC: 1.06 MG/DL (ref 0.2–1)
BUN SERPL-MCNC: 14 MG/DL (ref 5–25)
CALCIUM SERPL-MCNC: 8.4 MG/DL (ref 8.4–10.2)
CHLORIDE SERPL-SCNC: 110 MMOL/L (ref 96–108)
CO2 SERPL-SCNC: 22 MMOL/L (ref 21–32)
CREAT SERPL-MCNC: 0.39 MG/DL (ref 0.6–1.3)
EOSINOPHIL # BLD AUTO: 0.05 THOUSAND/ÂΜL (ref 0–0.61)
EOSINOPHIL NFR BLD AUTO: 0 % (ref 0–6)
ERYTHROCYTE [DISTWIDTH] IN BLOOD BY AUTOMATED COUNT: 13.2 % (ref 11.6–15.1)
GFR SERPL CREATININE-BSD FRML MDRD: 102 ML/MIN/1.73SQ M
GLUCOSE SERPL-MCNC: 96 MG/DL (ref 65–140)
HCT VFR BLD AUTO: 37.7 % (ref 34.8–46.1)
HGB BLD-MCNC: 12.6 G/DL (ref 11.5–15.4)
IMM GRANULOCYTES # BLD AUTO: 0.06 THOUSAND/UL (ref 0–0.2)
IMM GRANULOCYTES NFR BLD AUTO: 0 % (ref 0–2)
INR PPP: 1.17 (ref 0.84–1.19)
LYMPHOCYTES # BLD AUTO: 2.13 THOUSANDS/ÂΜL (ref 0.6–4.47)
LYMPHOCYTES NFR BLD AUTO: 15 % (ref 14–44)
MAGNESIUM SERPL-MCNC: 1.9 MG/DL (ref 1.9–2.7)
MCH RBC QN AUTO: 31.3 PG (ref 26.8–34.3)
MCHC RBC AUTO-ENTMCNC: 33.4 G/DL (ref 31.4–37.4)
MCV RBC AUTO: 94 FL (ref 82–98)
MONOCYTES # BLD AUTO: 0.68 THOUSAND/ÂΜL (ref 0.17–1.22)
MONOCYTES NFR BLD AUTO: 5 % (ref 4–12)
NEUTROPHILS # BLD AUTO: 11.72 THOUSANDS/ÂΜL (ref 1.85–7.62)
NEUTS SEG NFR BLD AUTO: 80 % (ref 43–75)
NRBC BLD AUTO-RTO: 0 /100 WBCS
PHOSPHATE SERPL-MCNC: 2.9 MG/DL (ref 2.3–4.1)
PLATELET # BLD AUTO: 349 THOUSANDS/UL (ref 149–390)
PMV BLD AUTO: 9.2 FL (ref 8.9–12.7)
POTASSIUM SERPL-SCNC: 3.5 MMOL/L (ref 3.5–5.3)
PROCALCITONIN SERPL-MCNC: 0.09 NG/ML
PROT SERPL-MCNC: 6.5 G/DL (ref 6.4–8.4)
PROTHROMBIN TIME: 15.3 SECONDS (ref 11.6–14.5)
RBC # BLD AUTO: 4.03 MILLION/UL (ref 3.81–5.12)
SODIUM SERPL-SCNC: 140 MMOL/L (ref 135–147)
WBC # BLD AUTO: 14.68 THOUSAND/UL (ref 4.31–10.16)

## 2024-02-04 PROCEDURE — C1713 ANCHOR/SCREW BN/BN,TIS/BN: HCPCS | Performed by: STUDENT IN AN ORGANIZED HEALTH CARE EDUCATION/TRAINING PROGRAM

## 2024-02-04 PROCEDURE — 99223 1ST HOSP IP/OBS HIGH 75: CPT | Performed by: STUDENT IN AN ORGANIZED HEALTH CARE EDUCATION/TRAINING PROGRAM

## 2024-02-04 PROCEDURE — 0SRB039 REPLACEMENT OF LEFT HIP JOINT WITH CERAMIC SYNTHETIC SUBSTITUTE, CEMENTED, OPEN APPROACH: ICD-10-PCS | Performed by: INTERNAL MEDICINE

## 2024-02-04 PROCEDURE — C1776 JOINT DEVICE (IMPLANTABLE): HCPCS | Performed by: STUDENT IN AN ORGANIZED HEALTH CARE EDUCATION/TRAINING PROGRAM

## 2024-02-04 PROCEDURE — NC001 PR NO CHARGE: Performed by: INTERNAL MEDICINE

## 2024-02-04 PROCEDURE — 72170 X-RAY EXAM OF PELVIS: CPT

## 2024-02-04 PROCEDURE — 27130 TOTAL HIP ARTHROPLASTY: CPT | Performed by: STUDENT IN AN ORGANIZED HEALTH CARE EDUCATION/TRAINING PROGRAM

## 2024-02-04 PROCEDURE — 80053 COMPREHEN METABOLIC PANEL: CPT | Performed by: INTERNAL MEDICINE

## 2024-02-04 PROCEDURE — 85610 PROTHROMBIN TIME: CPT | Performed by: INTERNAL MEDICINE

## 2024-02-04 PROCEDURE — 85025 COMPLETE CBC W/AUTO DIFF WBC: CPT | Performed by: INTERNAL MEDICINE

## 2024-02-04 PROCEDURE — 84100 ASSAY OF PHOSPHORUS: CPT | Performed by: INTERNAL MEDICINE

## 2024-02-04 PROCEDURE — 83735 ASSAY OF MAGNESIUM: CPT | Performed by: INTERNAL MEDICINE

## 2024-02-04 PROCEDURE — 85730 THROMBOPLASTIN TIME PARTIAL: CPT | Performed by: INTERNAL MEDICINE

## 2024-02-04 PROCEDURE — 27130 TOTAL HIP ARTHROPLASTY: CPT | Performed by: PHYSICIAN ASSISTANT

## 2024-02-04 PROCEDURE — 84145 PROCALCITONIN (PCT): CPT | Performed by: INTERNAL MEDICINE

## 2024-02-04 PROCEDURE — 99232 SBSQ HOSP IP/OBS MODERATE 35: CPT | Performed by: INTERNAL MEDICINE

## 2024-02-04 DEVICE — BIOLOX DELTA CERAMIC FEMORAL HEAD +1.5 36MM DIA 12/14 TAPER
Type: IMPLANTABLE DEVICE | Site: HIP | Status: FUNCTIONAL
Brand: BIOLOX DELTA

## 2024-02-04 DEVICE — SUMMIT FEMORAL STEM 12/14 TAPER CEMENTED SIZE 4 STD 114MM
Type: IMPLANTABLE DEVICE | Site: HIP | Status: FUNCTIONAL
Brand: SUMMIT

## 2024-02-04 DEVICE — PINNACLE CANCELLOUS BONE SCREW 6.5MM X 25MM
Type: IMPLANTABLE DEVICE | Site: HIP | Status: FUNCTIONAL
Brand: PINNACLE

## 2024-02-04 DEVICE — BONE PREPARATION KIT
Type: IMPLANTABLE DEVICE | Site: HIP | Status: FUNCTIONAL
Brand: BIOPREP

## 2024-02-04 DEVICE — CEMENTRALIZER STEM CENTRALIZER 9.25MM CEMENTED
Type: IMPLANTABLE DEVICE | Site: HIP | Status: FUNCTIONAL
Brand: CEMENTRALIZER

## 2024-02-04 DEVICE — SMARTSET HIGH PERFORMANCE MV MEDIUM VISCOSITY BONE CEMENT 40G
Type: IMPLANTABLE DEVICE | Site: HIP | Status: FUNCTIONAL
Brand: SMARTSET

## 2024-02-04 DEVICE — EMPHASYS POLYETHYLENE LINER AOX NEUTRAL 48MM 36MM
Type: IMPLANTABLE DEVICE | Site: HIP | Status: FUNCTIONAL
Brand: EMPHASYS

## 2024-02-04 DEVICE — EMPHASYS ACETABULAR SHELL THREE-HOLE 48MM CEMENTLESS
Type: IMPLANTABLE DEVICE | Site: HIP | Status: FUNCTIONAL
Brand: EMPHASYS

## 2024-02-04 RX ORDER — PHENYLEPHRINE HCL IN 0.9% NACL 1 MG/10 ML
SYRINGE (ML) INTRAVENOUS AS NEEDED
Status: DISCONTINUED | OUTPATIENT
Start: 2024-02-04 | End: 2024-02-04

## 2024-02-04 RX ORDER — ONDANSETRON 2 MG/ML
4 INJECTION INTRAMUSCULAR; INTRAVENOUS EVERY 6 HOURS PRN
Status: DISCONTINUED | OUTPATIENT
Start: 2024-02-04 | End: 2024-02-07 | Stop reason: HOSPADM

## 2024-02-04 RX ORDER — HEPARIN SODIUM 5000 [USP'U]/ML
5000 INJECTION, SOLUTION INTRAVENOUS; SUBCUTANEOUS EVERY 8 HOURS SCHEDULED
Status: DISCONTINUED | OUTPATIENT
Start: 2024-02-04 | End: 2024-02-06

## 2024-02-04 RX ORDER — PROPOFOL 10 MG/ML
INJECTION, EMULSION INTRAVENOUS AS NEEDED
Status: DISCONTINUED | OUTPATIENT
Start: 2024-02-04 | End: 2024-02-04

## 2024-02-04 RX ORDER — FENTANYL CITRATE/PF 50 MCG/ML
25 SYRINGE (ML) INJECTION
Status: DISCONTINUED | OUTPATIENT
Start: 2024-02-04 | End: 2024-02-04 | Stop reason: HOSPADM

## 2024-02-04 RX ORDER — FERROUS SULFATE 325(65) MG
325 TABLET ORAL
Status: DISCONTINUED | OUTPATIENT
Start: 2024-02-05 | End: 2024-02-07 | Stop reason: HOSPADM

## 2024-02-04 RX ORDER — SENNOSIDES 8.6 MG
1 TABLET ORAL DAILY
Status: DISCONTINUED | OUTPATIENT
Start: 2024-02-04 | End: 2024-02-07 | Stop reason: HOSPADM

## 2024-02-04 RX ORDER — SODIUM CHLORIDE, SODIUM LACTATE, POTASSIUM CHLORIDE, CALCIUM CHLORIDE 600; 310; 30; 20 MG/100ML; MG/100ML; MG/100ML; MG/100ML
INJECTION, SOLUTION INTRAVENOUS CONTINUOUS PRN
Status: DISCONTINUED | OUTPATIENT
Start: 2024-02-04 | End: 2024-02-04

## 2024-02-04 RX ORDER — MAGNESIUM HYDROXIDE/ALUMINUM HYDROXICE/SIMETHICONE 120; 1200; 1200 MG/30ML; MG/30ML; MG/30ML
30 SUSPENSION ORAL EVERY 6 HOURS PRN
Status: DISCONTINUED | OUTPATIENT
Start: 2024-02-04 | End: 2024-02-07 | Stop reason: HOSPADM

## 2024-02-04 RX ORDER — SODIUM CHLORIDE, SODIUM LACTATE, POTASSIUM CHLORIDE, CALCIUM CHLORIDE 600; 310; 30; 20 MG/100ML; MG/100ML; MG/100ML; MG/100ML
50 INJECTION, SOLUTION INTRAVENOUS CONTINUOUS
Status: CANCELLED | OUTPATIENT
Start: 2024-02-04

## 2024-02-04 RX ORDER — ROCURONIUM BROMIDE 10 MG/ML
INJECTION, SOLUTION INTRAVENOUS AS NEEDED
Status: DISCONTINUED | OUTPATIENT
Start: 2024-02-04 | End: 2024-02-04

## 2024-02-04 RX ORDER — HYDROMORPHONE HYDROCHLORIDE 1 MG/ML
INJECTION, SOLUTION INTRAMUSCULAR; INTRAVENOUS; SUBCUTANEOUS AS NEEDED
Status: DISCONTINUED | OUTPATIENT
Start: 2024-02-04 | End: 2024-02-04

## 2024-02-04 RX ORDER — FENTANYL CITRATE 50 UG/ML
INJECTION, SOLUTION INTRAMUSCULAR; INTRAVENOUS AS NEEDED
Status: DISCONTINUED | OUTPATIENT
Start: 2024-02-04 | End: 2024-02-04

## 2024-02-04 RX ORDER — DEXAMETHASONE SODIUM PHOSPHATE 10 MG/ML
INJECTION, SOLUTION INTRAMUSCULAR; INTRAVENOUS AS NEEDED
Status: DISCONTINUED | OUTPATIENT
Start: 2024-02-04 | End: 2024-02-04

## 2024-02-04 RX ORDER — OXYCODONE HYDROCHLORIDE 5 MG/1
5 TABLET ORAL EVERY 4 HOURS PRN
Status: DISCONTINUED | OUTPATIENT
Start: 2024-02-04 | End: 2024-02-07 | Stop reason: HOSPADM

## 2024-02-04 RX ORDER — CALCIUM CARBONATE 500 MG/1
1000 TABLET, CHEWABLE ORAL DAILY PRN
Status: DISCONTINUED | OUTPATIENT
Start: 2024-02-04 | End: 2024-02-07 | Stop reason: HOSPADM

## 2024-02-04 RX ORDER — KETOROLAC TROMETHAMINE 30 MG/ML
INJECTION, SOLUTION INTRAMUSCULAR; INTRAVENOUS AS NEEDED
Status: DISCONTINUED | OUTPATIENT
Start: 2024-02-04 | End: 2024-02-04

## 2024-02-04 RX ORDER — ONDANSETRON 2 MG/ML
INJECTION INTRAMUSCULAR; INTRAVENOUS AS NEEDED
Status: DISCONTINUED | OUTPATIENT
Start: 2024-02-04 | End: 2024-02-04

## 2024-02-04 RX ORDER — BUPIVACAINE HYDROCHLORIDE 2.5 MG/ML
INJECTION, SOLUTION EPIDURAL; INFILTRATION; INTRACAUDAL AS NEEDED
Status: DISCONTINUED | OUTPATIENT
Start: 2024-02-04 | End: 2024-02-04 | Stop reason: HOSPADM

## 2024-02-04 RX ORDER — SODIUM CHLORIDE, SODIUM LACTATE, POTASSIUM CHLORIDE, CALCIUM CHLORIDE 600; 310; 30; 20 MG/100ML; MG/100ML; MG/100ML; MG/100ML
100 INJECTION, SOLUTION INTRAVENOUS CONTINUOUS
Status: DISCONTINUED | OUTPATIENT
Start: 2024-02-04 | End: 2024-02-05

## 2024-02-04 RX ORDER — MIDAZOLAM HYDROCHLORIDE 2 MG/2ML
INJECTION, SOLUTION INTRAMUSCULAR; INTRAVENOUS CONTINUOUS PRN
Status: DISCONTINUED | OUTPATIENT
Start: 2024-02-04 | End: 2024-02-04

## 2024-02-04 RX ORDER — CEFAZOLIN SODIUM 2 G/50ML
SOLUTION INTRAVENOUS AS NEEDED
Status: DISCONTINUED | OUTPATIENT
Start: 2024-02-04 | End: 2024-02-04

## 2024-02-04 RX ORDER — ACETAMINOPHEN 325 MG/1
975 TABLET ORAL EVERY 8 HOURS
Status: DISCONTINUED | OUTPATIENT
Start: 2024-02-04 | End: 2024-02-07 | Stop reason: HOSPADM

## 2024-02-04 RX ORDER — LIDOCAINE HYDROCHLORIDE 20 MG/ML
INJECTION, SOLUTION EPIDURAL; INFILTRATION; INTRACAUDAL; PERINEURAL AS NEEDED
Status: DISCONTINUED | OUTPATIENT
Start: 2024-02-04 | End: 2024-02-04

## 2024-02-04 RX ORDER — DOCUSATE SODIUM 100 MG/1
100 CAPSULE, LIQUID FILLED ORAL 2 TIMES DAILY
Status: DISCONTINUED | OUTPATIENT
Start: 2024-02-04 | End: 2024-02-04

## 2024-02-04 RX ORDER — KETOROLAC TROMETHAMINE 30 MG/ML
INJECTION, SOLUTION INTRAMUSCULAR; INTRAVENOUS AS NEEDED
Status: DISCONTINUED | OUTPATIENT
Start: 2024-02-04 | End: 2024-02-04 | Stop reason: HOSPADM

## 2024-02-04 RX ORDER — GABAPENTIN 300 MG/1
300 CAPSULE ORAL
Status: DISCONTINUED | OUTPATIENT
Start: 2024-02-04 | End: 2024-02-07 | Stop reason: HOSPADM

## 2024-02-04 RX ADMIN — HYDROMORPHONE HYDROCHLORIDE 0.5 MG: 1 INJECTION, SOLUTION INTRAMUSCULAR; INTRAVENOUS; SUBCUTANEOUS at 09:11

## 2024-02-04 RX ADMIN — CARVEDILOL 12.5 MG: 12.5 TABLET, FILM COATED ORAL at 17:28

## 2024-02-04 RX ADMIN — ACETAMINOPHEN 325MG 975 MG: 325 TABLET ORAL at 20:35

## 2024-02-04 RX ADMIN — SODIUM CHLORIDE, SODIUM LACTATE, POTASSIUM CHLORIDE, AND CALCIUM CHLORIDE: .6; .31; .03; .02 INJECTION, SOLUTION INTRAVENOUS at 09:39

## 2024-02-04 RX ADMIN — LIDOCAINE HYDROCHLORIDE 50 MG: 20 INJECTION, SOLUTION EPIDURAL; INFILTRATION; INTRACAUDAL; PERINEURAL at 08:36

## 2024-02-04 RX ADMIN — ROCURONIUM BROMIDE 50 MG: 10 INJECTION, SOLUTION INTRAVENOUS at 08:36

## 2024-02-04 RX ADMIN — DEXAMETHASONE SODIUM PHOSPHATE 10 MG: 10 INJECTION, SOLUTION INTRAMUSCULAR; INTRAVENOUS at 08:36

## 2024-02-04 RX ADMIN — ACETAMINOPHEN 325MG 975 MG: 325 TABLET ORAL at 14:36

## 2024-02-04 RX ADMIN — SODIUM CHLORIDE, SODIUM LACTATE, POTASSIUM CHLORIDE, AND CALCIUM CHLORIDE 100 ML/HR: .6; .31; .03; .02 INJECTION, SOLUTION INTRAVENOUS at 21:34

## 2024-02-04 RX ADMIN — KETOROLAC TROMETHAMINE 15 MG: 30 INJECTION, SOLUTION INTRAMUSCULAR; INTRAVENOUS at 10:18

## 2024-02-04 RX ADMIN — DOCUSATE SODIUM 100 MG: 100 CAPSULE, LIQUID FILLED ORAL at 17:28

## 2024-02-04 RX ADMIN — SODIUM CHLORIDE, SODIUM LACTATE, POTASSIUM CHLORIDE, AND CALCIUM CHLORIDE: .6; .31; .03; .02 INJECTION, SOLUTION INTRAVENOUS at 08:30

## 2024-02-04 RX ADMIN — SUGAMMADEX 200 MG: 100 INJECTION, SOLUTION INTRAVENOUS at 10:18

## 2024-02-04 RX ADMIN — HEPARIN SODIUM 5000 UNITS: 5000 INJECTION INTRAVENOUS; SUBCUTANEOUS at 14:38

## 2024-02-04 RX ADMIN — HEPARIN SODIUM 5000 UNITS: 5000 INJECTION INTRAVENOUS; SUBCUTANEOUS at 21:36

## 2024-02-04 RX ADMIN — Medication 150 MCG: at 09:46

## 2024-02-04 RX ADMIN — TRANEXAMIC ACID 1000 MG: 10 INJECTION, SOLUTION INTRAVENOUS at 08:53

## 2024-02-04 RX ADMIN — ASPIRIN 81 MG: 81 TABLET, COATED ORAL at 20:35

## 2024-02-04 RX ADMIN — ONDANSETRON 4 MG: 2 INJECTION INTRAMUSCULAR; INTRAVENOUS at 08:36

## 2024-02-04 RX ADMIN — GABAPENTIN 300 MG: 300 CAPSULE ORAL at 21:36

## 2024-02-04 RX ADMIN — FENTANYL CITRATE 50 MCG: 50 INJECTION, SOLUTION INTRAMUSCULAR; INTRAVENOUS at 08:36

## 2024-02-04 RX ADMIN — PROPOFOL 100 MG: 10 INJECTION, EMULSION INTRAVENOUS at 08:36

## 2024-02-04 RX ADMIN — SODIUM CHLORIDE 125 ML/HR: 0.9 INJECTION, SOLUTION INTRAVENOUS at 04:44

## 2024-02-04 RX ADMIN — MORPHINE SULFATE 2 MG: 2 INJECTION, SOLUTION INTRAMUSCULAR; INTRAVENOUS at 06:05

## 2024-02-04 RX ADMIN — SODIUM CHLORIDE, SODIUM LACTATE, POTASSIUM CHLORIDE, AND CALCIUM CHLORIDE 100 ML/HR: .6; .31; .03; .02 INJECTION, SOLUTION INTRAVENOUS at 12:39

## 2024-02-04 RX ADMIN — CARVEDILOL 12.5 MG: 12.5 TABLET, FILM COATED ORAL at 07:18

## 2024-02-04 RX ADMIN — ATORVASTATIN CALCIUM 40 MG: 40 TABLET, FILM COATED ORAL at 17:28

## 2024-02-04 RX ADMIN — FENTANYL CITRATE 50 MCG: 50 INJECTION, SOLUTION INTRAMUSCULAR; INTRAVENOUS at 09:10

## 2024-02-04 RX ADMIN — SENNOSIDES 8.6 MG: 8.6 TABLET, FILM COATED ORAL at 14:36

## 2024-02-04 RX ADMIN — CEFAZOLIN SODIUM 2000 MG: 2 SOLUTION INTRAVENOUS at 08:50

## 2024-02-04 NOTE — ASSESSMENT & PLAN NOTE
"Patient admitted with severe sepsis as evidenced by tachycardia, leukocytosis of 18.7 K likely due to to UTI.  CT chest abdomen pelvis showed-\"Impacted subcapital fracture of the right femur.  Mild circumferential bladder wall thickening with mild perivesical inflammatory fat stranding, which may relate to cystitis. Recommend correlation with urinalysis.\"  Lactate is 2.9  Procalcitonin 0.12  Follow-up blood and urine culture results  IV fluids  Lactic acidosis resolved  On Rocephin  Trend WBC and fever curve  "

## 2024-02-04 NOTE — ASSESSMENT & PLAN NOTE
Patient has history of CVAs  Patient was on Eliquis but  states that he stopped her Eliquis around August 2023 on his own because they could not afford it  Cardiology recommending restarting Eliquis if family agrees  Continue on statin

## 2024-02-04 NOTE — DISCHARGE INSTR - AVS FIRST PAGE
Dr. Goldman Hip Replacement    What to Expect/Activity  You are weight bearing as tolerated to your operative leg unless otherwise instructed. Use your walker or crutches. It is important to get up and walk for 10 minutes every hour, if possible.  Initial recovery therapy is focused on walking. If in your follow-up a referral to formal physical therapy is required, this will be provided based on your recovery.  You may sleep however you would like. Many patients feel more comfortable with a pillow between their legs and find it uncomfortably to lay on the operative side. If you do roll on that side at night you will not damage the replacement.  You may use a regular or elevated toilet seat, whichever is more comfortable.  We do not routinely require any specific precautions in terms of positioning of your leg and if so this will be taught to you by the physical therapists at the hospital. This means that you can dress as you are comfortable, including shoes and socks. You can bend down carefully to get items from the floor.   Swelling and discomfort causes pain. Elevate your surgical leg on 1-2 pillows placed behind your ankle/calf throughout the day, especially when you are laying down.  Please use ice packs for 20-30 minutes every 1-2 hours for 48-72 hours on the operative hip. Please make sure there is a barrier directly between your skin and your ice/ice pack.  Please use incentive spirometer 10 times per hour while awake (see diagram below).    Dressing/Wound Care/Bathing  There is a surgical dressing over your incision that stays in place until follow-up unless it starts to peel off.   You may start showering 24 hours after surgery, the surgical dressing will remain in place. Please pat the dressing dry. If you notice the dressing appears saturated or is starting to come off, please replace with a dry dressing.  Keep the dressing on until your follow-up in the office.   There may be dried blood around the  incision. It is ok to continue showering after removing the dressing but do not scrub the incision. Pat incision dry.  Do not place any creams, ointments or gels on or around the incision.  No baths, swimming or submerging until cleared by Dr. Goldman.    Pain Management/Medications  You may resume your usual medications.  Please take the following medications:  Anti-coagulation (blood clot prevention) - already on Eliquis given history of afib  Pain medication:  Narcotic Medication: Take as directed - you did not require any narcotics while in the hospital  Tylenol 1000mg every 8 hours  Zofran (ondasetron) - 4mg every 8 hours as needed for nausea  Stool Softeners (senna/colace)- take daily to help prevent constipation as narcotic pain medication causes constipation  Antibiotic - take as directed if prescribed  If you have questions or pain concerns, please contact the office. Pain medication cannot remove all post-operative pain.    Follow up/Call if:  The findings of your surgery will be explained to you and your family immediately after surgery. However, in the post-operative period, during recovery from anesthesia you may not fully remember or fully understand what was said. We will go over this again when you return for your post-op appointment.  Please contact Dr. Goldman's office if you experience the following:  Excessive bleeding (bleeding through your dressing)  Fever greater than 101 degrees F after the first 2 days (a slight fever is normal the first few days after surgery)  Persistent nausea or vomiting  Decreased sensation or discoloration of the operative limb  Pain or swelling that is getting worse and not better with medication    Dr. Goldman's Office Contact: [phone]

## 2024-02-04 NOTE — CONSULTS
Consultation - Cardiology   Lo Talley 78 y.o. female MRN: [id number]  Unit/Bed#: [facility] Encounter: [id number]  02/04/24  9:34 AM        Impression:    78-year-old with history of hypertension, prior CVA, cerebral amyloid angiopathy, paroxysmal atrial fibrillation, was on apixaban for anticoagulation after admission for strokes in June 2023 and had been seen by neurology as well in July 2023 and this was continued, now admitted with fall in the setting of weakness, also in the setting of UTI and sepsis and in the setting having sustained a right sided subcapital fracture of the right femur for which she is in the OR for total hip arthroplasty currently.   had taken her off apixaban for unclear reasons.    Of note, I was unable to reach the patient, she had been taken to the OR and her  was not in the room, called her number, her home number and her 's phone number as well, and left message.    Plan:    Atrial fibrillation: At baseline was on carvedilol 12.5 mg twice,  ECG shows atrial fibrillation with elevated heart rates, since blood pressures are normal to hypertensive, can increase Coreg to 25 mg twice daily, I suspect that with intervention on the fractured femur, heart rate should improve.  As to anticoagulation-would recommend going back on apixaban to prevent strokes,  it is unclear why her apixaban was discontinued by her .  I tried to reach them as noted above.  Watchman device was considered by Dr. Garcia of neurology at 1 point but unclear where this discussion went.  After discharge, she will be going to rehab, hence her fall risk might be low,  this option could be discussed at her follow-up visit, after recuperating    Hypertension: Mildly elevated, watch after return from the OR    Prior history of stroke: Cardioembolic, also with cerebral amyloid angiopathy, on Eliquis at baseline.  CT this admission without acute strokes.    Fall: Appears to have been mechanical  fall in the setting of generalized weakness,-was putting her pants on and lost balance.  Not on telemetry currently.  No bradycardia on vitals.      Assessment:  Principal Problem:    Severe sepsis (HCC)  Active Problems:    History of multiple strokes    Atrial fibrillation (HCC)    Femur fracture, right (HCC)    UTI (urinary tract infection)    HTN (hypertension)    Fall    Chief Complaint   Patient presents with    Weakness - Generalized     Patient presents to the ER via EMS with a strong odor of urine and a report of the patient having had a fall Thursday night in her home.       Admitting diagnosis:  Confusion [R41.0]  Gingivitis [K05.10]  Hip fracture (HCC) [S72.009A]  UTI (urinary tract infection) [N39.0]  Weakness [R53.1]  Fall, initial encounter [W19.XXXA]  Severe sepsis (HCC) [A41.9, R65.20]    History of Present Illness   Physician Requesting Consult: Anshul Antoine MD   Reason for Consult / Principal Problem: Afib,   HPI: Lo Talley is a 78 with a history of hypertension, prior CVA, cerebral amyloid angiopathy, with a history of hypertension, prior CVA, cerebral amyloid angiopathy, residual speech issues, paroxysmal atrial fibrillation-on Eliquis, hypertension, dyslipidemia, prior stroke-in June 2023, discharged on apixaban at that time.,  Had established with's-cardiology in July 2023 and her apixaban was continued for suspected cardioembolic CVA in the setting of atrial fibrillation, and had seen Dr. Garcia of neurology as well in July 2023 but a Watchman device was also considered.    She is now admitted after a fall, with generalized weakness, subsequently in the hospital-meeting sepsis criteria and also with a UTI and in that setting of fall-sustained a subcapital fracture of the right femur for which she is in the OR now for total hip arthroplasty.  It appears that has been had stopped her anticoagulation with Eliquis on his own.    Of note, I was unable to reach the patient, she had been  taken to the OR and her  was not in the room, called her number, her home number and her 's phone number as well, and left message.  History was obtained from the chart.  I was unable to examine the patient    Inpatient consult to Cardiology  Consult performed by: Jaz Dey MD  Consult ordered by: Anshul Antoine MD          Review of Systems:  Unable to talk to patient or family  Review of Systems   Unable to perform ROS: Dementia       Historical Information   Past Medical History:   Diagnosis Date    History of stroke 6/14/2023     History reviewed. No pertinent surgical history.  Social History     Substance and Sexual Activity   Alcohol Use Not Currently    Alcohol/week: 1.0 standard drink of alcohol    Types: 1 Glasses of wine per week    Comment: stopped since hospitalization     Social History     Substance and Sexual Activity   Drug Use Not Currently     Social History     Tobacco Use   Smoking Status Never   Smokeless Tobacco Never     Family History: History reviewed. No pertinent family history.  No family history of premature CAD or Sudden Cardiac Death    Meds/Allergies     Current Facility-Administered Medications:     [Transfer Hold] acetaminophen (TYLENOL) tablet 650 mg, 650 mg, Oral, Q6H PRN, Anshul Antoine MD    [Transfer Hold] amLODIPine (NORVASC) tablet 10 mg, 10 mg, Oral, Daily, Anshul Antoine MD    [Transfer Hold] atorvastatin (LIPITOR) tablet 40 mg, 40 mg, Oral, Daily With Dinner, Anshul Antoine MD, 40 mg at 02/03/24 1700    [Transfer Hold] carvedilol (COREG) tablet 12.5 mg, 12.5 mg, Oral, BID With Meals, Anshul Antoine MD, 12.5 mg at 02/04/24 0718    [Transfer Hold] ceFAZolin (ANCEF) IVPB (premix in dextrose) 2,000 mg 50 mL, 2,000 mg, Intravenous, On Call To OR, Kevon Goldman DO    [Transfer Hold] cefTRIAXone (ROCEPHIN) IVPB (premix in dextrose) 1,000 mg 50 mL, 1,000 mg, Intravenous, Q24H, Anshul Antoine MD    [Transfer Hold] docusate sodium  "(COLACE) capsule 100 mg, 100 mg, Oral, BID, Anshul Antoine MD, 100 mg at 02/03/24 1701    [Transfer Hold] metoprolol (LOPRESSOR) injection 5 mg, 5 mg, Intravenous, Q6H PRN, Anshul Antoine MD    [Transfer Hold] morphine injection 2 mg, 2 mg, Intravenous, Q8H PRN, Anshul Antoine MD, 2 mg at 02/04/24 0605    [Transfer Hold] oxyCODONE (ROXICODONE) IR tablet 5 mg, 5 mg, Oral, Q6H PRN, Anshul Antoine MD, 5 mg at 02/03/24 1830    sodium chloride 0.9 % infusion, 125 mL/hr, Intravenous, Continuous, Anshul Antoine MD, Last Rate: 125 mL/hr at 02/04/24 0444, 125 mL/hr at 02/04/24 0444    Facility-Administered Medications Ordered in Other Encounters:     ceFAZolin (ANCEF) IVPB (premix in dextrose), , Intravenous, PRN, Gini Yu CRNA, 2,000 mg at 02/04/24 0850    dexamethasone (PF) (DECADRON) injection, , Intravenous, PRN, Gini Yu CRNA, 10 mg at 02/04/24 0836    fentanyl citrate (PF) 100 MCG/2ML, , Intravenous, PRN, Gini Yu CRNA, 50 mcg at 02/04/24 0910    HYDROmorphone (PF) (DILAUDID) 1 mg/mL injection, , Intravenous, PRN, Gini Yu CRNA, 0.5 mg at 02/04/24 0911    lactated ringers infusion, , Intravenous, Continuous PRN, Gini Yu CRNA, New Bag at 02/04/24 0830    lidocaine (PF) (XYLOCAINE-MPF) 2 % injection, , Intravenous, PRN, Gini Yu CRNA, 50 mg at 02/04/24 0836    ondansetron (ZOFRAN) injection, , Intravenous, PRN, Gini Yu CRNA, 4 mg at 02/04/24 0836    propofol (DIPRIVAN) 200 MG/20ML bolus injection, , Intravenous, PRN, Gini Yu CRNA, 100 mg at 02/04/24 0836    ROCuronium (ZEMURON) injection, , Intravenous, PRN, Gini Yu CRNA, 50 mg at 02/04/24 0836  Allergies   Allergen Reactions    Penicillins Other (See Comments)     Childhood reaction       Objective   Vitals: Blood pressure 146/83, pulse 105, temperature 99.5 °F (37.5 °C), resp. rate 14, height 5' 6\" (1.676 m), weight 52.6 kg (115 lb 15.4 oz), " SpO2 97%., Body mass index is 18.72 kg/m².,   Orthostatic Blood Pressures      Flowsheet Row Most Recent Value   Blood Pressure 146/83 filed at 02/04/2024 0800   Patient Position - Orthostatic VS Lying filed at 02/04/2024 0644              Intake/Output Summary (Last 24 hours) at 2/4/2024 0934  Last data filed at 2/4/2024 0446  Gross per 24 hour   Intake 1050 ml   Output 650 ml   Net 400 ml       Weight (last 2 days)       Date/Time Weight    02/04/24 0600 52.6 (115.96)    02/03/24 1426 52.6 (116)    02/03/24 1104 52.6 (116)            Invasive Devices       Peripheral Intravenous Line  Duration             Peripheral IV 02/03/24 Right Antecubital <1 day    Peripheral IV 02/04/24 Dorsal (posterior);Left Forearm <1 day              Airway  Duration             ETT  Cuffed;Oral 7 mm <1 day                    Unable to examine patient-she was in the OR  Lab Results:   Admission on 02/03/2024   Component Date Value    Ventricular Rate 02/03/2024 111     Atrial Rate 02/03/2024 93     QRSD Interval 02/03/2024 64     QT Interval 02/03/2024 344     QTC Interval 02/03/2024 467     QRS Axis 02/03/2024 54     T Wave Axis 02/03/2024 46     ABO Grouping 02/03/2024 B     Rh Factor 02/03/2024 Negative     Antibody Screen 02/03/2024 Negative     Specimen Expiration Date 02/03/2024 [id number]     WBC 02/03/2024 18.79 (H)     RBC 02/03/2024 4.61     Hemoglobin 02/03/2024 14.2     Hematocrit 02/03/2024 42.4     MCV 02/03/2024 92     MCH 02/03/2024 30.8     MCHC 02/03/2024 33.5     RDW 02/03/2024 13.2     MPV 02/03/2024 9.4     Platelets 02/03/2024 449 (H)     nRBC 02/03/2024 0     Neutrophils Relative 02/03/2024 89 (H)     Immat GRANS % 02/03/2024 1     Lymphocytes Relative 02/03/2024 6 (L)     Monocytes Relative 02/03/2024 4     Eosinophils Relative 02/03/2024 0     Basophils Relative 02/03/2024 0     Neutrophils Absolute 02/03/2024 16.77 (H)     Immature Grans Absolute 02/03/2024 0.11     Lymphocytes Absolute 02/03/2024 1.19      Monocytes Absolute 02/03/2024 0.67     Eosinophils Absolute 02/03/2024 0.01     Basophils Absolute 02/03/2024 0.04     Sodium 02/03/2024 141     Potassium 02/03/2024 4.2     Chloride 02/03/2024 105     CO2 02/03/2024 24     ANION GAP 02/03/2024 12     BUN 02/03/2024 23     Creatinine 02/03/2024 0.56 (L)     Glucose 02/03/2024 122     Calcium 02/03/2024 9.5     AST 02/03/2024 18     ALT 02/03/2024 18     Alkaline Phosphatase 02/03/2024 80     Total Protein 02/03/2024 7.2     Albumin 02/03/2024 4.1     Total Bilirubin 02/03/2024 1.30 (H)     eGFR 02/03/2024 90     Total CK 02/03/2024 188     Protime 02/03/2024 15.1 (H)     INR 02/03/2024 1.14     PTT 02/03/2024 29     Color, UA 02/03/2024 Yellow     Clarity, UA 02/03/2024 Cloudy     Specific Gravity, UA 02/03/2024 >=1.030     pH, UA 02/03/2024 5.5     Leukocytes, UA 02/03/2024 Large (A)     Nitrite, UA 02/03/2024 Negative     Protein, UA 02/03/2024 100 (2+) (A)     Glucose, UA 02/03/2024 Negative     Ketones, UA 02/03/2024 Trace (A)     Urobilinogen, UA 02/03/2024 <2.0     Bilirubin, UA 02/03/2024 Negative     Occult Blood, UA 02/03/2024 Moderate (A)     hs TnI 0hr 02/03/2024 11     Blood Culture 02/03/2024 Received in Microbiology Lab. Culture in Progress.     Blood Culture 02/03/2024 Received in Microbiology Lab. Culture in Progress.     LACTIC ACID 02/03/2024 2.9 (HH)     Procalcitonin 02/03/2024 0.12     SARS-CoV-2 02/03/2024 Negative     INFLUENZA A PCR 02/03/2024 Negative     INFLUENZA B PCR 02/03/2024 Negative     RSV PCR 02/03/2024 Negative     RBC, UA 02/03/2024 10-20 (A)     WBC, UA 02/03/2024 30-50 (A)     Epithelial Cells 02/03/2024 None Seen     Bacteria, UA 02/03/2024 Innumerable (A)     ABO Grouping 02/03/2024 B     Rh Factor 02/03/2024 Negative     LACTIC ACID 02/03/2024 2.7 (HH)     hs TnI 2hr 02/03/2024 11     Delta 2hr hsTnI 02/03/2024 0     hs TnI 4hr 02/03/2024 11     Delta 4hr hsTnI 02/03/2024 0     LACTIC ACID 02/03/2024 2.1 (HH)     LACTIC ACID  "02/03/2024 2.5 (HH)     LACTIC ACID 02/03/2024 1.5     PTT 02/04/2024 29     WBC 02/04/2024 14.68 (H)     RBC 02/04/2024 4.03     Hemoglobin 02/04/2024 12.6     Hematocrit 02/04/2024 37.7     MCV 02/04/2024 94     MCH 02/04/2024 31.3     MCHC 02/04/2024 33.4     RDW 02/04/2024 13.2     MPV 02/04/2024 9.2     Platelets 02/04/2024 349     nRBC 02/04/2024 0     Neutrophils Relative 02/04/2024 80 (H)     Immat GRANS % 02/04/2024 0     Lymphocytes Relative 02/04/2024 15     Monocytes Relative 02/04/2024 5     Eosinophils Relative 02/04/2024 0     Basophils Relative 02/04/2024 0     Neutrophils Absolute 02/04/2024 11.72 (H)     Immature Grans Absolute 02/04/2024 0.06     Lymphocytes Absolute 02/04/2024 2.13     Monocytes Absolute 02/04/2024 0.68     Eosinophils Absolute 02/04/2024 0.05     Basophils Absolute 02/04/2024 0.04     Sodium 02/04/2024 140     Potassium 02/04/2024 3.5     Chloride 02/04/2024 110 (H)     CO2 02/04/2024 22     ANION GAP 02/04/2024 8     BUN 02/04/2024 14     Creatinine 02/04/2024 0.39 (L)     Glucose 02/04/2024 96     Calcium 02/04/2024 8.4     AST 02/04/2024 18     ALT 02/04/2024 12     Alkaline Phosphatase 02/04/2024 61     Total Protein 02/04/2024 6.5     Albumin 02/04/2024 3.5     Total Bilirubin 02/04/2024 1.06 (H)     eGFR 02/04/2024 102     Magnesium 02/04/2024 1.9     Phosphorus 02/04/2024 2.9     Procalcitonin 02/04/2024 0.09     Protime 02/04/2024 15.3 (H)     INR 02/04/2024 1.17          Imaging: I have personally reviewed pertinent reports.        EKG/Telemetry: Atrial fibrillation-mildly elevated rates    Counseling / Coordination of Care    Thank you for allowing us to participate in their care.     This note was completed in part utilizing [employer] direct voice recognition software.   Grammatical errors, random word insertion, spelling mistakes, occasional wrong word or \"sound-alike\" substitutions and incomplete sentences may be an occasional consequence of the system " secondary to software limitations, ambient noise and hardware issues. At the time of dictation, efforts were made to edit, clarify and /or correct errors.  Please read the chart carefully and recognize, using context, where substitutions have occurred.  If you have any questions or concerns about the context, text or information contained within the body of this dictation, please contact myself, the provider, for further clarification.    Jaz Dey MD

## 2024-02-04 NOTE — ASSESSMENT & PLAN NOTE
On Coreg for rate control  Not on any anticoagulation as  stopped Eliquis in August on his own

## 2024-02-04 NOTE — PLAN OF CARE
Problem: PAIN - ADULT  Goal: Verbalizes/displays adequate comfort level or baseline comfort level  Description: Interventions:  - Encourage patient to monitor pain and request assistance  - Assess pain using appropriate pain scale  - Administer analgesics based on type and severity of pain and evaluate response  - Implement non-pharmacological measures as appropriate and evaluate response  - Consider cultural and social influences on pain and pain management  - Notify physician/advanced practitioner if interventions unsuccessful or patient reports new pain  Outcome: Progressing     Problem: INFECTION - ADULT  Goal: Absence or prevention of progression during hospitalization  Description: INTERVENTIONS:  - Assess and monitor for signs and symptoms of infection  - Monitor lab/diagnostic results  - Monitor all insertion sites, i.e. indwelling lines, tubes, and drains  - Monitor endotracheal if appropriate and nasal secretions for changes in amount and color  - [place] appropriate cooling/warming therapies per order  - Administer medications as ordered  - Instruct and encourage patient and family to use good hand hygiene technique  - Identify and instruct in appropriate isolation precautions for identified infection/condition  Outcome: Progressing  Goal: Absence of fever/infection during neutropenic period  Description: INTERVENTIONS:  - Monitor WBC    Outcome: Progressing     Problem: SAFETY ADULT  Goal: Patient will remain free of falls  Description: INTERVENTIONS:  - Educate patient/family on patient safety including physical limitations  - Instruct patient to call for assistance with activity   - Consult OT/PT to assist with strengthening/mobility   - Keep Call bell within reach  - Keep bed low and locked with side rails adjusted as appropriate  - Keep care items and personal belongings within reach  - Initiate and maintain comfort rounds  - Make Fall Risk Sign visible to staff  - Offer Toileting every 2 Hours,  in advance of need  - Initiate/Maintain bed alarm  - Obtain necessary fall risk management equipment  - Apply yellow socks and bracelet for high fall risk patients  - Consider moving patient to room near nurses station  Outcome: Progressing  Goal: Maintain or return to baseline ADL function  Description: INTERVENTIONS:  -  Assess patient's ability to carry out ADLs; assess patient's baseline for ADL function and identify physical deficits which impact ability to perform ADLs (bathing, care of mouth/teeth, toileting, grooming, dressing, etc.)  - Assess/evaluate cause of self-care deficits   - Assess range of motion  - Assess patient's mobility; develop plan if impaired  - Assess patient's need for assistive devices and provide as appropriate  - Encourage maximum independence but intervene and supervise when necessary  - Involve family in performance of ADLs  - Assess for home care needs following discharge   - Consider OT consult to assist with ADL evaluation and planning for discharge  - Provide patient education as appropriate  Outcome: Progressing  Goal: Maintains/Returns to pre admission functional level  Description: INTERVENTIONS:  - Perform AM-PAC 6 Click Basic Mobility/ Daily Activity assessment daily.  - Set and communicate daily mobility goal to care team and patient/family/caregiver.   - Collaborate with rehabilitation services on mobility goals if consulted  - Perform Range of Motion 3 times a day.  - Reposition patient every 2 hours.  - Dangle patient 3 times a day  - Stand patient 3 times a day  - Ambulate patient 3 times a day  - Out of bed to chair 3 times a day   - Out of bed for meals 3 times a day  - Out of bed for toileting  - Record patient progress and toleration of activity level   Outcome: Progressing     Problem: DISCHARGE PLANNING  Goal: Discharge to home or other facility with appropriate resources  Description: INTERVENTIONS:  - Identify barriers to discharge w/patient and caregiver  -  Arrange for needed discharge resources and transportation as appropriate  - Identify discharge learning needs (meds, wound care, etc.)  - Arrange for interpretive services to assist at discharge as needed  - Refer to Case Management Department for coordinating discharge planning if the patient needs post-hospital services based on physician/advanced practitioner order or complex needs related to functional status, cognitive ability, or social support system  Outcome: Progressing     Problem: Knowledge Deficit  Goal: Patient/family/caregiver demonstrates understanding of disease process, treatment plan, medications, and discharge instructions  Description: Complete learning assessment and assess knowledge base.  Interventions:  - Provide teaching at level of understanding  - Provide teaching via preferred learning methods  Outcome: Progressing     Problem: METABOLIC, FLUID AND ELECTROLYTES - ADULT  Goal: Electrolytes maintained within normal limits  Description: INTERVENTIONS:  - Monitor labs and assess patient for signs and symptoms of electrolyte imbalances  - Administer electrolyte replacement as ordered  - Monitor response to electrolyte replacements, including repeat lab results as appropriate  - Instruct patient on fluid and nutrition as appropriate  Outcome: Progressing  Goal: Fluid balance maintained  Description: INTERVENTIONS:  - Monitor labs   - Monitor I/O and WT  - Instruct patient on fluid and nutrition as appropriate  - Assess for signs & symptoms of volume excess or deficit  Outcome: Progressing  Goal: Glucose maintained within target range  Description: INTERVENTIONS:  - Monitor Blood Glucose as ordered  - Assess for signs and symptoms of hyperglycemia and hypoglycemia  - Administer ordered medications to maintain glucose within target range  - Assess nutritional intake and initiate nutrition service referral as needed  Outcome: Progressing     Problem: SKIN/TISSUE INTEGRITY - ADULT  Goal: Skin  Integrity remains intact(Skin Breakdown Prevention)  Description: Assess:  -Perform Martin assessment every   -Clean and moisturize skin every   -Inspect skin when repositioning, toileting, and assisting with ADLS  -Assess under medical devices such as  every   -Assess extremities for adequate circulation and sensation     Bed Management:  -Have minimal linens on bed & keep smooth, unwrinkled  -Change linens as needed when moist or perspiring  -Avoid sitting or lying in one position for more than  hours while in bed  -Keep HOB at degrees     Toileting:  -Offer bedside commode  -Assess for incontinence every   -Use incontinent care products after each incontinent episode such as     Activity:  -Mobilize patient  times a day  -Encourage activity and walks on unit  -Encourage or provide ROM exercises   -Turn and reposition patient every  Hours  -Use appropriate equipment to lift or move patient in bed  -Instruct/ Assist with weight shifting every  when out of bed in chair  -Consider limitation of chair time  hour intervals    Skin Care:  -Avoid use of baby powder, tape, friction and shearing, hot water or constrictive clothing  -Relieve pressure over bony prominences using   -Do not massage red bony areas    Next Steps:  -Teach patient strategies to minimize risks such as    -Consider consults to  interdisciplinary teams such as   Outcome: Progressing  Goal: Incision(s), wounds(s) or drain site(s) healing without S/S of infection  Description: INTERVENTIONS  - Assess and document dressing, incision, wound bed, drain sites and surrounding tissue  - Provide patient and family education  - Perform skin care/dressing changes every   Outcome: Progressing  Goal: Pressure injury heals and does not worsen  Description: Interventions:  - Implement low air loss mattress or specialty surface (Criteria met)  - Apply silicone foam dressing  - Instruct/assist with weight shifting every  minutes when in chair   - Limit chair time to   hour intervals  - Use special pressure reducing interventions such as  when in chair   - Apply fecal or urinary incontinence containment device   - Perform passive or active ROM every   - Turn and reposition patient & offload bony prominences every  hours   - Utilize friction reducing device or surface for transfers   - Consider consults to  interdisciplinary teams such as   - Use incontinent care products after each incontinent episode such as   - Consider nutrition services referral as needed  Outcome: Progressing     Problem: HEMATOLOGIC - ADULT  Goal: Maintains hematologic stability  Description: INTERVENTIONS  - Assess for signs and symptoms of bleeding or hemorrhage  - Monitor labs  - Administer supportive blood products/factors as ordered and appropriate  Outcome: Progressing     Problem: MUSCULOSKELETAL - ADULT  Goal: Maintain or return mobility to safest level of function  Description: INTERVENTIONS:  - Assess patient's ability to carry out ADLs; assess patient's baseline for ADL function and identify physical deficits which impact ability to perform ADLs (bathing, care of mouth/teeth, toileting, grooming, dressing, etc.)  - Assess/evaluate cause of self-care deficits   - Assess range of motion  - Assess patient's mobility  - Assess patient's need for assistive devices and provide as appropriate  - Encourage maximum independence but intervene and supervise when necessary  - Involve family in performance of ADLs  - Assess for home care needs following discharge   - Consider OT consult to assist with ADL evaluation and planning for discharge  - Provide patient education as appropriate  Outcome: Progressing  Goal: Maintain proper alignment of affected body part  Description: INTERVENTIONS:  - Support, maintain and protect limb and body alignment  - Provide patient/ family with appropriate education  Outcome: Progressing     Problem: Prexisting or High Potential for Compromised Skin Integrity  Goal: Skin  integrity is maintained or improved  Description: INTERVENTIONS:  - Identify patients at risk for skin breakdown  - Assess and monitor skin integrity  - Assess and monitor nutrition and hydration status  - Monitor labs   - Assess for incontinence   - Turn and reposition patient  - Assist with mobility/ambulation  - Relieve pressure over bony prominences  - Avoid friction and shearing  - Provide appropriate hygiene as needed including keeping skin clean and dry  - Evaluate need for skin moisturizer/barrier cream  - Collaborate with interdisciplinary team   - Patient/family teaching  - Consider wound care consult   Outcome: Progressing

## 2024-02-04 NOTE — PLAN OF CARE
Problem: PAIN - ADULT  Goal: Verbalizes/displays adequate comfort level or baseline comfort level  Description: Interventions:  - Encourage patient to monitor pain and request assistance  - Assess pain using appropriate pain scale  - Administer analgesics based on type and severity of pain and evaluate response  - Implement non-pharmacological measures as appropriate and evaluate response  - Consider cultural and social influences on pain and pain management  - Notify physician/advanced practitioner if interventions unsuccessful or patient reports new pain  Outcome: Progressing     Problem: INFECTION - ADULT  Goal: Absence or prevention of progression during hospitalization  Description: INTERVENTIONS:  - Assess and monitor for signs and symptoms of infection  - Monitor lab/diagnostic results  - Monitor all insertion sites, i.e. indwelling lines, tubes, and drains  - Monitor endotracheal if appropriate and nasal secretions for changes in amount and color  - [place] appropriate cooling/warming therapies per order  - Administer medications as ordered  - Instruct and encourage patient and family to use good hand hygiene technique  - Identify and instruct in appropriate isolation precautions for identified infection/condition  Outcome: Progressing  Goal: Absence of fever/infection during neutropenic period  Description: INTERVENTIONS:  - Monitor WBC    Outcome: Progressing     Problem: SAFETY ADULT  Goal: Patient will remain free of falls  Description: INTERVENTIONS:  - Educate patient/family on patient safety including physical limitations  - Instruct patient to call for assistance with activity   - Consult OT/PT to assist with strengthening/mobility   - Keep Call bell within reach  - Keep bed low and locked with side rails adjusted as appropriate  - Keep care items and personal belongings within reach  - Initiate and maintain comfort rounds  - Make Fall Risk Sign visible to staff  - Offer Toileting every x Hours,  in advance of need  - Initiate/Maintain xalarm  - Obtain necessary fall risk management equipment: x  - Apply yellow socks and bracelet for high fall risk patients  - Consider moving patient to room near nurses station  Outcome: Progressing  Goal: Maintain or return to baseline ADL function  Description: INTERVENTIONS:  -  Assess patient's ability to carry out ADLs; assess patient's baseline for ADL function and identify physical deficits which impact ability to perform ADLs (bathing, care of mouth/teeth, toileting, grooming, dressing, etc.)  - Assess/evaluate cause of self-care deficits   - Assess range of motion  - Assess patient's mobility; develop plan if impaired  - Assess patient's need for assistive devices and provide as appropriate  - Encourage maximum independence but intervene and supervise when necessary  - Involve family in performance of ADLs  - Assess for home care needs following discharge   - Consider OT consult to assist with ADL evaluation and planning for discharge  - Provide patient education as appropriate  Outcome: Progressing  Goal: Maintains/Returns to pre admission functional level  Description: INTERVENTIONS:  - Perform AM-PAC 6 Click Basic Mobility/ Daily Activity assessment daily.  - Set and communicate daily mobility goal to care team and patient/family/caregiver.   - Collaborate with rehabilitation services on mobility goals if consulted  - Perform Range of Motion x times a day.  - Reposition patient every x hours.  - Dangle patient x times a day  - Stand patient x times a day  - Ambulate patient x times a day  - Out of bed to chair x times a day   - Out of bed for meals x times a day  - Out of bed for toileting  - Record patient progress and toleration of activity level   Outcome: Progressing     Problem: DISCHARGE PLANNING  Goal: Discharge to home or other facility with appropriate resources  Description: INTERVENTIONS:  - Identify barriers to discharge w/patient and caregiver  -  Arrange for needed discharge resources and transportation as appropriate  - Identify discharge learning needs (meds, wound care, etc.)  - Arrange for interpretive services to assist at discharge as needed  - Refer to Case Management Department for coordinating discharge planning if the patient needs post-hospital services based on physician/advanced practitioner order or complex needs related to functional status, cognitive ability, or social support system  Outcome: Progressing     Problem: Knowledge Deficit  Goal: Patient/family/caregiver demonstrates understanding of disease process, treatment plan, medications, and discharge instructions  Description: Complete learning assessment and assess knowledge base.  Interventions:  - Provide teaching at level of understanding  - Provide teaching via preferred learning methods  Outcome: Progressing     Problem: METABOLIC, FLUID AND ELECTROLYTES - ADULT  Goal: Electrolytes maintained within normal limits  Description: INTERVENTIONS:  - Monitor labs and assess patient for signs and symptoms of electrolyte imbalances  - Administer electrolyte replacement as ordered  - Monitor response to electrolyte replacements, including repeat lab results as appropriate  - Instruct patient on fluid and nutrition as appropriate  Outcome: Progressing  Goal: Fluid balance maintained  Description: INTERVENTIONS:  - Monitor labs   - Monitor I/O and WT  - Instruct patient on fluid and nutrition as appropriate  - Assess for signs & symptoms of volume excess or deficit  Outcome: Progressing  Goal: Glucose maintained within target range  Description: INTERVENTIONS:  - Monitor Blood Glucose as ordered  - Assess for signs and symptoms of hyperglycemia and hypoglycemia  - Administer ordered medications to maintain glucose within target range  - Assess nutritional intake and initiate nutrition service referral as needed  Outcome: Progressing     Problem: SKIN/TISSUE INTEGRITY - ADULT  Goal: Skin  Integrity remains intact(Skin Breakdown Prevention)  Description: Assess:  -Perform [place] assessment every x  -Clean and moisturize skin every x  -Inspect skin when repositioning, toileting, and assisting with ADLS  -Assess under medical devices such as x every x  -Assess extremities for adequate circulation and sensation     Bed Management:  -Have minimal linens on bed & keep smooth, unwrinkled  -Change linens as needed when moist or perspiring  -Avoid sitting or lying in one position for more than x hours while in bed  -Keep HOB at xdegrees     Toileting:  -Offer bedside commode  -Assess for incontinence every x  -Use incontinent care products after each incontinent episode such as x    Activity:  -Mobilize patient x times a day  -Encourage activity and walks on unit  -Encourage or provide ROM exercises   -Turn and reposition patient every x Hours  -Use appropriate equipment to lift or move patient in bed  -Instruct/ Assist with weight shifting every x when out of bed in chair  -Consider limitation of chair time x hour intervals    Skin Care:  -Avoid use of baby powder, tape, friction and shearing, hot water or constrictive clothing  -Relieve pressure over bony prominences using x  -Do not massage red bony areas    Next Steps:  -Teach patient strategies to minimize risks such as x   -Consider consults to  interdisciplinary teams such as x  Outcome: Progressing  Goal: Incision(s), wounds(s) or drain site(s) healing without S/S of infection  Description: INTERVENTIONS  - Assess and document dressing, incision, wound bed, drain sites and surrounding tissue  - Provide patient and family education  - Perform skin care/dressing changes every x  Outcome: Progressing  Goal: Pressure injury heals and does not worsen  Description: Interventions:  - Implement low air loss mattress or specialty surface (Criteria met)  - Apply silicone foam dressing  - Instruct/assist with weight shifting every x minutes when in chair   -  Limit chair time to x hour intervals  - Use special pressure reducing interventions such as x when in chair   - Apply fecal or urinary incontinence containment device   - Perform passive or active ROM every x  - Turn and reposition patient & offload bony prominences every x hours   - Utilize friction reducing device or surface for transfers   - Consider consults to  interdisciplinary teams such as xxx  - Use incontinent care products after each incontinent episode such as x  - Consider nutrition services referral as needed  Outcome: Progressing     Problem: HEMATOLOGIC - ADULT  Goal: Maintains hematologic stability  Description: INTERVENTIONS  - Assess for signs and symptoms of bleeding or hemorrhage  - Monitor labs  - Administer supportive blood products/factors as ordered and appropriate  Outcome: Progressing     Problem: MUSCULOSKELETAL - ADULT  Goal: Maintain or return mobility to safest level of function  Description: INTERVENTIONS:  - Assess patient's ability to carry out ADLs; assess patient's baseline for ADL function and identify physical deficits which impact ability to perform ADLs (bathing, care of mouth/teeth, toileting, grooming, dressing, etc.)  - Assess/evaluate cause of self-care deficits   - Assess range of motion  - Assess patient's mobility  - Assess patient's need for assistive devices and provide as appropriate  - Encourage maximum independence but intervene and supervise when necessary  - Involve family in performance of ADLs  - Assess for home care needs following discharge   - Consider OT consult to assist with ADL evaluation and planning for discharge  - Provide patient education as appropriate  Outcome: Progressing  Goal: Maintain proper alignment of affected body part  Description: INTERVENTIONS:  - Support, maintain and protect limb and body alignment  - Provide patient/ family with appropriate education  Outcome: Progressing     Problem: Prexisting or High Potential for Compromised Skin  Integrity  Goal: Skin integrity is maintained or improved  Description: INTERVENTIONS:  - Identify patients at risk for skin breakdown  - Assess and monitor skin integrity  - Assess and monitor nutrition and hydration status  - Monitor labs   - Assess for incontinence   - Turn and reposition patient  - Assist with mobility/ambulation  - Relieve pressure over bony prominences  - Avoid friction and shearing  - Provide appropriate hygiene as needed including keeping skin clean and dry  - Evaluate need for skin moisturizer/barrier cream  - Collaborate with interdisciplinary team   - Patient/family teaching  - Consider wound care consult   Outcome: Progressing

## 2024-02-04 NOTE — ANESTHESIA POSTPROCEDURE EVALUATION
Post-Op Assessment Note    CV Status:  Stable  Pain Score: 0    Pain management: adequate       Mental Status:  Arousable and sleepy   Hydration Status:  Stable   PONV Controlled:  Controlled   Airway Patency:  Patent     Post Op Vitals Reviewed: Yes    No anethesia notable event occurred.    Staff: CRNA               BP   141/72   Temp 97.6   Pulse 77   Resp 16   SpO2 98

## 2024-02-04 NOTE — ANESTHESIA PREPROCEDURE EVALUATION
Procedure:  ARTHROPLASTY HIP TOTAL (Right: Hip)    Relevant Problems   CARDIO   (+) Atrial fibrillation (HCC)   (+) HTN (hypertension)   (+) Hypertensive urgency      H/o multiple strokes - stopped eliquis on their own 8/2023 due to price ofm edication  Cognitive decline    Physical Exam    Airway    Mallampati score: II  TM Distance: <3 FB  Neck ROM: full     Dental   Comment: Poor dentition  Gingival hyperplasia L>R     Cardiovascular      Pulmonary      Other Findings  post-pubertal.       Latest Reference Range & Units 02/04/24 04:48   Sodium 135 - 147 mmol/L 140   Potassium 3.5 - 5.3 mmol/L 3.5   Chloride 96 - 108 mmol/L 110 (H)   Carbon Dioxide 21 - 32 mmol/L 22   ANION GAP mmol/L 8   BUN 5 - 25 mg/dL 14   Creatinine 0.60 - 1.30 mg/dL 0.39 (L)   GLUCOSE 65 - 140 mg/dL 96   Calcium 8.4 - 10.2 mg/dL 8.4   AST 13 - 39 U/L 18   ALT 7 - 52 U/L 12   ALK PHOS 34 - 104 U/L 61   Total Protein 6.4 - 8.4 g/dL 6.5   Albumin 3.5 - 5.0 g/dL 3.5   Total Bilirubin 0.20 - 1.00 mg/dL 1.06 (H)   GFR, Calculated ml/min/1.73sq m 102   Phosphorus 2.3 - 4.1 mg/dL 2.9   MAGNESIUM 1.9 - 2.7 mg/dL 1.9   (H): Data is abnormally high  (L): Data is abnormally low         Latest Reference Range & Units 02/04/24 04:48   WBC 4.31 - 10.16 Thousand/uL 14.68 (H)   RBC 3.81 - 5.12 Million/uL 4.03   Hemoglobin 11.5 - 15.4 g/dL 12.6   Hematocrit 34.8 - 46.1 % 37.7   MCV 82 - 98 fL 94   MCH 26.8 - 34.3 pg 31.3   MCHC 31.4 - 37.4 g/dL 33.4   RDW 11.6 - 15.1 % 13.2   Platelet Count 149 - 390 Thousands/uL 349   (H): Data is abnormally high       Latest Reference Range & Units 02/04/24 04:48   PROTIME 11.6 - 14.5 seconds 15.3 (H)   POCT INR 0.84 - 1.19  1.17   PTT 23 - 37 seconds 29   (H): Data is abnormally high          ECHO (6/2023)  Aortic Valve The aortic valve is trileaflet. The leaflets are not thickened. The leaflets are not calcified. The leaflets exhibit normal mobility. There is no evidence of regurgitation. The aortic valve has no  significant stenosis.     Left Ventricle: Left ventricular cavity size is normal. Wall thickness is moderately increased. The left ventricular ejection fraction is 55%. Systolic function is normal. Wall motion is normal. Diastolic function is normal.    Mitral Valve: There is mild regurgitation.    Tricuspid Valve: There is mild regurgitation.      Anesthesia Plan  ASA Score- 3     Anesthesia Type- general with ASA Monitors.         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       Plan Factors-Exercise tolerance (METS): <4 METS.    Chart reviewed. EKG reviewed.  Existing labs reviewed. Patient summary reviewed.    Patient is not a current smoker.              Induction- intravenous.    Postoperative Plan- Plan for postoperative opioid use. Planned trial extubation    Informed Consent- Anesthetic plan and risks discussed with patient.  I personally reviewed this patient with the CRNA. Discussed and agreed on the Anesthesia Plan with the CRNA..

## 2024-02-04 NOTE — OP NOTE
OPERATIVE REPORT  PATIENT NAME: Lo Talley  : 1945  MRN: [id number]  Pt Location:  [facility] OR ROOM 02    Surgery Date: 2024    Surgeons and Role:     * Kevon Goldman,  - Primary     * Ana Hughes PA-C - Assisting     * Anna Madrid PA-C - Assisting     Preop Diagnosis:  Hip fracture (HCC) [S72.009A]    Post-Op Diagnosis Codes:     * Hip fracture (HCC) [S72.009A]    Procedure(s):  Right - ARTHROPLASTY HIP TOTAL    Specimens:  * No specimens in log *    Estimated Blood Loss:   100 cc    Drains:  * No LDAs found *    Anesthesia Type:   General     Operative Indications:  Hip fracture (HCC) [S72.009A]  Patient is a 78-year-old female who had a mechanical fall at home 3 days ago.  She had pain in the right hip down to the thigh but did not want to be evaluated at that time.  She essentially sat at home on the couch as she did not particularly want to seek out medical attention.  She was then brought to the emergency department due to continued right hip pain and inability to walk.  X-rays and CAT scan were obtained which show an acute femoral neck fracture.  On the axial CT cuts she has approximately 60 degrees of posterior tilt angle of her femoral neck.  This is not conducive for percutaneous screw fixation and will require arthroplasty for definitive treatment.  She also has cartilage loss and sclerosis of the femoral head consistent with underlying arthritic changes.  Patient is a community ambulator at baseline.  Due to her underlying arthritic changes we will plan for total hip arthroplasty.  To note patient has urinary tract infection that she presented with and has been on antibiotics since presentation.  She was also hemoconcentrated on her initial labs.  She has been volume resuscitated and lactate has decreased. The patient has elected to proceed with right MARY through the lateral approach. Risks and benefits of surgery to include but not limited to bleeding, infection, damage to  surrounding structures, hardware failure, instability, fracture, dislocation, leg length inequality, need for further surgery, continued pain, stiffness, blood clots, stroke, and heart attack was discussed with the patient.     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 Item Serial No.  Lot No. LRB No. Used Action   ACETABULAR SHELL 3HL 48MM CMNTLS EMPHASYS - [id number]  ACETABULAR SHELL 3HL 48MM CMNTLS EMPHASYS  DEPUY [id number] Right 1 Implanted   SCREW NIKOLE 6.5 X 25MM SLF TAP PINNACLE - [id number]  SCREW NIKOLE 6.5 X 25MM SLF TAP PINNACLE  DEPUY [id number] Right 1 Implanted   SCREW NIKOLE 6.5 X 25MM SLF TAP PINNACLE - [id number]  SCREW NIKOLE 6.5 X 25MM SLF TAP PINNACLE  DEPUY [id number] Right 1 Implanted   LINER AOX NEUTRAL 48 X 36MM EMPHASYS - [id number]  LINER AOX NEUTRAL 48 X 36MM EMPHASYS  DEPUY [id number] Right 1 Implanted   CEMENT BONE SMART SET GRAY MED VISC - [id number]  CEMENT BONE SMART SET GRAY MED VISC  DEPUY [id number] Right 1 Implanted   CEMENT BONE SMART SET GRAY MED VISC - [id number]  CEMENT BONE SMART SET GRAY MED VISC  [id number]  Right 1 Implanted   PREPARATION KIT TOTAL HIP BIOPREP - [id number]  PREPARATION KIT TOTAL HIP BIOPREP  ELISA BURAK [id number] Right 1 Implanted   HEAD FEMORAL 12/14 TPR 36MM +1.5MM ARTICULEZE BIOLOX DELTA - [id number]  HEAD FEMORAL 12/14 TPR 36MM +1.5MM ARTICULEZE BIOLOX DELTA  DEPUY [id number] Right 1 Implanted     Complications:   None    Hip Approach: Direct lateral, modified hardinge     Procedure and Technique:  The patient seen in the preoperative area.  The informed consent was confirmed.  The operative site was confirmed and marked.  Patient was taken to the operating room and anesthesia was performed by the anesthesiologist.  Once anesthesia was complete the patient was placed in the lateral decubitus position with the  operative hip up held in place with stulberg hip positioners. All bony prominences were well-padded. The right lower extremity was prepped and draped in typical sterile fashion.  Perioperative antibiotics were given per scip protocol prior to incision.  A formal timeout was performed identifying the patient and surgical site.  A lateral incision slightly anterior to the greater trochanter was made through skin and subcutaneous tissues down to the level of the ITB fascia.  The ITB band was split in line with the greater trochanter.  At this time a Charnley retractor was placed being careful to avoid neurovascular structures. A modified Lopez approach was used taking down the anterior 1/3 of the gluteus minimus and medius reflecting it anterior off the capsule.  Retractors were then carefully placed around the capsule.  The anterior capsule was resected sharply. There was a large hematoma within the hip. A freshening cut of the femoral neck was made. The femoral head was removed from the acetabulum with a corkscrew. Acetabular retractors were carefully placed. The labrum was excised. Peripheral osteophytes were carefully removed with an osteotome. A 45 mm reamer was used to medialize to the medial wall. Sequential reamer was then performed up to a size 48 mm with excellent chatter. A 48 mm Emphasys cluster hole cup was impacted into place aiming for abduction of 40 degrees and anteversion of 15 degrees. The cup was confirmed to be well seated. An acetabular screw was placed to augment fixation up the posterior column. The neutral liner was impacted into place and assured to be well-seated. At this time acetabular retractors were removed. The proximal femur was now exposed. A canal finder was used followed by a box osteotome. The femur was sequentially reamed and then broached up to a size 5 summit cemented with excellent rotational stability. The calcar planner was then used. The hip was trialed with a +1.5 with  excellent stability in external rotation, flexion to 90 with IR of 70 and restoration of leg lengths. The trials were removed and the femur was irrigated. A cement restrictor was placed 2 cm below the planned depth of the stem. The canal was irrigated and dried. Cement was mixed. The stem was coated in cement and then the canal was filled and pressurized with cement. Peripheral cement was removed. The stem was held in place until the cement was cured. We then trialed the hip again with excellent stability with the + 1.5 head. The real +1.5 36 mm ceramic head was impacted onto a clean, dried trunion. The hip was then taken through range of motion and found have excellent stability.  The hip was irrigated with Irrisept solution followed by normal saline solution.  The abductors were at this time repaired with #1 vicryl suture to the greater trochanter through figure-of-eight sutures and bone tunnels.  This was then reinforced with running #1 Stratafix.  The ITB band was closed with 1. Stratafix.  The subcutaneous tissues closed with interrupted 2-0 Vicryl.  The skin was closed with 3-0 Stratafix suture followed by exofin.  All instrument counts and sponge counts were correct.  Patient was awoken from anesthesia in stable condition when taken to the recovery room.     I was present for the entire procedure, A qualified resident physician was not available and A physician assistant was required during the procedure for retraction tissue handling,dissection and suturing    Patient Disposition:  PACU      78F s/p R MARY 2/4  - multi-modal pain control  - periop abx x24 hrs  - DVT ppx: aspirin 81mg BID x 4 weeks   - PT/OT  - WBAT  - no hip precautions  - f/u 10-14 days     SIGNATURE: Kevon Goldman DO  DATE: February 4, 2024  TIME: 10:32 AM

## 2024-02-04 NOTE — ASSESSMENT & PLAN NOTE
Patient has positive UTI on admission  On Rocephin  Urine culture growing E. coli.  Follow-up final culture results  Trend WBC and fever curve

## 2024-02-04 NOTE — ASSESSMENT & PLAN NOTE
Patient with subcapital fracture of right femur  Patient underwent right hip arthroplasty by Dr. Goldman 2/4  ER spoke with orthopedics recommended medical admission and they will consult  IV fluids  Pain management as needed  Encourage incentive spirometry use  Trend H&H  DVT prophylaxis  PT/OT consult

## 2024-02-04 NOTE — PROGRESS NOTES
"[facility]  Progress Note  Name: Lo Talley I  MRN: [id number]  Unit/Bed#: -01 I Date of Admission: 2/3/2024   Date of Service: 2/4/2024 I Hospital Day: 1    Assessment/Plan   * Severe sepsis (HCC)  Assessment & Plan  Patient admitted with severe sepsis as evidenced by tachycardia, leukocytosis of 18.7 K likely due to to UTI.  CT chest abdomen pelvis showed-\"Impacted subcapital fracture of the right femur.  Mild circumferential bladder wall thickening with mild perivesical inflammatory fat stranding, which may relate to cystitis. Recommend correlation with urinalysis.\"  Lactate is 2.9  Procalcitonin 0.12  Follow-up blood and urine culture results  IV fluids  Lactic acidosis resolved  On Rocephin  Trend WBC and fever curve    Femur fracture, right (HCC)  Assessment & Plan  Patient with subcapital fracture of right femur  Patient underwent right hip arthroplasty by Dr. Goldman 2/4  ER spoke with orthopedics recommended medical admission and they will consult  IV fluids  Pain management as needed  Encourage incentive spirometry use  Trend H&H  DVT prophylaxis  PT/OT consult    Fall  Assessment & Plan  Patient with mechanical fall on February 1 evening  Presented with right hip pain  CT head-\" No acute intracranial abnormality.Stable chronic microangiopathic changes within the brain. Stable encephalomalacia of the inferior right frontal lobe.\"  No head injuries or any other injuries noted  Orthopedics consult  PT/OT      UTI (urinary tract infection)  Assessment & Plan  Patient has positive UTI on admission  On Rocephin  Urine culture growing E. coli.  Follow-up final culture results  Trend WBC and fever curve    HTN (hypertension)  Assessment & Plan  On Coreg, Norvasc and Zestril  Will hold Zestril  Hydralazine as needed  Monitor vitals as per protocol    Atrial fibrillation (HCC)  Assessment & Plan  On Coreg for rate control  Not on any anticoagulation as  stopped Eliquis in " "August on his own        History of multiple strokes  Assessment & Plan  Patient has history of CVAs  Patient was on Eliquis but  states that he stopped her Eliquis around August 2023 on his own because they could not afford it  Cardiology recommending restarting Eliquis if family agrees  Continue on statin             Labs & Imaging: I have personally reviewed pertinent reports.      VTE Prophylaxis: in place.    Code Status:   Level 1 - Full Code    Patient Centered Rounds: I have performed bedside rounds with nursing staff today.    Mobility:   Basic Mobility Inpatient Raw Score: 8  -HLM Goal: 3: Sit at edge of bed  JH-HLM Achieved: 2: Bed activities/Dependent transfer  HLM Goal NOT achieved. Continue with multidisciplinary rounding and encourage appropriate mobility to improve upon HLM goals.    Discussions with Specialists or Other Care Team Provider: Orthopedics    Education and Discussions with Family / Patient:  at bedside    Total Time Spent on Date of Encounter in care of patient: 35 mins. This time was spent on one or more of the following: performing physical exam; counseling and coordination of care; obtaining or reviewing history; documenting in the medical record; reviewing/ordering tests, medications or procedures; communicating with other healthcare professionals and discussing with patient's family/caregivers.    Current Length of Stay: 1 day(s)    Current Patient Status: Inpatient   Certification Statement: The patient will continue to require additional inpatient hospital stay due to see my assessment and plan.     Subjective:   Patient is seen and examined at bedside.  Pain is well-controlled.  No other complaints.  Afebrile  All other ROS are negative.    Objective:    Vitals: Blood pressure 123/82, pulse 105, temperature 97.9 °F (36.6 °C), resp. rate 12, height 5' 6\" (1.676 m), weight 52.6 kg (115 lb 15.4 oz), SpO2 98%.,Body mass index is 18.72 kg/m².  SPO2 RA Rest  "     Flowsheet Row ED to Hosp-Admission (Current) from 2/3/2024 in [facility] Med [facility]urg Unit   SpO2 98 %   SpO2 Activity At Rest   O2 Device Nasal cannula   O2 Flow Rate --          I&O:   Intake/Output Summary (Last 24 hours) at 2/4/2024 1330  Last data filed at 2/4/2024 0939  Gross per 24 hour   Intake 1000 ml   Output 650 ml   Net 350 ml       Physical Exam:    General- Alert, lying comfortably in bed. Not in any acute distress.  Neck- Supple, No JVD  CVS- irregular, S1 and S2 normal  Chest- Bilateral Air entry, No rhochi, crackles or wheezing present.  Abdomen- soft, nontender, not distended, no guarding or rigidity, BS+  Extremities-  No pedal edema, No calf tenderness  Right hip-dressing in place  CNS-   Alert, awake and orientedx3. No focal deficits present.    Invasive Devices       Peripheral Intravenous Line  Duration             Peripheral IV 02/03/24 Right Antecubital 1 day    Peripheral IV 02/04/24 Dorsal (posterior);Left Forearm <1 day                          Social History  reviewed  History reviewed. No pertinent family history. reviewed    Meds:  Current Facility-Administered Medications   Medication Dose Route Frequency Provider Last Rate Last Admin    acetaminophen (TYLENOL) tablet 650 mg  650 mg Oral Q6H PRN Ana Hughes PA-C        acetaminophen (TYLENOL) tablet 975 mg  975 mg Oral Q8H Ana Hughes PA-C        aluminum-magnesium hydroxide-simethicone (MAALOX) oral suspension 30 mL  30 mL Oral Q6H PRN Ana Hughes PA-C        amLODIPine (NORVASC) tablet 10 mg  10 mg Oral Daily Ana Hughes PA-C        aspirin (ECOTRIN LOW STRENGTH) EC tablet 81 mg  81 mg Oral BID Ana Hughes PA-C        atorvastatin (LIPITOR) tablet 40 mg  40 mg Oral Daily With Dinner Ana Hughes PA-C   40 mg at 02/03/24 1700    calcium carbonate (TUMS) chewable tablet 1,000 mg  1,000 mg Oral Daily PRN Ana Hughes PA-C        carvedilol (COREG) tablet 12.5 mg  12.5 mg Oral BID With  Meals Ana Hughes PA-C   12.5 mg at 02/04/24 0718    [Transfer Hold] ceFAZolin (ANCEF) IVPB (premix in dextrose) 2,000 mg 50 mL  2,000 mg Intravenous On Call To OR Kevon Goldman DO        cefTRIAXone (ROCEPHIN) IVPB (premix in dextrose) 1,000 mg 50 mL  1,000 mg Intravenous Q24H Ana Hughes PA-C        docusate sodium (COLACE) capsule 100 mg  100 mg Oral BID Ana Hughes PA-C   100 mg at 02/03/24 1701    gabapentin (NEURONTIN) capsule 300 mg  300 mg Oral HS Ana Hughes PA-C        lactated ringers bolus 1,000 mL  1,000 mL Intravenous Once PRN Ana Hughes PA-C        And    lactated ringers bolus 1,000 mL  1,000 mL Intravenous Once PRN Ana Hughes PA-C        lactated ringers infusion  100 mL/hr Intravenous Continuous Ana Hughes PA-C 100 mL/hr at 02/04/24 1239 100 mL/hr at 02/04/24 1239    metoprolol (LOPRESSOR) injection 5 mg  5 mg Intravenous Q6H PRN Ana Hughes PA-C        morphine injection 2 mg  2 mg Intravenous Q2H PRN Ana Hughes PA-C        ondansetron (ZOFRAN) injection 4 mg  4 mg Intravenous Q6H PRN Ana Hughes PA-C        oxyCODONE (ROXICODONE) IR tablet 5 mg  5 mg Oral Q4H PRN Ana Hughes PA-C        oxyCODONE (ROXICODONE) split tablet 2.5 mg  2.5 mg Oral Q6H PRN Ana Hughes PA-C        senna (SENOKOT) tablet 8.6 mg  1 tablet Oral Daily Ana Hughes PA-C        sodium chloride 0.9 % bolus 1,000 mL  1,000 mL Intravenous Once PRN Ana Hughes PA-C        And    sodium chloride 0.9 % bolus 1,000 mL  1,000 mL Intravenous Once PRN Ana Hughes PA-C        sodium chloride 0.9 % infusion  125 mL/hr Intravenous Continuous Anshul Antoine  mL/hr at 02/04/24 0444 125 mL/hr at 02/04/24 0444      Medications Prior to Admission   Medication    amLODIPine (NORVASC) 10 mg tablet    atorvastatin (LIPITOR) 40 mg tablet    carvedilol (COREG) 12.5 mg tablet    lisinopril (ZESTRIL) 40 mg tablet       Labs:  Results from last 7 days   Lab  Units 02/04/24  0448 02/03/24  1124   WBC Thousand/uL 14.68* 18.79*   HEMOGLOBIN g/dL 12.6 14.2   HEMATOCRIT % 37.7 42.4   PLATELETS Thousands/uL 349 449*   NEUTROS PCT % 80* 89*   LYMPHS PCT % 15 6*   MONOS PCT % 5 4   EOS PCT % 0 0     Results from last 7 days   Lab Units 02/04/24  0448 02/03/24  1124   POTASSIUM mmol/L 3.5 4.2   CHLORIDE mmol/L 110* 105   CO2 mmol/L 22 24   BUN mg/dL 14 23   CREATININE mg/dL 0.39* 0.56*   CALCIUM mg/dL 8.4 9.5   ALK PHOS U/L 61 80   ALT U/L 12 18   AST U/L 18 18     Lab Results   Component Value Date    CKTOTAL 188 02/03/2024     Results from last 7 days   Lab Units 02/04/24  0448 02/03/24  1124   INR  1.17 1.14     Lab Results   Component Value Date    BLOODCX Received in Microbiology Lab. Culture in Progress. 02/03/2024    BLOODCX Received in Microbiology Lab. Culture in Progress. 02/03/2024    URINECX >100,000 cfu/ml Escherichia coli (A) 02/03/2024    URINECX (A) 02/03/2024     10,000-19,000 cfu/ml Alpha Hemolytic Streptococcus NOT Enterococcus         Imaging:  Results for orders placed during the hospital encounter of 06/14/23    X-ray chest 1 view portable    Narrative  CHEST    INDICATION:   weakness.    COMPARISON:  None    EXAM PERFORMED/VIEWS:  XR CHEST PORTABLE      FINDINGS: Examination performed on a slightly rotated projection. Monitoring leads and clips project over the chest.    Cardiomediastinal silhouette appears unremarkable.    The lungs are clear.  No pneumothorax or pleural effusion.    Osseous structures appear within normal limits for patient age.    Impression  No acute cardiopulmonary disease.            Workstation performed: [id number]    No results found for this or any previous visit.      Last 24 Hours Medication List:   Current Facility-Administered Medications   Medication Dose Route Frequency Provider Last Rate    acetaminophen  650 mg Oral Q6H PRN Ana Hughes PA-C      acetaminophen  975 mg Oral Q8H Ana Hughes PA-C       aluminum-magnesium hydroxide-simethicone  30 mL Oral Q6H PRN Ana Hughes PA-C      amLODIPine  10 mg Oral Daily Ana Hughes PA-C      aspirin  81 mg Oral BID Ana Hughes PA-C      atorvastatin  40 mg Oral Daily With Dinner Ana Hughes PA-C      calcium carbonate  1,000 mg Oral Daily PRN Ana Hughes PA-C      carvedilol  12.5 mg Oral BID With Meals Ana Hughes PA-C      [Transfer Hold] cefazolin  2,000 mg Intravenous On Call To OR Kevon Goldman,       cefTRIAXone  1,000 mg Intravenous Q24H Ana Hughes PA-C      docusate sodium  100 mg Oral BID Ana Hughes PA-C      gabapentin  300 mg Oral HS Ana Hughes PA-C      lactated ringers  1,000 mL Intravenous Once PRN Ana Hughes PA-C      And    lactated ringers  1,000 mL Intravenous Once PRN Ana Hughes PA-C      lactated ringers  100 mL/hr Intravenous Continuous Ana Hughes PA-C 100 mL/hr (02/04/24 1239)    metoprolol  5 mg Intravenous Q6H PRN Ana Hughes PA-C      morphine injection  2 mg Intravenous Q2H PRN Ana Hughes PA-C      ondansetron  4 mg Intravenous Q6H PRN Ana Hughes PA-C      oxyCODONE  5 mg Oral Q4H PRN Ana Hughes PA-C      oxyCODONE  2.5 mg Oral Q6H PRN Ana Hughes PA-C      senna  1 tablet Oral Daily Ana Hughes PA-C      sodium chloride  1,000 mL Intravenous Once PRN Ana Hughes PA-C      And    sodium chloride  1,000 mL Intravenous Once PRN Ana Hughes PA-C      sodium chloride  125 mL/hr Intravenous Continuous Anshul Antoine  mL/hr (02/04/24 7014)        Today, Patient Was Seen By: Anshul Antoine MD    ** Please Note: Dictation voice to text software may have been used in the creation of this document. **

## 2024-02-04 NOTE — CONSULTS
Consultation - Orthopedics   Lo Talley 78 y.o. female MRN: [id number]  Unit/Bed#: -01 Encounter: [id number]      Assessment/Plan     Assessment:  78 y.o.female status post fall with right subcapital femoral neck fracture    Plan:   Non weight bearing right lower extremity  Analgesics for pain  Informed consent obtained  Risks and benefits of surgery to include but not limited to bleeding, infection, damage to surrounding structures, hardware failure, instability, fracture, dislocation, leg length inequality, need for further surgery, continued pain, stiffness, blood clots, stroke, and heart attack was discussed with the patient.   Case request placed.  Ancef and TXA on call to OR.  Pre op labs in ED  Maintain NPO status  Medicine for all medical management and preoperative risk stratification  To OR for lateral approach total hip arthroplasty  Dispo: Ortho will follow        History of Present Illness   Physician Requesting Consult: Anshul Antoine MD  Reason for Consult / Principal Problem: Right hip pain  HPI: Lo Talley is a 78 y.o. year old female who presents with Right hip pain after a fall 3 days ago.  She has been experiencing weakness, and when she was trying to pull her pants on, she lost her balance and experienced an unwitnessed fall. She developed pain in the hip after her fall, but was not taken to the ER at first.  She was then brought to the ER by her .  She has also been having significant urinary symptoms.  While in the ER, a CT of the abdomen and pelvis was performed showing that she has an impacted subcapital hip fracture on the right.  Xrays of the right hip also ordered showing subcap femoral neck fracture.  She is being treated for sepsis from a UTI and is currently on IV rocephin.    Consults    ROS: see HPI, all other systems negative.      Historical Information   Past Medical History:   Diagnosis Date    History of stroke 6/14/2023     History reviewed. No pertinent  "surgical history.  Social History   Social History     Substance and Sexual Activity   Alcohol Use Not Currently    Alcohol/week: 1.0 standard drink of alcohol    Types: 1 Glasses of wine per week    Comment: stopped since hospitalization     Social History     Substance and Sexual Activity   Drug Use Not Currently     Social History     Tobacco Use   Smoking Status Never   Smokeless Tobacco Never     Family History: History reviewed. No pertinent family history.    Meds/Allergies   Medications Prior to Admission   Medication    amLODIPine (NORVASC) 10 mg tablet    atorvastatin (LIPITOR) 40 mg tablet    carvedilol (COREG) 12.5 mg tablet    lisinopril (ZESTRIL) 40 mg tablet     No Known Allergies    Objective   Vitals: Blood pressure 132/95, pulse (!) 109, temperature 98.1 °F (36.7 °C), temperature source Temporal, resp. rate 20, height 5' 6\" (1.676 m), weight 52.6 kg (115 lb 15.4 oz), SpO2 98%.,Body mass index is 18.72 kg/m².      Intake/Output Summary (Last 24 hours) at 2/4/2024 0805  Last data filed at 2/4/2024 0446  Gross per 24 hour   Intake 1050 ml   Output 650 ml   Net 400 ml     I/O last 24 hours:  In: 1050 [IV Piggyback:1050]  Out: 650 [Urine:650]    Invasive Devices       Peripheral Intravenous Line  Duration             Peripheral IV 02/03/24 Right Antecubital <1 day    Peripheral IV 02/04/24 Dorsal (posterior);Left Forearm <1 day                    PE:  Gen:  Awake and alert  HEENT:  Hearing intact  Heart:  Regular rate  Lungs: no audible wheezing  GI: no abdominal distension  Physical Exam:     Musculoskeletal: right lower extremity  Skin intact  Tender to palpation over hip  ROM not assessed 2/2 known fracture  Sensation intact L3-S1  Positive ankle dorsi/plantar flexion, EHL/FHL  2+ DP/ PT pulse  Musculature is soft and compressible, no pain with passive stretch      Radiology:   I personally reviewed the films.  X-rays AP/Lateral views right hip shows subcapital femoral neck fracture      Code Status: " Level 1 - Full Code  Advance Directive and Living Will:      Power of :    POLST:         Ana Hughes PA-C

## 2024-02-05 LAB
ALBUMIN SERPL BCP-MCNC: 3.1 G/DL (ref 3.5–5)
ALP SERPL-CCNC: 54 U/L (ref 34–104)
ALT SERPL W P-5'-P-CCNC: 15 U/L (ref 7–52)
ANION GAP SERPL CALCULATED.3IONS-SCNC: 8 MMOL/L
AST SERPL W P-5'-P-CCNC: 17 U/L (ref 13–39)
BACTERIA UR CULT: ABNORMAL
BACTERIA UR CULT: ABNORMAL
BASOPHILS # BLD AUTO: 0.02 THOUSANDS/ÂΜL (ref 0–0.1)
BASOPHILS NFR BLD AUTO: 0 % (ref 0–1)
BILIRUB DIRECT SERPL-MCNC: 0.17 MG/DL (ref 0–0.2)
BILIRUB SERPL-MCNC: 0.79 MG/DL (ref 0.2–1)
BUN SERPL-MCNC: 21 MG/DL (ref 5–25)
CALCIUM ALBUM COR SERPL-MCNC: 9.3 MG/DL (ref 8.3–10.1)
CALCIUM SERPL-MCNC: 8.6 MG/DL (ref 8.4–10.2)
CHLORIDE SERPL-SCNC: 108 MMOL/L (ref 96–108)
CO2 SERPL-SCNC: 24 MMOL/L (ref 21–32)
CREAT SERPL-MCNC: 0.46 MG/DL (ref 0.6–1.3)
EOSINOPHIL # BLD AUTO: 0 THOUSAND/ÂΜL (ref 0–0.61)
EOSINOPHIL NFR BLD AUTO: 0 % (ref 0–6)
ERYTHROCYTE [DISTWIDTH] IN BLOOD BY AUTOMATED COUNT: 13.2 % (ref 11.6–15.1)
GFR SERPL CREATININE-BSD FRML MDRD: 96 ML/MIN/1.73SQ M
GLUCOSE SERPL-MCNC: 110 MG/DL (ref 65–140)
HCT VFR BLD AUTO: 35 % (ref 34.8–46.1)
HGB BLD-MCNC: 11.8 G/DL (ref 11.5–15.4)
IMM GRANULOCYTES # BLD AUTO: 0.08 THOUSAND/UL (ref 0–0.2)
IMM GRANULOCYTES NFR BLD AUTO: 1 % (ref 0–2)
LYMPHOCYTES # BLD AUTO: 1.1 THOUSANDS/ÂΜL (ref 0.6–4.47)
LYMPHOCYTES NFR BLD AUTO: 7 % (ref 14–44)
MAGNESIUM SERPL-MCNC: 1.8 MG/DL (ref 1.9–2.7)
MCH RBC QN AUTO: 31.3 PG (ref 26.8–34.3)
MCHC RBC AUTO-ENTMCNC: 33.7 G/DL (ref 31.4–37.4)
MCV RBC AUTO: 93 FL (ref 82–98)
MONOCYTES # BLD AUTO: 0.57 THOUSAND/ÂΜL (ref 0.17–1.22)
MONOCYTES NFR BLD AUTO: 4 % (ref 4–12)
NEUTROPHILS # BLD AUTO: 14.04 THOUSANDS/ÂΜL (ref 1.85–7.62)
NEUTS SEG NFR BLD AUTO: 88 % (ref 43–75)
NRBC BLD AUTO-RTO: 0 /100 WBCS
PLATELET # BLD AUTO: 326 THOUSANDS/UL (ref 149–390)
PMV BLD AUTO: 9.4 FL (ref 8.9–12.7)
POTASSIUM SERPL-SCNC: 3.7 MMOL/L (ref 3.5–5.3)
PROT SERPL-MCNC: 5.8 G/DL (ref 6.4–8.4)
RBC # BLD AUTO: 3.77 MILLION/UL (ref 3.81–5.12)
SODIUM SERPL-SCNC: 140 MMOL/L (ref 135–147)
WBC # BLD AUTO: 15.81 THOUSAND/UL (ref 4.31–10.16)

## 2024-02-05 PROCEDURE — 82248 BILIRUBIN DIRECT: CPT | Performed by: INTERNAL MEDICINE

## 2024-02-05 PROCEDURE — 97530 THERAPEUTIC ACTIVITIES: CPT

## 2024-02-05 PROCEDURE — 97163 PT EVAL HIGH COMPLEX 45 MIN: CPT

## 2024-02-05 PROCEDURE — 99232 SBSQ HOSP IP/OBS MODERATE 35: CPT | Performed by: PHYSICIAN ASSISTANT

## 2024-02-05 PROCEDURE — 99024 POSTOP FOLLOW-UP VISIT: CPT

## 2024-02-05 PROCEDURE — 80053 COMPREHEN METABOLIC PANEL: CPT | Performed by: INTERNAL MEDICINE

## 2024-02-05 PROCEDURE — 97167 OT EVAL HIGH COMPLEX 60 MIN: CPT

## 2024-02-05 PROCEDURE — 85025 COMPLETE CBC W/AUTO DIFF WBC: CPT | Performed by: INTERNAL MEDICINE

## 2024-02-05 PROCEDURE — 83735 ASSAY OF MAGNESIUM: CPT | Performed by: INTERNAL MEDICINE

## 2024-02-05 RX ORDER — CEFAZOLIN SODIUM 1 G/50ML
1000 SOLUTION INTRAVENOUS EVERY 8 HOURS
Status: DISCONTINUED | OUTPATIENT
Start: 2024-02-05 | End: 2024-02-07

## 2024-02-05 RX ORDER — MAGNESIUM SULFATE HEPTAHYDRATE 40 MG/ML
2 INJECTION, SOLUTION INTRAVENOUS ONCE
Status: COMPLETED | OUTPATIENT
Start: 2024-02-05 | End: 2024-02-05

## 2024-02-05 RX ORDER — MAGNESIUM SULFATE 1 G/100ML
1 INJECTION INTRAVENOUS ONCE
Status: COMPLETED | OUTPATIENT
Start: 2024-02-05 | End: 2024-02-05

## 2024-02-05 RX ORDER — CEFAZOLIN SODIUM 2 G/50ML
2000 SOLUTION INTRAVENOUS EVERY 8 HOURS
Status: DISCONTINUED | OUTPATIENT
Start: 2024-02-05 | End: 2024-02-05

## 2024-02-05 RX ADMIN — ACETAMINOPHEN 325MG 975 MG: 325 TABLET ORAL at 13:16

## 2024-02-05 RX ADMIN — ATORVASTATIN CALCIUM 40 MG: 40 TABLET, FILM COATED ORAL at 17:22

## 2024-02-05 RX ADMIN — FERROUS SULFATE TAB 325 MG (65 MG ELEMENTAL FE) 325 MG: 325 (65 FE) TAB at 08:29

## 2024-02-05 RX ADMIN — ASPIRIN 81 MG: 81 TABLET, COATED ORAL at 08:29

## 2024-02-05 RX ADMIN — HEPARIN SODIUM 5000 UNITS: 5000 INJECTION INTRAVENOUS; SUBCUTANEOUS at 20:41

## 2024-02-05 RX ADMIN — ASPIRIN 81 MG: 81 TABLET, COATED ORAL at 17:22

## 2024-02-05 RX ADMIN — GABAPENTIN 300 MG: 300 CAPSULE ORAL at 20:41

## 2024-02-05 RX ADMIN — SENNOSIDES 8.6 MG: 8.6 TABLET, FILM COATED ORAL at 08:29

## 2024-02-05 RX ADMIN — MAGNESIUM SULFATE HEPTAHYDRATE 2 G: 2 INJECTION, SOLUTION INTRAVENOUS at 08:27

## 2024-02-05 RX ADMIN — ACETAMINOPHEN 325MG 975 MG: 325 TABLET ORAL at 03:51

## 2024-02-05 RX ADMIN — ACETAMINOPHEN 325MG 975 MG: 325 TABLET ORAL at 20:41

## 2024-02-05 RX ADMIN — CARVEDILOL 12.5 MG: 12.5 TABLET, FILM COATED ORAL at 08:29

## 2024-02-05 RX ADMIN — AMLODIPINE BESYLATE 10 MG: 5 TABLET ORAL at 08:29

## 2024-02-05 RX ADMIN — DOCUSATE SODIUM 100 MG: 100 CAPSULE, LIQUID FILLED ORAL at 08:29

## 2024-02-05 RX ADMIN — MAGNESIUM SULFATE IN DEXTROSE 1 G: 10 INJECTION, SOLUTION INTRAVENOUS at 05:36

## 2024-02-05 RX ADMIN — DOCUSATE SODIUM 100 MG: 100 CAPSULE, LIQUID FILLED ORAL at 17:22

## 2024-02-05 RX ADMIN — CEFAZOLIN SODIUM 1000 MG: 1 SOLUTION INTRAVENOUS at 17:22

## 2024-02-05 RX ADMIN — CARVEDILOL 12.5 MG: 12.5 TABLET, FILM COATED ORAL at 17:22

## 2024-02-05 RX ADMIN — CEFAZOLIN SODIUM 1000 MG: 1 SOLUTION INTRAVENOUS at 10:04

## 2024-02-05 RX ADMIN — HEPARIN SODIUM 5000 UNITS: 5000 INJECTION INTRAVENOUS; SUBCUTANEOUS at 13:17

## 2024-02-05 RX ADMIN — HEPARIN SODIUM 5000 UNITS: 5000 INJECTION INTRAVENOUS; SUBCUTANEOUS at 05:36

## 2024-02-05 NOTE — ASSESSMENT & PLAN NOTE
Patient has positive UTI on admission  Initially on IV Rocephin - transition to IV ancef given cx results  Urine culture growing pansensitive ecoli  Trend WBC and fever curve

## 2024-02-05 NOTE — ASSESSMENT & PLAN NOTE
On Coreg, Norvasc and Zestril  Lisinopril held preop  Blood pressure is stable with SBP 110s - avoid hypotension.  Already received coreg/amlodipine this AM.  Will resume lisinopril tomorrow with hold parameters - pt may not need amlodipine given current BP trend  Hydralazine as needed  Blood pressure stable

## 2024-02-05 NOTE — PHYSICAL THERAPY NOTE
PHYSICAL THERAPY EVALUATION  DATE: 02/05/24  TIME: 7669-6631    NAME:  Lo Talley  AGE:   78 y.o.  Mrn:   [id number]  Length Of Stay: 2    ADMIT DX:  Confusion [R41.0]  Gingivitis [K05.10]  Hip fracture (HCC) [S72.009A]  UTI (urinary tract infection) [N39.0]  Weakness [R53.1]  Fall, initial encounter [W19.XXXA]  Severe sepsis (HCC) [A41.9, R65.20]    Past Medical History:   Diagnosis Date    History of stroke 6/14/2023     History reviewed. No pertinent surgical history.    Performed at least 2 patient identifiers during session: Name, Birthday, and ID bracelet     02/05/24 1024   PT Last Visit   PT Visit Date 02/05/24   Note Type   Note type Evaluation   Pain Assessment   Pain Assessment Tool 0-10   Pain Score 1   Pain Location/Orientation Orientation: Right;Orientation: Upper;Location: Leg   Pain Radiating Towards R anterior thigh   Pain Onset/Description Frequency: Intermittent;Descriptor: Sore   Effect of Pain on Daily Activities limits mobility tolerance and gait mechanics   Patient's Stated Pain Goal No pain   Hospital Pain Intervention(s) Repositioned;Ambulation/increased activity;Elevated;Emotional support   Multiple Pain Sites No   Restrictions/Precautions   Weight Bearing Precautions Per Order Yes   RLE Weight Bearing Per Order WBAT  (s/p R anterior MARY 2/4/24 by Dr. Goldman, s/p traumatic R hip fx)   Other Precautions Cognitive;Chair Alarm;Bed Alarm;WBS;THR;Fall Risk;Pain;Multiple lines   Home Living   Type of Home House   Home Layout Two level;Stairs to enter without rails;Bed/bath upstairs  (2 CONNIE with no HR, per EMR pt's baseline bedroom on 2nd floor however has been sleeping on 1st floor since stroke in 6/2023 (unable to determine if remains accurate))   Bathroom Shower/Tub Tub/shower unit  (per EMR, pt has been sponge bathing since stroke in 6/2023 (questionable if remains current status))   Bathroom Toilet Standard   Bathroom Accessibility Accessible  (per EMR, full bathroom on both levels of the  "home)   Home Equipment Walker;Cane   Additional Comments Reports spouse works and is not home during daytime.   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 Pt reports living at home with spouse, 2SH with 2nd floor bedroom/bathroom, ambulates independently with no AD, independent with all self care.   General   Additional Pertinent History Pt is a 78 yr old female admitted 2/3/24 s/p fall 2 nights prior, generalized weakness. Dx: UTI w/ severe sepsis, R femur fx. 2/4/24 underwent R MARY by Dr. Goldman. WBAT R LE.   Family/Caregiver Present No   Cognition   Overall Cognitive Status (S)  Impaired   Arousal/Participation Cooperative   Orientation Level Oriented to person;Disoriented to place;Disoriented to time;Disoriented to situation  (pt needing frequent re-orientation to situation t/o session)   Memory Decreased short term memory;Decreased recall of recent events   Following Commands Follows one step commands with increased time or repetition   Subjective   Subjective \"Do you know why I'm here?\"   RUE Assessment   RUE Assessment WFL  (limited shoulder AROM)   RUE Strength   RUE Overall Strength Within Functional Limits - able to perform ADL tasks with strength   LUE Assessment   LUE Assessment WFL  (limited shoulder AROM)   LUE Strength   LUE Overall Strength Within Functional Limits - able to perform ADL tasks with strength   RLE Assessment   RLE Assessment X   Strength RLE   RLE Overall Strength 2+/5  (POD1 s/p R anterior MARY)   LLE Assessment   LLE Assessment WFL " "  Vision-Basic Assessment   Current Vision Other (Comment)  (wears glasses \"sometimes\")   Coordination   Movements are Fluid and Coordinated 1   Sensation WFL   Bed Mobility   Supine to Sit 3  Moderate assistance   Additional items Assist x 2;HOB elevated;Bedrails;Increased time required;Verbal cues;LE management  (trunk management)   Sit to Supine   (NT as pt was left seated OOB in recliner chair at end of session)   Additional Comments Pt with overall fair sitting balance at EOB. Pt with c/o dizziness upon initial sit however resolves w/in 1 minute. Pt with soft BPs however does not change with change in positioning.   Transfers   Sit to Stand 3  Moderate assistance   Additional items Assist x 1;Bedrails;Increased time required;Verbal cues   Stand to Sit 3  Moderate assistance   Additional items Assist x 1;Armrests;Increased time required;Verbal cues   Stand pivot 4  Minimal assistance   Additional items Assist x 1;Increased time required;Verbal cues  (RW; assist for steadying + RW management)   Toilet transfer 4  Minimal assistance   Additional items Assist x 1;Armrests;Increased time required;Verbal cues;Standard toilet  (full assist for hygiene care needed after toileting, defer to OT note for details on ADL care)   Additional Comments Pt needing extensive verbal and tactile cues for hand placement for safe transfers and optimal mechanics. Pt with limited carry over t/o session, requiring frequent reminder cues.   Ambulation/Elevation   Gait pattern Improper Weight shift;Poor UE support;Forward Flexion;Decreased foot clearance;Short stride;Decreased R stance  (poor RW management)   Gait Assistance 4  Minimal assist   Additional items Assist x 1;Tactile cues;Verbal cues   Assistive Device Rolling walker   Distance 3ft + 8ft  (with toileting & hygiene tasks between trials)   Stair Management Assistance Not tested   Balance   Static Sitting Fair +   Dynamic Sitting Fair   Static Standing Poor +  (w/ RW support) "   Dynamic Standing Poor  (w/ RW support)   Ambulatory Poor  (w/ RW support)   Endurance Deficit   Endurance Deficit Yes   Activity Tolerance   Activity Tolerance Patient limited by fatigue;Patient limited by pain;Other (Comment)  (impaired cognition)   Medical Staff Made Aware Spoke with CARMEN, OT   Nurse Made Aware Spoke with ANTONIO [place] pre/post session   Assessment   Prognosis Fair   Problem List Decreased strength;Decreased range of motion;Decreased endurance;Impaired balance;Decreased mobility;Decreased cognition;Impaired judgement;Decreased safety awareness;Decreased skin integrity;Orthopedic restrictions;Pain   Assessment Pt seen for PT evaluation for mobility assessment & discharge needs. Pt is a 78 yr old female admitted 2/3/24 s/p fall 2 nights prior, generalized weakness. Dx: UTI w/ severe sepsis, R femur fx. 2/4/24 underwent R MARY by Dr. Goldman. Comorbidities affecting pt's fnxl performance include: HTN, multiple CVA, Afib, HTN, falls. During PT IE, pt requires MODA 2 person for bed mobility, MODA 1 for STS transfers, however once in upright, DERECK 1 needed for transitions between surfaces and additional ambulation with RW. Pt displays above outlined functional impairments & limitations, and presents below her baseline level of functional mobility. The AM-PAC & Barthel Index outcome tools were used to assist in determining pt safety w/ mobility/self care & appropriate d/c recommendations, see above for scores. Pt is at risk of falls d/t multiple comorbidities, h/o falls, impaired balance, impaired cognition, impaired insight/safety awareness, use of ambulatory aid, varying levels of pain, acuity of medical illness, ongoing medical treatment of primary dx, polypharmacy, and unstable vitals. Pt's clinical presentation is currently unstable/unpredictable as seen in pt's presentation of vital sign response, changing level of pain, varying levels of cognitive performance, increased fall risk, new onset of impairment  of functional mobility, decreased endurance, and new onset of weakness. Pt will benefit from continued PT services in order to address impairments, decrease risk of falls, maximize independence w/ fnxl mobility, & ensure safety w/ mobility for transition to next level of care. Based on pt presentation & impairments, pt would most appropriately benefit from Level II (moderate PT intensity) resources upon d/c.   Barriers to Discharge Decreased caregiver support   Goals   Patient Goals no rehab goals stated on this date   STG Expiration Date 02/19/24   Short Term Goal #1 Patient PT goals established in order to maximize functional independence and safety. Pt will: complete all bed mobility in hospital bed with S in order to promote increased OOB functional mobility and simulate home environment; complete all transfers with RW and S in order to increase safety with functional mobility; ambulate >100ft with RW and S in order to increase safety with household and in facility functional mobility; negotiate 1 standard height step with RW and DERECK in order to facilitate safe access to her home; improve R LE strength to >/= 3+/5 MMT t/o in order to increase safety with functional mobility and decrease risk of falls; demonstrate understanding and independence with LE strengthening HEP; improve ambulatory balance to >/= fair+ grade in order to promote safety and increased independence with mobility; tolerate >3hrs OOB in upright position, in order to improve muscular endurance and respiratory status; improve AM-PAC score to >/= 17/24 in order to increase independence with mobility and decrease burden of care; improve Barthel Index score to >/= 50/100 in order to increase independence and decrease risk of falls.   PT Treatment Day 0   Plan   Treatment/Interventions Functional transfer training;LE strengthening/ROM;Elevations;Therapeutic exercise;Endurance training;Cognitive reorientation;Patient/family training;Equipment  eval/education;Bed mobility;Gait training;Compensatory technique education;Spoke to nursing;Spoke to case management   PT Frequency 3-5x/wk   Discharge Recommendation   Rehab Resource Intensity Level, PT II (Moderate Resource Intensity)   AM-PAC Basic Mobility Inpatient   Turning in Flat Bed Without Bedrails 2   Lying on Back to Sitting on Edge of Flat Bed Without Bedrails 2   Moving Bed to Chair 2   Standing Up From Chair Using Arms 2   Walk in Room 2   Climb 3-5 Stairs With Railing 2   Basic Mobility Inpatient Raw Score 12   Basic Mobility Standardized Score 32.23   Highest Level Of Mobility   -HLM Goal 4: Move to chair/commode   JH-HLM Achieved 6: Walk 10 steps or more   Modified Az Scale   Modified Az Scale 4   Barthel Index   Feeding 10   Bathing 0   Grooming Score 0   Dressing Score 5   Bladder Score 5   Bowels Score 10   Toilet Use Score 5   Transfers (Bed/Chair) Score 5   Mobility (Level Surface) Score 0   Stairs Score 0   Barthel Index Score 40   End of Consult   Patient Position at End of Consult Bedside chair;Bed/Chair alarm activated;All needs within reach     This session, pt required and most appropriately benefited from partial or full skilled PT/OT co-eval due to extensive physical assistance of SKILLED therapists, cognitive-communication impairments, significant regression from baseline level of mobility, continuous vitals monitoring, decreased activity tolerance, and unpredictable medical and/or functional status. PT and OT goals were addressed separately as seen in documentation.    Based on patient's [facility] Highest Level of Mobility scores today, patient currently has a goal of -[facility] Levels: 6: WALK 10 STEPS OR MORE, to be completed with RN staffing each shift, in order to improve overall activity tolerance and mobility, combat hospital related deconditioning, and maximize outcomes for d/c from the acute care setting.     The patient's AM-PAC Basic Mobility Inpatient Short Form  Raw Score is 12. A Raw score of less than or equal to 16 suggests the patient may benefit from discharge to post-acute rehabilitation services. Please also refer to the recommendation of the Physical Therapist for safe discharge planning.      Ann Mcclain PT, DPT   Available via [employer]  [facility], [place] # [id number]  [place] License - [id number]  2/5/2024

## 2024-02-05 NOTE — OCCUPATIONAL THERAPY NOTE
Occupational Therapy Evaluation/Treatment     Patient Name: Lo Talley  Today's Date: 2/5/2024  Problem List  Principal Problem:    Severe sepsis (HCC)  Active Problems:    History of multiple strokes    Atrial fibrillation (HCC)    Femur fracture, right (HCC)    UTI (urinary tract infection)    HTN (hypertension)    Fall    Past Medical History  Past Medical History:   Diagnosis Date    History of stroke 6/14/2023     Past Surgical History  History reviewed. No pertinent surgical history.          02/05/24 1056   OT Last Visit   OT Visit Date 02/05/24   Note Type   Note type Evaluation   Additional Comments Pt is s/p R MARY 2/4/24, anterior approach. WBAT - no formal hip precautions.   Pain Assessment   Pain Assessment Tool 0-10   Pain Score 1   Pain Location/Orientation Orientation: Right;Orientation: Upper;Location: Leg   Effect of Pain on Daily Activities Impaired functional mobility/ADL performance   Patient's Stated Pain Goal No pain   Hospital Pain Intervention(s) Repositioned;Ambulation/increased activity   Restrictions/Precautions   Weight Bearing Precautions Per Order Yes   RLE Weight Bearing Per Order WBAT   Other Precautions Cognitive;Chair Alarm;Bed Alarm;Fall Risk;Pain;THR;Multiple lines   Home Living   Type of Home House   Home Layout Two level;Stairs to enter with rails;Bed/bath upstairs  (2 CONNIE - per chart B/B upstairs. Pt reports sleeping downstairs)   Bathroom Shower/Tub Tub/shower unit   Bathroom Toilet Standard   Bathroom Accessibility Accessible   Home Equipment Walker;Cane   Additional Comments Pt reports spouse works out of home. Per chart, full bath both floors   Prior Function   Level of [place] Independent with ADLs;Independent with functional mobility;Needs assistance with IADLS   Lives With Spouse   Receives Help From Family   IADLs Family/Friend/Other provides transportation;Family/Friend/Other provides meals;Family/Friend/Other provides medication management   Falls in the  "last 6 months 1 to 4  (1 per pt)   Vocational Retired   Comments Pt is unreliable historian. Majority of information obtained from chart.   General   Family/Caregiver Present No   Subjective   Subjective \"Do you know why I'm here?\"   ADL   Eating Assistance 7  Independent   Grooming Assistance 5  Supervision/Setup   UB Bathing Assistance 4  Minimal Assistance   LB Bathing Assistance 3  Moderate Assistance   UB Dressing Assistance 4  Minimal Assistance   LB Dressing Assistance 3  Moderate Assistance   LB Dressing Deficit Thread RLE into underwear;Thread LLE into underwear;Pull up over hips   Toileting Assistance  2  Maximal Assistance   Toileting Deficit Bedside commode;Increased time to complete;Supervison/safety;Verbal cueing;Steadying;Perineal hygiene;Clothing management up  (Pt able to complete frontal halina-care with VC's for thoroughness while sitting on BSC. Required Total A for posterior halina-care s/p BM. Increasingly retropulsive during halina-care in stance likely 2/2 fatigue. Mod Ax1 for balance)   Bed Mobility   Supine to Sit 3  Moderate assistance   Additional items Assist x 2;Bedrails;HOB elevated;Increased time required;Verbal cues;LE management   Additional Comments Pt complain of dizziness upon sitting. Soft BP's (98/60). Fair sitting balance.   Transfers   Sit to Stand 3  Moderate assistance   Additional items Assist x 1;Bedrails;Increased time required;Verbal cues   Stand to Sit 3  Moderate assistance   Additional items Assist x 1;Increased time required;Verbal cues;Armrests   Stand pivot 4  Minimal assistance   Additional items Assist x 1;Increased time required;Verbal cues  (RW)   Toilet transfer 4  Minimal assistance   Additional items Assist x 1;Increased time required;Commode;Verbal cues   Additional Comments Max verbal cues & hand over hand assistance for hand placement. Pt with dizziness s/p standing x2 min for halina-care. Pt with visible nystagmus while standing. Required seated rest break x2 " "min. Nystagmus terminated upon sitting, BP 96/61.   Functional Mobility   Functional Mobility 3  Moderate assistance   Additional Comments x1 - initially Mod Ax1 for 3 ft to commode. See tx session for improvement in functional mobility   Additional items Rolling walker   Balance   Static Sitting Fair +   Dynamic Sitting Fair   Static Standing Poor +   Dynamic Standing Poor   Ambulatory Poor   Activity Tolerance   Activity Tolerance Patient limited by fatigue;Patient limited by pain   Medical Staff Made Aware PT CARMEN [place]   Nurse Made Aware ANTONIO WALSH Assessment   RUE Assessment WFL   LUE Assessment   LUE Assessment WFL   Vision-Basic Assessment   Current Vision Other (Comment)  (Wears glasses \"sometimes\")   Cognition   Overall Cognitive Status (S)  Impaired   Arousal/Participation Alert   Attention Attends with cues to redirect   Orientation Level Oriented to person;Disoriented to place;Disoriented to time;Disoriented to situation   Memory Decreased recall of precautions;Decreased recall of recent events;Decreased short term memory;Decreased long term memory   Following Commands Follows one step commands inconsistently   Comments Pt unable to state details about her home or her functional level. Pt asked multiple times during session \"Do you know why I'm here?\" Oriented pt to her fall/sx approx 4x during session   Assessment   Limitation Decreased ADL status;Decreased Safe judgement during ADL;Decreased cognition;Decreased endurance;Decreased self-care trans;Decreased high-level ADLs   Prognosis Fair   Assessment Pt is a 78 y.o. female seen for OT evaluation at [facility], admitted 2/3/2024 w/ Severe sepsis (HCC). Pt additional presents s/p fall with displaced femoral neck fx. Pt is now POD#1 s/p R MARY with Dr. Goldman. Pt is WBAT without formal hip precautions. OT completed extensive review of pt's medical and social history. Comorbidities affecting pt's functional performance at time of assessment " include: h/o multiple CVA's, a-fib, UTI, HTN, h/o cerebral amyloid angiopathy. Personal factors affecting pt at time of IE include: steps to enter environment, limited home support, behavioral pattern, difficulty performing ADLS, difficulty performing IADLS , limited insight into deficits, and environment. Prior to admission, pt was living with her spouse in a 2SH with CONNIE.  Per chart, pt was I w/  ADLS and A w/ IADLS, (-) drove, & required use of no DME/AD PTA. Upon evaluation: Pt requires Mod Ax2 for bed mobility, Min-Mod Ax1 for functional mobility/transfers, Min A for UB ADLs and Mod A for LB ADLS 2* the following deficits impacting occupational performance: weakness, decreased strength, decreased balance, decreased tolerance, impaired memory, impaired sequencing, impaired problem solving, decreased safety awareness, increased pain, and orthopedic restrictions. Full objective findings from OT assessment regarding body systems outlined above. Pt to benefit from continued skilled OT tx while in the hospital to address deficits as defined above and maximize level of functional independence w/ ADL's and functional mobility. Occupational Performance areas to address include: grooming, bathing/shower, toilet hygiene, dressing, functional mobility, community mobility, and clothing management. Based on findings, pt is of high complexity. The patient's raw score on the AM-PAC Daily Activity inpatient short form is 16, standardized score is 35.96, less than 39.4. Patients at this level are likely to benefit from DC to post-acute rehabilitation services. However, please refer to therapist recommendation for discharge planning given other factors that may influence destination. At this time, OT recommendations at time of discharge are DC with Level II resources.   Goals   Patient Goals Pt does not state goals regarding therapy at this time   Plan   Treatment Interventions ADL retraining;Functional transfer  training;Patient/family training;Equipment evaluation/education;Cognitive reorientation;Endurance training;UE strengthening/ROM;Compensatory technique education;Continued evaluation   Goal Expiration Date 02/15/24   OT Treatment Day 0   OT Frequency 3-5x/wk   Discharge Recommendation   Rehab Resource Intensity Level, OT II (Moderate Resource Intensity)   AM-PAC Daily Activity Inpatient   Lower Body Dressing 2   Bathing 2   Toileting 2   Upper Body Dressing 3   Grooming 3   Eating 4   Daily Activity Raw Score 16   Daily Activity Standardized Score (Calc for Raw Score >=11) 35.96   AM-PAC Applied Cognition Inpatient   Following a Speech/Presentation 2   Understanding Ordinary Conversation 3   Taking Medications 2   Remembering Where Things Are Placed or Put Away 1   Remembering List of 4-5 Errands 1   Taking Care of Complicated Tasks 1   Applied Cognition Raw Score 10   Applied Cognition Standardized Score 24.98   Additional Treatment Session   Start Time 0400   End Time 1056   Treatment Assessment Pt required Min Ax1 for sit > stand from BSC. Pt required hand over hand assistance for hand placement. Pt able to complete functionla mobility x8ft with min Ax1 & RW. Pt performed stand > sit with Min Ax1 & hand over hand assist/VC's for sequencing & hand placement. Pt required assistance for RW navigation.   End of Consult   Education Provided Yes   Patient Position at End of Consult Bedside chair;Bed/Chair alarm activated;All needs within reach   Nurse Communication Nurse aware of consult     Pt will achieve the following goals within 10 days.    *Pt will complete UB bathing and dressing with S.    *Pt will complete LB bathing and dressing with Min A & DME PRN.    *Pt will complete toileting w/ Min A w/ G hygiene/thoroughness using DME PRN    *Pt will complete bed mobility with Min Ax1, with HOB elevated & use of side rails PRN to prep for purposeful tasks    *Pt will perform functional transfers with on/off all surfaces  with S using DME as needed w/ G balance/safety.    *Pt will increase standing tolerance to 5+ minutes with use of AD PRN for increased activity tolerance in order to maximize independence in ADL/IADL performance.    *Pt will participate in UE therapeutic exercise in order to maximize strength for ADL transfers.    *Pt will improve functional mobility during ADL/IADL/leisure tasks to S using DME as needed w/ G balance/safety.     *Pt will improve functional activity tolerance to 15 minutes of sustained functional tasks to increase participation in basic self-care and decrease assistance level.      *Pt will orient self x 2 with minimal verbal cues to increase overall awareness and promote safety with ADL/IADL tasks.     Lucy Hi, OTR/L

## 2024-02-05 NOTE — MALNUTRITION/BMI
This medical record reflects one or more clinical indicators suggestive of malnutrition and/or morbid obesity.    Malnutrition Findings:   Adult Malnutrition type: Chronic illness  Adult Degree of Malnutrition: Malnutrition of moderate degree  Malnutrition Characteristics: Fat loss, Muscle loss                  360 Statement: Pt presents with moderate protein malnutrition as evidenced by prominent clavicle bone and sunken orbitals. Treat with diet and supplements BID.    BMI Findings:           Body mass index is 18.82 kg/m².     See Nutrition note dated 2/5/24  for additional details.  Completed nutrition assessment is viewable in the nutrition documentation.

## 2024-02-05 NOTE — PLAN OF CARE
Problem: PAIN - ADULT  Goal: Verbalizes/displays adequate comfort level or baseline comfort level  Description: Interventions:  - Encourage patient to monitor pain and request assistance  - Assess pain using appropriate pain scale  - Administer analgesics based on type and severity of pain and evaluate response  - Implement non-pharmacological measures as appropriate and evaluate response  - Consider cultural and social influences on pain and pain management  - Notify physician/advanced practitioner if interventions unsuccessful or patient reports new pain  Outcome: Progressing     Problem: INFECTION - ADULT  Goal: Absence or prevention of progression during hospitalization  Description: INTERVENTIONS:  - Assess and monitor for signs and symptoms of infection  - Monitor lab/diagnostic results  - Monitor all insertion sites, i.e. indwelling lines, tubes, and drains  - Monitor endotracheal if appropriate and nasal secretions for changes in amount and color  - [place] appropriate cooling/warming therapies per order  - Administer medications as ordered  - Instruct and encourage patient and family to use good hand hygiene technique  - Identify and instruct in appropriate isolation precautions for identified infection/condition  Outcome: Progressing  Goal: Absence of fever/infection during neutropenic period  Description: INTERVENTIONS:  - Monitor WBC    Outcome: Progressing     Problem: SAFETY ADULT  Goal: Patient will remain free of falls  Description: INTERVENTIONS:  - Educate patient/family on patient safety including physical limitations  - Instruct patient to call for assistance with activity   - Consult OT/PT to assist with strengthening/mobility   - Keep Call bell within reach  - Keep bed low and locked with side rails adjusted as appropriate  - Keep care items and personal belongings within reach  - Initiate and maintain comfort rounds  - Make Fall Risk Sign visible to staff  - Offer Toileting every 2 Hours,  in advance of need  - Initiate/Maintain bed alarm  - Obtain necessary fall risk management equipment  - Apply yellow socks and bracelet for high fall risk patients  - Consider moving patient to room near nurses station  Outcome: Progressing  Goal: Maintain or return to baseline ADL function  Description: INTERVENTIONS:  -  Assess patient's ability to carry out ADLs; assess patient's baseline for ADL function and identify physical deficits which impact ability to perform ADLs (bathing, care of mouth/teeth, toileting, grooming, dressing, etc.)  - Assess/evaluate cause of self-care deficits   - Assess range of motion  - Assess patient's mobility; develop plan if impaired  - Assess patient's need for assistive devices and provide as appropriate  - Encourage maximum independence but intervene and supervise when necessary  - Involve family in performance of ADLs  - Assess for home care needs following discharge   - Consider OT consult to assist with ADL evaluation and planning for discharge  - Provide patient education as appropriate  Outcome: Progressing  Goal: Maintains/Returns to pre admission functional level  Description: INTERVENTIONS:  - Perform AM-PAC 6 Click Basic Mobility/ Daily Activity assessment daily.  - Set and communicate daily mobility goal to care team and patient/family/caregiver.   - Collaborate with rehabilitation services on mobility goals if consulted  - Perform Range of Motion 3 times a day.  - Reposition patient every 2 hours.  - Dangle patient 3 times a day  - Stand patient 3 times a day  - Ambulate patient 3 times a day  - Out of bed to chair 3 times a day   - Out of bed for meals 3 times a day  - Out of bed for toileting  - Record patient progress and toleration of activity level   Outcome: Progressing     Problem: DISCHARGE PLANNING  Goal: Discharge to home or other facility with appropriate resources  Description: INTERVENTIONS:  - Identify barriers to discharge w/patient and caregiver  -  Arrange for needed discharge resources and transportation as appropriate  - Identify discharge learning needs (meds, wound care, etc.)  - Arrange for interpretive services to assist at discharge as needed  - Refer to Case Management Department for coordinating discharge planning if the patient needs post-hospital services based on physician/advanced practitioner order or complex needs related to functional status, cognitive ability, or social support system  Outcome: Progressing     Problem: Knowledge Deficit  Goal: Patient/family/caregiver demonstrates understanding of disease process, treatment plan, medications, and discharge instructions  Description: Complete learning assessment and assess knowledge base.  Interventions:  - Provide teaching at level of understanding  - Provide teaching via preferred learning methods  Outcome: Progressing     Problem: METABOLIC, FLUID AND ELECTROLYTES - ADULT  Goal: Electrolytes maintained within normal limits  Description: INTERVENTIONS:  - Monitor labs and assess patient for signs and symptoms of electrolyte imbalances  - Administer electrolyte replacement as ordered  - Monitor response to electrolyte replacements, including repeat lab results as appropriate  - Instruct patient on fluid and nutrition as appropriate  Outcome: Progressing  Goal: Fluid balance maintained  Description: INTERVENTIONS:  - Monitor labs   - Monitor I/O and WT  - Instruct patient on fluid and nutrition as appropriate  - Assess for signs & symptoms of volume excess or deficit  Outcome: Progressing  Goal: Glucose maintained within target range  Description: INTERVENTIONS:  - Monitor Blood Glucose as ordered  - Assess for signs and symptoms of hyperglycemia and hypoglycemia  - Administer ordered medications to maintain glucose within target range  - Assess nutritional intake and initiate nutrition service referral as needed  Outcome: Progressing     Problem: SKIN/TISSUE INTEGRITY - ADULT  Goal: Skin  Integrity remains intact(Skin Breakdown Prevention)  Description: Assess:  -Perform Martin assessment every   -Clean and moisturize skin every   -Inspect skin when repositioning, toileting, and assisting with ADLS  -Assess under medical devices such as  every   -Assess extremities for adequate circulation and sensation     Bed Management:  -Have minimal linens on bed & keep smooth, unwrinkled  -Change linens as needed when moist or perspiring  -Avoid sitting or lying in one position for more than  hours while in bed  -Keep HOB at degrees     Toileting:  -Offer bedside commode  -Assess for incontinence every   -Use incontinent care products after each incontinent episode such as     Activity:  -Mobilize patient  times a day  -Encourage activity and walks on unit  -Encourage or provide ROM exercises   -Turn and reposition patient every  Hours  -Use appropriate equipment to lift or move patient in bed  -Instruct/ Assist with weight shifting every  when out of bed in chair  -Consider limitation of chair time  hour intervals    Skin Care:  -Avoid use of baby powder, tape, friction and shearing, hot water or constrictive clothing  -Relieve pressure over bony prominences using   -Do not massage red bony areas    Next Steps:  -Teach patient strategies to minimize risks such as    -Consider consults to  interdisciplinary teams such as   Outcome: Progressing  Goal: Incision(s), wounds(s) or drain site(s) healing without S/S of infection  Description: INTERVENTIONS  - Assess and document dressing, incision, wound bed, drain sites and surrounding tissue  - Provide patient and family education  - Perform skin care/dressing changes every   Outcome: Progressing  Goal: Pressure injury heals and does not worsen  Description: Interventions:  - Implement low air loss mattress or specialty surface (Criteria met)  - Apply silicone foam dressing  - Instruct/assist with weight shifting every  minutes when in chair   - Limit chair time to   hour intervals  - Use special pressure reducing interventions such as  when in chair   - Apply fecal or urinary incontinence containment device   - Perform passive or active ROM every   - Turn and reposition patient & offload bony prominences every  hours   - Utilize friction reducing device or surface for transfers   - Consider consults to  interdisciplinary teams such as   - Use incontinent care products after each incontinent episode such as   - Consider nutrition services referral as needed  Outcome: Progressing     Problem: HEMATOLOGIC - ADULT  Goal: Maintains hematologic stability  Description: INTERVENTIONS  - Assess for signs and symptoms of bleeding or hemorrhage  - Monitor labs  - Administer supportive blood products/factors as ordered and appropriate  Outcome: Progressing     Problem: MUSCULOSKELETAL - ADULT  Goal: Maintain or return mobility to safest level of function  Description: INTERVENTIONS:  - Assess patient's ability to carry out ADLs; assess patient's baseline for ADL function and identify physical deficits which impact ability to perform ADLs (bathing, care of mouth/teeth, toileting, grooming, dressing, etc.)  - Assess/evaluate cause of self-care deficits   - Assess range of motion  - Assess patient's mobility  - Assess patient's need for assistive devices and provide as appropriate  - Encourage maximum independence but intervene and supervise when necessary  - Involve family in performance of ADLs  - Assess for home care needs following discharge   - Consider OT consult to assist with ADL evaluation and planning for discharge  - Provide patient education as appropriate  Outcome: Progressing  Goal: Maintain proper alignment of affected body part  Description: INTERVENTIONS:  - Support, maintain and protect limb and body alignment  - Provide patient/ family with appropriate education  Outcome: Progressing     Problem: Prexisting or High Potential for Compromised Skin Integrity  Goal: Skin  integrity is maintained or improved  Description: INTERVENTIONS:  - Identify patients at risk for skin breakdown  - Assess and monitor skin integrity  - Assess and monitor nutrition and hydration status  - Monitor labs   - Assess for incontinence   - Turn and reposition patient  - Assist with mobility/ambulation  - Relieve pressure over bony prominences  - Avoid friction and shearing  - Provide appropriate hygiene as needed including keeping skin clean and dry  - Evaluate need for skin moisturizer/barrier cream  - Collaborate with interdisciplinary team   - Patient/family teaching  - Consider wound care consult   Outcome: Progressing

## 2024-02-05 NOTE — PROGRESS NOTES
"[facility]  Progress Note  Name: Lo Talley I  MRN: [id number]  Unit/Bed#: -01 I Date of Admission: 2/3/2024   Date of Service: 2/5/2024 I Hospital Day: 2    Assessment/Plan   * Severe sepsis (HCC)  Assessment & Plan  Patient admitted with severe sepsis as evidenced by tachycardia, leukocytosis of 18.7 K likely due to to UTI.  CT chest abdomen pelvis showed-\"Impacted subcapital fracture of the right femur.  Mild circumferential bladder wall thickening with mild perivesical inflammatory fat stranding, which may relate to cystitis. Recommend correlation with urinalysis.\"  Lactate is 2.9 - now resolved  Procalcitonin 0.12  Blood cultures negative x 2 after 24 hours  Urine culture > 100k pansensitive ecoli   Restart IV ancef now.  Did receive prophylactic dose x1 yesterday for the OR.  Had been on IV rocephin but does not appear pt received dose yesterday as med was on MAR hold due to being in the OR  Leukocytosis remains elevated but downtrending from admit, likely in part due to recent surgery.  Afebrile    Fall  Assessment & Plan  Patient with mechanical fall on February 1st  Presented with right hip pain  CT head-\" No acute intracranial abnormality.Stable chronic microangiopathic changes within the brain. Stable encephalomalacia of the inferior right frontal lobe.\"  No head injuries or any other injuries noted  Orthopedics consult - POD 1   PT/OT for disposition planning    HTN (hypertension)  Assessment & Plan  On Coreg, Norvasc and Zestril  Lisinopril held preop  Blood pressure is stable with SBP 110s - avoid hypotension.  Already received coreg/amlodipine this AM.  Will resume lisinopril tomorrow with hold parameters - pt may not need amlodipine given current BP trend  Hydralazine as needed  Blood pressure stable    UTI (urinary tract infection)  Assessment & Plan  Patient has positive UTI on admission  Initially on IV Rocephin - transition to IV ancef given cx results  Urine " culture growing pansensitive ecoli  Trend WBC and fever curve    Femur fracture, right (HCC)  Assessment & Plan  Patient with subcapital fracture of right femur  Patient underwent right hip arthroplasty by Dr. Goldman 2/4 - POD 1  Pain management as needed  Encourage incentive spirometry use  Trend H&H  DVT prophylaxis - currently ASA 81 mg BID per ortho but need to discuss with  re: AC due to hx of afib  PT/OT consult    Atrial fibrillation (HCC)  Assessment & Plan  On Coreg for rate control  Not on any anticoagulation as  stopped Eliquis in August on his own - reportedly medication may have been too expensive?  Cardiology attempted to call  yesterday.  Personally called  this AM - left VM        History of multiple strokes  Assessment & Plan  Patient has history of CVAs  Patient was on Eliquis but per prior report  states that he stopped her Eliquis around August 2023 on his own because they could not afford it  Cardiology recommending restarting Eliquis if family agrees - they attempted to speak with  2/4 but were unable to reach via phone.  Left VM this AM  Continue on statin         VTE Pharmacologic Prophylaxis: VTE Score: 10 High Risk (Score >/= 5) - Pharmacological DVT Prophylaxis Ordered: ASA BID per ortho. Sequential Compression Devices Ordered.    Mobility:   Basic Mobility Inpatient Raw Score: 8  -HLM Goal: 3: Sit at edge of bed  JH-HLM Achieved: 2: Bed activities/Dependent transfer  HLM Goal NOT achieved. Continue with multidisciplinary rounding and encourage appropriate mobility to improve upon HLM goals.    Patient Centered Rounds: I performed bedside rounds with nursing staff today.   Discussions with Specialists or Other Care Team Provider: CM    Education and Discussions with Family / Patient: Attempted to update  () via phone. Left voicemail.     Total Time Spent on Date of Encounter in care of patient: 45 mins. This time was spent on  one or more of the following: performing physical exam; counseling and coordination of care; obtaining or reviewing history; documenting in the medical record; reviewing/ordering tests, medications or procedures; communicating with other healthcare professionals and discussing with patient's family/caregivers.    Current Length of Stay: 2 day(s)  Current Patient Status: Inpatient   Certification Statement: The patient will continue to require additional inpatient hospital stay due to PT/OT eval, monitoring labs  Discharge Plan: Anticipate discharge in 24-48 hrs to discharge location to be determined pending rehab evaluations.    Code Status: Level 1 - Full Code    Subjective:   Patient feels well this morning.  Denies chest pain/palpitations, shortness of breath, nausea/vomiting or abdominal pain.  Hip pain well-controlled.    Objective:     Vitals:   Temp (24hrs), Av.9 °F (36.6 °C), Min:97.2 °F (36.2 °C), Max:98.8 °F (37.1 °C)    Temp:  [97.2 °F (36.2 °C)-98.8 °F (37.1 °C)] 97.2 °F (36.2 °C)  HR:  [] 98  Resp:  [12-20] 20  BP: (102-140)/(64-84) 117/72  SpO2:  [90 %-100 %] 100 %  Body mass index is 18.82 kg/m².     Input and Output Summary (last 24 hours):     Intake/Output Summary (Last 24 hours) at 2024 0934  Last data filed at 2024 2301  Gross per 24 hour   Intake 1000 ml   Output 350 ml   Net 650 ml       Physical Exam:   Physical Exam  Vitals and nursing note reviewed.   Constitutional:       Appearance: Normal appearance.      Comments: No acute distress   HENT:      Head: Normocephalic.   Eyes:      General: No scleral icterus.     Extraocular Movements: Extraocular movements intact.      Conjunctiva/sclera: Conjunctivae normal.   Cardiovascular:      Rate and Rhythm: Normal rate and regular rhythm.      Heart sounds: S1 normal and S2 normal.   Pulmonary:      Effort: Pulmonary effort is normal.      Breath sounds: Normal breath sounds. No wheezing, rhonchi or rales.   Abdominal:       General: Bowel sounds are normal.      Palpations: Abdomen is soft.      Tenderness: There is no abdominal tenderness. There is no guarding or rebound.   Musculoskeletal:         General: No swelling, tenderness or deformity.      Cervical back: Normal range of motion.      Comments: Right hip dressing clean/dry/intact.  Compartments soft, nontender to palpation.  No ecchymosis   Skin:     General: Skin is warm and dry.   Neurological:      Mental Status: She is alert. Mental status is at baseline.   Psychiatric:         Mood and Affect: Mood normal.         Speech: Speech normal.         Behavior: Behavior normal.          Additional Data:     Labs:  Results from last 7 days   Lab Units 02/05/24  0350   WBC Thousand/uL 15.81*   HEMOGLOBIN g/dL 11.8   HEMATOCRIT % 35.0   PLATELETS Thousands/uL 326   NEUTROS PCT % 88*   LYMPHS PCT % 7*   MONOS PCT % 4   EOS PCT % 0     Results from last 7 days   Lab Units 02/05/24  0350   SODIUM mmol/L 140   POTASSIUM mmol/L 3.7   CHLORIDE mmol/L 108   CO2 mmol/L 24   BUN mg/dL 21   CREATININE mg/dL 0.46*   ANION GAP mmol/L 8   CALCIUM mg/dL 8.6   ALBUMIN g/dL 3.1*   TOTAL BILIRUBIN mg/dL 0.79   ALK PHOS U/L 54   ALT U/L 15   AST U/L 17   GLUCOSE RANDOM mg/dL 110     Results from last 7 days   Lab Units 02/04/24  0448   INR  1.17             Results from last 7 days   Lab Units 02/04/24  0448 02/03/24  2143 02/03/24  1847 02/03/24  1616 02/03/24  1404 02/03/24  1124   LACTIC ACID mmol/L  --  1.5 2.5* 2.1* 2.7* 2.9*   PROCALCITONIN ng/ml 0.09  --   --   --   --  0.12       Lines/Drains:  Invasive Devices       Peripheral Intravenous Line  Duration             Peripheral IV 02/03/24 Right Antecubital 1 day    Peripheral IV 02/04/24 Dorsal (posterior);Left Forearm 1 day                          Imaging: Reviewed radiology reports from this admission including: chest CT scan, abdominal/pelvic CT, CT head, and xray(s)    Recent Cultures (last 7 days):   Results from last 7 days   Lab  Units 02/03/24  1125 02/03/24  1124   BLOOD CULTURE  No Growth at 24 hrs. No Growth at 24 hrs.   URINE CULTURE   --  >100,000 cfu/ml Escherichia coli*  10,000-19,000 cfu/ml Alpha Hemolytic Streptococcus NOT Enterococcus*       Last 24 Hours Medication List:   Current Facility-Administered Medications   Medication Dose Route Frequency Provider Last Rate    acetaminophen  650 mg Oral Q6H PRN Idania Edwards PA-C      acetaminophen  975 mg Oral Q8H Idania Edwards PA-C      aluminum-magnesium hydroxide-simethicone  30 mL Oral Q6H PRN Idania Edwards PA-C      amLODIPine  10 mg Oral Daily Idania Edwards PA-C      aspirin  81 mg Oral BID Idania Edwards PA-C      atorvastatin  40 mg Oral Daily With Dinner Idania Edwards PA-C      calcium carbonate  1,000 mg Oral Daily PRN Idania Edwards PA-C      carvedilol  12.5 mg Oral BID With Meals Idania Edwards PA-C      cefazolin  2,000 mg Intravenous Q8H Landy Gould PA-C      docusate sodium  100 mg Oral BID Idania Edwards PA-C      ferrous sulfate  325 mg Oral Daily With Breakfast Idania Edwards PA-C      gabapentin  300 mg Oral HS Idania Edwards PA-C      heparin (porcine)  5,000 Units Subcutaneous Q8H [facility] Idania Edwards PA-C      lactated ringers  1,000 mL Intravenous Once PRN Ana Hughes PA-C      And    lactated ringers  1,000 mL Intravenous Once PRN Ana Hughes PA-C      magnesium sulfate  2 g Intravenous Once Landy Gould PA-C 2 g (02/05/24 0827)    metoprolol  5 mg Intravenous Q6H PRN Idania Edwards PA-C      morphine injection  2 mg Intravenous Q2H PRN Idania Edwards PA-C      ondansetron  4 mg Intravenous Q6H PRN Idania Edwards PA-C      oxyCODONE  5 mg Oral Q4H PRN Idania Edwards PA-C      oxyCODONE  2.5 mg Oral Q6H PRN Idania Edwards PA-C      senna  1 tablet Oral Daily Idania Edwards PA-C      sodium chloride  1,000 mL Intravenous Once PRN Ana Arminda Saul, PA-C      And    sodium chloride  1,000 mL Intravenous Once PRN Ana Hughes PA-C          Today, Patient Was Seen By: Landy Gould,  PASarojC    **Please Note: This note may have been constructed using a voice recognition system.**

## 2024-02-05 NOTE — ASSESSMENT & PLAN NOTE
On Coreg for rate control  Not on any anticoagulation as  stopped Eliquis in August on his own - reportedly medication may have been too expensive?  Cardiology attempted to call  yesterday.  Personally called  this AM - left VM

## 2024-02-05 NOTE — PLAN OF CARE
Problem: PAIN - ADULT  Goal: Verbalizes/displays adequate comfort level or baseline comfort level  Description: Interventions:  - Encourage patient to monitor pain and request assistance  - Assess pain using appropriate pain scale  - Administer analgesics based on type and severity of pain and evaluate response  - Implement non-pharmacological measures as appropriate and evaluate response  - Consider cultural and social influences on pain and pain management  - Notify physician/advanced practitioner if interventions unsuccessful or patient reports new pain  Outcome: Progressing     Problem: INFECTION - ADULT  Goal: Absence or prevention of progression during hospitalization  Description: INTERVENTIONS:  - Assess and monitor for signs and symptoms of infection  - Monitor lab/diagnostic results  - Monitor all insertion sites, i.e. indwelling lines, tubes, and drains  - Monitor endotracheal if appropriate and nasal secretions for changes in amount and color  - Raymond appropriate cooling/warming therapies per order  - Administer medications as ordered  - Instruct and encourage patient and family to use good hand hygiene technique  - Identify and instruct in appropriate isolation precautions for identified infection/condition  Outcome: Progressing  Goal: Absence of fever/infection during neutropenic period  Description: INTERVENTIONS:  - Monitor WBC    Outcome: Progressing     Problem: SAFETY ADULT  Goal: Patient will remain free of falls  Description: INTERVENTIONS:  - Educate patient/family on patient safety including physical limitations  - Instruct patient to call for assistance with activity   - Consult OT/PT to assist with strengthening/mobility   - Keep Call bell within reach  - Keep bed low and locked with side rails adjusted as appropriate  - Keep care items and personal belongings within reach  - Initiate and maintain comfort rounds  - Make Fall Risk Sign visible to staff  - Offer Toileting every 2 Hours,  in advance of need  - Initiate/Maintain bed alarm  - Obtain necessary fall risk management equipment:   - Apply yellow socks and bracelet for high fall risk patients  - Consider moving patient to room near nurses station  Outcome: Progressing  Goal: Maintain or return to baseline ADL function  Description: INTERVENTIONS:  -  Assess patient's ability to carry out ADLs; assess patient's baseline for ADL function and identify physical deficits which impact ability to perform ADLs (bathing, care of mouth/teeth, toileting, grooming, dressing, etc.)  - Assess/evaluate cause of self-care deficits   - Assess range of motion  - Assess patient's mobility; develop plan if impaired  - Assess patient's need for assistive devices and provide as appropriate  - Encourage maximum independence but intervene and supervise when necessary  - Involve family in performance of ADLs  - Assess for home care needs following discharge   - Consider OT consult to assist with ADL evaluation and planning for discharge  - Provide patient education as appropriate  Outcome: Progressing  Goal: Maintains/Returns to pre admission functional level  Description: INTERVENTIONS:  - Perform AM-PAC 6 Click Basic Mobility/ Daily Activity assessment daily.  - Set and communicate daily mobility goal to care team and patient/family/caregiver.   - Collaborate with rehabilitation services on mobility goals if consulted  - Perform Range of Motion 3 times a day.  - Reposition patient every 2 hours.  - Dangle patient 3 times a day  - Stand patient 3 times a day  - Ambulate patient 3 times a day  - Out of bed to chair 3 times a day   - Out of bed for meals 3 times a day  - Out of bed for toileting  - Record patient progress and toleration of activity level   Outcome: Progressing     Problem: DISCHARGE PLANNING  Goal: Discharge to home or other facility with appropriate resources  Description: INTERVENTIONS:  - Identify barriers to discharge w/patient and caregiver  -  Arrange for needed discharge resources and transportation as appropriate  - Identify discharge learning needs (meds, wound care, etc.)  - Arrange for interpretive services to assist at discharge as needed  - Refer to Case Management Department for coordinating discharge planning if the patient needs post-hospital services based on physician/advanced practitioner order or complex needs related to functional status, cognitive ability, or social support system  Outcome: Progressing     Problem: Knowledge Deficit  Goal: Patient/family/caregiver demonstrates understanding of disease process, treatment plan, medications, and discharge instructions  Description: Complete learning assessment and assess knowledge base.  Interventions:  - Provide teaching at level of understanding  - Provide teaching via preferred learning methods  Outcome: Progressing     Problem: METABOLIC, FLUID AND ELECTROLYTES - ADULT  Goal: Electrolytes maintained within normal limits  Description: INTERVENTIONS:  - Monitor labs and assess patient for signs and symptoms of electrolyte imbalances  - Administer electrolyte replacement as ordered  - Monitor response to electrolyte replacements, including repeat lab results as appropriate  - Instruct patient on fluid and nutrition as appropriate  Outcome: Progressing  Goal: Fluid balance maintained  Description: INTERVENTIONS:  - Monitor labs   - Monitor I/O and WT  - Instruct patient on fluid and nutrition as appropriate  - Assess for signs & symptoms of volume excess or deficit  Outcome: Progressing  Goal: Glucose maintained within target range  Description: INTERVENTIONS:  - Monitor Blood Glucose as ordered  - Assess for signs and symptoms of hyperglycemia and hypoglycemia  - Administer ordered medications to maintain glucose within target range  - Assess nutritional intake and initiate nutrition service referral as needed  Outcome: Progressing     Problem: SKIN/TISSUE INTEGRITY - ADULT   Problem:  HEMATOLOGIC - ADULT  Goal: Maintains hematologic stability  Description: INTERVENTIONS  - Assess for signs and symptoms of bleeding or hemorrhage  - Monitor labs  - Administer supportive blood products/factors as ordered and appropriate  Outcome: Progressing     Problem: MUSCULOSKELETAL - ADULT  Goal: Maintain or return mobility to safest level of function  Description: INTERVENTIONS:  - Assess patient's ability to carry out ADLs; assess patient's baseline for ADL function and identify physical deficits which impact ability to perform ADLs (bathing, care of mouth/teeth, toileting, grooming, dressing, etc.)  - Assess/evaluate cause of self-care deficits   - Assess range of motion  - Assess patient's mobility  - Assess patient's need for assistive devices and provide as appropriate  - Encourage maximum independence but intervene and supervise when necessary  - Involve family in performance of ADLs  - Assess for home care needs following discharge   - Consider OT consult to assist with ADL evaluation and planning for discharge  - Provide patient education as appropriate  Outcome: Progressing  Goal: Maintain proper alignment of affected body part  Description: INTERVENTIONS:  - Support, maintain and protect limb and body alignment  - Provide patient/ family with appropriate education  Outcome: Progressing     Problem: Prexisting or High Potential for Compromised Skin Integrity  Goal: Skin integrity is maintained or improved  Description: INTERVENTIONS:  - Identify patients at risk for skin breakdown  - Assess and monitor skin integrity  - Assess and monitor nutrition and hydration status  - Monitor labs   - Assess for incontinence   - Turn and reposition patient  - Assist with mobility/ambulation  - Relieve pressure over bony prominences  - Avoid friction and shearing  - Provide appropriate hygiene as needed including keeping skin clean and dry  - Evaluate need for skin moisturizer/barrier cream  - Collaborate with  interdisciplinary team   - Patient/family teaching  - Consider wound care consult   Outcome: Progressing     Problem: Potential for Falls  Goal: Patient will remain free of falls  Description: INTERVENTIONS:  - Educate patient/family on patient safety including physical limitations  - Instruct patient to call for assistance with activity   - Consult OT/PT to assist with strengthening/mobility   - Keep Call bell within reach  - Keep bed low and locked with side rails adjusted as appropriate  - Keep care items and personal belongings within reach  - Initiate and maintain comfort rounds  - Make Fall Risk Sign visible to staff  - Offer Toileting every 2 Hours, in advance of need  - Initiate/Maintain bed alarm  - Obtain necessary fall risk management equipment:   - Apply yellow socks and bracelet for high fall risk patients  - Consider moving patient to room near nurses station  Outcome: Progressing

## 2024-02-05 NOTE — PLAN OF CARE
Problem: OCCUPATIONAL THERAPY ADULT  Goal: Performs self-care activities at highest level of function for planned discharge setting.  See evaluation for individualized goals.  Description: Treatment Interventions: ADL retraining, Functional transfer training, Patient/family training, Equipment evaluation/education, Cognitive reorientation, Endurance training, UE strengthening/ROM, Compensatory technique education, Continued evaluation          See flowsheet documentation for full assessment, interventions and recommendations.   Note: Limitation: Decreased ADL status, Decreased Safe judgement during ADL, Decreased cognition, Decreased endurance, Decreased self-care trans, Decreased high-level ADLs  Prognosis: Fair  Assessment: Pt is a 78 y.o. female seen for OT evaluation at [facility], admitted 2/3/2024 w/ Severe sepsis (HCC). Pt additional presents s/p fall with displaced femoral neck fx. Pt is now POD#1 s/p R MARY with Dr. Goldman. Pt is WBAT without formal hip precautions. OT completed extensive review of pt's medical and social history. Comorbidities affecting pt's functional performance at time of assessment include: h/o multiple CVA's, a-fib, UTI, HTN, h/o cerebral amyloid angiopathy. Personal factors affecting pt at time of IE include: steps to enter environment, limited home support, behavioral pattern, difficulty performing ADLS, difficulty performing IADLS , limited insight into deficits, and environment. Prior to admission, pt was living with her spouse in a 2SH with [facility].  Per chart, pt was I w/  ADLS and A w/ IADLS, (-) drove, & required use of no DME/AD PTA. Upon evaluation: Pt requires Mod Ax2 for bed mobility, Min-Mod Ax1 for functional mobility/transfers, Min A for UB ADLs and Mod A for LB ADLS 2* the following deficits impacting occupational performance: weakness, decreased strength, decreased balance, decreased tolerance, impaired memory, impaired sequencing, impaired problem solving,  decreased safety awareness, increased pain, and orthopedic restrictions. Full objective findings from OT assessment regarding body systems outlined above. Pt to benefit from continued skilled OT tx while in the hospital to address deficits as defined above and maximize level of functional independence w/ ADL's and functional mobility. Occupational Performance areas to address include: grooming, bathing/shower, toilet hygiene, dressing, functional mobility, community mobility, and clothing management. Based on findings, pt is of high complexity. The patient's raw score on the -PAC Daily Activity inpatient short form is 16, standardized score is 35.96, less than 39.4. Patients at this level are likely to benefit from DC to post-acute rehabilitation services. However, please refer to therapist recommendation for discharge planning given other factors that may influence destination. At this time, OT recommendations at time of discharge are DC with Level II resources.     Rehab Resource Intensity Level, OT: II (Moderate Resource Intensity)

## 2024-02-05 NOTE — ASSESSMENT & PLAN NOTE
"Patient with mechanical fall on February 1st  Presented with right hip pain  CT head-\" No acute intracranial abnormality.Stable chronic microangiopathic changes within the brain. Stable encephalomalacia of the inferior right frontal lobe.\"  No head injuries or any other injuries noted  Orthopedics consult - POD 1   PT/OT for disposition planning  "

## 2024-02-05 NOTE — PROGRESS NOTES
" Pastoral Care Progress Note    2024  Patient: Lo Talley : 1945  Admission Date & Time: 2/3/2024 1059  MRN: [id number] CSN: [id number]       and Pt in good spirits,  did the talking, said \"we're good\" when asked if I could do anything for them.   offered encouragement and remains available.  "

## 2024-02-05 NOTE — PLAN OF CARE
Problem: PHYSICAL THERAPY ADULT  Goal: Performs mobility at highest level of function for planned discharge setting.  See evaluation for individualized goals.  Description: Treatment/Interventions: Functional transfer training, LE strengthening/ROM, Elevations, Therapeutic exercise, Endurance training, Cognitive reorientation, Patient/family training, Equipment eval/education, Bed mobility, Gait training, Compensatory technique education, Spoke to nursing, Spoke to case management     See flowsheet documentation for full assessment, interventions and recommendations.  Note: Prognosis: Fair  Problem List: Decreased strength, Decreased range of motion, Decreased endurance, Impaired balance, Decreased mobility, Decreased cognition, Impaired judgement, Decreased safety awareness, Decreased skin integrity, Orthopedic restrictions, Pain  Assessment: Pt seen for PT evaluation for mobility assessment & discharge needs. Pt is a 78 yr old female admitted 2/3/24 s/p fall 2 nights prior, generalized weakness. Dx: UTI w/ severe sepsis, R femur fx. 2/4/24 underwent R MARY by Dr. Goldman. Comorbidities affecting pt's fnxl performance include: HTN, multiple CVA, Afib, HTN, falls. During PT IE, pt requires MODA 2 person for bed mobility, MODA 1 for STS transfers, however once in upright, DERECK 1 needed for transitions between surfaces and additional ambulation with RW. Pt displays above outlined functional impairments & limitations, and presents below her baseline level of functional mobility. The AM-PAC & Barthel Index outcome tools were used to assist in determining pt safety w/ mobility/self care & appropriate d/c recommendations, see above for scores. Pt is at risk of falls d/t multiple comorbidities, h/o falls, impaired balance, impaired cognition, impaired insight/safety awareness, use of ambulatory aid, varying levels of pain, acuity of medical illness, ongoing medical treatment of primary dx, polypharmacy, and unstable  vitals. Pt's clinical presentation is currently unstable/unpredictable as seen in pt's presentation of vital sign response, changing level of pain, varying levels of cognitive performance, increased fall risk, new onset of impairment of functional mobility, decreased endurance, and new onset of weakness. Pt will benefit from continued PT services in order to address impairments, decrease risk of falls, maximize independence w/ fnxl mobility, & ensure safety w/ mobility for transition to next level of care. Based on pt presentation & impairments, pt would most appropriately benefit from Level II (moderate PT intensity) resources upon d/c.    Barriers to Discharge: Decreased caregiver support     Rehab Resource Intensity Level, PT: II (Moderate Resource Intensity)    See flowsheet documentation for full assessment.

## 2024-02-05 NOTE — PROGRESS NOTES
Progress Note - Orthopedics   Lo Talley 78 y.o. female MRN: [id number]  Unit/Bed#: -01      Assessment:  78 y.o.female s/p right  MARY  with Dr. Goldman on 2/4/24 for displaced femoral neck fracture. POD1      Plan:  WBAT - no formal hip precautions other than no resisted abduction  Hgb 11.2. Yesterday Hgb was 12.6 . Continue to monitor H&H and vitals.  PT/OT  Pain control per primary  DVT ppx - ASA 81mg BID x 4 weeks on discharge  Follow up with Dr. Goldman in 2 weeks  Dispo: Ortho will follow      Subjective:    78 y.o.female Seen and examined at bedside. Patient states she does not have any pain. He has not been out of bed yet with physical therapy. Patient states no acute events or complaints overnight. Denies fevers, chills, CP, SOB, N/V, numbness or tingling.     Labs:  0   Lab Value Date/Time    HCT 35.0 02/05/2024 0350    HCT 37.7 02/04/2024 0448    HCT 42.4 02/03/2024 1124    HGB 11.8 02/05/2024 0350    HGB 12.6 02/04/2024 0448    HGB 14.2 02/03/2024 1124    INR 1.17 02/04/2024 0448    WBC 15.81 (H) 02/05/2024 0350    WBC 14.68 (H) 02/04/2024 0448    WBC 18.79 (H) 02/03/2024 1124       Meds:    Current Facility-Administered Medications:     acetaminophen (TYLENOL) tablet 650 mg, 650 mg, Oral, Q6H PRN, Idania Edwards PA-C    acetaminophen (TYLENOL) tablet 975 mg, 975 mg, Oral, Q8H, Idania Edwards PA-C, 975 mg at 02/05/24 0351    aluminum-magnesium hydroxide-simethicone (MAALOX) oral suspension 30 mL, 30 mL, Oral, Q6H PRN, Idania Edwards PA-C    amLODIPine (NORVASC) tablet 10 mg, 10 mg, Oral, Daily, Idania Edwards PA-C    aspirin (ECOTRIN LOW STRENGTH) EC tablet 81 mg, 81 mg, Oral, BID, Idania Edwards PA-C, 81 mg at 02/04/24 2035    atorvastatin (LIPITOR) tablet 40 mg, 40 mg, Oral, Daily With Dinner, Idania Edwards PA-C, 40 mg at 02/04/24 1728    calcium carbonate (TUMS) chewable tablet 1,000 mg, 1,000 mg, Oral, Daily PRN, Idania Edwards PA-C    carvedilol (COREG) tablet 12.5 mg, 12.5 mg, Oral, BID With Meals, Idania Edwards  "JARROD, 12.5 mg at 02/04/24 1728    cefTRIAXone (ROCEPHIN) IVPB (premix in dextrose) 1,000 mg 50 mL, 1,000 mg, Intravenous, Q24H, Idania Edwards PA-C    docusate sodium (COLACE) capsule 100 mg, 100 mg, Oral, BID, Idania Edwards PA-C, 100 mg at 02/04/24 1728    ferrous sulfate tablet 325 mg, 325 mg, Oral, Daily With Breakfast, Idania Edwards PA-C    gabapentin (NEURONTIN) capsule 300 mg, 300 mg, Oral, HS, Idania Edwards PA-C, 300 mg at 02/04/24 2136    heparin (porcine) subcutaneous injection 5,000 Units, 5,000 Units, Subcutaneous, Q8H BK, Idania Edwards PA-C, 5,000 Units at 02/05/24 0536    lactated ringers bolus 1,000 mL, 1,000 mL, Intravenous, Once PRN **AND** lactated ringers bolus 1,000 mL, 1,000 mL, Intravenous, Once PRN, Aan Hughes PA-C    lactated ringers infusion, 100 mL/hr, Intravenous, Continuous, Idania Edwards PA-C, Last Rate: 100 mL/hr at 02/04/24 2134, 100 mL/hr at 02/04/24 2134    metoprolol (LOPRESSOR) injection 5 mg, 5 mg, Intravenous, Q6H PRN, Idania Edwards PA-C    morphine injection 2 mg, 2 mg, Intravenous, Q2H PRN, Idania Edwards PA-C    ondansetron (ZOFRAN) injection 4 mg, 4 mg, Intravenous, Q6H PRN, Idania Edwards PA-C    oxyCODONE (ROXICODONE) IR tablet 5 mg, 5 mg, Oral, Q4H PRN, Idania Edwards PA-C    oxyCODONE (ROXICODONE) split tablet 2.5 mg, 2.5 mg, Oral, Q6H PRN, Idania Edwards PA-C    senna (SENOKOT) tablet 8.6 mg, 1 tablet, Oral, Daily, Idania Edwards PA-C, 8.6 mg at 02/04/24 1436    sodium chloride 0.9 % bolus 1,000 mL, 1,000 mL, Intravenous, Once PRN **AND** sodium chloride 0.9 % bolus 1,000 mL, 1,000 mL, Intravenous, Once PRN, Ana Hughes PA-C    Blood Culture:   Lab Results   Component Value Date    BLOODCX No Growth at 24 hrs. 02/03/2024       Wound Culture:   No results found for: \"WOUNDCULT\"    Ins and Outs:  I/O last 24 hours:  In: 1000 [I.V.:1000]  Out: 1000 [Urine:1000]          Physical:  Vitals:    02/05/24 0540   BP: 115/66   Pulse: 95   Resp:    Temp:    SpO2: 100%     Musculoskeletal: right Lower " Extremity  Skin - . No erythema or ecchymosis. Compartments soft and compressible.   Dressing - c/d/I, covered with mepilex  TTP at incision site  Sensation intact to saphenous, sural, tibial, superficial peroneal nerve, and deep peroneal  Motor intact to +FHL/EHL, +ankle dorsi/plantar flexion  2+ DP pulse, symmetric bilaterally  Digits warm and well perfused  Capillary refill < 2 seconds      Lance Ramirez PA-C

## 2024-02-05 NOTE — ASSESSMENT & PLAN NOTE
Patient has history of CVAs  Patient was on Eliquis but per prior report  states that he stopped her Eliquis around August 2023 on his own because they could not afford it  Cardiology recommending restarting Eliquis if family agrees - they attempted to speak with  2/4 but were unable to reach via phone.  Left VM this AM  Continue on statin

## 2024-02-05 NOTE — ASSESSMENT & PLAN NOTE
"Patient admitted with severe sepsis as evidenced by tachycardia, leukocytosis of 18.7 K likely due to to UTI.  CT chest abdomen pelvis showed-\"Impacted subcapital fracture of the right femur.  Mild circumferential bladder wall thickening with mild perivesical inflammatory fat stranding, which may relate to cystitis. Recommend correlation with urinalysis.\"  Lactate is 2.9 - now resolved  Procalcitonin 0.12  Blood cultures negative x 2 after 24 hours  Urine culture > 100k pansensitive ecoli   Restart IV ancef now.  Did receive prophylactic dose x1 yesterday for the OR.  Had been on IV rocephin but does not appear pt received dose yesterday as med was on MAR hold due to being in the OR  Leukocytosis remains elevated but downtrending from admit, likely in part due to recent surgery.  Afebrile  "

## 2024-02-05 NOTE — ASSESSMENT & PLAN NOTE
Patient with subcapital fracture of right femur  Patient underwent right hip arthroplasty by Dr. Goldman 2/4 - POD 1  Pain management as needed  Encourage incentive spirometry use  Trend H&H  DVT prophylaxis - currently ASA 81 mg BID per ortho but need to discuss with  re: AC due to hx of afib  PT/OT consult

## 2024-02-06 PROBLEM — D62 ACUTE BLOOD LOSS ANEMIA: Status: ACTIVE | Noted: 2024-02-06

## 2024-02-06 PROBLEM — E44.0 MODERATE PROTEIN-CALORIE MALNUTRITION (HCC): Status: ACTIVE | Noted: 2024-02-06

## 2024-02-06 LAB
ANION GAP SERPL CALCULATED.3IONS-SCNC: 6 MMOL/L
BASOPHILS # BLD MANUAL: 0 THOUSAND/UL (ref 0–0.1)
BASOPHILS NFR MAR MANUAL: 0 % (ref 0–1)
BUN SERPL-MCNC: 22 MG/DL (ref 5–25)
CALCIUM SERPL-MCNC: 8.1 MG/DL (ref 8.4–10.2)
CHLORIDE SERPL-SCNC: 110 MMOL/L (ref 96–108)
CO2 SERPL-SCNC: 26 MMOL/L (ref 21–32)
CREAT SERPL-MCNC: 0.41 MG/DL (ref 0.6–1.3)
EOSINOPHIL # BLD MANUAL: 0 THOUSAND/UL (ref 0–0.4)
EOSINOPHIL NFR BLD MANUAL: 0 % (ref 0–6)
ERYTHROCYTE [DISTWIDTH] IN BLOOD BY AUTOMATED COUNT: 13.3 % (ref 11.6–15.1)
GFR SERPL CREATININE-BSD FRML MDRD: 100 ML/MIN/1.73SQ M
GLUCOSE SERPL-MCNC: 108 MG/DL (ref 65–140)
HCT VFR BLD AUTO: 32.4 % (ref 34.8–46.1)
HGB BLD-MCNC: 10.8 G/DL (ref 11.5–15.4)
LYMPHOCYTES # BLD AUTO: 15 % (ref 14–44)
LYMPHOCYTES # BLD AUTO: 2.35 THOUSAND/UL (ref 0.6–4.47)
MAGNESIUM SERPL-MCNC: 2.1 MG/DL (ref 1.9–2.7)
MCH RBC QN AUTO: 31.2 PG (ref 26.8–34.3)
MCHC RBC AUTO-ENTMCNC: 33.3 G/DL (ref 31.4–37.4)
MCV RBC AUTO: 94 FL (ref 82–98)
MONOCYTES # BLD AUTO: 0.47 THOUSAND/UL (ref 0–1.22)
MONOCYTES NFR BLD: 3 % (ref 4–12)
NEUTROPHILS # BLD MANUAL: 12.85 THOUSAND/UL (ref 1.85–7.62)
NEUTS BAND NFR BLD MANUAL: 1 % (ref 0–8)
NEUTS SEG NFR BLD AUTO: 81 % (ref 43–75)
PLATELET # BLD AUTO: 329 THOUSANDS/UL (ref 149–390)
PLATELET BLD QL SMEAR: ADEQUATE
PMV BLD AUTO: 9.2 FL (ref 8.9–12.7)
POTASSIUM SERPL-SCNC: 3.2 MMOL/L (ref 3.5–5.3)
RBC # BLD AUTO: 3.46 MILLION/UL (ref 3.81–5.12)
RBC MORPH BLD: NORMAL
SODIUM SERPL-SCNC: 142 MMOL/L (ref 135–147)
WBC # BLD AUTO: 15.67 THOUSAND/UL (ref 4.31–10.16)

## 2024-02-06 PROCEDURE — 99024 POSTOP FOLLOW-UP VISIT: CPT | Performed by: PHYSICIAN ASSISTANT

## 2024-02-06 PROCEDURE — 85027 COMPLETE CBC AUTOMATED: CPT | Performed by: PHYSICIAN ASSISTANT

## 2024-02-06 PROCEDURE — 85007 BL SMEAR W/DIFF WBC COUNT: CPT | Performed by: PHYSICIAN ASSISTANT

## 2024-02-06 PROCEDURE — 83735 ASSAY OF MAGNESIUM: CPT | Performed by: PHYSICIAN ASSISTANT

## 2024-02-06 PROCEDURE — 80048 BASIC METABOLIC PNL TOTAL CA: CPT | Performed by: PHYSICIAN ASSISTANT

## 2024-02-06 PROCEDURE — 99232 SBSQ HOSP IP/OBS MODERATE 35: CPT | Performed by: PHYSICIAN ASSISTANT

## 2024-02-06 RX ADMIN — ATORVASTATIN CALCIUM 40 MG: 40 TABLET, FILM COATED ORAL at 17:23

## 2024-02-06 RX ADMIN — CEFAZOLIN SODIUM 1000 MG: 1 SOLUTION INTRAVENOUS at 17:23

## 2024-02-06 RX ADMIN — SENNOSIDES 8.6 MG: 8.6 TABLET, FILM COATED ORAL at 09:29

## 2024-02-06 RX ADMIN — ACETAMINOPHEN 325MG 975 MG: 325 TABLET ORAL at 19:58

## 2024-02-06 RX ADMIN — APIXABAN 5 MG: 5 TABLET, FILM COATED ORAL at 12:10

## 2024-02-06 RX ADMIN — HEPARIN SODIUM 5000 UNITS: 5000 INJECTION INTRAVENOUS; SUBCUTANEOUS at 04:54

## 2024-02-06 RX ADMIN — ASPIRIN 81 MG: 81 TABLET, COATED ORAL at 09:29

## 2024-02-06 RX ADMIN — CARVEDILOL 12.5 MG: 12.5 TABLET, FILM COATED ORAL at 09:31

## 2024-02-06 RX ADMIN — GABAPENTIN 300 MG: 300 CAPSULE ORAL at 22:17

## 2024-02-06 RX ADMIN — ACETAMINOPHEN 325MG 975 MG: 325 TABLET ORAL at 04:54

## 2024-02-06 RX ADMIN — DOCUSATE SODIUM 100 MG: 100 CAPSULE, LIQUID FILLED ORAL at 17:23

## 2024-02-06 RX ADMIN — ACETAMINOPHEN 325MG 975 MG: 325 TABLET ORAL at 12:10

## 2024-02-06 RX ADMIN — CEFAZOLIN SODIUM 1000 MG: 1 SOLUTION INTRAVENOUS at 09:29

## 2024-02-06 RX ADMIN — DOCUSATE SODIUM 100 MG: 100 CAPSULE, LIQUID FILLED ORAL at 09:29

## 2024-02-06 RX ADMIN — CEFAZOLIN SODIUM 1000 MG: 1 SOLUTION INTRAVENOUS at 01:15

## 2024-02-06 RX ADMIN — APIXABAN 5 MG: 5 TABLET, FILM COATED ORAL at 17:25

## 2024-02-06 RX ADMIN — FERROUS SULFATE TAB 325 MG (65 MG ELEMENTAL FE) 325 MG: 325 (65 FE) TAB at 09:31

## 2024-02-06 RX ADMIN — CARVEDILOL 12.5 MG: 12.5 TABLET, FILM COATED ORAL at 17:23

## 2024-02-06 NOTE — CASE MANAGEMENT
Case Management Discharge Planning Note    Patient name Lo Talley  Location /-01 MRN [id number]  : 1945 Date 2024       Current Admission Date: 2/3/2024  Current Admission Diagnosis:Severe sepsis (HCC)   Patient Active Problem List    Diagnosis Date Noted    Moderate protein-calorie malnutrition (HCC) 2024    Acute blood loss anemia 2024    Severe sepsis (HCC) 2024    Femur fracture, right (HCC) 2024    UTI (urinary tract infection) 2024    HTN (hypertension) 2024    Fall 2024    Cerebral amyloid angiopathy (CODE) 2023    History of multiple strokes 06/15/2023    Atrial fibrillation (HCC) 06/15/2023    Hypertensive urgency 2023    Elevated troponin 2023      LOS (days): 3  Geometric Mean LOS (GMLOS) (days): 9.9  Days to GMLOS:6.8     OBJECTIVE:  Risk of Unplanned Readmission Score: 14.51         Current admission status: Inpatient   Preferred Pharmacy:   [facility]  [place] - [address]  [address]  [place] [address]  Phone: [phone] Fax: [phone]    Primary Care Provider: No primary care provider on file.    Primary Insurance: MEDICARE  Secondary Insurance: AET[employer]    DISCHARGE DETAILS:        IMM reviewed with patient's caregiver, patient's caregiver agrees with discharge determination.          IMM Given (Date):: 24 (327p)  IMM Given to:: Family  Family notified:: Spouse, Jeremiah Talley

## 2024-02-06 NOTE — ASSESSMENT & PLAN NOTE
Malnutrition Findings:   Adult Malnutrition type: Chronic illness  Adult Degree of Malnutrition: Malnutrition of moderate degree  Malnutrition Characteristics: Fat loss, Muscle loss                  360 Statement: Pt presents with moderate protein malnutrition as evidenced by prominent clavicle bone and sunken orbitals. Treat with diet and supplements BID.    BMI Findings:           Body mass index is 18.72 kg/m².

## 2024-02-06 NOTE — PROGRESS NOTES
Patient:    MRN:  [id number]    Soha Request ID:  [id number]    Level of care reserved:  Skilled Nursing Facility    Partner Reserved:  [facility], [place] [address] [phone]    Clinical needs requested:    Geography searched:  10 miles around [address]    Start of Service:    Request sent:  10:40am EST on 2/6/2024 by Ana Dang    Partner reserved:  12:25pm EST on 2/6/2024 by Ana Dang    Choice list shared:  12:24pm EST on 2/6/2024 by Ana Dang

## 2024-02-06 NOTE — ASSESSMENT & PLAN NOTE
On Coreg, Norvasc and Zestril  Lisinopril held preop  Blood pressure stable.  Hold amlodipine given some low-normal BP yesterday, SBP has been 100-110s  Would consider resuming lisinopril over coreg if needed

## 2024-02-06 NOTE — ASSESSMENT & PLAN NOTE
"Patient with mechanical fall on February 1st  Presented with right hip pain  CT head-\" No acute intracranial abnormality.Stable chronic microangiopathic changes within the brain. Stable encephalomalacia of the inferior right frontal lobe.\"  No head injuries or any other injuries noted  Orthopedics consult - POD 2  PT/OT for disposition planning - recommending rehab.  CM following  "

## 2024-02-06 NOTE — QUICK NOTE
Updated patient's  at bedside regarding clinical course.  Plan for discharge to rehab tomorrow.   in agreement and verbalized understanding of current plan.  He reports he discontinued patient's eliquis around August 2023 due to how expensive (reported around $400) it was.  Discussed with  that cardiology continues to recommend eliquis for stroke prevention and that this has been resumed at this time.  This will be continued while at rehab and cardiology recommends follow up as outpt regarding alternative anticoagulation.  Nursing updated facesheet to include 's cell phone number as main contact - previously only house phone was listed.

## 2024-02-06 NOTE — ASSESSMENT & PLAN NOTE
On Coreg for rate control  Not on any anticoagulation as  stopped Eliquis in August on his own - reportedly medication may have been too expensive?  Cardiology attempted to call  yesterday.  Personally called  this AM - left VM again 2/6  Discussed with cardiology 2/6 - will restart eliquis at this time for stroke prevention.  If this is cost prohibitive following rehab, will need to follow up with cardiology after discharge.  Per ortho OK to switch from ASA BID to eliquis

## 2024-02-06 NOTE — PROGRESS NOTES
"[facility]  Progress Note  Name: Lo Talley I  MRN: [id number]  Unit/Bed#: -01 I Date of Admission: 2/3/2024   Date of Service: 2/6/2024 I Hospital Day: 3    Assessment/Plan   * Severe sepsis (HCC)  Assessment & Plan  Patient admitted with severe sepsis as evidenced by tachycardia, leukocytosis of 18.7 K likely due to to UTI.  CT chest abdomen pelvis showed-\"Impacted subcapital fracture of the right femur.  Mild circumferential bladder wall thickening with mild perivesical inflammatory fat stranding, which may relate to cystitis. Recommend correlation with urinalysis.\"  Lactate is 2.9 - now resolved  Procalcitonin 0.12  Blood cultures negative x 2 after 24 hours  Urine culture > 100k pansensitive ecoli   Restart IV ancef now.  Did receive prophylactic dose x1 for the OR.  Had been on IV rocephin but does not appear pt received dose 2/4 as med was on MAR hold due to being in the OR  Leukocytosis remains elevated but stable, likely in part due to recent surgery.  Afebrile    UTI (urinary tract infection)  Assessment & Plan  Patient has positive UTI on admission  Initially on IV Rocephin - transition to IV ancef given cx results  Urine culture growing pansensitive ecoli  Trend WBC and fever curve    Atrial fibrillation (HCC)  Assessment & Plan  On Coreg for rate control  Not on any anticoagulation as  stopped Eliquis in August on his own - reportedly medication may have been too expensive?  Cardiology attempted to call  yesterday.  Personally called  this AM - left VM again 2/6  Discussed with cardiology 2/6 - will restart eliquis at this time for stroke prevention.  If this is cost prohibitive following rehab, will need to follow up with cardiology after discharge.  Per ortho OK to switch from ASA BID to eliquis         Acute blood loss anemia  Assessment & Plan  Pre-op hemoglobin noted to be 14.2  Decreased to 10.8 this AM suspect likely due to recent surgery " "but also hemodilutional s/p IVF  No ecchymosis or hematoma noted on exam  Otherwise no evidence of bleeding  Trend CBC    Moderate protein-calorie malnutrition (HCC)  Assessment & Plan  Malnutrition Findings:   Adult Malnutrition type: Chronic illness  Adult Degree of Malnutrition: Malnutrition of moderate degree  Malnutrition Characteristics: Fat loss, Muscle loss                  360 Statement: Pt presents with moderate protein malnutrition as evidenced by prominent clavicle bone and sunken orbitals. Treat with diet and supplements BID.    BMI Findings:           Body mass index is 18.72 kg/m².       Fall  Assessment & Plan  Patient with mechanical fall on February 1st  Presented with right hip pain  CT head-\" No acute intracranial abnormality.Stable chronic microangiopathic changes within the brain. Stable encephalomalacia of the inferior right frontal lobe.\"  No head injuries or any other injuries noted  Orthopedics consult - POD 2  PT/OT for disposition planning - recommending rehab.  CM following    HTN (hypertension)  Assessment & Plan  On Coreg, Norvasc and Zestril  Lisinopril held preop  Blood pressure stable.  Hold amlodipine given some low-normal BP yesterday, SBP has been 100-110s  Would consider resuming lisinopril over coreg if needed    Femur fracture, right (HCC)  Assessment & Plan  Patient with subcapital fracture of right femur  Patient underwent right hip arthroplasty by Dr. Goldman 2/4 - POD 2  Pain management as needed  Encourage incentive spirometry use  Trend H&H  DVT prophylaxis - initially on ASA 81 mg BID however given atrial fibrillation, discussed with both cardiology and orthopedics 2/6 - will transition back to eliquis and dc ASA/sq heparin  PT/OT consult - recommending rehab.  CM following    History of multiple strokes  Assessment & Plan  Patient has history of CVAs  Patient was on Eliquis but per prior report  states that he stopped her Eliquis around August 2023 on his own " because they could not afford it  Will restart eliquis at this time - if cost prohibitive will need to revisted as an outpt.  Had left additional VM for patient's   to discuss further  Continue on statin         VTE Pharmacologic Prophylaxis: VTE Score: 10 High Risk (Score >/= 5) - Pharmacological DVT Prophylaxis Ordered: apixaban (Eliquis). Sequential Compression Devices Ordered.    Mobility:   Basic Mobility Inpatient Raw Score: 12  JH-HLM Goal: 4: Move to chair/commode  JH-HLM Achieved: 4: Move to chair/commode  HLM Goal achieved. Continue to encourage appropriate mobility.    Patient Centered Rounds: I performed bedside rounds with nursing staff today.   Discussions with Specialists or Other Care Team Provider: CM, cardiology AP, ortho AP    Education and Discussions with Family / Patient: Attempted to update  () via phone. Left voicemail.     Total Time Spent on Date of Encounter in care of patient: 45 mins. This time was spent on one or more of the following: performing physical exam; counseling and coordination of care; obtaining or reviewing history; documenting in the medical record; reviewing/ordering tests, medications or procedures; communicating with other healthcare professionals and discussing with patient's family/caregivers.    Current Length of Stay: 3 day(s)  Current Patient Status: Inpatient   Certification Statement: The patient will continue to require additional inpatient hospital stay due to rehab placement  Discharge Plan: Anticipate discharge in 24-48 hrs to rehab facility.    Code Status: Level 1 - Full Code    Subjective:   Patient pleasantly confused.  Denies any complaints this morning.  No events overnight.  Patient reports hip pain is well-controlled at rest.    Objective:     Vitals:   Temp (24hrs), Av °F (36.7 °C), Min:96.6 °F (35.9 °C), Max:99.1 °F (37.3 °C)    Temp:  [96.6 °F (35.9 °C)-99.1 °F (37.3 °C)] 98.1 °F (36.7 °C)  HR:  [] 91  BP:  (101-119)/(61-69) 117/69  SpO2:  [94 %-99 %] 94 %  Body mass index is 18.72 kg/m².     Input and Output Summary (last 24 hours):     Intake/Output Summary (Last 24 hours) at 2/6/2024 1200  Last data filed at 2/6/2024 0501  Gross per 24 hour   Intake --   Output 450 ml   Net -450 ml       Physical Exam:   Physical Exam  Vitals and nursing note reviewed.   Constitutional:       Appearance: Normal appearance.      Comments: No acute distress   HENT:      Head: Normocephalic.   Eyes:      General: No scleral icterus.     Extraocular Movements: Extraocular movements intact.      Conjunctiva/sclera: Conjunctivae normal.   Cardiovascular:      Rate and Rhythm: Normal rate. Rhythm irregularly irregular.   Pulmonary:      Effort: Pulmonary effort is normal.      Breath sounds: Normal breath sounds. No wheezing, rhonchi or rales.   Abdominal:      General: Bowel sounds are normal.      Palpations: Abdomen is soft.      Tenderness: There is no abdominal tenderness. There is no guarding or rebound.   Musculoskeletal:         General: No swelling, tenderness or deformity.      Cervical back: Normal range of motion.      Comments: R hip dressing clean/dry/intact, compartments soft, no evidence of hematoma   Skin:     General: Skin is warm and dry.   Neurological:      Mental Status: She is alert. Mental status is at baseline.   Psychiatric:         Mood and Affect: Mood normal.         Speech: Speech normal.         Behavior: Behavior normal.          Additional Data:     Labs:  Results from last 7 days   Lab Units 02/06/24  0902 02/05/24  0350   WBC Thousand/uL 15.67* 15.81*   HEMOGLOBIN g/dL 10.8* 11.8   HEMATOCRIT % 32.4* 35.0   PLATELETS Thousands/uL 329 326   BANDS PCT % 1  --    NEUTROS PCT %  --  88*   LYMPHS PCT %  --  7*   LYMPHO PCT % 15  --    MONOS PCT %  --  4   MONO PCT % 3*  --    EOS PCT % 0 0     Results from last 7 days   Lab Units 02/06/24  0902 02/05/24  0350   SODIUM mmol/L 142 140   POTASSIUM mmol/L 3.2* 3.7    CHLORIDE mmol/L 110* 108   CO2 mmol/L 26 24   BUN mg/dL 22 21   CREATININE mg/dL 0.41* 0.46*   ANION GAP mmol/L 6 8   CALCIUM mg/dL 8.1* 8.6   ALBUMIN g/dL  --  3.1*   TOTAL BILIRUBIN mg/dL  --  0.79   ALK PHOS U/L  --  54   ALT U/L  --  15   AST U/L  --  17   GLUCOSE RANDOM mg/dL 108 110     Results from last 7 days   Lab Units 02/04/24  0448   INR  1.17             Results from last 7 days   Lab Units 02/04/24  0448 02/03/24  2143 02/03/24  1847 02/03/24  1616 02/03/24  1404 02/03/24  1124   LACTIC ACID mmol/L  --  1.5 2.5* 2.1* 2.7* 2.9*   PROCALCITONIN ng/ml 0.09  --   --   --   --  0.12       Lines/Drains:  Invasive Devices       Peripheral Intravenous Line  Duration             Peripheral IV 02/03/24 Right Antecubital 3 days    Peripheral IV 02/04/24 Dorsal (posterior);Left Forearm 2 days                          Imaging: No pertinent imaging reviewed.    Recent Cultures (last 7 days):   Results from last 7 days   Lab Units 02/03/24  1125 02/03/24  1124   BLOOD CULTURE  No Growth at 48 hrs. No Growth at 48 hrs.   URINE CULTURE   --  >100,000 cfu/ml Escherichia coli*  10,000-19,000 cfu/ml Alpha Hemolytic Streptococcus NOT Enterococcus*       Last 24 Hours Medication List:   Current Facility-Administered Medications   Medication Dose Route Frequency Provider Last Rate    acetaminophen  650 mg Oral Q6H PRN Idania Edwards PA-C      acetaminophen  975 mg Oral Q8H Idania Edwards PA-C      aluminum-magnesium hydroxide-simethicone  30 mL Oral Q6H PRN Idania Edwards PA-C      apixaban  5 mg Oral BID Landy Gould PA-C      atorvastatin  40 mg Oral Daily With Dinner Idania Edwards PA-C      calcium carbonate  1,000 mg Oral Daily PRN Idania Edwards PA-C      carvedilol  12.5 mg Oral BID With Meals Idania Edwards PA-C      cefazolin  1,000 mg Intravenous Q8H Landy Gould PA-C 1,000 mg (02/06/24 0929)    docusate sodium  100 mg Oral BID Idania Edwards PA-C      ferrous sulfate  325 mg Oral Daily With Breakfast Idania Edwards PA-C       gabapentin  300 mg Oral HS Idania Edwards PA-C      metoprolol  5 mg Intravenous Q6H PRN Idania Edwards PA-C      morphine injection  2 mg Intravenous Q2H PRN Idania Edwards PA-C      ondansetron  4 mg Intravenous Q6H PRN Idania Edwards PA-C      oxyCODONE  5 mg Oral Q4H PRN Idania Edwards PA-C      oxyCODONE  2.5 mg Oral Q6H PRN Idania Edwards PA-C      senna  1 tablet Oral Daily Idania Edwards PA-C          Today, Patient Was Seen By: Landy Gould PA-C    **Please Note: This note may have been constructed using a voice recognition system.**

## 2024-02-06 NOTE — ASSESSMENT & PLAN NOTE
Patient with subcapital fracture of right femur  Patient underwent right hip arthroplasty by Dr. Goldman 2/4 - POD 2  Pain management as needed  Encourage incentive spirometry use  Trend H&H  DVT prophylaxis - initially on ASA 81 mg BID however given atrial fibrillation, discussed with both cardiology and orthopedics 2/6 - will transition back to eliquis and dc ASA/sq heparin  PT/OT consult - recommending rehab.  CM following

## 2024-02-06 NOTE — PLAN OF CARE
Problem: PAIN - ADULT  Goal: Verbalizes/displays adequate comfort level or baseline comfort level  Description: Interventions:  - Encourage patient to monitor pain and request assistance  - Assess pain using appropriate pain scale  - Administer analgesics based on type and severity of pain and evaluate response  - Implement non-pharmacological measures as appropriate and evaluate response  - Consider cultural and social influences on pain and pain management  - Notify physician/advanced practitioner if interventions unsuccessful or patient reports new pain  Outcome: Progressing     Problem: INFECTION - ADULT  Goal: Absence or prevention of progression during hospitalization  Description: INTERVENTIONS:  - Assess and monitor for signs and symptoms of infection  - Monitor lab/diagnostic results  - Monitor all insertion sites, i.e. indwelling lines, tubes, and drains  - Monitor endotracheal if appropriate and nasal secretions for changes in amount and color  - [place] appropriate cooling/warming therapies per order  - Administer medications as ordered  - Instruct and encourage patient and family to use good hand hygiene technique  - Identify and instruct in appropriate isolation precautions for identified infection/condition  Outcome: Progressing  Goal: Absence of fever/infection during neutropenic period  Description: INTERVENTIONS:  - Monitor WBC    Outcome: Progressing     Problem: SAFETY ADULT  Goal: Patient will remain free of falls  Description: INTERVENTIONS:  - Educate patient/family on patient safety including physical limitations  - Instruct patient to call for assistance with activity   - Consult OT/PT to assist with strengthening/mobility   - Keep Call bell within reach  - Keep bed low and locked with side rails adjusted as appropriate  - Keep care items and personal belongings within reach  - Initiate and maintain comfort rounds  - Make Fall Risk Sign visible to staff  - Offer Toileting every 2 Hours,  in advance of need  - Initiate/Maintain alarm  - Obtain necessary fall risk management equipment:   - Apply yellow socks and bracelet for high fall risk patients  - Consider moving patient to room near nurses station  Outcome: Progressing  Goal: Maintain or return to baseline ADL function  Description: INTERVENTIONS:  -  Assess patient's ability to carry out ADLs; assess patient's baseline for ADL function and identify physical deficits which impact ability to perform ADLs (bathing, care of mouth/teeth, toileting, grooming, dressing, etc.)  - Assess/evaluate cause of self-care deficits   - Assess range of motion  - Assess patient's mobility; develop plan if impaired  - Assess patient's need for assistive devices and provide as appropriate  - Encourage maximum independence but intervene and supervise when necessary  - Involve family in performance of ADLs  - Assess for home care needs following discharge   - Consider OT consult to assist with ADL evaluation and planning for discharge  - Provide patient education as appropriate  Outcome: Progressing  Goal: Maintains/Returns to pre admission functional level  Description: INTERVENTIONS:  - Perform AM-PAC 6 Click Basic Mobility/ Daily Activity assessment daily.  - Set and communicate daily mobility goal to care team and patient/family/caregiver.   - Collaborate with rehabilitation services on mobility goals if consulted  - Perform Range of Motion 2 times a day.  - Reposition patient every 2 hours.  - Dangle patient 2 times a day  - Stand patient 2 times a day  - Ambulate patient 2 times a day  - Out of bed to chair 2 times a day   - Out of bed for meals 2 times a day  - Out of bed for toileting  - Record patient progress and toleration of activity level   Outcome: Progressing     Problem: DISCHARGE PLANNING  Goal: Discharge to home or other facility with appropriate resources  Description: INTERVENTIONS:  - Identify barriers to discharge w/patient and caregiver  -  Arrange for needed discharge resources and transportation as appropriate  - Identify discharge learning needs (meds, wound care, etc.)  - Arrange for interpretive services to assist at discharge as needed  - Refer to Case Management Department for coordinating discharge planning if the patient needs post-hospital services based on physician/advanced practitioner order or complex needs related to functional status, cognitive ability, or social support system  Outcome: Progressing     Problem: Knowledge Deficit  Goal: Patient/family/caregiver demonstrates understanding of disease process, treatment plan, medications, and discharge instructions  Description: Complete learning assessment and assess knowledge base.  Interventions:  - Provide teaching at level of understanding  - Provide teaching via preferred learning methods  Outcome: Progressing

## 2024-02-06 NOTE — CASE MANAGEMENT
Case Management Assessment & Discharge Planning Note    Patient name Lo Talley  Location /-01 MRN [id number]  : 1945 Date 2024       Current Admission Date: 2/3/2024  Current Admission Diagnosis:Severe sepsis (HCC)   Patient Active Problem List    Diagnosis Date Noted    Moderate protein-calorie malnutrition (HCC) 2024    Acute blood loss anemia 2024    Severe sepsis (HCC) 2024    Femur fracture, right (HCC) 2024    UTI (urinary tract infection) 2024    HTN (hypertension) 2024    Fall 2024    Cerebral amyloid angiopathy (CODE) 2023    History of multiple strokes 06/15/2023    Atrial fibrillation (HCC) 06/15/2023    Hypertensive urgency 2023    Elevated troponin 2023      LOS (days): 3  Geometric Mean LOS (GMLOS) (days): 9.9  Days to GMLOS:6.9     OBJECTIVE:    Risk of Unplanned Readmission Score: 14.51         Current admission status: Inpatient       Preferred Pharmacy:   [id number]  [place] - [address]  [address]  [place] [address]  Phone: [phone] Fax: [phone]    Primary Care Provider: No primary care provider on file.    Primary Insurance: MEDICARE  Secondary Insurance: AETNA    ASSESSMENT:  Active Health Care Proxies       jeremiah Talley Health Care Representative - Spouse   Primary Phone: [phone] (Home)                 Advance Directives  Does patient have a Health Care POA?: No  Was patient offered paperwork?: Yes (declined)  Does patient currently have a Health Care decision maker?: Yes, please see Health Care Proxy section  Does patient have Advance Directives?: No  Was patient offered paperwork?: Yes (declined)  Primary Contact: Jeremiah Talley, spouse         Readmission Root Cause  30 Day Readmission: No    Patient Information  Admitted from:: Home  Mental Status: Confused  During Assessment patient was accompanied by: Spouse  Assessment information provided by::  Spouse  Primary Caregiver: Self  Support Systems: Self, Spouse/significant other, Family members  County of Residence: [place]  What [facility] do you live in?: [place]  Home entry access options. Select all that apply.: Stairs  Number of steps to enter home.: 2  Do the steps have railings?: No  Type of Current Residence: 2 story home  Upon entering residence, is there a bedroom on the main floor (no further steps)?: No  A bedroom is located on the following floor levels of residence (select all that apply):: 2nd Floor  Upon entering residence, is there a bathroom on the main floor (no further steps)?: Yes  Number of steps to 2nd floor from main floor: One Flight  Living Arrangements: Lives w/ Spouse/significant other    Activities of Daily Living Prior to Admission  Functional Status: Independent  Completes ADLs independently?: Yes  Ambulates independently?: Yes  Does patient use assisted devices?: Yes  Assisted Devices (DME) used: Walker  Does patient currently own DME?: Yes  What DME does the patient currently own?: Walker  Does patient have a history of Outpatient Therapy (PT/OT)?: No  Does the patient have a history of Short-Term Rehab?: No  Does patient have a history of HHC?: No  Does patient currently have HHC?: No         Patient Information Continued  Income Source: Pension/USP  Does patient have prescription coverage?: Yes  Does patient receive dialysis treatments?: No  Does patient have a history of substance abuse?: No  Does patient have a history of Mental Health Diagnosis?: No         Means of Transportation  Means of Transport to [facility]:: Drives Self      Housing Stability: Low Risk  (2/6/2024)    Housing Stability Vital Sign     Unable to Pay for Housing in the Last Year: No     Number of Places Lived in the Last Year: 1     Unstable Housing in the Last Year: No   Food Insecurity: No Food Insecurity (2/6/2024)    Hunger Vital Sign     Worried About Running Out of Food in the Last Year: Never true      Ran Out of Food in the Last Year: Never true   Transportation Needs: No Transportation Needs (2/6/2024)    PRAPARE - Transportation     Lack of Transportation (Medical): No     Lack of Transportation (Non-Medical): No   Utilities: Not At Risk (2/6/2024)    [facility] Utilities     Threatened with loss of utilities: No       DISCHARGE DETAILS:    Discharge planning discussed with:: Pt and spouse  Freedom of Choice: Yes  Comments - Freedom of Choice: PT and spouse prefer [facility] for STR  CM contacted family/caregiver?: Yes  Were Treatment Team discharge recommendations reviewed with patient/caregiver?: Yes  Did patient/caregiver verbalize understanding of patient care needs?: Yes  Were patient/caregiver advised of the risks associated with not following Treatment Team discharge recommendations?: Yes    Contacts  Patient Contacts: Jeremiah Talley spouse  Relationship to Patient:: Family  Contact Method: In Person  Reason/Outcome: Discharge Planning, Emergency Contact, Continuity of Care, Referral    Requested Home Health Care         Is the patient interested in HHC at discharge?: No    DME Referral Provided  Referral made for DME?: No    Other Referral/Resources/Interventions Provided:  Interventions: Short Term Rehab         Treatment Team Recommendation: Short Term Rehab  Discharge Destination Plan:: Short Term Rehab                                         Additional Comments: CM met with pt and spouse to discuss role of CM and any needs prior to discharge. Pt wally w/ spouse in 2SH w/ 2 CONNIE & 2nd flr setup. Indp PTA. Pt owns RW. No hx STR/OP PT/HH/DA/MH. Pt prefers to use [facility]. Therapy is recommending STR and pt and spouse are in agreement and prefer [facility]. [facility] can accept and are reserved in [place]. BLS transport at discharge.

## 2024-02-06 NOTE — ASSESSMENT & PLAN NOTE
Pre-op hemoglobin noted to be 14.2  Decreased to 10.8 this AM suspect likely due to recent surgery but also hemodilutional s/p IVF  No ecchymosis or hematoma noted on exam  Otherwise no evidence of bleeding  Trend CBC

## 2024-02-06 NOTE — PLAN OF CARE
Problem: PAIN - ADULT  Goal: Verbalizes/displays adequate comfort level or baseline comfort level  Description: Interventions:  - Encourage patient to monitor pain and request assistance  - Assess pain using appropriate pain scale  - Administer analgesics based on type and severity of pain and evaluate response  - Implement non-pharmacological measures as appropriate and evaluate response  - Consider cultural and social influences on pain and pain management  - Notify physician/advanced practitioner if interventions unsuccessful or patient reports new pain  Outcome: Progressing     Problem: INFECTION - ADULT  Goal: Absence or prevention of progression during hospitalization  Description: INTERVENTIONS:  - Assess and monitor for signs and symptoms of infection  - Monitor lab/diagnostic results  - Monitor all insertion sites, i.e. indwelling lines, tubes, and drains  - Monitor endotracheal if appropriate and nasal secretions for changes in amount and color  - [place] appropriate cooling/warming therapies per order  - Administer medications as ordered  - Instruct and encourage patient and family to use good hand hygiene technique  - Identify and instruct in appropriate isolation precautions for identified infection/condition  Outcome: Progressing  Goal: Absence of fever/infection during neutropenic period  Description: INTERVENTIONS:  - Monitor WBC    Outcome: Progressing     Problem: SAFETY ADULT  Goal: Patient will remain free of falls  Description: INTERVENTIONS:  - Educate patient/family on patient safety including physical limitations  - Instruct patient to call for assistance with activity   - Consult OT/PT to assist with strengthening/mobility   - Keep Call bell within reach  - Keep bed low and locked with side rails adjusted as appropriate  - Keep care items and personal belongings within reach  - Initiate and maintain comfort rounds  - Make Fall Risk Sign visible to staff  - Offer Toileting every 2 Hours,  in advance of need  - Initiate/Maintain bedalarm  - Obtain necessary fall risk management equipment: yellow braclet, bed alarm  - Apply yellow socks and bracelet for high fall risk patients  - Consider moving patient to room near nurses station  Outcome: Progressing  Goal: Maintain or return to baseline ADL function  Description: INTERVENTIONS:  -  Assess patient's ability to carry out ADLs; assess patient's baseline for ADL function and identify physical deficits which impact ability to perform ADLs (bathing, care of mouth/teeth, toileting, grooming, dressing, etc.)  - Assess/evaluate cause of self-care deficits   - Assess range of motion  - Assess patient's mobility; develop plan if impaired  - Assess patient's need for assistive devices and provide as appropriate  - Encourage maximum independence but intervene and supervise when necessary  - Involve family in performance of ADLs  - Assess for home care needs following discharge   - Consider OT consult to assist with ADL evaluation and planning for discharge  - Provide patient education as appropriate  Outcome: Progressing  Goal: Maintains/Returns to pre admission functional level  Description: INTERVENTIONS:  - Perform AM-PAC 6 Click Basic Mobility/ Daily Activity assessment daily.  - Set and communicate daily mobility goal to care team and patient/family/caregiver.   - Collaborate with rehabilitation services on mobility goals if consulted  - Perform Range of Motion 4 times a day.  - Reposition patient every 2 hours.  - Dangle patient 3 times a day  - Stand patient 3 times a day  - Ambulate patient 3 times a day  - Out of bed to chair 3 times a day   - Out of bed for meals 3 times a day  - Out of bed for toileting  - Record patient progress and toleration of activity level   Outcome: Progressing     Problem: DISCHARGE PLANNING  Goal: Discharge to home or other facility with appropriate resources  Description: INTERVENTIONS:  - Identify barriers to discharge  w/patient and caregiver  - Arrange for needed discharge resources and transportation as appropriate  - Identify discharge learning needs (meds, wound care, etc.)  - Arrange for interpretive services to assist at discharge as needed  - Refer to Case Management Department for coordinating discharge planning if the patient needs post-hospital services based on physician/advanced practitioner order or complex needs related to functional status, cognitive ability, or social support system  Outcome: Progressing     Problem: Knowledge Deficit  Goal: Patient/family/caregiver demonstrates understanding of disease process, treatment plan, medications, and discharge instructions  Description: Complete learning assessment and assess knowledge base.  Interventions:  - Provide teaching at level of understanding  - Provide teaching via preferred learning methods  Outcome: Progressing     Problem: METABOLIC, FLUID AND ELECTROLYTES - ADULT  Goal: Electrolytes maintained within normal limits  Description: INTERVENTIONS:  - Monitor labs and assess patient for signs and symptoms of electrolyte imbalances  - Administer electrolyte replacement as ordered  - Monitor response to electrolyte replacements, including repeat lab results as appropriate  - Instruct patient on fluid and nutrition as appropriate  Outcome: Progressing  Goal: Fluid balance maintained  Description: INTERVENTIONS:  - Monitor labs   - Monitor I/O and WT  - Instruct patient on fluid and nutrition as appropriate  - Assess for signs & symptoms of volume excess or deficit  Outcome: Progressing  Goal: Glucose maintained within target range  Description: INTERVENTIONS:  - Monitor Blood Glucose as ordered  - Assess for signs and symptoms of hyperglycemia and hypoglycemia  - Administer ordered medications to maintain glucose within target range  - Assess nutritional intake and initiate nutrition service referral as needed  Outcome: Progressing     Problem: SKIN/TISSUE  INTEGRITY - ADULT  Goal: Skin Integrity remains intact(Skin Breakdown Prevention)  Description: Assess:  -Perform Martin assessment every 12  -Clean and moisturize skin every 2  -Inspect skin when repositioning, toileting, and assisting with ADLS  -Assess extremities for adequate circulation and sensation     Bed Management:  -Have minimal linens on bed & keep smooth, unwrinkled  -Change linens as needed when moist or perspiring  -Avoid sitting or lying in one position for more than 2 hours while in bed  -Keep HOB at 45degrees     Toileting:  -Offer bedside commode  -Assess for incontinence every 2  -Use incontinent care products after each incontinent episode such as pads    Activity:  -Mobilize patient 3 times a day  -Encourage activity and walks on unit  -Encourage or provide ROM exercises   -Turn and reposition patient every 2 Hours  -Use appropriate equipment to lift or move patient in bed  -Instruct/ Assist with weight shifting every 2 when out of bed in chair  -Consider limitation of chair time 2 hour intervals    Skin Care:  -Avoid use of baby powder, tape, friction and shearing, hot water or constrictive clothing  -Relieve pressure over bony prominences using allyvn  -Do not massage red bony areas    Next Steps:  -Teach patient strategies to minimize risks such as bed sores   -Consider consults to  interdisciplinary teams such as pt/ot  Outcome: Progressing  Goal: Incision(s), wounds(s) or drain site(s) healing without S/S of infection  Description: INTERVENTIONS  - Assess and document dressing, incision, wound bed, drain sites and surrounding tissue  - Provide patient and family education  - Perform skin care/dressing changes every 2  Outcome: Progressing  Goal: Pressure injury heals and does not worsen  Description: Interventions:  - Implement low air loss mattress or specialty surface (Criteria met)  - Apply silicone foam dressing  - Instruct/assist with weight shifting every 15 minutes when in chair   -  Limit chair time to 2 hour intervals  - Use special pressure reducing interventions such as 2 when in chair   - Apply fecal or urinary incontinence containment device   - Perform passive or active ROM every 4  - Turn and reposition patient & offload bony prominences every 2 hours   - Utilize friction reducing device or surface for transfers   - Consider consults to  interdisciplinary teams such as pt/ot  - Use incontinent care products after each incontinent episode such as pads  - Consider nutrition services referral as needed  Outcome: Progressing     Problem: HEMATOLOGIC - ADULT  Goal: Maintains hematologic stability  Description: INTERVENTIONS  - Assess for signs and symptoms of bleeding or hemorrhage  - Monitor labs  - Administer supportive blood products/factors as ordered and appropriate  Outcome: Progressing     Problem: MUSCULOSKELETAL - ADULT  Goal: Maintain or return mobility to safest level of function  Description: INTERVENTIONS:  - Assess patient's ability to carry out ADLs; assess patient's baseline for ADL function and identify physical deficits which impact ability to perform ADLs (bathing, care of mouth/teeth, toileting, grooming, dressing, etc.)  - Assess/evaluate cause of self-care deficits   - Assess range of motion  - Assess patient's mobility  - Assess patient's need for assistive devices and provide as appropriate  - Encourage maximum independence but intervene and supervise when necessary  - Involve family in performance of ADLs  - Assess for home care needs following discharge   - Consider OT consult to assist with ADL evaluation and planning for discharge  - Provide patient education as appropriate  Outcome: Progressing  Goal: Maintain proper alignment of affected body part  Description: INTERVENTIONS:  - Support, maintain and protect limb and body alignment  - Provide patient/ family with appropriate education  Outcome: Progressing     Problem: Prexisting or High Potential for Compromised  Skin Integrity  Goal: Skin integrity is maintained or improved  Description: INTERVENTIONS:  - Identify patients at risk for skin breakdown  - Assess and monitor skin integrity  - Assess and monitor nutrition and hydration status  - Monitor labs   - Assess for incontinence   - Turn and reposition patient  - Assist with mobility/ambulation  - Relieve pressure over bony prominences  - Avoid friction and shearing  - Provide appropriate hygiene as needed including keeping skin clean and dry  - Evaluate need for skin moisturizer/barrier cream  - Collaborate with interdisciplinary team   - Patient/family teaching  - Consider wound care consult   Outcome: Progressing     Problem: Potential for Falls  Goal: Patient will remain free of falls  Description: INTERVENTIONS:  - Educate patient/family on patient safety including physical limitations  - Instruct patient to call for assistance with activity   - Consult OT/PT to assist with strengthening/mobility   - Keep Call bell within reach  - Keep bed low and locked with side rails adjusted as appropriate  - Keep care items and personal belongings within reach  - Initiate and maintain comfort rounds  - Make Fall Risk Sign visible to staff  - Offer Toileting every 2 Hours, in advance of need  - Initiate/Maintain bedalarm  - Obtain necessary fall risk management equipment: bed alarm, yellow braclet,   - Apply yellow socks and bracelet for high fall risk patients  - Consider moving patient to room near nurses station  Outcome: Progressing     Problem: Nutrition/Hydration-ADULT  Goal: Nutrient/Hydration intake appropriate for improving, restoring or maintaining nutritional needs  Description: Monitor and assess patient's nutrition/hydration status for malnutrition. Collaborate with interdisciplinary team and initiate plan and interventions as ordered.  Monitor patient's weight and dietary intake as ordered or per policy. Utilize nutrition screening tool and intervene as necessary.  Determine patient's food preferences and provide high-protein, high-caloric foods as appropriate.     INTERVENTIONS:  - Monitor oral intake, urinary output, labs, and treatment plans  - Assess nutrition and hydration status and recommend course of action  - Evaluate amount of meals eaten  - Assist patient with eating if necessary   - Allow adequate time for meals  - Recommend/ encourage appropriate diets, oral nutritional supplements, and vitamin/mineral supplements  - Order, calculate, and assess calorie counts as needed  - Recommend, monitor, and adjust tube feedings and TPN/PPN based on assessed needs  - Assess need for intravenous fluids  - Provide specific nutrition/hydration education as appropriate  - Include patient/family/caregiver in decisions related to nutrition  Outcome: Progressing

## 2024-02-06 NOTE — ASSESSMENT & PLAN NOTE
"Patient admitted with severe sepsis as evidenced by tachycardia, leukocytosis of 18.7 K likely due to to UTI.  CT chest abdomen pelvis showed-\"Impacted subcapital fracture of the right femur.  Mild circumferential bladder wall thickening with mild perivesical inflammatory fat stranding, which may relate to cystitis. Recommend correlation with urinalysis.\"  Lactate is 2.9 - now resolved  Procalcitonin 0.12  Blood cultures negative x 2 after 24 hours  Urine culture > 100k pansensitive ecoli   Restart IV ancef now.  Did receive prophylactic dose x1 for the OR.  Had been on IV rocephin but does not appear pt received dose 2/4 as med was on MAR hold due to being in the OR  Leukocytosis remains elevated but stable, likely in part due to recent surgery.  Afebrile  "

## 2024-02-06 NOTE — ASSESSMENT & PLAN NOTE
Patient has history of CVAs  Patient was on Eliquis but per prior report  states that he stopped her Eliquis around August 2023 on his own because they could not afford it  Will restart eliquis at this time - if cost prohibitive will need to revisted as an outpt.  Had left additional VM for patient's  2/6 to discuss further  Continue on statin

## 2024-02-06 NOTE — PROGRESS NOTES
Progress Note - Orthopedics   Lo Talley 78 y.o. female MRN: [id number]  Unit/Bed#: -01      Assessment:  78 y.o.female s/p right  MARY  with Dr. Goldman on 2/4/24 for displaced femoral neck fracture. POD2      Plan:  WBAT - no formal hip precautions other than no resisted abduction  AM labs not in yet. Continue to monitor H&H and vitals.  PT/OT  Pain control per primary  DVT ppx - ASA 81mg BID x 4 weeks on discharge  Follow up with Dr. Goldman in 2 weeks  Dispo: Ortho will follow      Subjective:    78 y.o.female Seen and examined at bedside. Confused this morning and asking for her parents. Denies fevers, chills, CP, SOB, N/V, numbness or tingling.     Labs:  0   Lab Value Date/Time    HCT 35.0 02/05/2024 0350    HCT 37.7 02/04/2024 0448    HCT 42.4 02/03/2024 1124    HGB 11.8 02/05/2024 0350    HGB 12.6 02/04/2024 0448    HGB 14.2 02/03/2024 1124    INR 1.17 02/04/2024 0448    WBC 15.81 (H) 02/05/2024 0350    WBC 14.68 (H) 02/04/2024 0448    WBC 18.79 (H) 02/03/2024 1124       Meds:    Current Facility-Administered Medications:     acetaminophen (TYLENOL) tablet 650 mg, 650 mg, Oral, Q6H PRN, Idania Edwards PA-C    acetaminophen (TYLENOL) tablet 975 mg, 975 mg, Oral, Q8H, Idania Edwards PA-C, 975 mg at 02/06/24 0454    aluminum-magnesium hydroxide-simethicone (MAALOX) oral suspension 30 mL, 30 mL, Oral, Q6H PRN, Idania Edwards PA-C    amLODIPine (NORVASC) tablet 10 mg, 10 mg, Oral, Daily, Idania Edwards PA-C, 10 mg at 02/05/24 0829    aspirin (ECOTRIN LOW STRENGTH) EC tablet 81 mg, 81 mg, Oral, BID, Idania Edwards PA-C, 81 mg at 02/05/24 1722    atorvastatin (LIPITOR) tablet 40 mg, 40 mg, Oral, Daily With Dinner, Idania Edwards PA-C, 40 mg at 02/05/24 1722    calcium carbonate (TUMS) chewable tablet 1,000 mg, 1,000 mg, Oral, Daily PRN, Idania Edwards PA-C    carvedilol (COREG) tablet 12.5 mg, 12.5 mg, Oral, BID With Meals, Idania Edwards PA-C, 12.5 mg at 02/05/24 1722    ceFAZolin (ANCEF) IVPB (premix in dextrose) 1,000 mg 50 mL,  "1,000 mg, Intravenous, Q8H, Landy Gould PA-C, Last Rate: 100 mL/hr at 02/06/24 0115, 1,000 mg at 02/06/24 0115    docusate sodium (COLACE) capsule 100 mg, 100 mg, Oral, BID, Idania Edwards PA-C, 100 mg at 02/05/24 1722    ferrous sulfate tablet 325 mg, 325 mg, Oral, Daily With Breakfast, Idania Edwards PA-C, 325 mg at 02/05/24 0829    gabapentin (NEURONTIN) capsule 300 mg, 300 mg, Oral, HS, Idania Edwards PA-C, 300 mg at 02/05/24 2041    heparin (porcine) subcutaneous injection 5,000 Units, 5,000 Units, Subcutaneous, Q8H BK, Idania Edwards PA-C, 5,000 Units at 02/06/24 0454    metoprolol (LOPRESSOR) injection 5 mg, 5 mg, Intravenous, Q6H PRN, Idania Edwards PA-C    morphine injection 2 mg, 2 mg, Intravenous, Q2H PRN, Idania Edwards PA-C    ondansetron (ZOFRAN) injection 4 mg, 4 mg, Intravenous, Q6H PRN, Idania Edwards PA-C    oxyCODONE (ROXICODONE) IR tablet 5 mg, 5 mg, Oral, Q4H PRN, Idania Edwards PA-C    oxyCODONE (ROXICODONE) split tablet 2.5 mg, 2.5 mg, Oral, Q6H PRN, Idania Edwards PA-C    senna (SENOKOT) tablet 8.6 mg, 1 tablet, Oral, Daily, Idania Edwards PA-C, 8.6 mg at 02/05/24 0829    Blood Culture:   Lab Results   Component Value Date    BLOODCX No Growth at 48 hrs. 02/03/2024       Wound Culture:   No results found for: \"WOUNDCULT\"    Ins and Outs:  I/O last 24 hours:  In: -   Out: 450 [Urine:450]          Physical:  Vitals:    02/05/24 2040   BP: 101/61   Pulse: 94   Resp:    Temp: 98.1 °F (36.7 °C)   SpO2: 95%     Musculoskeletal: right Lower Extremity  Skin - . No erythema or ecchymosis. Compartments soft and compressible.   Dressing - c/d/I, covered with mepilex  TTP at incision site  Sensation intact to saphenous, sural, tibial, superficial peroneal nerve, and deep peroneal  Motor intact to +FHL/EHL, +ankle dorsi/plantar flexion  2+ DP pulse, symmetric bilaterally  Digits warm and well perfused  Capillary refill < 2 seconds      DEYANIRA CabreraC      "

## 2024-02-07 VITALS
RESPIRATION RATE: 22 BRPM | WEIGHT: 122.36 LBS | HEART RATE: 92 BPM | HEIGHT: 66 IN | OXYGEN SATURATION: 96 % | BODY MASS INDEX: 19.66 KG/M2 | TEMPERATURE: 98.4 F | SYSTOLIC BLOOD PRESSURE: 131 MMHG | DIASTOLIC BLOOD PRESSURE: 80 MMHG

## 2024-02-07 PROBLEM — N39.0 UTI (URINARY TRACT INFECTION): Status: RESOLVED | Noted: 2024-02-03 | Resolved: 2024-02-07

## 2024-02-07 PROBLEM — A41.9 SEVERE SEPSIS (HCC): Status: RESOLVED | Noted: 2024-02-03 | Resolved: 2024-02-07
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
## 2024-02-07 PROBLEM — S72.91XA FEMUR FRACTURE, RIGHT (HCC): Status: RESOLVED | Noted: 2024-02-03 | Resolved: 2024-02-07

## 2024-02-07 PROBLEM — R65.20 SEVERE SEPSIS (HCC): Status: RESOLVED | Noted: 2024-02-03 | Resolved: 2024-02-07

## 2024-02-07 LAB
ANION GAP SERPL CALCULATED.3IONS-SCNC: 7 MMOL/L
BASOPHILS # BLD AUTO: 0.01 THOUSANDS/ÂΜL (ref 0–0.1)
BASOPHILS NFR BLD AUTO: 0 % (ref 0–1)
BUN SERPL-MCNC: 15 MG/DL (ref 5–25)
CALCIUM SERPL-MCNC: 8.4 MG/DL (ref 8.4–10.2)
CHLORIDE SERPL-SCNC: 110 MMOL/L (ref 96–108)
CO2 SERPL-SCNC: 24 MMOL/L (ref 21–32)
CREAT SERPL-MCNC: 0.39 MG/DL (ref 0.6–1.3)
EOSINOPHIL # BLD AUTO: 0.14 THOUSAND/ÂΜL (ref 0–0.61)
EOSINOPHIL NFR BLD AUTO: 1 % (ref 0–6)
ERYTHROCYTE [DISTWIDTH] IN BLOOD BY AUTOMATED COUNT: 13.3 % (ref 11.6–15.1)
GFR SERPL CREATININE-BSD FRML MDRD: 102 ML/MIN/1.73SQ M
GLUCOSE SERPL-MCNC: 96 MG/DL (ref 65–140)
HCT VFR BLD AUTO: 33.3 % (ref 34.8–46.1)
HGB BLD-MCNC: 11 G/DL (ref 11.5–15.4)
IMM GRANULOCYTES # BLD AUTO: 0.05 THOUSAND/UL (ref 0–0.2)
IMM GRANULOCYTES NFR BLD AUTO: 0 % (ref 0–2)
LYMPHOCYTES # BLD AUTO: 2.43 THOUSANDS/ÂΜL (ref 0.6–4.47)
LYMPHOCYTES NFR BLD AUTO: 19 % (ref 14–44)
MCH RBC QN AUTO: 30.9 PG (ref 26.8–34.3)
MCHC RBC AUTO-ENTMCNC: 33 G/DL (ref 31.4–37.4)
MCV RBC AUTO: 94 FL (ref 82–98)
MONOCYTES # BLD AUTO: 0.72 THOUSAND/ÂΜL (ref 0.17–1.22)
MONOCYTES NFR BLD AUTO: 6 % (ref 4–12)
NEUTROPHILS # BLD AUTO: 9.16 THOUSANDS/ÂΜL (ref 1.85–7.62)
NEUTS SEG NFR BLD AUTO: 74 % (ref 43–75)
NRBC BLD AUTO-RTO: 0 /100 WBCS
PLATELET # BLD AUTO: 344 THOUSANDS/UL (ref 149–390)
PMV BLD AUTO: 9.7 FL (ref 8.9–12.7)
POTASSIUM SERPL-SCNC: 3.4 MMOL/L (ref 3.5–5.3)
RBC # BLD AUTO: 3.56 MILLION/UL (ref 3.81–5.12)
SARS-COV-2 RNA RESP QL NAA+PROBE: NEGATIVE
SODIUM SERPL-SCNC: 141 MMOL/L (ref 135–147)
WBC # BLD AUTO: 12.51 THOUSAND/UL (ref 4.31–10.16)

## 2024-02-07 PROCEDURE — 99024 POSTOP FOLLOW-UP VISIT: CPT | Performed by: PHYSICIAN ASSISTANT

## 2024-02-07 PROCEDURE — 80048 BASIC METABOLIC PNL TOTAL CA: CPT | Performed by: PHYSICIAN ASSISTANT

## 2024-02-07 PROCEDURE — 99239 HOSP IP/OBS DSCHRG MGMT >30: CPT | Performed by: PHYSICIAN ASSISTANT

## 2024-02-07 PROCEDURE — 87635 SARS-COV-2 COVID-19 AMP PRB: CPT | Performed by: PHYSICIAN ASSISTANT

## 2024-02-07 PROCEDURE — 85025 COMPLETE CBC W/AUTO DIFF WBC: CPT | Performed by: PHYSICIAN ASSISTANT

## 2024-02-07 RX ORDER — GABAPENTIN 300 MG/1
300 CAPSULE ORAL
Start: 2024-02-07

## 2024-02-07 RX ORDER — CEPHALEXIN 500 MG/1
500 CAPSULE ORAL EVERY 6 HOURS SCHEDULED
Status: ON HOLD
Start: 2024-02-07 | End: 2024-02-09

## 2024-02-07 RX ORDER — ACETAMINOPHEN 325 MG/1
650 TABLET ORAL EVERY 6 HOURS PRN
Start: 2024-02-07

## 2024-02-07 RX ORDER — SENNOSIDES 8.6 MG
8.6 TABLET ORAL DAILY
Start: 2024-02-08

## 2024-02-07 RX ORDER — DOCUSATE SODIUM 100 MG/1
100 CAPSULE, LIQUID FILLED ORAL 2 TIMES DAILY
Start: 2024-02-07

## 2024-02-07 RX ORDER — FERROUS SULFATE 325(65) MG
325 TABLET ORAL
Start: 2024-02-08

## 2024-02-07 RX ORDER — CEPHALEXIN 250 MG/1
500 CAPSULE ORAL EVERY 6 HOURS SCHEDULED
Status: DISCONTINUED | OUTPATIENT
Start: 2024-02-07 | End: 2024-02-07 | Stop reason: HOSPADM

## 2024-02-07 RX ORDER — POTASSIUM CHLORIDE 20 MEQ/1
40 TABLET, EXTENDED RELEASE ORAL ONCE
Status: COMPLETED | OUTPATIENT
Start: 2024-02-07 | End: 2024-02-07

## 2024-02-07 RX ADMIN — DOCUSATE SODIUM 100 MG: 100 CAPSULE, LIQUID FILLED ORAL at 09:07

## 2024-02-07 RX ADMIN — CEPHALEXIN 500 MG: 250 CAPSULE ORAL at 10:51

## 2024-02-07 RX ADMIN — CARVEDILOL 12.5 MG: 12.5 TABLET, FILM COATED ORAL at 09:08

## 2024-02-07 RX ADMIN — APIXABAN 5 MG: 5 TABLET, FILM COATED ORAL at 09:07

## 2024-02-07 RX ADMIN — FERROUS SULFATE TAB 325 MG (65 MG ELEMENTAL FE) 325 MG: 325 (65 FE) TAB at 09:08

## 2024-02-07 RX ADMIN — POTASSIUM CHLORIDE 40 MEQ: 1500 TABLET, EXTENDED RELEASE ORAL at 09:07

## 2024-02-07 RX ADMIN — CEFAZOLIN SODIUM 1000 MG: 1 SOLUTION INTRAVENOUS at 01:14

## 2024-02-07 RX ADMIN — SENNOSIDES 8.6 MG: 8.6 TABLET, FILM COATED ORAL at 09:07

## 2024-02-07 NOTE — PLAN OF CARE
Problem: PAIN - ADULT  Goal: Verbalizes/displays adequate comfort level or baseline comfort level  Description: Interventions:  - Encourage patient to monitor pain and request assistance  - Assess pain using appropriate pain scale  - Administer analgesics based on type and severity of pain and evaluate response  - Implement non-pharmacological measures as appropriate and evaluate response  - Consider cultural and social influences on pain and pain management  - Notify physician/advanced practitioner if interventions unsuccessful or patient reports new pain  Outcome: Progressing     Problem: INFECTION - ADULT  Goal: Absence or prevention of progression during hospitalization  Description: INTERVENTIONS:  - Assess and monitor for signs and symptoms of infection  - Monitor lab/diagnostic results  - Monitor all insertion sites, i.e. indwelling lines, tubes, and drains  - Monitor endotracheal if appropriate and nasal secretions for changes in amount and color  - [place] appropriate cooling/warming therapies per order  - Administer medications as ordered  - Instruct and encourage patient and family to use good hand hygiene technique  - Identify and instruct in appropriate isolation precautions for identified infection/condition  Outcome: Progressing  Goal: Absence of fever/infection during neutropenic period  Description: INTERVENTIONS:  - Monitor WBC    Outcome: Progressing     Problem: SAFETY ADULT  Goal: Patient will remain free of falls  Description: INTERVENTIONS:  - Educate patient/family on patient safety including physical limitations  - Instruct patient to call for assistance with activity   - Consult OT/PT to assist with strengthening/mobility   - Keep Call bell within reach  - Keep bed low and locked with side rails adjusted as appropriate  - Keep care items and personal belongings within reach  - Initiate and maintain comfort rounds  - Make Fall Risk Sign visible to staff  - Offer Toileting every 2 Hours,  in advance of need  - Initiate/Maintain bedalarm  - Obtain necessary fall risk management equipment: yellow socks, bed alarm  - Apply yellow socks and bracelet for high fall risk patients  - Consider moving patient to room near nurses station  Outcome: Progressing  Goal: Maintain or return to baseline ADL function  Description: INTERVENTIONS:  -  Assess patient's ability to carry out ADLs; assess patient's baseline for ADL function and identify physical deficits which impact ability to perform ADLs (bathing, care of mouth/teeth, toileting, grooming, dressing, etc.)  - Assess/evaluate cause of self-care deficits   - Assess range of motion  - Assess patient's mobility; develop plan if impaired  - Assess patient's need for assistive devices and provide as appropriate  - Encourage maximum independence but intervene and supervise when necessary  - Involve family in performance of ADLs  - Assess for home care needs following discharge   - Consider OT consult to assist with ADL evaluation and planning for discharge  - Provide patient education as appropriate  Outcome: Progressing  Goal: Maintains/Returns to pre admission functional level  Description: INTERVENTIONS:  - Perform AM-PAC 6 Click Basic Mobility/ Daily Activity assessment daily.  - Set and communicate daily mobility goal to care team and patient/family/caregiver.   - Collaborate with rehabilitation services on mobility goals if consulted  - Perform Range of Motion 3 times a day.  - Reposition patient every 2 hours.  - Dangle patient 3 times a day  - Stand patient 3 times a day  - Ambulate patient 3 times a day  - Out of bed to chair 3 times a day   - Out of bed for meals 3 times a day  - Out of bed for toileting  - Record patient progress and toleration of activity level   Outcome: Progressing     Problem: DISCHARGE PLANNING  Goal: Discharge to home or other facility with appropriate resources  Description: INTERVENTIONS:  - Identify barriers to discharge  w/patient and caregiver  - Arrange for needed discharge resources and transportation as appropriate  - Identify discharge learning needs (meds, wound care, etc.)  - Arrange for interpretive services to assist at discharge as needed  - Refer to Case Management Department for coordinating discharge planning if the patient needs post-hospital services based on physician/advanced practitioner order or complex needs related to functional status, cognitive ability, or social support system  Outcome: Progressing     Problem: Knowledge Deficit  Goal: Patient/family/caregiver demonstrates understanding of disease process, treatment plan, medications, and discharge instructions  Description: Complete learning assessment and assess knowledge base.  Interventions:  - Provide teaching at level of understanding  - Provide teaching via preferred learning methods  Outcome: Progressing     Problem: METABOLIC, FLUID AND ELECTROLYTES - ADULT  Goal: Electrolytes maintained within normal limits  Description: INTERVENTIONS:  - Monitor labs and assess patient for signs and symptoms of electrolyte imbalances  - Administer electrolyte replacement as ordered  - Monitor response to electrolyte replacements, including repeat lab results as appropriate  - Instruct patient on fluid and nutrition as appropriate  Outcome: Progressing  Goal: Fluid balance maintained  Description: INTERVENTIONS:  - Monitor labs   - Monitor I/O and WT  - Instruct patient on fluid and nutrition as appropriate  - Assess for signs & symptoms of volume excess or deficit  Outcome: Progressing  Goal: Glucose maintained within target range  Description: INTERVENTIONS:  - Monitor Blood Glucose as ordered  - Assess for signs and symptoms of hyperglycemia and hypoglycemia  - Administer ordered medications to maintain glucose within target range  - Assess nutritional intake and initiate nutrition service referral as needed  Outcome: Progressing     Problem: SKIN/TISSUE  INTEGRITY - ADULT  Goal: Skin Integrity remains intact(Skin Breakdown Prevention)  Description: Assess:  -Perform Martin assessment every 12 hours  -Clean and moisturize skin every day  -Inspect skin when repositioning, toileting, and assisting with ADLS  -Assess extremities for adequate circulation and sensation     Bed Management:  -Have minimal linens on bed & keep smooth, unwrinkled  -Change linens as needed when moist or perspiring  -Avoid sitting or lying in one position for more than 2 hours while in bed  -Keep HOB at 35degrees     Toileting:  -Offer bedside commode  -Assess for incontinence every 2  -Use incontinent care products after each incontinent episode such as purwick    Activity:  -Mobilize patient 3 times a day  -Encourage activity and walks on unit  -Encourage or provide ROM exercises   -Turn and reposition patient every 2 Hours  -Use appropriate equipment to lift or move patient in bed  -Instruct/ Assist with weight shifting every 2 when out of bed in chair  -Consider limitation of chair time 3 hour intervals    Skin Care:  -Avoid use of baby powder, tape, friction and shearing, hot water or constrictive clothing  -Relieve pressure over bony prominences using alyvyn  -Do not massage red bony areas    Next Steps:  -Teach patient strategies to minimize risks such as frequent repositioning    -Consider consults to  interdisciplinary teams such as PT/OT  Outcome: Progressing  Goal: Incision(s), wounds(s) or drain site(s) healing without S/S of infection  Description: INTERVENTIONS  - Assess and document dressing, incision, wound bed, drain sites and surrounding tissue  - Provide patient and family education  - Perform skin care/dressing changes every day   Outcome: Progressing  Goal: Pressure injury heals and does not worsen  Description: Interventions:  - Implement low air loss mattress or specialty surface (Criteria met)  - Apply silicone foam dressing  - Instruct/assist with weight shifting every  15 minutes when in chair   - Limit chair time to 4 hour intervals  - Use special pressure reducing interventions such as waffle cushion when in chair   - Apply fecal or urinary incontinence containment device   - Perform passive or active ROM every 2  - Turn and reposition patient & offload bony prominences every 2 hours   - Utilize friction reducing device or surface for transfers   - Consider consults to  interdisciplinary teams such as pt/ot  - Use incontinent care products after each incontinent episode such as purwick  - Consider nutrition services referral as needed  Outcome: Progressing     Problem: HEMATOLOGIC - ADULT  Goal: Maintains hematologic stability  Description: INTERVENTIONS  - Assess for signs and symptoms of bleeding or hemorrhage  - Monitor labs  - Administer supportive blood products/factors as ordered and appropriate  Outcome: Progressing     Problem: MUSCULOSKELETAL - ADULT  Goal: Maintain or return mobility to safest level of function  Description: INTERVENTIONS:  - Assess patient's ability to carry out ADLs; assess patient's baseline for ADL function and identify physical deficits which impact ability to perform ADLs (bathing, care of mouth/teeth, toileting, grooming, dressing, etc.)  - Assess/evaluate cause of self-care deficits   - Assess range of motion  - Assess patient's mobility  - Assess patient's need for assistive devices and provide as appropriate  - Encourage maximum independence but intervene and supervise when necessary  - Involve family in performance of ADLs  - Assess for home care needs following discharge   - Consider OT consult to assist with ADL evaluation and planning for discharge  - Provide patient education as appropriate  Outcome: Progressing  Goal: Maintain proper alignment of affected body part  Description: INTERVENTIONS:  - Support, maintain and protect limb and body alignment  - Provide patient/ family with appropriate education  Outcome: Progressing     Problem:  Prexisting or High Potential for Compromised Skin Integrity  Goal: Skin integrity is maintained or improved  Description: INTERVENTIONS:  - Identify patients at risk for skin breakdown  - Assess and monitor skin integrity  - Assess and monitor nutrition and hydration status  - Monitor labs   - Assess for incontinence   - Turn and reposition patient  - Assist with mobility/ambulation  - Relieve pressure over bony prominences  - Avoid friction and shearing  - Provide appropriate hygiene as needed including keeping skin clean and dry  - Evaluate need for skin moisturizer/barrier cream  - Collaborate with interdisciplinary team   - Patient/family teaching  - Consider wound care consult   Outcome: Progressing     Problem: Potential for Falls  Goal: Patient will remain free of falls  Description: INTERVENTIONS:  - Educate patient/family on patient safety including physical limitations  - Instruct patient to call for assistance with activity   - Consult OT/PT to assist with strengthening/mobility   - Keep Call bell within reach  - Keep bed low and locked with side rails adjusted as appropriate  - Keep care items and personal belongings within reach  - Initiate and maintain comfort rounds  - Make Fall Risk Sign visible to staff  - Offer Toileting every 2 Hours, in advance of need  - Initiate/Maintain bedalarm  - Obtain necessary fall risk management equipment: yellow socks, bed alarm  - Apply yellow socks and bracelet for high fall risk patients  - Consider moving patient to room near nurses station  Outcome: Progressing     Problem: Nutrition/Hydration-ADULT  Goal: Nutrient/Hydration intake appropriate for improving, restoring or maintaining nutritional needs  Description: Monitor and assess patient's nutrition/hydration status for malnutrition. Collaborate with interdisciplinary team and initiate plan and interventions as ordered.  Monitor patient's weight and dietary intake as ordered or per policy. Utilize nutrition  screening tool and intervene as necessary. Determine patient's food preferences and provide high-protein, high-caloric foods as appropriate.     INTERVENTIONS:  - Monitor oral intake, urinary output, labs, and treatment plans  - Assess nutrition and hydration status and recommend course of action  - Evaluate amount of meals eaten  - Assist patient with eating if necessary   - Allow adequate time for meals  - Recommend/ encourage appropriate diets, oral nutritional supplements, and vitamin/mineral supplements  - Order, calculate, and assess calorie counts as needed  - Recommend, monitor, and adjust tube feedings and TPN/PPN based on assessed needs  - Assess need for intravenous fluids  - Provide specific nutrition/hydration education as appropriate  - Include patient/family/caregiver in decisions related to nutrition  Outcome: Progressing

## 2024-02-07 NOTE — CASE MANAGEMENT
Case Management Discharge Planning Note    Patient name Lo Talley  Location /-01 MRN [id number]  : 1945 Date 2024       Current Admission Date: 2/3/2024  Current Admission Diagnosis:Severe sepsis (HCC)   Patient Active Problem List    Diagnosis Date Noted    Moderate protein-calorie malnutrition (HCC) 2024    Acute blood loss anemia 2024    Severe sepsis (HCC) 2024    Femur fracture, right (HCC) 2024    UTI (urinary tract infection) 2024    HTN (hypertension) 2024    Fall 2024    Cerebral amyloid angiopathy (CODE) 2023    History of multiple strokes 06/15/2023    Atrial fibrillation (HCC) 06/15/2023    Hypertensive urgency 2023    Elevated troponin 2023      LOS (days): 4  Geometric Mean LOS (GMLOS) (days): 9.9  Days to GMLOS:6.1     OBJECTIVE:  Risk of Unplanned Readmission Score: 11.19         Current admission status: Inpatient   Preferred Pharmacy:   [id number] - [place] - [address]  [address] [facility]  [place] [address]  Phone: [phone] Fax: [phone]    Primary Care Provider: No primary care provider on file.    Primary Insurance: MEDICARE  Secondary Insurance: [employer]    DISCHARGE DETAILS:     Patient is medically stable for discharge to [facility] per provider. CM updated facility via [facility]. CM requested transport via [employer]. Awaiting confirmed  time.          Accepting Facility Name, City & State : [facility]  Receiving Facility/Agency Phone Number: [phone]  Facility/Agency Fax Number: [phone]

## 2024-02-07 NOTE — ASSESSMENT & PLAN NOTE
Patient has positive UTI on admission  Initially on IV Rocephin - transition to IV ancef given cx results  Urine culture growing pansensitive ecoli  Transition to keflex

## 2024-02-07 NOTE — ASSESSMENT & PLAN NOTE
Malnutrition Findings:   Adult Malnutrition type: Chronic illness  Adult Degree of Malnutrition: Malnutrition of moderate degree  Malnutrition Characteristics: Fat loss, Muscle loss         Continue well balanced diet and diet supplementations.          360 Statement: Pt presents with moderate protein malnutrition as evidenced by prominent clavicle bone and sunken orbitals. Treat with diet and supplements BID.    BMI Findings:           Body mass index is 19.75 kg/m².

## 2024-02-07 NOTE — ASSESSMENT & PLAN NOTE
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  [facility]  "

## 2024-02-07 NOTE — ASSESSMENT & PLAN NOTE
Patient has history of CVAs  Patient was on Eliquis but per prior report  states that he stopped her Eliquis around August 2023 on his own because they could not afford it  Will restart eliquis at this time - if cost prohibitive will need to revisted as an outpt.  Discussed with patient's  importance of anticoagulation and he verbalized understanding  Continue on statin

## 2024-02-07 NOTE — ASSESSMENT & PLAN NOTE
"Patient admitted with severe sepsis as evidenced by tachycardia, leukocytosis of 18.7 K likely due to to UTI.  CT chest abdomen pelvis showed-\"Impacted subcapital fracture of the right femur.  Mild circumferential bladder wall thickening with mild perivesical inflammatory fat stranding, which may relate to cystitis. Recommend correlation with urinalysis.\"  Lactate is 2.9 - now resolved  Procalcitonin 0.12  Blood cultures negative x 2 after 4 days  Urine culture > 100k pansensitive ecoli   Restart IV ancef now.  Did receive prophylactic dose x1 for the OR.  Had been on IV rocephin but does not appear pt received dose 2/4 as med was on MAR hold due to being in the OR  Leukocytosis downtrending.  Transition to Keflex to complete course  "

## 2024-02-07 NOTE — ASSESSMENT & PLAN NOTE
Pre-op hemoglobin noted to be 14.2  Decreased initially to 10.8 this AM suspect likely due to recent surgery but also hemodilutional s/p IVF  Hemoglobin stable at 11.0  No ecchymosis or hematoma noted on exam  Otherwise no evidence of bleeding

## 2024-02-07 NOTE — PLAN OF CARE
Problem: PAIN - ADULT  Goal: Verbalizes/displays adequate comfort level or baseline comfort level  Description: Interventions:  - Encourage patient to monitor pain and request assistance  - Assess pain using appropriate pain scale  - Administer analgesics based on type and severity of pain and evaluate response  - Implement non-pharmacological measures as appropriate and evaluate response  - Consider cultural and social influences on pain and pain management  - Notify physician/advanced practitioner if interventions unsuccessful or patient reports new pain  Outcome: Progressing     Problem: INFECTION - ADULT  Goal: Absence or prevention of progression during hospitalization  Description: INTERVENTIONS:  - Assess and monitor for signs and symptoms of infection  - Monitor lab/diagnostic results  - Monitor all insertion sites, i.e. indwelling lines, tubes, and drains  - Monitor endotracheal if appropriate and nasal secretions for changes in amount and color  - [place] appropriate cooling/warming therapies per order  - Administer medications as ordered  - Instruct and encourage patient and family to use good hand hygiene technique  - Identify and instruct in appropriate isolation precautions for identified infection/condition  Outcome: Progressing  Goal: Absence of fever/infection during neutropenic period  Description: INTERVENTIONS:  - Monitor WBC    Outcome: Progressing     Problem: SAFETY ADULT  Goal: Patient will remain free of falls  Description: INTERVENTIONS:  - Educate patient/family on patient safety including physical limitations  - Instruct patient to call for assistance with activity   - Consult OT/PT to assist with strengthening/mobility   - Keep Call bell within reach  - Keep bed low and locked with side rails adjusted as appropriate  - Keep care items and personal belongings within reach  - Initiate and maintain comfort rounds  - Make Fall Risk Sign visible to staff  - Offer Toileting every x Hours,  in advance of need  - Initiate/Maintain xalarm  - Obtain necessary fall risk management equipment: x  - Apply yellow socks and bracelet for high fall risk patients  - Consider moving patient to room near nurses station  Outcome: Progressing  Goal: Maintain or return to baseline ADL function  Description: INTERVENTIONS:  -  Assess patient's ability to carry out ADLs; assess patient's baseline for ADL function and identify physical deficits which impact ability to perform ADLs (bathing, care of mouth/teeth, toileting, grooming, dressing, etc.)  - Assess/evaluate cause of self-care deficits   - Assess range of motion  - Assess patient's mobility; develop plan if impaired  - Assess patient's need for assistive devices and provide as appropriate  - Encourage maximum independence but intervene and supervise when necessary  - Involve family in performance of ADLs  - Assess for home care needs following discharge   - Consider OT consult to assist with ADL evaluation and planning for discharge  - Provide patient education as appropriate  Outcome: Progressing  Goal: Maintains/Returns to pre admission functional level  Description: INTERVENTIONS:  - Perform AM-PAC 6 Click Basic Mobility/ Daily Activity assessment daily.  - Set and communicate daily mobility goal to care team and patient/family/caregiver.   - Collaborate with rehabilitation services on mobility goals if consulted  - Perform Range of Motion x times a day.  - Reposition patient every x hours.  - Dangle patient x times a day  - Stand patient x times a day  - Ambulate patient x times a day  - Out of bed to chair x times a day   - Out of bed for meals x times a day  - Out of bed for toileting  - Record patient progress and toleration of activity level   Outcome: Progressing     Problem: DISCHARGE PLANNING  Goal: Discharge to home or other facility with appropriate resources  Description: INTERVENTIONS:  - Identify barriers to discharge w/patient and caregiver  -  Arrange for needed discharge resources and transportation as appropriate  - Identify discharge learning needs (meds, wound care, etc.)  - Arrange for interpretive services to assist at discharge as needed  - Refer to Case Management Department for coordinating discharge planning if the patient needs post-hospital services based on physician/advanced practitioner order or complex needs related to functional status, cognitive ability, or social support system  Outcome: Progressing     Problem: Knowledge Deficit  Goal: Patient/family/caregiver demonstrates understanding of disease process, treatment plan, medications, and discharge instructions  Description: Complete learning assessment and assess knowledge base.  Interventions:  - Provide teaching at level of understanding  - Provide teaching via preferred learning methods  Outcome: Progressing     Problem: METABOLIC, FLUID AND ELECTROLYTES - ADULT  Goal: Electrolytes maintained within normal limits  Description: INTERVENTIONS:  - Monitor labs and assess patient for signs and symptoms of electrolyte imbalances  - Administer electrolyte replacement as ordered  - Monitor response to electrolyte replacements, including repeat lab results as appropriate  - Instruct patient on fluid and nutrition as appropriate  Outcome: Progressing  Goal: Fluid balance maintained  Description: INTERVENTIONS:  - Monitor labs   - Monitor I/O and WT  - Instruct patient on fluid and nutrition as appropriate  - Assess for signs & symptoms of volume excess or deficit  Outcome: Progressing  Goal: Glucose maintained within target range  Description: INTERVENTIONS:  - Monitor Blood Glucose as ordered  - Assess for signs and symptoms of hyperglycemia and hypoglycemia  - Administer ordered medications to maintain glucose within target range  - Assess nutritional intake and initiate nutrition service referral as needed  Outcome: Progressing     Problem: SKIN/TISSUE INTEGRITY - ADULT  Goal: Skin  Integrity remains intact(Skin Breakdown Prevention)  Description: Assess:  -Perform Martin assessment every x  -Clean and moisturize skin every x  -Inspect skin when repositioning, toileting, and assisting with ADLS  -Assess under medical devices such as x every x  -Assess extremities for adequate circulation and sensation     Bed Management:  -Have minimal linens on bed & keep smooth, unwrinkled  -Change linens as needed when moist or perspiring  -Avoid sitting or lying in one position for more than x hours while in bed  -Keep HOB at xdegrees     Toileting:  -Offer bedside commode  -Assess for incontinence every x  -Use incontinent care products after each incontinent episode such as x    Activity:  -Mobilize patient x times a day  -Encourage activity and walks on unit  -Encourage or provide ROM exercises   -Turn and reposition patient every x Hours  -Use appropriate equipment to lift or move patient in bed  -Instruct/ Assist with weight shifting every x when out of bed in chair  -Consider limitation of chair time x hour intervals    Skin Care:  -Avoid use of baby powder, tape, friction and shearing, hot water or constrictive clothing  -Relieve pressure over bony prominences using x  -Do not massage red bony areas    Next Steps:  -Teach patient strategies to minimize risks such as x   -Consider consults to  interdisciplinary teams such as x  Outcome: Progressing  Goal: Incision(s), wounds(s) or drain site(s) healing without S/S of infection  Description: INTERVENTIONS  - Assess and document dressing, incision, wound bed, drain sites and surrounding tissue  - Provide patient and family education  - Perform skin care/dressing changes every x  Outcome: Progressing  Goal: Pressure injury heals and does not worsen  Description: Interventions:  - Implement low air loss mattress or specialty surface (Criteria met)  - Apply silicone foam dressing  - Instruct/assist with weight shifting every x minutes when in chair   -  Limit chair time to x hour intervals  - Use special pressure reducing interventions such as x when in chair   - Apply fecal or urinary incontinence containment device   - Perform passive or active ROM every x  - Turn and reposition patient & offload bony prominences every x hours   - Utilize friction reducing device or surface for transfers   - Consider consults to  interdisciplinary teams such as x  - Use incontinent care products after each incontinent episode such as x  - Consider nutrition services referral as needed  Outcome: Progressing     Problem: HEMATOLOGIC - ADULT  Goal: Maintains hematologic stability  Description: INTERVENTIONS  - Assess for signs and symptoms of bleeding or hemorrhage  - Monitor labs  - Administer supportive blood products/factors as ordered and appropriate  Outcome: Progressing     Problem: MUSCULOSKELETAL - ADULT  Goal: Maintain or return mobility to safest level of function  Description: INTERVENTIONS:  - Assess patient's ability to carry out ADLs; assess patient's baseline for ADL function and identify physical deficits which impact ability to perform ADLs (bathing, care of mouth/teeth, toileting, grooming, dressing, etc.)  - Assess/evaluate cause of self-care deficits   - Assess range of motion  - Assess patient's mobility  - Assess patient's need for assistive devices and provide as appropriate  - Encourage maximum independence but intervene and supervise when necessary  - Involve family in performance of ADLs  - Assess for home care needs following discharge   - Consider OT consult to assist with ADL evaluation and planning for discharge  - Provide patient education as appropriate  Outcome: Progressing  Goal: Maintain proper alignment of affected body part  Description: INTERVENTIONS:  - Support, maintain and protect limb and body alignment  - Provide patient/ family with appropriate education  Outcome: Progressing     Problem: Prexisting or High Potential for Compromised Skin  Integrity  Goal: Skin integrity is maintained or improved  Description: INTERVENTIONS:  - Identify patients at risk for skin breakdown  - Assess and monitor skin integrity  - Assess and monitor nutrition and hydration status  - Monitor labs   - Assess for incontinence   - Turn and reposition patient  - Assist with mobility/ambulation  - Relieve pressure over bony prominences  - Avoid friction and shearing  - Provide appropriate hygiene as needed including keeping skin clean and dry  - Evaluate need for skin moisturizer/barrier cream  - Collaborate with interdisciplinary team   - Patient/family teaching  - Consider wound care consult   Outcome: Progressing     Problem: Potential for Falls  Goal: Patient will remain free of falls  Description: INTERVENTIONS:  - Educate patient/family on patient safety including physical limitations  - Instruct patient to call for assistance with activity   - Consult OT/PT to assist with strengthening/mobility   - Keep Call bell within reach  - Keep bed low and locked with side rails adjusted as appropriate  - Keep care items and personal belongings within reach  - Initiate and maintain comfort rounds  - Make Fall Risk Sign visible to staff  - Offer Toileting every x Hours, in advance of need  - Initiate/Maintain xxxxalarm  - Obtain necessary fall risk management equipment: x  - Apply yellow socks and bracelet for high fall risk patients  - Consider moving patient to room near nurses station  Outcome: Progressing     Problem: Nutrition/Hydration-ADULT  Goal: Nutrient/Hydration intake appropriate for improving, restoring or maintaining nutritional needs  Description: Monitor and assess patient's nutrition/hydration status for malnutrition. Collaborate with interdisciplinary team and initiate plan and interventions as ordered.  Monitor patient's weight and dietary intake as ordered or per policy. Utilize nutrition screening tool and intervene as necessary. Determine patient's food  preferences and provide high-protein, high-caloric foods as appropriate.     INTERVENTIONS:  - Monitor oral intake, urinary output, labs, and treatment plans  - Assess nutrition and hydration status and recommend course of action  - Evaluate amount of meals eaten  - Assist patient with eating if necessary   - Allow adequate time for meals  - Recommend/ encourage appropriate diets, oral nutritional supplements, and vitamin/mineral supplements  - Order, calculate, and assess calorie counts as needed  - Recommend, monitor, and adjust tube feedings and TPN/PPN based on assessed needs  - Assess need for intravenous fluids  - Provide specific nutrition/hydration education as appropriate  - Include patient/family/caregiver in decisions related to nutrition  Outcome: Progressing

## 2024-02-07 NOTE — ASSESSMENT & PLAN NOTE
Malnutrition Findings:   Adult Malnutrition type: Chronic illness  Adult Degree of Malnutrition: Malnutrition of moderate degree  Malnutrition Characteristics: Fat loss, Muscle loss                  360 Statement: Pt presents with moderate protein malnutrition as evidenced by prominent clavicle bone and sunken orbitals. Treat with diet and supplements BID.    BMI Findings:           Body mass index is 19.75 kg/m².

## 2024-02-07 NOTE — PROGRESS NOTES
Progress Note - Orthopedics   Lo Talley 78 y.o. female MRN: [id number]  Unit/Bed#: -01      Assessment:  78 y.o.female s/p right  MARY  with Dr. Goldman on 2/4/24 for displaced femoral neck fracture. POD3      Plan:  WBAT - no formal hip precautions other than no resisted abduction  Hemoglobin 11.0. Continue to monitor H&H and vitals.  PT/OT  Pain control per primary  DVT ppx - Cardiology changed patient's DVT ppx to Eliquis.  Follow up with Dr. Goldman in 2 weeks  Dispo: Orthopedically stable stable for dc to rehab when cleared by [facility].      Subjective:    78 y.o.female Seen and examined at bedside.  She was easily awoken from sleep this morning. She denies any pain in the right hip.  Denies fevers, chills, CP, SOB, N/V, numbness or tingling. No acute overnight events.    Labs:  0   Lab Value Date/Time    HCT 33.3 (L) 02/07/2024 0342    HCT 32.4 (L) 02/06/2024 0902    HCT 35.0 02/05/2024 0350    HGB 11.0 (L) 02/07/2024 0342    HGB 10.8 (L) 02/06/2024 0902    HGB 11.8 02/05/2024 0350    INR 1.17 02/04/2024 0448    WBC 12.51 (H) 02/07/2024 0342    WBC 15.67 (H) 02/06/2024 0902    WBC 15.81 (H) 02/05/2024 0350       Meds:    Current Facility-Administered Medications:     acetaminophen (TYLENOL) tablet 650 mg, 650 mg, Oral, Q6H PRN, Idania Edwards PA-C    acetaminophen (TYLENOL) tablet 975 mg, 975 mg, Oral, Q8H, Idania Edwards PA-C, 975 mg at 02/06/24 1958    aluminum-magnesium hydroxide-simethicone (MAALOX) oral suspension 30 mL, 30 mL, Oral, Q6H PRN, Idania Edwards PA-C    apixaban (ELIQUIS) tablet 5 mg, 5 mg, Oral, BID, Landy Gould PA-C, 5 mg at 02/06/24 1725    atorvastatin (LIPITOR) tablet 40 mg, 40 mg, Oral, Daily With Dinner, Idania Edwards PA-C, 40 mg at 02/06/24 1723    calcium carbonate (TUMS) chewable tablet 1,000 mg, 1,000 mg, Oral, Daily PRN, Idania Edwards PA-C    carvedilol (COREG) tablet 12.5 mg, 12.5 mg, Oral, BID With Meals, Idania Edwards PA-C, 12.5 mg at 02/06/24 1723    ceFAZolin (ANCEF) IVPB (premix in  "dextrose) 1,000 mg 50 mL, 1,000 mg, Intravenous, Q8H, Landy Gould PA-C, Last Rate: 100 mL/hr at 02/07/24 0114, 1,000 mg at 02/07/24 0114    docusate sodium (COLACE) capsule 100 mg, 100 mg, Oral, BID, Idania Edwards PA-C, 100 mg at 02/06/24 1723    ferrous sulfate tablet 325 mg, 325 mg, Oral, Daily With Breakfast, Idania Edwards PA-C, 325 mg at 02/06/24 0931    gabapentin (NEURONTIN) capsule 300 mg, 300 mg, Oral, HS, Idania Edwards PA-C, 300 mg at 02/06/24 2217    metoprolol (LOPRESSOR) injection 5 mg, 5 mg, Intravenous, Q6H PRN, Idania Edwards PA-C    ondansetron (ZOFRAN) injection 4 mg, 4 mg, Intravenous, Q6H PRN, Idania Edwards PA-C    oxyCODONE (ROXICODONE) IR tablet 5 mg, 5 mg, Oral, Q4H PRN, Idania Edwards PA-C    oxyCODONE (ROXICODONE) split tablet 2.5 mg, 2.5 mg, Oral, Q6H PRN, Idania Edwards PA-C    senna (SENOKOT) tablet 8.6 mg, 1 tablet, Oral, Daily, Idania Edwards PA-C, 8.6 mg at 02/06/24 0929    Blood Culture:   Lab Results   Component Value Date    BLOODCX No Growth at 72 hrs. 02/03/2024       Wound Culture:   No results found for: \"WOUNDCULT\"    Ins and Outs:  I/O last 24 hours:  In: 100 [P.O.:100]  Out: -       Physical:  Vitals:    02/06/24 1530   BP: 121/69   Pulse: 85   Resp:    Temp: 97.9 °F (36.6 °C)   SpO2: 96%       Musculoskeletal: right Lower Extremity  Skin - . No erythema or ecchymosis. Compartments soft and compressible.   Dressing - c/d/I, covered with mepilex  TTP at incision site  +swelling around incision.  Sensation intact to saphenous, sural, tibial, superficial peroneal nerve, and deep peroneal  Motor intact to +FHL/EHL, +ankle dorsi/plantar flexion  2+ DP pulse, symmetric bilaterally  Digits warm and well perfused  Capillary refill < 2 seconds        Ana Hughes PA-C      "

## 2024-02-07 NOTE — ASSESSMENT & PLAN NOTE
Provider spoke to patient and her  yesterday. Patient agreeable to restarting Eliquis for stroke prevention. Patient to follow-up with outpatient cardiology to discuss alternative medications that will be covered by insurance. Continue Carvedilol.

## 2024-02-07 NOTE — DISCHARGE SUMMARY
"[facility]  Discharge- Lo Talley 1945, 78 y.o. female MRN: [id number]  Unit/Bed#: MS [place] Encounter: [id number]  Primary Care Provider: No primary care provider on file.   Date and time admitted to hospital: 2/3/2024 10:59 AM    * Severe sepsis (HCC)-resolved as of 2/7/2024  Assessment & Plan  Patient admitted with severe sepsis as evidenced by tachycardia, leukocytosis of 18.7 K likely due to to UTI.  CT chest abdomen pelvis showed-\"Impacted subcapital fracture of the right femur.  Mild circumferential bladder wall thickening with mild perivesical inflammatory fat stranding, which may relate to cystitis. Recommend correlation with urinalysis.\"  Lactate is 2.9 - now resolved  Procalcitonin 0.12  Blood cultures negative x 2 after 4 days  Urine culture > 100k pansensitive ecoli   Restart IV ancef now.  Did receive prophylactic dose x1 for the OR.  Had been on IV rocephin but does not appear pt received dose 2/4 as med was on MAR hold due to being in the OR  Leukocytosis downtrending.  Transition to Keflex to complete course    UTI (urinary tract infection)-resolved as of 2/7/2024  Assessment & Plan  Patient has positive UTI on admission  Initially on IV Rocephin - transition to IV ancef given cx results  Urine culture growing pansensitive ecoli  Transition to keflex    Atrial fibrillation (HCC)  Assessment & Plan  On Coreg for rate control  Not on any anticoagulation as  stopped Eliquis in August on his own - reportedly medication may have been too expensive?  Cardiology attempted to call  yesterday.  Personally called  this AM - left VM again 2/6  Discussed with cardiology 2/6 - will restart eliquis at this time for stroke prevention.  If this is cost prohibitive following rehab, will need to follow up with cardiology after discharge.  Per ortho OK to switch from ASA BID to eliquis         Acute blood loss anemia  Assessment & Plan  Pre-op hemoglobin noted " "to be 14.2  Decreased initially to 10.8 this AM suspect likely due to recent surgery but also hemodilutional s/p IVF  Hemoglobin stable at 11.0  No ecchymosis or hematoma noted on exam  Otherwise no evidence of bleeding    Moderate protein-calorie malnutrition (HCC)  Assessment & Plan  Malnutrition Findings:   Adult Malnutrition type: Chronic illness  Adult Degree of Malnutrition: Malnutrition of moderate degree  Malnutrition Characteristics: Fat loss, Muscle loss                  360 Statement: Pt presents with moderate protein malnutrition as evidenced by prominent clavicle bone and sunken orbitals. Treat with diet and supplements BID.    BMI Findings:           Body mass index is 19.75 kg/m².       HTN (hypertension)  Assessment & Plan  On Coreg, Norvasc and Zestril  Noted to have low-normal BP postop.  Lisinopril had been held on admission and amlodipine was discontinued  Blood pressure was well-controlled on carvedilol  If elevated blood pressure as outpatient can consider restarting lisinopril    History of multiple strokes  Assessment & Plan  Patient has history of CVAs  Patient was on Eliquis but per prior report  states that he stopped her Eliquis around August 2023 on his own because they could not afford it  Will restart eliquis at this time - if cost prohibitive will need to revisted as an outpt.  Discussed with patient's  importance of anticoagulation and he verbalized understanding  Continue on statin    Fall-resolved as of 2/7/2024  Assessment & Plan  Patient with mechanical fall on February 1st  Presented with right hip pain  CT head-\" No acute intracranial abnormality.Stable chronic microangiopathic changes within the brain. Stable encephalomalacia of the inferior right frontal lobe.\"  No head injuries or any other injuries noted  Orthopedics consult - POD 3  PT/OT for disposition planning - recommending rehab.  Stable for discharge to [facility] today    Femur fracture, right " (HCC)-resolved as of 2/7/2024  Assessment & Plan  Patient with subcapital fracture of right femur  Patient underwent right hip arthroplasty by Dr. Goldman 2/4 - POD 3  Pain management as needed  Encourage incentive spirometry use  DVT prophylaxis - initially on ASA 81 mg BID however given atrial fibrillation, discussed with both cardiology and orthopedics 2/6 - will transition back to eliquis and dc ASA/sq heparin  PT/OT consult - recommending rehab.  CM following.  For discharge to [facility]      Medical Problems       Resolved Problems  Date Reviewed: 2/7/2024            Resolved    * (Principal) Severe sepsis (HCC) 2/7/2024     Resolved by  Landy Gould PA-C    Femur fracture, right (HCC) 2/7/2024     Resolved by  Landy Gould PA-C    UTI (urinary tract infection) 2/7/2024     Resolved by  Landy Gould PA-C    Fall 2/7/2024     Resolved by  Landy Gould PA-C        Discharging Physician / Practitioner: Landy Gould PA-C  PCP: No primary care provider on file.  Admission Date:   Admission Orders (From admission, onward)       Ordered        02/03/24 1249  INPATIENT ADMISSION  Once                          Discharge Date: 02/07/24    Consultations During Hospital Stay:  Orthopedics  Cardiology  PT/OT  Case management    Procedures Performed:   2/4/2024: Right hip total arthroplasty    Significant Findings / Test Results:   CT head: No acute intracranial abnormality.  Stable chronic microangiopathic changes.  Stable encephalomalacia of the inferior right frontal lobe  CT chest abdomen pelvis: Impacted subcapital fracture of the right femur.  Mild circumferential bladder wall thickening with mild perivesical inflammatory fat stranding, which may relate to cystitis. Recommend correlation with urinalysis  Blood cultures negative x 2 after 4 days  Urine culture greater than 100 K E. Coli  Influenza/RSV/COVID-negative    Incidental Findings:   None    Test Results Pending at Discharge (will  "require follow up):   None     Outpatient Tests Requested:  None    Complications: None    Reason for Admission: Severe sepsis    Hospital Course:   Lo Talley is a 78 y.o. female patient who originally presented to the hospital on 2/3/2024 due to fall and right hip pain.    Recall history significant for hypertension, CVA, A-fib, cognitive decline.  Patient presented to the emergency department following a fall and subsequent right hip pain.  Patient was found to have a right hip fracture.  Patient also met severe sepsis criteria and was found to have urinary tract infection and started on IV Rocephin.  Blood cultures were negative to date.  Patient had surgical intervention on 2/4 with orthopedics.  Cardiology was consulted due to concern of prior anticoagulation use and CVA.  Ultimately given prior CVA and persistent atrial fibrillation patient was recommended to resume anticoagulation.  This had previously been discontinued by patient's  due to cost but after further discussion with cardiology and , they will follow-up as outpatient in regards to alternatives.  PT/OT recommended rehab to which patient and  were agreeable.  On day of discharge patient was hemodynamically stable, afebrile for discharge to rehab.      Please see above list of diagnoses and related plan for additional information.     Condition at Discharge: stable    Discharge Day Visit / Exam:   Subjective: Feels well, right hip pain well-controlled.  Vitals: Blood Pressure: 131/80 (02/07/24 0753)  Pulse: 92 (02/07/24 0753)  Temperature: 98.4 °F (36.9 °C) (02/07/24 0753)  Temp Source: Temporal (02/06/24 1530)  Respirations: 22 (02/07/24 0753)  Height: 5' 6\" (167.6 cm) (02/03/24 1104)  Weight - Scale: 55.5 kg (122 lb 5.7 oz) (02/07/24 0533)  SpO2: 96 % (02/07/24 0753)  Exam:   Physical Exam  Vitals and nursing note reviewed.   Constitutional:       Appearance: Normal appearance.      Comments: No acute distress   HENT:      " Head: Normocephalic.   Eyes:      General: No scleral icterus.     Extraocular Movements: Extraocular movements intact.      Conjunctiva/sclera: Conjunctivae normal.   Cardiovascular:      Rate and Rhythm: Normal rate. Rhythm irregularly irregular.   Pulmonary:      Effort: Pulmonary effort is normal.      Breath sounds: Normal breath sounds. No wheezing, rhonchi or rales.   Abdominal:      General: Bowel sounds are normal.      Palpations: Abdomen is soft.      Tenderness: There is no abdominal tenderness. There is no guarding or rebound.   Musculoskeletal:         General: No swelling, tenderness or deformity.      Cervical back: Normal range of motion.      Comments: Able to move upper/lower extremities bilaterally, no edema.  Right hip dressing clean/dry/intact, no ecchymosis   Skin:     General: Skin is warm and dry.   Neurological:      Mental Status: She is alert. Mental status is at baseline.   Psychiatric:         Mood and Affect: Mood normal.         Speech: Speech normal.         Behavior: Behavior normal.          Discussion with Family: Patient declined call to .   CM updated  regarding discharge to rehab today and he was agreeable.    Discharge instructions/Information to patient and family:   See after visit summary for information provided to patient and family.      Provisions for Follow-Up Care:  See after visit summary for information related to follow-up care and any pertinent home health orders.      Mobility at time of Discharge:   Basic Mobility Inpatient Raw Score: 12  JH-HLM Goal: 4: Move to chair/commode  JH-HLM Achieved: 3: Sit at edge of bed  HLM Goal NOT achieved. Continue to encourage mobility in post discharge setting.     Disposition:   Other Skilled Nursing Facility at [facility]    Planned Readmission: None     Discharge Statement:  I spent 60 minutes discharging the patient. This time was spent on the day of discharge. I had direct contact with the patient on the  day of discharge. Greater than 50% of the total time was spent examining patient, answering all patient questions, arranging and discussing plan of care with patient as well as directly providing post-discharge instructions.  Additional time then spent on discharge activities.    Discharge Medications:  See after visit summary for reconciled discharge medications provided to patient and/or family.      **Please Note: This note may have been constructed using a voice recognition system**

## 2024-02-07 NOTE — CASE MANAGEMENT
Case Management Discharge Planning Note    Patient name Lo Talley  Location /-01 MRN [id number]  : 1945 Date 2024       Current Admission Date: 2/3/2024  Current Admission Diagnosis:Severe sepsis (HCC)   Patient Active Problem List    Diagnosis Date Noted    Moderate protein-calorie malnutrition (HCC) 2024    Acute blood loss anemia 2024    Severe sepsis (HCC) 2024    Femur fracture, right (HCC) 2024    UTI (urinary tract infection) 2024    HTN (hypertension) 2024    Fall 2024    Cerebral amyloid angiopathy (CODE) 2023    History of multiple strokes 06/15/2023    Atrial fibrillation (HCC) 06/15/2023    Hypertensive urgency 2023    Elevated troponin 2023      LOS (days): 4  Geometric Mean LOS (GMLOS) (days): 9.9  Days to GMLOS:6.1     OBJECTIVE:  Risk of Unplanned Readmission Score: 11.19         Current admission status: Inpatient   Preferred Pharmacy:   [id number]  [place] - [address]  [address]  [facility] [address]  Phone: [phone] Fax: [phone]    Primary Care Provider: No primary care provider on file.    Primary Insurance: MEDICARE  Secondary Insurance: AETNA    DISCHARGE DETAILS:        CM received confirmed  time of 1130 am for pt to go to [facility]. CM notified provider and RN via TT, facility via Aidin, and spouse, Jeremiah, via phone.           Transport at Discharge : [facility]     Number/Name of Dispatcher: [phone]  Transported by (Company and Unit #): [facility]  ETA of Transport (Date): 24  ETA of Transport (Time): 1130     Transfer Mode: Stretcher       Accepting Facility Name, City & State : [facility]  Receiving Facility/Agency Phone Number: [phone]  Facility/Agency Fax Number: [phone]

## 2024-02-07 NOTE — ASSESSMENT & PLAN NOTE
On Coreg, Norvasc and Zestril  Noted to have low-normal BP postop.  Lisinopril had been held on admission and amlodipine was discontinued  Blood pressure was well-controlled on carvedilol  If elevated blood pressure as outpatient can consider restarting lisinopril

## 2024-02-07 NOTE — APP STUDENT NOTE
"  VTE Pharmacologic Prophylaxis: VTE Score: 10 Low Risk (Score 0-2) - Encourage Ambulation.    Mobility:   Basic Mobility Inpatient Raw Score: 12  JH-HLM Goal: 4: Move to chair/commode  JH-HLM Achieved: 3: Sit at edge of bed  HLM Goal not achieved. Patient to continue PT/OT at the short term rehab facility.     Patient Centered Rounds: I performed bedside rounds with nursing staff today.   Discussions with Specialists or Other Care Team Provider: Morning rounds completed alongside preceptor.    Education and Discussions with Family / Patient:  Planned discharge for 11-12 PM. Will meet with preceptor to discuss patient before attempting to call family.     Total Time Spent on Date of Encounter in care of patient: 10 mins. This time was spent on one or more of the following: performing physical exam; counseling and coordination of care; obtaining or reviewing history; documenting in the medical record; reviewing/ordering tests, medications or procedures; communicating with other healthcare professionals and discussing with patient's family/caregivers.    Current Length of Stay: 4 day(s)  Current Patient Status: Inpatient   Certification Statement:  The patient, who was admitted as inpatient, is being discharged later this morning.  Discharge Plan:  Anticipated discharge today.    Code Status: Level 1 - Full Code    Subjective:   Lo Talley is a 77 y/o female HD4, who was admitted due to severe sepsis and a right subcapital femoral neck fracture.   Patient had no acute overnight events and states that she feels \"good\". She denies CP/SOB, n/v, dizziness, and hip pain. Patient endorses decreased appetite and anxiety/nervousness regarding her discharge today. Patient plans on discharging to [facility] rehab facility.    Objective:     Vitals:   Temp (24hrs), Av.2 °F (36.8 °C), Min:97.9 °F (36.6 °C), Max:98.4 °F (36.9 °C)    Temp:  [97.9 °F (36.6 °C)-98.4 °F (36.9 °C)] 98.4 °F (36.9 °C)  HR:  [85-92] " 92  Resp:  [22] 22  BP: (121-131)/(69-80) 131/80  SpO2:  [96 %] 96 %  Body mass index is 19.75 kg/m².     Input and Output Summary (last 24 hours):     Intake/Output Summary (Last 24 hours) at 2/7/2024 0937  Last data filed at 2/7/2024 0907  Gross per 24 hour   Intake 200 ml   Output --   Net 200 ml       Physical Exam:   Physical Exam  Vitals reviewed.   Constitutional:       General: She is not in acute distress.     Appearance: Normal appearance. She is not ill-appearing.   HENT:      Head: Normocephalic.      Right Ear: Tympanic membrane, ear canal and external ear normal.      Left Ear: Tympanic membrane, ear canal and external ear normal.      Nose: Nose normal.      Mouth/Throat:      Pharynx: Oropharynx is clear.   Eyes:      Extraocular Movements: Extraocular movements intact.      Conjunctiva/sclera: Conjunctivae normal.      Pupils: Pupils are equal, round, and reactive to light.   Cardiovascular:      Rate and Rhythm: Normal rate and regular rhythm.      Pulses: Normal pulses.      Heart sounds: Normal heart sounds.   Pulmonary:      Effort: Pulmonary effort is normal. No respiratory distress.      Breath sounds: Normal breath sounds. No stridor. No wheezing.   Abdominal:      General: Abdomen is flat. Bowel sounds are normal. There is no distension.      Palpations: Abdomen is soft. There is no mass.      Tenderness: There is no abdominal tenderness.   Musculoskeletal:         General: Normal range of motion.      Cervical back: Normal range of motion and neck supple. No rigidity or tenderness.   Skin:     General: Skin is warm and dry.      Coloration: Skin is not jaundiced or pale.      Findings: No erythema.   Neurological:      General: No focal deficit present.      Mental Status: She is alert and oriented to person, place, and time. Mental status is at baseline.   Psychiatric:         Mood and Affect: Mood normal.          Skin: Right hip dressing is clean, intact, and dry. Site is warm to the  touch and is tender to palpation.  CV: Patient has history of A-fib. Irregular irregular rhythm not heard on exam.   PV: Distal pulses 2+  Pulm: Lungs CTA b/l. No w/r/r.      Additional Data:     Labs:  Results from last 7 days   Lab Units 02/07/24 0342 02/06/24  0902   WBC Thousand/uL 12.51* 15.67*   HEMOGLOBIN g/dL 11.0* 10.8*   HEMATOCRIT % 33.3* 32.4*   PLATELETS Thousands/uL 344 329   BANDS PCT %  --  1   NEUTROS PCT % 74  --    LYMPHS PCT % 19  --    LYMPHO PCT %  --  15   MONOS PCT % 6  --    MONO PCT %  --  3*   EOS PCT % 1 0     Results from last 7 days   Lab Units 02/07/24  0342 02/06/24  0902 02/05/24  0350   SODIUM mmol/L 141   < > 140   POTASSIUM mmol/L 3.4*   < > 3.7   CHLORIDE mmol/L 110*   < > 108   CO2 mmol/L 24   < > 24   BUN mg/dL 15   < > 21   CREATININE mg/dL 0.39*   < > 0.46*   ANION GAP mmol/L 7   < > 8   CALCIUM mg/dL 8.4   < > 8.6   ALBUMIN g/dL  --   --  3.1*   TOTAL BILIRUBIN mg/dL  --   --  0.79   ALK PHOS U/L  --   --  54   ALT U/L  --   --  15   AST U/L  --   --  17   GLUCOSE RANDOM mg/dL 96   < > 110    < > = values in this interval not displayed.     Results from last 7 days   Lab Units 02/04/24  0448   INR  1.17             Results from last 7 days   Lab Units 02/04/24  0448 02/03/24  2143 02/03/24  1847 02/03/24  1616 02/03/24  1404 02/03/24  1124   LACTIC ACID mmol/L  --  1.5 2.5* 2.1* 2.7* 2.9*   PROCALCITONIN ng/ml 0.09  --   --   --   --  0.12       Lines/Drains:  Invasive Devices       Peripheral Intravenous Line  Duration             Peripheral IV 02/04/24 Dorsal (posterior);Left Forearm 3 days                          Imaging: No pertinent imaging reviewed.    Recent Cultures (last 7 days):   Results from last 7 days   Lab Units 02/03/24  1125 02/03/24  1124   BLOOD CULTURE  No Growth at 72 hrs. No Growth at 72 hrs.   URINE CULTURE   --  >100,000 cfu/ml Escherichia coli*  10,000-19,000 cfu/ml Alpha Hemolytic Streptococcus NOT Enterococcus*       Last 24 Hours Medication  List:   Current Facility-Administered Medications   Medication Dose Route Frequency Provider Last Rate    acetaminophen  650 mg Oral Q6H PRN Idania Edwards PA-C      acetaminophen  975 mg Oral Q8H Idania Edwards PA-C      aluminum-magnesium hydroxide-simethicone  30 mL Oral Q6H PRN Idania Edwards PA-C      apixaban  5 mg Oral BID Landy Gould PA-C      atorvastatin  40 mg Oral Daily With Dinner Idania Edwards PA-C      calcium carbonate  1,000 mg Oral Daily PRN Idania Edwards PA-C      carvedilol  12.5 mg Oral BID With Meals Idania Edwards PA-C      cephalexin  500 mg Oral Q6H BK Landy Gould PA-C      docusate sodium  100 mg Oral BID Idania Edwards PA-C      ferrous sulfate  325 mg Oral Daily With Breakfast Idania Edwards PA-C      gabapentin  300 mg Oral HS Idania Edwards PA-C      metoprolol  5 mg Intravenous Q6H PRN Idania Edwards PA-C      ondansetron  4 mg Intravenous Q6H PRN Idania Edwards PA-C      oxyCODONE  5 mg Oral Q4H PRN Idania Edwards PA-C      oxyCODONE  2.5 mg Oral Q6H PRN Idania Edwards PA-C      senna  1 tablet Oral Daily Idania Edwards PA-C          Today, Patient Was Seen By: GISELLE VELASQUEZ    **Please Note: This note may have been constructed using a voice recognition system.**

## 2024-02-08 ENCOUNTER — APPOINTMENT (EMERGENCY)
Dept: CT IMAGING | Facility: HOSPITAL | Age: 79
End: 2024-02-08
Payer: MEDICARE

## 2024-02-08 ENCOUNTER — HOSPITAL ENCOUNTER (OUTPATIENT)
Facility: HOSPITAL | Age: 79
Setting detail: OBSERVATION
Discharge: NURSING, REHAB OR LONG-TERM CARE | End: 2024-02-09
Attending: EMERGENCY MEDICINE | Admitting: INTERNAL MEDICINE
Payer: MEDICARE

## 2024-02-08 DIAGNOSIS — I16.0 HYPERTENSIVE URGENCY: ICD-10-CM

## 2024-02-08 DIAGNOSIS — N39.0 UTI (URINARY TRACT INFECTION): ICD-10-CM

## 2024-02-08 DIAGNOSIS — I48.91 ATRIAL FIBRILLATION, UNSPECIFIED TYPE (HCC): ICD-10-CM

## 2024-02-08 DIAGNOSIS — G93.41 METABOLIC ENCEPHALOPATHY: ICD-10-CM

## 2024-02-08 DIAGNOSIS — I95.9 HYPOTENSION: Primary | ICD-10-CM

## 2024-02-08 PROBLEM — R41.82 ALTERED MENTAL STATUS: Status: ACTIVE | Noted: 2024-02-08

## 2024-02-08 PROBLEM — K86.9 PANCREATIC LESION: Status: ACTIVE | Noted: 2024-02-08

## 2024-02-08 LAB
2HR DELTA HS TROPONIN: -2 NG/L
4HR DELTA HS TROPONIN: -3 NG/L
ALBUMIN SERPL BCP-MCNC: 3.2 G/DL (ref 3.5–5)
ALP SERPL-CCNC: 73 U/L (ref 34–104)
ALT SERPL W P-5'-P-CCNC: 36 U/L (ref 7–52)
ANION GAP SERPL CALCULATED.3IONS-SCNC: 7 MMOL/L
AST SERPL W P-5'-P-CCNC: 40 U/L (ref 13–39)
BACTERIA BLD CULT: NORMAL
BACTERIA BLD CULT: NORMAL
BASOPHILS # BLD AUTO: 0.03 THOUSANDS/ÂΜL (ref 0–0.1)
BASOPHILS NFR BLD AUTO: 0 % (ref 0–1)
BILIRUB SERPL-MCNC: 1.07 MG/DL (ref 0.2–1)
BILIRUB UR QL STRIP: NEGATIVE
BUN SERPL-MCNC: 12 MG/DL (ref 5–25)
CALCIUM ALBUM COR SERPL-MCNC: 9.5 MG/DL (ref 8.3–10.1)
CALCIUM SERPL-MCNC: 8.9 MG/DL (ref 8.4–10.2)
CARDIAC TROPONIN I PNL SERPL HS: 10 NG/L
CARDIAC TROPONIN I PNL SERPL HS: 7 NG/L
CARDIAC TROPONIN I PNL SERPL HS: 8 NG/L
CHLORIDE SERPL-SCNC: 106 MMOL/L (ref 96–108)
CLARITY UR: CLEAR
CO2 SERPL-SCNC: 29 MMOL/L (ref 21–32)
COLOR UR: YELLOW
CREAT SERPL-MCNC: 0.58 MG/DL (ref 0.6–1.3)
EOSINOPHIL # BLD AUTO: 0.2 THOUSAND/ÂΜL (ref 0–0.61)
EOSINOPHIL NFR BLD AUTO: 2 % (ref 0–6)
ERYTHROCYTE [DISTWIDTH] IN BLOOD BY AUTOMATED COUNT: 13.7 % (ref 11.6–15.1)
FLUAV RNA RESP QL NAA+PROBE: NEGATIVE
FLUBV RNA RESP QL NAA+PROBE: NEGATIVE
GFR SERPL CREATININE-BSD FRML MDRD: 89 ML/MIN/1.73SQ M
GLUCOSE SERPL-MCNC: 113 MG/DL (ref 65–140)
GLUCOSE UR STRIP-MCNC: NEGATIVE MG/DL
HCT VFR BLD AUTO: 36.1 % (ref 34.8–46.1)
HGB BLD-MCNC: 11.7 G/DL (ref 11.5–15.4)
HGB UR QL STRIP.AUTO: NEGATIVE
IMM GRANULOCYTES # BLD AUTO: 0.09 THOUSAND/UL (ref 0–0.2)
IMM GRANULOCYTES NFR BLD AUTO: 1 % (ref 0–2)
KETONES UR STRIP-MCNC: NEGATIVE MG/DL
LACTATE SERPL-SCNC: 1.6 MMOL/L (ref 0.5–2)
LEUKOCYTE ESTERASE UR QL STRIP: NEGATIVE
LIPASE SERPL-CCNC: 163 U/L (ref 11–82)
LYMPHOCYTES # BLD AUTO: 2.41 THOUSANDS/ÂΜL (ref 0.6–4.47)
LYMPHOCYTES NFR BLD AUTO: 18 % (ref 14–44)
MCH RBC QN AUTO: 31.1 PG (ref 26.8–34.3)
MCHC RBC AUTO-ENTMCNC: 32.4 G/DL (ref 31.4–37.4)
MCV RBC AUTO: 96 FL (ref 82–98)
MONOCYTES # BLD AUTO: 0.66 THOUSAND/ÂΜL (ref 0.17–1.22)
MONOCYTES NFR BLD AUTO: 5 % (ref 4–12)
NEUTROPHILS # BLD AUTO: 10.03 THOUSANDS/ÂΜL (ref 1.85–7.62)
NEUTS SEG NFR BLD AUTO: 74 % (ref 43–75)
NITRITE UR QL STRIP: NEGATIVE
NRBC BLD AUTO-RTO: 0 /100 WBCS
PH UR STRIP.AUTO: 7.5 [PH]
PLATELET # BLD AUTO: 405 THOUSANDS/UL (ref 149–390)
PMV BLD AUTO: 9.6 FL (ref 8.9–12.7)
POTASSIUM SERPL-SCNC: 4.5 MMOL/L (ref 3.5–5.3)
PROT SERPL-MCNC: 5.7 G/DL (ref 6.4–8.4)
PROT UR STRIP-MCNC: NEGATIVE MG/DL
RBC # BLD AUTO: 3.76 MILLION/UL (ref 3.81–5.12)
RSV RNA RESP QL NAA+PROBE: NEGATIVE
SARS-COV-2 RNA RESP QL NAA+PROBE: NEGATIVE
SODIUM SERPL-SCNC: 142 MMOL/L (ref 135–147)
SP GR UR STRIP.AUTO: 1.02 (ref 1–1.03)
TSH SERPL DL<=0.05 MIU/L-ACNC: 2.8 UIU/ML (ref 0.45–4.5)
UROBILINOGEN UR STRIP-ACNC: <2 MG/DL
WBC # BLD AUTO: 13.42 THOUSAND/UL (ref 4.31–10.16)

## 2024-02-08 PROCEDURE — 71260 CT THORAX DX C+: CPT

## 2024-02-08 PROCEDURE — 70450 CT HEAD/BRAIN W/O DYE: CPT

## 2024-02-08 PROCEDURE — 80053 COMPREHEN METABOLIC PANEL: CPT | Performed by: EMERGENCY MEDICINE

## 2024-02-08 PROCEDURE — 83605 ASSAY OF LACTIC ACID: CPT | Performed by: EMERGENCY MEDICINE

## 2024-02-08 PROCEDURE — 74177 CT ABD & PELVIS W/CONTRAST: CPT

## 2024-02-08 PROCEDURE — 83690 ASSAY OF LIPASE: CPT | Performed by: EMERGENCY MEDICINE

## 2024-02-08 PROCEDURE — 99223 1ST HOSP IP/OBS HIGH 75: CPT | Performed by: PHYSICIAN ASSISTANT

## 2024-02-08 PROCEDURE — 84443 ASSAY THYROID STIM HORMONE: CPT | Performed by: EMERGENCY MEDICINE

## 2024-02-08 PROCEDURE — 99291 CRITICAL CARE FIRST HOUR: CPT | Performed by: EMERGENCY MEDICINE

## 2024-02-08 PROCEDURE — 85025 COMPLETE CBC W/AUTO DIFF WBC: CPT | Performed by: EMERGENCY MEDICINE

## 2024-02-08 PROCEDURE — 99285 EMERGENCY DEPT VISIT HI MDM: CPT

## 2024-02-08 PROCEDURE — 36415 COLL VENOUS BLD VENIPUNCTURE: CPT | Performed by: EMERGENCY MEDICINE

## 2024-02-08 PROCEDURE — 81003 URINALYSIS AUTO W/O SCOPE: CPT | Performed by: EMERGENCY MEDICINE

## 2024-02-08 PROCEDURE — 93005 ELECTROCARDIOGRAM TRACING: CPT

## 2024-02-08 PROCEDURE — 0241U HB NFCT DS VIR RESP RNA 4 TRGT: CPT | Performed by: EMERGENCY MEDICINE

## 2024-02-08 PROCEDURE — 84484 ASSAY OF TROPONIN QUANT: CPT | Performed by: EMERGENCY MEDICINE

## 2024-02-08 PROCEDURE — 96360 HYDRATION IV INFUSION INIT: CPT

## 2024-02-08 PROCEDURE — 96361 HYDRATE IV INFUSION ADD-ON: CPT

## 2024-02-08 RX ORDER — ONDANSETRON 2 MG/ML
4 INJECTION INTRAMUSCULAR; INTRAVENOUS EVERY 6 HOURS PRN
Status: DISCONTINUED | OUTPATIENT
Start: 2024-02-08 | End: 2024-02-09 | Stop reason: HOSPADM

## 2024-02-08 RX ORDER — CARVEDILOL 3.12 MG/1
6.25 TABLET ORAL 2 TIMES DAILY WITH MEALS
Status: DISCONTINUED | OUTPATIENT
Start: 2024-02-08 | End: 2024-02-09 | Stop reason: HOSPADM

## 2024-02-08 RX ORDER — LANOLIN ALCOHOL/MO/W.PET/CERES
3 CREAM (GRAM) TOPICAL
Status: DISCONTINUED | OUTPATIENT
Start: 2024-02-08 | End: 2024-02-09 | Stop reason: HOSPADM

## 2024-02-08 RX ORDER — CEPHALEXIN 250 MG/1
500 CAPSULE ORAL EVERY 6 HOURS SCHEDULED
Status: DISCONTINUED | OUTPATIENT
Start: 2024-02-08 | End: 2024-02-09 | Stop reason: HOSPADM

## 2024-02-08 RX ORDER — SENNOSIDES 8.6 MG
8.6 TABLET ORAL DAILY
Status: DISCONTINUED | OUTPATIENT
Start: 2024-02-09 | End: 2024-02-09 | Stop reason: HOSPADM

## 2024-02-08 RX ORDER — FERROUS SULFATE 325(65) MG
325 TABLET ORAL
Status: DISCONTINUED | OUTPATIENT
Start: 2024-02-09 | End: 2024-02-09 | Stop reason: HOSPADM

## 2024-02-08 RX ORDER — DOCUSATE SODIUM 100 MG/1
100 CAPSULE, LIQUID FILLED ORAL DAILY PRN
Status: DISCONTINUED | OUTPATIENT
Start: 2024-02-08 | End: 2024-02-09 | Stop reason: HOSPADM

## 2024-02-08 RX ORDER — ACETAMINOPHEN 325 MG/1
650 TABLET ORAL EVERY 6 HOURS PRN
Status: DISCONTINUED | OUTPATIENT
Start: 2024-02-08 | End: 2024-02-09 | Stop reason: HOSPADM

## 2024-02-08 RX ORDER — ATORVASTATIN CALCIUM 40 MG/1
40 TABLET, FILM COATED ORAL
Status: DISCONTINUED | OUTPATIENT
Start: 2024-02-08 | End: 2024-02-09 | Stop reason: HOSPADM

## 2024-02-08 RX ORDER — GABAPENTIN 300 MG/1
300 CAPSULE ORAL
Status: DISCONTINUED | OUTPATIENT
Start: 2024-02-08 | End: 2024-02-09 | Stop reason: HOSPADM

## 2024-02-08 RX ADMIN — GABAPENTIN 300 MG: 300 CAPSULE ORAL at 23:13

## 2024-02-08 RX ADMIN — Medication 3 MG: at 23:13

## 2024-02-08 RX ADMIN — CEPHALEXIN 500 MG: 250 CAPSULE ORAL at 23:12

## 2024-02-08 RX ADMIN — CEPHALEXIN 500 MG: 250 CAPSULE ORAL at 18:05

## 2024-02-08 RX ADMIN — ATORVASTATIN CALCIUM 40 MG: 40 TABLET, FILM COATED ORAL at 18:04

## 2024-02-08 RX ADMIN — IOHEXOL 85 ML: 350 INJECTION, SOLUTION INTRAVENOUS at 14:10

## 2024-02-08 RX ADMIN — CARVEDILOL 6.25 MG: 3.12 TABLET, FILM COATED ORAL at 18:04

## 2024-02-08 RX ADMIN — SODIUM CHLORIDE 1000 ML: 0.9 INJECTION, SOLUTION INTRAVENOUS at 12:46

## 2024-02-08 RX ADMIN — APIXABAN 5 MG: 5 TABLET, FILM COATED ORAL at 18:04

## 2024-02-08 NOTE — ASSESSMENT & PLAN NOTE
Stable in ED  Continue Coreg at reduced dose, 6.25mg BID given reports of hypotension prior to admission

## 2024-02-08 NOTE — H&P
Formerly [facility]  H&P  Name: Lo Talley 78 y.o. female I MRN: [id number]  Unit/Bed#: ED 02 I Date of Admission: 2/8/2024   Date of Service: 2/8/2024 I Hospital Day: 0      Assessment/Plan   * Altered mental status  Assessment & Plan  Likely multifactorial due to recent hospitalization and transition to SNF, ongoing treatment for UTI and history of CAA/strokes, known cognitive decline/dementia  Baseline: Alert and oriented to self, forgetful to situation, defers to  in regards to medical history  Has baseline slurred speech since stroke/worsened by gum disease  On admission: patient oriented to self and location, appears tired - patient reports poor sleep the evening prior to presentation  Continue Keflex to complete antibiotic course for UTI  UA in ED unremarkable  Hemodynamically stable at time of admission, labs overall unremarkable  Suspect this is related to recent environment change/hospital associated delirium exacerbating underlying dementia  CT head unremarkable    Pancreatic lesion  Assessment & Plan  Noted on CT abdomen pelvis  Can follow-up outpatient    Moderate protein-calorie malnutrition (HCC)  Assessment & Plan  Malnutrition Findings:           BMI Findings:           There is no height or weight on file to calculate BMI.       HTN (hypertension)  Assessment & Plan  Stable in ED  Continue Coreg at reduced dose, 6.25mg BID given reports of hypotension prior to admission    Cerebral amyloid angiopathy (CODE)  Assessment & Plan  Continue outpatient follow up with Neurology    Atrial fibrillation (HCC)  Assessment & Plan  Recently resumed Eliquis 5 mg twice daily  Continue Coreg for rate control       VTE Pharmacologic Prophylaxis: VTE Score: 11 High Risk (Score >/= 5) - Pharmacological DVT Prophylaxis Ordered: apixaban (Eliquis). Sequential Compression Devices Ordered.  Code Status: Prior   Discussion with family: Updated  () via phone.    Anticipated  "Length of Stay: Patient will be admitted on an observation basis with an anticipated length of stay of less than 2 midnights secondary to AMS.    Total Time Spent on Date of Encounter in care of patient: 65 mins. This time was spent on one or more of the following: performing physical exam; counseling and coordination of care; obtaining or reviewing history; documenting in the medical record; reviewing/ordering tests, medications or procedures; communicating with other healthcare professionals and discussing with patient's family/caregivers.    Chief Complaint: \"I feel good\"    History of Present Illness:  Lo Talley is a 78 y.o. female with a PMH of hypertension, CVA, CAD, A-fib, cognitive decline, dementia, currently undergoing treatment for UTI, recent hospitalization for surgical repair of hip who presents with altered mental status reported by rehab facility with low blood pressure.   Patient herself is a poor historian reports \"I feel good\" and is not sure why she is in the hospital.  She offers no complaints or self.  Per ED report facility felt the patient was experiencing increased fatigue and weakness, she did have a low blood pressure reading in the outpatient setting.  Systolic blood pressure has remained greater than 100 while in the ED.  Will lower Coreg dose and monitor.  Patient is currently undergoing treatment for UTI which should be continued, may be contributing to her confusion.  Suspect there may also be a component of recent environment change exacerbating her underlying dementia.   History obtained primarily through her  who reports at baseline patient is oriented to herself and location but not necessarily situation.  He admits this is at baseline at time of admission however patient just appears more tired and slowed.  He reports that he spoke with her over lunch and asked how she slept last night, she did appear to dodge this question and report \"not really...\" Question if patient " had poor sleep due to change in environment which caused her to appear more fatigued today.  Will trial melatonin tonight.    Review of Systems:  Review of Systems   All other systems reviewed and are negative.      Past Medical and Surgical History:   Past Medical History:   Diagnosis Date    Acute posthemorrhagic anemia     Cerebral amyloid angiopathy      Encounter for current long-term use of anticoagulants     History of falling     History of stroke 06/14/2023    Hyperlipemia     Hypertension     Intracapsular fracture of right femur (HCC)     Moderate protein-calorie malnutrition (HCC)     Paroxysmal atrial fibrillation (HCC)     Personal history of transient ischemic attack (TIA), and cerebral infarction without residual deficits     TIA (transient ischemic attack)     Urinary tract infection        Past Surgical History:   Procedure Laterality Date    HIP ARTHROPLASTY Right 2/4/2024    Procedure: ARTHROPLASTY HIP TOTAL;  Surgeon: Kevon Goldman DO;  Location: [facility] OR;  Service: Orthopedics       Meds/Allergies:  Prior to Admission medications    Medication Sig Start Date End Date Taking? Authorizing Provider   acetaminophen (TYLENOL) 325 mg tablet Take 2 tablets (650 mg total) by mouth every 6 (six) hours as needed for mild pain 2/7/24   Landy Gould PA-C   apixaban (ELIQUIS) 5 mg Take 1 tablet (5 mg total) by mouth 2 (two) times a day 2/7/24   Landy Gould PA-C   atorvastatin (LIPITOR) 40 mg tablet Take 1 tablet (40 mg total) by mouth daily with dinner 6/18/23   Maryjo Velázquez MD   carvedilol (COREG) 12.5 mg tablet Take 1 tablet (12.5 mg total) by mouth 2 (two) times a day with meals 7/6/23   Michael Ruiz PA-C   cephalexin (KEFLEX) 500 mg capsule Take 1 capsule (500 mg total) by mouth every 6 (six) hours for 12 doses 2/7/24 2/10/24  Landy Gould PA-C   docusate sodium (COLACE) 100 mg capsule Take 1 capsule (100 mg total) by mouth 2 (two) times a day 2/7/24   Landy GREENBERG  JARROD Gould   ferrous sulfate 325 (65 Fe) mg tablet Take 1 tablet (325 mg total) by mouth daily with breakfast 2/8/24   Landy Gould PA-C   gabapentin (NEURONTIN) 300 mg capsule Take 1 capsule (300 mg total) by mouth daily at bedtime 2/7/24   Landy Gould PA-C   senna (SENOKOT) 8.6 mg Take 1 tablet (8.6 mg total) by mouth daily 2/8/24   Landy Gould PA-C     I have reviewed home medications using recent Epic encounter.    Allergies:   Allergies   Allergen Reactions    Penicillins Other (See Comments)     Childhood reaction       Social History:  Marital Status: /Civil Union   Occupation: retired  Patient Pre-hospital Living Situation: Skilled Nursing Facility: [facility]  Patient Pre-hospital Level of Mobility: unable to be assessed at time of evaluation  Patient Pre-hospital Diet Restrictions: None  Substance Use History:   Social History     Substance and Sexual Activity   Alcohol Use Not Currently    Alcohol/week: 1.0 standard drink of alcohol    Types: 1 Glasses of wine per week    Comment: stopped since hospitalization     Social History     Tobacco Use   Smoking Status Never   Smokeless Tobacco Never     Social History     Substance and Sexual Activity   Drug Use Not Currently       Family History:  History reviewed. No pertinent family history.    Physical Exam:     Vitals:   Blood Pressure: 115/64 (02/08/24 1700)  Pulse: 89 (02/08/24 1700)  Temperature: 98.6 °F (37 °C) (02/08/24 1206)  Temp Source: Oral (02/08/24 1206)  Respirations: 18 (02/08/24 1602)  SpO2: 96 % (02/08/24 1700)    Physical Exam  Vitals and nursing note reviewed.   Constitutional:       General: She is not in acute distress.     Appearance: Normal appearance. She is well-developed.   HENT:      Head: Normocephalic and atraumatic.   Eyes:      General: No scleral icterus.     Conjunctiva/sclera: Conjunctivae normal.   Cardiovascular:      Rate and Rhythm: Normal rate. Rhythm irregularly irregular.      Heart sounds:  No murmur heard.  Pulmonary:      Effort: Pulmonary effort is normal.      Breath sounds: No wheezing, rhonchi or rales.   Abdominal:      General: There is no distension.      Palpations: Abdomen is soft.   Skin:     General: Skin is warm and dry.   Neurological:      General: No focal deficit present.      Mental Status: She is alert.   Psychiatric:         Mood and Affect: Mood normal.        Additional Data:     Lab Results:  Results from last 7 days   Lab Units 02/08/24  1245 02/07/24  0342 02/06/24  0902   WBC Thousand/uL 13.42*   < > 15.67*   HEMOGLOBIN g/dL 11.7   < > 10.8*   HEMATOCRIT % 36.1   < > 32.4*   PLATELETS Thousands/uL 405*   < > 329   BANDS PCT %  --   --  1   NEUTROS PCT % 74   < >  --    LYMPHS PCT % 18   < >  --    LYMPHO PCT %  --   --  15   MONOS PCT % 5   < >  --    MONO PCT %  --   --  3*   EOS PCT % 2   < > 0    < > = values in this interval not displayed.     Results from last 7 days   Lab Units 02/08/24  1245   SODIUM mmol/L 142   POTASSIUM mmol/L 4.5   CHLORIDE mmol/L 106   CO2 mmol/L 29   BUN mg/dL 12   CREATININE mg/dL 0.58*   ANION GAP mmol/L 7   CALCIUM mg/dL 8.9   ALBUMIN g/dL 3.2*   TOTAL BILIRUBIN mg/dL 1.07*   ALK PHOS U/L 73   ALT U/L 36   AST U/L 40*   GLUCOSE RANDOM mg/dL 113     Results from last 7 days   Lab Units 02/04/24  0448   INR  1.17             Results from last 7 days   Lab Units 02/08/24  1245 02/04/24  0448 02/03/24  2143 02/03/24  1847 02/03/24  1616 02/03/24  1404 02/03/24  1124   LACTIC ACID mmol/L 1.6  --  1.5 2.5* 2.1*   < > 2.9*   PROCALCITONIN ng/ml  --  0.09  --   --   --   --  0.12    < > = values in this interval not displayed.       Lines/Drains:  Invasive Devices       Peripheral Intravenous Line  Duration             Peripheral IV 02/08/24 Proximal;Right;Ventral (anterior) Forearm <1 day                        Imaging: Reviewed radiology reports from this admission including: chest CT scan, abdominal/pelvic CT, and CT head  CT head without contrast    Final Result by Jaime Moeller MD (02/08 1513)      No acute intracranial abnormality.                  Workstation performed: [id number]         CT chest abdomen pelvis w contrast   Final Result by Jazz Deluna MD (02/08 1552)      1. Small pericardial effusion.      2. Bladder distention with nondependent focus of gas, recommend correlation for recent instrumentation.      3. A 6 mm pancreatic head cystic lesion. For simple cyst(s) less than 1.5 cm, recommend follow-up every 2 years for 5 times or to age 80, whichever comes first. Follow-up can stop at age 80 or can switch over to 80 year or older algorithm. Recommend next    follow-up in 2 years. Preferred imaging modality: abdomen MRI and MRCP with and without IV contrast, or triple phase abdomen CT with IV contrast, or abdomen MRI and MRCP without IV contrast.      The recommendations regarding pancreatic findings assume that patient does not have family history of pancreatic cancer nor have any symptoms potentially attributable to pancreatic cystic lesions (hyperamylasemia, recent-onset diabetes, severe epigastric    pain, weight loss, steatorrhea, or jaundice). If these conditions are not true, then management should be deferred to judgement of specialists such as gastroenterologists or oncologic surgeons.      REFERENCES:   Recommendations are based on recent consensus statements on management of pancreatic cystic lesions from American Gastroenterology Association, American College of Radiology, the Journal Pancreatology, and our own institutional consensus.      American Gastroenterological Association [place] Guidelines on the Diagnosis and Management of Asymptomatic Neoplastic Pancreatic Cysts. Gastroenterology 2015; 148:819-822 (AGA GUIDELINES)   International Consensus Guidelines for Management of Intraductal Papillary Mucinous Neoplasm and Mucinous Cystic Neoplasms of the Pancreas. Pancreatology Sep-Oct 2017;17(5):738-753. doi:  10.1016/j.oconnell.2017.07.007. Epub 2017 Jul 13 (KEYA paper).   American College of Radiology: White Paper: Management of Incidental Pancreatic Cysts: A White Paper of the ACR Incidental Findings Committee, 2017 Jul;14(7):911-923. doi: 10.1016/j.jacr.2017.03.010. Epub 2017 May 19. (ACR GUIDELINES.)      The study was marked in EPIC for immediate notification.         Resident: SOLEDAD AVITIA I, the attending radiologist, have reviewed the images and agree with the final report above.      Workstation performed: [id number]             EKG and Other Studies Reviewed on Admission:   EKG: Atrial fibrillation. HR 74bpm.    ** Please Note: This note has been constructed using a voice recognition system. **

## 2024-02-08 NOTE — ASSESSMENT & PLAN NOTE
Malnutrition Findings:           BMI Findings:           There is no height or weight on file to calculate BMI.

## 2024-02-08 NOTE — ED PROVIDER NOTES
History  Chief Complaint   Patient presents with    Hypotension     [place] [facility] [place] Staff reported increase fatigue and weakness. Hx of tia and stroke with no deficits.      Patient is a 78-year-old female.  She recently suffered a right hip fracture and underwent repair.  She currently is at a nursing home.  Today she was found with altered mental status.  She was also noted to be hypotensive.  Really no other history is available.  No reported fever.  She has a history of atrial fibrillation and is anticoagulated on Eliquis.  She has had UTI.  She has hypertension and hyperlipidemia.  She has history of cerebrovascular disease.  She has a history of anemia.  Review of old records showed that patient was septic when she presented with her hip fracture, potentially from UTI.        Prior to Admission Medications   Prescriptions Last Dose Informant Patient Reported? Taking?   acetaminophen (TYLENOL) 325 mg tablet   No No   Sig: Take 2 tablets (650 mg total) by mouth every 6 (six) hours as needed for mild pain   apixaban (ELIQUIS) 5 mg   No No   Sig: Take 1 tablet (5 mg total) by mouth 2 (two) times a day   atorvastatin (LIPITOR) 40 mg tablet  Self No No   Sig: Take 1 tablet (40 mg total) by mouth daily with dinner   carvedilol (COREG) 12.5 mg tablet   No No   Sig: Take 1 tablet (12.5 mg total) by mouth 2 (two) times a day with meals   cephalexin (KEFLEX) 500 mg capsule   No No   Sig: Take 1 capsule (500 mg total) by mouth every 6 (six) hours for 12 doses   docusate sodium (COLACE) 100 mg capsule   No No   Sig: Take 1 capsule (100 mg total) by mouth 2 (two) times a day   ferrous sulfate 325 (65 Fe) mg tablet   No No   Sig: Take 1 tablet (325 mg total) by mouth daily with breakfast   gabapentin (NEURONTIN) 300 mg capsule   No No   Sig: Take 1 capsule (300 mg total) by mouth daily at bedtime   senna (SENOKOT) 8.6 mg   No No   Sig: Take 1 tablet (8.6 mg total) by mouth daily       Facility-Administered Medications: None       Past Medical History:   Diagnosis Date    Acute posthemorrhagic anemia     Cerebral amyloid angiopathy      Encounter for current long-term use of anticoagulants     History of falling     History of stroke 06/14/2023    Hyperlipemia     Hypertension     Intracapsular fracture of right femur (HCC)     Moderate protein-calorie malnutrition (HCC)     Paroxysmal atrial fibrillation (HCC)     Personal history of transient ischemic attack (TIA), and cerebral infarction without residual deficits     TIA (transient ischemic attack)     Urinary tract infection        Past Surgical History:   Procedure Laterality Date    HIP ARTHROPLASTY Right 2/4/2024    Procedure: ARTHROPLASTY HIP TOTAL;  Surgeon: Kevon Goldman DO;  Location:  MAIN OR;  Service: Orthopedics       History reviewed. No pertinent family history.  I have reviewed and agree with the history as documented.    E-Cigarette/Vaping    E-Cigarette Use Never User      E-Cigarette/Vaping Substances    Nicotine No     THC No     CBD No     Flavoring No     Other No     Unknown No      Social History     Tobacco Use    Smoking status: Never    Smokeless tobacco: Never   Vaping Use    Vaping status: Never Used   Substance Use Topics    Alcohol use: Not Currently     Alcohol/week: 1.0 standard drink of alcohol     Types: 1 Glasses of wine per week     Comment: stopped since hospitalization    Drug use: Not Currently       Review of Systems   Unable to perform ROS: Mental status change       Physical Exam  Physical Exam  Vitals reviewed.   HENT:      Head: Normocephalic and atraumatic.      Nose: Nose normal.      Mouth/Throat:      Mouth: Mucous membranes are dry.   Eyes:      General: No scleral icterus.     Extraocular Movements: Extraocular movements intact.      Conjunctiva/sclera: Conjunctivae normal.      Pupils: Pupils are equal, round, and reactive to light.   Cardiovascular:      Rate and Rhythm: Normal  rate and regular rhythm.      Pulses: Normal pulses.      Heart sounds: Normal heart sounds. No murmur heard.     No friction rub. No gallop.   Pulmonary:      Effort: No respiratory distress.      Breath sounds: Normal breath sounds. No stridor. No wheezing, rhonchi or rales.   Abdominal:      General: Bowel sounds are normal. There is no distension.      Palpations: Abdomen is soft.      Tenderness: There is abdominal tenderness.   Musculoskeletal:      Cervical back: Neck supple. No rigidity.      Right lower leg: No edema.      Left lower leg: No edema.      Comments: There is a dressing to the right hip.  No erythema or swelling.   Skin:     General: Skin is warm and dry.      Findings: Erythema present.      Comments: Erythema to the sacral area.   Neurological:      Mental Status: She is lethargic.      Comments: Lethargic.  Nonverbal.  No lateralizing deficits.  Does follow commands.         Vital Signs  ED Triage Vitals   Temperature Pulse Respirations Blood Pressure SpO2   02/08/24 1206 02/08/24 1206 02/08/24 1206 02/08/24 1206 02/08/24 1206   98.6 °F (37 °C) 79 16 94/53 96 %      Temp Source Heart Rate Source Patient Position - Orthostatic VS BP Location FiO2 (%)   02/08/24 1206 02/08/24 1206 02/08/24 1206 02/08/24 1206 --   Oral Monitor Sitting Left arm       Pain Score       02/08/24 1230       No Pain           Vitals:    02/08/24 1206 02/08/24 1230 02/08/24 1530 02/08/24 1602   BP: 94/53 106/70  113/68   Pulse: 79 83 86 87   Patient Position - Orthostatic VS: Sitting Sitting  Lying         Visual Acuity  Visual Acuity      Flowsheet Row Most Recent Value   L Pupil Size (mm) 3   R Pupil Size (mm) 3            ED Medications  Medications   sodium chloride 0.9 % bolus 1,000 mL (1,000 mL Intravenous New Bag 2/8/24 1246)   iohexol (OMNIPAQUE) 350 MG/ML injection (MULTI-DOSE) 100 mL (85 mL Intravenous Given 2/8/24 1410)       Diagnostic Studies  Results Reviewed       Procedure Component Value Units  Date/Time    HS Troponin I 2hr [[id number]]  (Normal) Collected: 02/08/24 1601    Lab Status: Final result Specimen: Blood from Arm, Right Updated: 02/08/24 1635     hs TnI 2hr 8 ng/L      Delta 2hr hsTnI -2 ng/L     HS Troponin I 4hr [[id number]]     Lab Status: No result Specimen: Blood     FLU/RSV/COVID - if FLU/RSV clinically relevant [[id number]]  (Normal) Collected: 02/08/24 1245    Lab Status: Final result Specimen: Nares from Nose Updated: 02/08/24 1337     SARS-CoV-2 Negative     INFLUENZA A PCR Negative     INFLUENZA B PCR Negative     RSV PCR Negative    Narrative:      FOR PEDIATRIC PATIENTS - copy/paste COVID Guidelines URL to browser: [email or web address]    SARS-CoV-2 assay is a Nucleic Acid Amplification assay intended for the  qualitative detection of nucleic acid from SARS-CoV-2 in nasopharyngeal  swabs. Results are for the presumptive identification of SARS-CoV-2 RNA.    Positive results are indicative of infection with SARS-CoV-2, the virus  causing COVID-19, but do not rule out bacterial infection or co-infection  with other viruses. Laboratories within the [place] and its  territories are required to report all positive results to the appropriate  public health authorities. Negative results do not preclude SARS-CoV-2  infection and should not be used as the sole basis for treatment or other  patient management decisions. Negative results must be combined with  clinical observations, patient history, and epidemiological information.  This test has not been FDA cleared or approved.    This test has been authorized by FDA under an Emergency Use Authorization  (EUA). This test is only authorized for the duration of time the  declaration that circumstances exist justifying the authorization of the  emergency use of an in vitro diagnostic tests for detection of SARS-CoV-2  virus and/or diagnosis of COVID-19 infection under section 564(b)(1)  of  the Act, 21 U.S.C. 360bbb-3(b)(1), unless the authorization is terminated  or revoked sooner. The test has been validated but independent review by FDA  and CLIA is pending.    Test performed using [employer]: This RT-PCR assay targets N2,  a region unique to SARS-CoV-2. A conserved region in the E-gene was chosen  for pan-Sarbecovirus detection which includes SARS-CoV-2.    According to CMS-2020-01-R, this platform meets the definition of high-throughput technology.    UA w Reflex to Microscopic w Reflex to Culture [[id number]] Collected: 02/08/24 1306    Lab Status: Final result Specimen: Urine, Straight Cath Updated: 02/08/24 1334     Color, UA Yellow     Clarity, UA Clear     Specific Gravity, UA 1.025     pH, UA 7.5     Leukocytes, UA Negative     Nitrite, UA Negative     Protein, UA Negative mg/dl      Glucose, UA Negative mg/dl      Ketones, UA Negative mg/dl      Urobilinogen, UA <2.0 mg/dl      Bilirubin, UA Negative     Occult Blood, UA Negative    TSH, 3rd generation with Free T4 reflex [[id number]]  (Normal) Collected: 02/08/24 1245    Lab Status: Final result Specimen: Blood from Arm, Right Updated: 02/08/24 1331     TSH 3RD GENERATON 2.801 uIU/mL     Comprehensive metabolic panel [[id number]]  (Abnormal) Collected: 02/08/24 1245    Lab Status: Final result Specimen: Blood from Arm, Right Updated: 02/08/24 1324     Sodium 142 mmol/L      Potassium 4.5 mmol/L      Chloride 106 mmol/L      CO2 29 mmol/L      ANION GAP 7 mmol/L      BUN 12 mg/dL      Creatinine 0.58 mg/dL      Glucose 113 mg/dL      Calcium 8.9 mg/dL      Corrected Calcium 9.5 mg/dL      AST 40 U/L      ALT 36 U/L      Alkaline Phosphatase 73 U/L      Total Protein 5.7 g/dL      Albumin 3.2 g/dL      Total Bilirubin 1.07 mg/dL      eGFR 89 ml/min/1.73sq m     Narrative:      National Kidney Disease Foundation guidelines for Chronic Kidney Disease (CKD):     Stage 1 with normal or high GFR (GFR > 90 mL/min/1.73 square meters)     Stage 2 Mild CKD (GFR = 60-89 mL/min/1.73 square meters)    Stage 3A Moderate CKD (GFR = 45-59 mL/min/1.73 square meters)    Stage 3B Moderate CKD (GFR = 30-44 mL/min/1.73 square meters)    Stage 4 Severe CKD (GFR = 15-29 mL/min/1.73 square meters)    Stage 5 End Stage CKD (GFR <15 mL/min/1.73 square meters)  Note: GFR calculation is accurate only with a steady state creatinine    Lipase [[id number]]  (Abnormal) Collected: 02/08/24 1245    Lab Status: Final result Specimen: Blood from Arm, Right Updated: 02/08/24 1324     Lipase 163 u/L     Lactic acid, plasma (w/reflex if result > 2.0) [[id number]]  (Normal) Collected: 02/08/24 1245    Lab Status: Final result Specimen: Blood from Arm, Right Updated: 02/08/24 1324     LACTIC ACID 1.6 mmol/L     Narrative:      Result may be elevated if tourniquet was used during collection.    HS Troponin 0hr (reflex protocol) [[id number]]  (Normal) Collected: 02/08/24 1245    Lab Status: Final result Specimen: Blood from Arm, Right Updated: 02/08/24 1319     hs TnI 0hr 10 ng/L     CBC and differential [[id number]]  (Abnormal) Collected: 02/08/24 1245    Lab Status: Final result Specimen: Blood from Arm, Right Updated: 02/08/24 1256     WBC 13.42 Thousand/uL      RBC 3.76 Million/uL      Hemoglobin 11.7 g/dL      Hematocrit 36.1 %      MCV 96 fL      MCH 31.1 pg      MCHC 32.4 g/dL      RDW 13.7 %      MPV 9.6 fL      Platelets 405 Thousands/uL      nRBC 0 /100 WBCs      Neutrophils Relative 74 %      Immat GRANS % 1 %      Lymphocytes Relative 18 %      Monocytes Relative 5 %      Eosinophils Relative 2 %      Basophils Relative 0 %      Neutrophils Absolute 10.03 Thousands/µL      Immature Grans Absolute 0.09 Thousand/uL      Lymphocytes Absolute 2.41 Thousands/µL      Monocytes Absolute 0.66 Thousand/µL      Eosinophils Absolute 0.20 Thousand/µL      Basophils Absolute 0.03 Thousands/µL                    CT head without contrast   Final Result by Jaime Moeller MD  (02/08 1513)      No acute intracranial abnormality.                  Workstation performed: [id number]         CT chest abdomen pelvis w contrast   Final Result by Jazz Deluna MD (02/08 1552)      1. Small pericardial effusion.      2. Bladder distention with nondependent focus of gas, recommend correlation for recent instrumentation.      3. A 6 mm pancreatic head cystic lesion. For simple cyst(s) less than 1.5 cm, recommend follow-up every 2 years for 5 times or to age 80, whichever comes first. Follow-up can stop at age 80 or can switch over to 80 year or older algorithm. Recommend next    follow-up in 2 years. Preferred imaging modality: abdomen MRI and MRCP with and without IV contrast, or triple phase abdomen CT with IV contrast, or abdomen MRI and MRCP without IV contrast.      The recommendations regarding pancreatic findings assume that patient does not have family history of pancreatic cancer nor have any symptoms potentially attributable to pancreatic cystic lesions (hyperamylasemia, recent-onset diabetes, severe epigastric    pain, weight loss, steatorrhea, or jaundice). If these conditions are not true, then management should be deferred to judgement of specialists such as gastroenterologists or oncologic surgeons.      REFERENCES:   Recommendations are based on recent consensus statements on management of pancreatic cystic lesions from American Gastroenterology Association, American College of Radiology, the Journal Pancreatology, and our own institutional consensus.      American Gastroenterological Association [place] Guidelines on the Diagnosis and Management of Asymptomatic Neoplastic Pancreatic Cysts. Gastroenterology 2015; 148:819-822 (AGA GUIDELINES)   International Consensus Guidelines for Management of Intraductal Papillary Mucinous Neoplasm and Mucinous Cystic Neoplasms of the Pancreas. Pancreatology Sep-Oct 2017;17(5):738-753. doi: 10.1016/j.oconnell.2017.07.007. Epub 2017 Jul 13  (JERONIMOUOKA paper).   American College of Radiology: White Paper: Management of Incidental Pancreatic Cysts: A White Paper of the ACR Incidental Findings Committee, 2017 Jul;14(7):911-923. doi: 10.1016/j.jacr.2017.03.010. Epub 2017 May 19. (ACR GUIDELINES.)      The study was marked in EPIC for immediate notification.         Resident: SOLEDAD AVITIA I, the attending radiologist, have reviewed the images and agree with the final report above.      Workstation performed: [id number]                    Procedures  ECG 12 Lead Documentation Only    Date/Time: 2/8/2024 12:55 PM    Performed by: Yeison Tolliver MD  Authorized by: Yeison Tolliver MD    ECG reviewed by me, the ED Provider: yes    Patient location:  ED  Comments:      Rate controlled atrial fibrillation.  Poor R wave progression.  Present on old EKG.  Abnormal EKG.  CriticalCare Time    Date/Time: 2/8/2024 4:38 PM    Performed by: Yeison Tolliver MD  Authorized by: Yeison Tolliver MD    Critical care provider statement:     Critical care time (minutes):  60    Critical care time was exclusive of:  Separately billable procedures and treating other patients    Critical care was necessary to treat or prevent imminent or life-threatening deterioration of the following conditions:  Circulatory failure (Hypotension)    Critical care was time spent personally by me on the following activities:  Obtaining history from patient or surrogate, development of treatment plan with patient or surrogate, discussions with consultants, evaluation of patient's response to treatment, examination of patient, ordering and performing treatments and interventions, ordering and review of laboratory studies, ordering and review of radiographic studies and re-evaluation of patient's condition    I assumed direction of critical care for this patient from another provider in my specialty: no             ED Course                               SBIRT 22yo+      Flowsheet Row Most  Recent Value   Initial Alcohol Screen: US AUDIT-C     1. How often do you have a drink containing alcohol? 0 Filed at: 02/08/2024 1211   2. How many drinks containing alcohol do you have on a typical day you are drinking?  0 Filed at: 02/08/2024 1211   3a. Male UNDER 65: How often do you have five or more drinks on one occasion? 0 Filed at: 02/08/2024 1211   3b. FEMALE Any Age, or MALE 65+: How often do you have 4 or more drinks on one occassion? 0 Filed at: 02/08/2024 1211   Audit-C Score 0 Filed at: 02/08/2024 1211   ANAM: How many times in the past year have you...    Used an illegal drug or used a prescription medication for non-medical reasons? Never Filed at: 02/08/2024 1211                      Medical Decision Making  Patient presented hypotensive with acute mental status changes.  Her hypotension responded to IV fluid hydration.  Her mental status did improve.  However, she did not return to baseline.  I spoke with her .  He was conversing with her yesterday.  Today she is clearly changed, even after correction of the hypotension.  Head CT was negative for intracranial hemorrhage.  Doubt acute stroke.  No lateralizing deficits.  Patient is not septic.  There was no UTI.  No hypothyroidism.  COVID and flu negative.  Reviewed medication.  Apart from the Neurontin, none should cause this.  Neck is supple.  There was no meningitis.  White blood cell count was mildly elevated.  This is nonspecific.  Lipase was mildly elevated.  Not high enough for pancreatitis.  There was nonspecific pancreatic lesion.  This can be worked up as outpatient.  This is unlikely to explain her symptomatology.  I suspect her mental status changes are related to metabolic encephalopathy.  Consulted with hospitalist for admission.    Amount and/or Complexity of Data Reviewed  Independent Historian: spouse  External Data Reviewed: notes.  Labs: ordered. Decision-making details documented in ED Course.  Radiology: ordered.  Decision-making details documented in ED Course.  ECG/medicine tests: ordered and independent interpretation performed. Decision-making details documented in ED Course.  Discussion of management or test interpretation with external provider(s): Hospitalist    Risk  Prescription drug management.  Decision regarding hospitalization.             Disposition  Final diagnoses:   Hypotension   Metabolic encephalopathy     Time reflects when diagnosis was documented in both MDM as applicable and the Disposition within this note       Time User Action Codes Description Comment    2/8/2024  4:37 PM Yeison Tolliver [I95.9] Hypotension     2/8/2024  4:37 PM Yeison Tolliver Add [R41.82] Altered mental status     2/8/2024  4:45 PM Yeison Tolliver Remove [R41.82] Altered mental status     2/8/2024  4:45 PM Yeison Tolliver Add [G93.41] Metabolic encephalopathy           ED Disposition       ED Disposition   Admit    Condition   Stable    Date/Time   Thu Feb 8, 2024 1637    Comment                  Follow-up Information    None         Patient's Medications   Discharge Prescriptions    No medications on file       No discharge procedures on file.    PDMP Review         Value Time User    PDMP Reviewed  Yes 2/4/2024 10:33 AM Ana Hughes PA-C            ED Provider  Electronically Signed by             Yeison Tolliver MD  02/08/24 3476

## 2024-02-08 NOTE — ASSESSMENT & PLAN NOTE
Likely multifactorial due to recent hospitalization and transition to SNF, ongoing treatment for UTI and history of CAA/strokes, known cognitive decline/dementia  Baseline: Alert and oriented to self, forgetful to situation, defers to  in regards to medical history  Has baseline slurred speech since stroke/worsened by gum disease  On admission: patient oriented to self and location, appears tired - patient reports poor sleep the evening prior to presentation  Continue Keflex to complete antibiotic course for UTI  UA in ED unremarkable  Hemodynamically stable at time of admission, labs overall unremarkable  Suspect this is related to recent environment change/hospital associated delirium exacerbating underlying dementia  CT head unremarkable

## 2024-02-08 NOTE — PLAN OF CARE
Problem: Potential for Falls  Goal: Patient will remain free of falls  Description: INTERVENTIONS:  - Educate patient/family on patient safety including physical limitations  - Instruct patient to call for assistance with activity   - Consult OT/PT to assist with strengthening/mobility   - Keep Call bell within reach  - Keep bed low and locked with side rails adjusted as appropriate  - Keep care items and personal belongings within reach  - Initiate and maintain comfort rounds  - Make Fall Risk Sign visible to staff  - Offer Toileting every 2 Hours, in advance of need  - Initiate/Maintain bed alarm  - Obtain necessary fall risk management equipment: alarm, socks  - Apply yellow socks and bracelet for high fall risk patients  - Consider moving patient to room near nurses station  Outcome: Progressing     Problem: Prexisting or High Potential for Compromised Skin Integrity  Goal: Skin integrity is maintained or improved  Description: INTERVENTIONS:  - Identify patients at risk for skin breakdown  - Assess and monitor skin integrity  - Assess and monitor nutrition and hydration status  - Monitor labs   - Assess for incontinence   - Turn and reposition patient  - Assist with mobility/ambulation  - Relieve pressure over bony prominences  - Avoid friction and shearing  - Provide appropriate hygiene as needed including keeping skin clean and dry  - Evaluate need for skin moisturizer/barrier cream  - Collaborate with interdisciplinary team   - Patient/family teaching  - Consider wound care consult   Outcome: Progressing     Problem: PAIN - ADULT  Goal: Verbalizes/displays adequate comfort level or baseline comfort level  Description: Interventions:  - Encourage patient to monitor pain and request assistance  - Assess pain using appropriate pain scale  - Administer analgesics based on type and severity of pain and evaluate response  - Implement non-pharmacological measures as appropriate and evaluate response  - Consider  cultural and social influences on pain and pain management  - Notify physician/advanced practitioner if interventions unsuccessful or patient reports new pain  Outcome: Progressing     Problem: INFECTION - ADULT  Goal: Absence or prevention of progression during hospitalization  Description: INTERVENTIONS:  - Assess and monitor for signs and symptoms of infection  - Monitor lab/diagnostic results  - Monitor all insertion sites, i.e. indwelling lines, tubes, and drains  - Administer medications as ordered  - Instruct and encourage patient and family to use good hand hygiene technique  - Identify and instruct in appropriate isolation precautions for identified infection/condition  Outcome: Progressing     Problem: SAFETY ADULT  Goal: Patient will remain free of falls  Description: INTERVENTIONS:  - Educate patient/family on patient safety including physical limitations  - Instruct patient to call for assistance with activity   - Consult OT/PT to assist with strengthening/mobility   - Keep Call bell within reach  - Keep bed low and locked with side rails adjusted as appropriate  - Keep care items and personal belongings within reach  - Initiate and maintain comfort rounds  - Make Fall Risk Sign visible to staff  - Offer Toileting every 2 Hours, in advance of need  - Initiate/Maintain bed alarm  - Obtain necessary fall risk management equipment: alarm, socks  - Apply yellow socks and bracelet for high fall risk patients  - Consider moving patient to room near nurses station  Outcome: Progressing  Goal: Maintain or return to baseline ADL function  Description: INTERVENTIONS:  -  Assess patient's ability to carry out ADLs; assess patient's baseline for ADL function and identify physical deficits which impact ability to perform ADLs (bathing, care of mouth/teeth, toileting, grooming, dressing, etc.)  - Assess/evaluate cause of self-care deficits   - Assess range of motion  - Assess patient's mobility; develop plan if  impaired  - Assess patient's need for assistive devices and provide as appropriate  - Encourage maximum independence but intervene and supervise when necessary  - Involve family in performance of ADLs  - Assess for home care needs following discharge   - Consider OT consult to assist with ADL evaluation and planning for discharge  - Provide patient education as appropriate  Outcome: Progressing  Goal: Maintains/Returns to pre admission functional level  Description: INTERVENTIONS:  - Perform AM-PAC 6 Click Basic Mobility/ Daily Activity assessment daily.  - Set and communicate daily mobility goal to care team and patient/family/caregiver.   - Collaborate with rehabilitation services on mobility goals if consulted  - Perform Range of Motion 3 times a day.  - Reposition patient every 2 hours.  - Dangle patient 3 times a day  - Stand patient 3 times a day  - Ambulate patient 3 times a day  - Out of bed to chair 3 times a day   - Out of bed for meals 3 times a day  - Out of bed for toileting  - Record patient progress and toleration of activity level   Outcome: Progressing     Problem: DISCHARGE PLANNING  Goal: Discharge to home or other facility with appropriate resources  Description: INTERVENTIONS:  - Identify barriers to discharge w/patient and caregiver  - Arrange for needed discharge resources and transportation as appropriate  - Identify discharge learning needs (meds, wound care, etc.)  - Arrange for interpretive services to assist at discharge as needed  - Refer to Case Management Department for coordinating discharge planning if the patient needs post-hospital services based on physician/advanced practitioner order or complex needs related to functional status, cognitive ability, or social support system  Outcome: Progressing     Problem: Knowledge Deficit  Goal: Patient/family/caregiver demonstrates understanding of disease process, treatment plan, medications, and discharge instructions  Description:  Complete learning assessment and assess knowledge base.  Interventions:  - Provide teaching at level of understanding  - Provide teaching via preferred learning methods  Outcome: Progressing

## 2024-02-09 VITALS
WEIGHT: 123.46 LBS | HEIGHT: 66 IN | BODY MASS INDEX: 19.84 KG/M2 | OXYGEN SATURATION: 97 % | HEART RATE: 86 BPM | SYSTOLIC BLOOD PRESSURE: 128 MMHG | TEMPERATURE: 96.3 F | RESPIRATION RATE: 17 BRPM | DIASTOLIC BLOOD PRESSURE: 76 MMHG

## 2024-02-09 LAB
ATRIAL RATE: 61 BPM
QRS AXIS: 7 DEGREES
QRSD INTERVAL: 76 MS
QT INTERVAL: 392 MS
QTC INTERVAL: 435 MS
T WAVE AXIS: 43 DEGREES
VENTRICULAR RATE: 74 BPM

## 2024-02-09 PROCEDURE — 97167 OT EVAL HIGH COMPLEX 60 MIN: CPT

## 2024-02-09 PROCEDURE — 97535 SELF CARE MNGMENT TRAINING: CPT

## 2024-02-09 PROCEDURE — 99239 HOSP IP/OBS DSCHRG MGMT >30: CPT | Performed by: INTERNAL MEDICINE

## 2024-02-09 RX ORDER — CARVEDILOL 6.25 MG/1
6.25 TABLET ORAL 2 TIMES DAILY WITH MEALS
Start: 2024-02-09

## 2024-02-09 RX ORDER — CEPHALEXIN 500 MG/1
500 CAPSULE ORAL EVERY 6 HOURS SCHEDULED
Qty: 4 CAPSULE | Refills: 0 | Status: SHIPPED | OUTPATIENT
Start: 2024-02-09 | End: 2024-02-10

## 2024-02-09 RX ADMIN — CEPHALEXIN 500 MG: 250 CAPSULE ORAL at 05:54

## 2024-02-09 RX ADMIN — SENNOSIDES 8.6 MG: 8.6 TABLET, FILM COATED ORAL at 09:59

## 2024-02-09 RX ADMIN — CARVEDILOL 6.25 MG: 3.12 TABLET, FILM COATED ORAL at 09:59

## 2024-02-09 RX ADMIN — CEPHALEXIN 500 MG: 250 CAPSULE ORAL at 12:34

## 2024-02-09 RX ADMIN — APIXABAN 5 MG: 5 TABLET, FILM COATED ORAL at 09:59

## 2024-02-09 RX ADMIN — FERROUS SULFATE TAB 325 MG (65 MG ELEMENTAL FE) 325 MG: 325 (65 FE) TAB at 09:59

## 2024-02-09 NOTE — PROGRESS NOTES
24 1100   Clinical Encounter Type   Visited With Patient   Routine Visit Introduction   Referral From Occupational therapy      Pastoral Care Progress Note    2024  Patient: Lo Talley : 1945  Admission Date & Time: 2024 1203  MRN: [id number] CSN: [id number]           introductory visit. Patient expressed no needs at this time, I offered support. She thanked me for the visit and agreed that I could keep her in my prayers.

## 2024-02-09 NOTE — ASSESSMENT & PLAN NOTE
Transient hypotension reported at the nursing facility, resolved  Continued Coreg at reduced dose, 6.25mg BID given reports of hypotension prior to admission

## 2024-02-09 NOTE — ASSESSMENT & PLAN NOTE
Recently resumed Eliquis 5 mg twice daily  Continue Coreg for rate control  Episode of transient hypotension, Coreg reduced.

## 2024-02-09 NOTE — OCCUPATIONAL THERAPY NOTE
Occupational Therapy Evaluation     Patient Name: Lo Talley  Today's Date: 2/9/2024  Problem List  Principal Problem:    Altered mental status  Active Problems:    Atrial fibrillation (HCC)    Cerebral amyloid angiopathy (CODE)    HTN (hypertension)    Moderate protein-calorie malnutrition (HCC)    Pancreatic lesion    Past Medical History  Past Medical History:   Diagnosis Date    Acute posthemorrhagic anemia     Cerebral amyloid angiopathy      Encounter for current long-term use of anticoagulants     History of falling     History of stroke 06/14/2023    Hyperlipemia     Hypertension     Intracapsular fracture of right femur (HCC)     Moderate protein-calorie malnutrition (HCC)     Paroxysmal atrial fibrillation (HCC)     Personal history of transient ischemic attack (TIA), and cerebral infarction without residual deficits     TIA (transient ischemic attack)     Urinary tract infection      Past Surgical History  Past Surgical History:   Procedure Laterality Date    HIP ARTHROPLASTY Right 2/4/2024    Procedure: ARTHROPLASTY HIP TOTAL;  Surgeon: Kevon Goldman DO;  Location:  MAIN OR;  Service: Orthopedics             02/09/24 0952   OT Last Visit   OT Visit Date 02/09/24   Note Type   Note type Evaluation   Pain Assessment   Pain Assessment Tool Mckeon-Baker FACES   Mckeon-Baker FACES Pain Rating 2   Pain Location/Orientation Orientation: Right;Location: Hip   Pain Onset/Description Onset: Sudden   Effect of Pain on Daily Activities Impaired mobility   Patient's Stated Pain Goal No pain   Hospital Pain Intervention(s) Repositioned;Emotional support;Ambulation/increased activity   Restrictions/Precautions   Weight Bearing Precautions Per Order Yes   RLE Weight Bearing Per Order WBAT  (Per ortho note 2/4/24)   LLE Weight Bearing Per Order   (Per ortho note 2/4/24)   Other Precautions Cognitive;Bed Alarm;Fall Risk;Chair Alarm;THR;WBS   Home Living   Type of Home House   Home Layout Two level  (CONNIE)  "  Bathroom Shower/Tub Tub/shower unit   Home Equipment Walker;Cane   Additional Comments However, PTA pt at STR x1 day   Prior Function   Level of [place] Needs assistance with ADLs;Needs assistance with IADLS;Needs assistance with functional mobility   Lives With Spouse   Receives Help From Family   IADLs Family/Friend/Other provides meals;Family/Friend/Other provides transportation;Family/Friend/Other provides medication management   Falls in the last 6 months 1 to 4   Vocational Retired   General   Family/Caregiver Present No   Subjective   Subjective \"Why am I here?\" Pt very tearful throughout session. pt is aware she is confused & states that it upsets her.   ADL   Eating Assistance 7  Independent   Grooming Assistance 4  Minimal Assistance   UB Bathing Assistance 3  Moderate Assistance   LB Bathing Assistance 2  Maximal Assistance   LB Bathing Deficit Right upper leg;Perineal area;Right lower leg including foot;Left upper leg;Left lower leg including foot   UB Dressing Assistance 3  Moderate Assistance   UB Dressing Deficit Thread RUE;Thread LUE;Increased time to complete;Verbal cueing   LB Dressing Assistance 2  Maximal Assistance   LB Dressing Deficit Thread RLE into underwear;Thread LLE into underwear;Pull up over hips;Verbal cueing;Supervision/safety;Increased time to complete  (Pt able to assist in pulling brief up from lower legs to upper thighd)   Toileting Assistance  1  Total Assistance   Toileting Deficit Perineal hygiene;Bedside commode;Supervison/safety;Verbal cueing   Additional Comments Pt incontinent of urine upon standing. Required Total A for halina-care   Bed Mobility   Supine to Sit 3  Moderate assistance   Additional items Assist x 1;Increased time required;Verbal cues;LE management;Bedrails;HOB elevated   Sit to Supine Unable to assess   Additional Comments Remains OOB to recliner at end of session with (+) chair alarm   Transfers   Sit to Stand 3  Moderate assistance   Additional items " Assist x 1;Increased time required;Verbal cues;Bedrails   Stand to Sit 4  Minimal assistance   Additional items Assist x 1;Increased time required;Verbal cues;Bedrails   Additional Comments See tx session for additional txfers/mobility. Pt Incontinent of urine upon standing   Balance   Static Sitting Fair +   Dynamic Sitting Fair   Static Standing Poor +   Dynamic Standing Poor +   Ambulatory Poor   Activity Tolerance   Activity Tolerance Patient tolerated treatment well   Nurse Made Aware ANTONIO Andino   RUE Assessment   RUE Assessment WFL   LUE Assessment   LUE Assessment WFL   Psychosocial   Psychosocial (WDL) X   Patient Behaviors/Mood Tearful;Crying;Sad  (Emotional support provided)   Needs Expressed Denies   Cognition   Overall Cognitive Status Impaired   Arousal/Participation Alert   Attention Attends with cues to redirect   Orientation Level Oriented to person;Oriented to place;Disoriented to time;Disoriented to situation   Memory Decreased recall of precautions;Decreased recall of recent events;Decreased short term memory;Decreased long term memory   Following Commands Follows one step commands with increased time or repetition   Assessment   Limitation Decreased ADL status;Decreased cognition;Decreased endurance;Decreased self-care trans;Decreased high-level ADLs;Decreased Safe judgement during ADL   Prognosis Good   Assessment Pt is a 78 y.o. female seen for OT evaluation at [facility], admitted 2/8/2024 w/ Altered mental status.  OT completed extensive review of pt's medical and social history. Comorbidities affecting pt's functional performance at time of assessment include: a-fib, HTN, malnutrition, pancreatic lesion, h/o multiple strokes. Personal factors affecting pt at time of IE include: steps to enter environment, limited home support, behavioral pattern, difficulty performing ADLS, difficulty performing IADLS , limited insight into deficits, and environment. Prior to admission, pt was  living with spouse mumtaz 2SH. Pt with recent hospital admission at [facility] where she was Dced on 2/7/24 to STR. Pt readmitted 2/8/24.  Pt was A w/  ADLS and w/ IADLS, (-) drove, & required use of RW PTA. Upon evaluation: Pt requires Mo dAx1 for bed mobility, Mod-Max Ax1 for functional mobility/transfers, Mod A for UB ADLs and Max/Total A for LB ADLS 2* the following deficits impacting occupational performance: weakness, decreased strength, decreased balance, decreased tolerance, impaired memory, impaired sequencing, impaired problem solving, decreased safety awareness, increased pain, and orthopedic restrictions. Full objective findings from OT assessment regarding body systems outlined above. Pt to benefit from continued skilled OT tx while in the hospital to address deficits as defined above and maximize level of functional independence w/ ADL's and functional mobility. Occupational Performance areas to address include: bathing/shower, toilet hygiene, dressing, functional mobility, and clothing management. Based on findings, pt is of high complexity. The patient's raw score on the AM-PAC Daily Activity inpatient short form is 14, standardized score is 33.39, less than 39.4. Patients at this level are likely to benefit from DC to post-acute rehabilitation services. However, please refer to therapist recommendation for discharge planning given other factors that may influence destination. At this time, OT recommendations at time of discharge are DC with Level II resources.   Goals   Patient Goals Pt does not verbalize goals   Plan   Treatment Interventions ADL retraining;Functional transfer training;Patient/family training;Equipment evaluation/education;Compensatory technique education;Continued evaluation;UE strengthening/ROM;Endurance training   Goal Expiration Date 02/19/24   OT Treatment Day 0   OT Frequency 2-3x/wk   Discharge Recommendation   Rehab Resource Intensity Level, OT II (Moderate Resource Intensity)    AM-PAC Daily Activity Inpatient   Lower Body Dressing 2   Bathing 2   Toileting 1   Upper Body Dressing 2   Grooming 3   Eating 4   Daily Activity Raw Score 14   Daily Activity Standardized Score (Calc for Raw Score >=11) 33.39   AM-PAC Applied Cognition Inpatient   Following a Speech/Presentation 2   Understanding Ordinary Conversation 3   Taking Medications 3   Remembering Where Things Are Placed or Put Away 2   Remembering List of 4-5 Errands 1   Taking Care of Complicated Tasks 1   Applied Cognition Raw Score 12   Applied Cognition Standardized Score 28.82   Additional Treatment Session   Start Time 0940   End Time 0952   Treatment Assessment Pt required Max A for LB dressing to don brief, Total A to doff/don BL socks. Pt able to assist in pulling brief from lower legs to upper thighs. Pt required total A for halina care. Pt regressed to Max Ax1 for sit > stand from EOB. VC's required for hand placement. Pt performed functional mobility x3 ft bed > recliner with Mod Ax1 & RW. Min A for RW navigation. Pt required Min Ax1 for stand > sit. Pt required Mod A for UB dressing. Pt left seated OOB to recliner, (+) chair alarm, RN aware.   End of Consult   Education Provided Yes   Patient Position at End of Consult Bedside chair;Bed/Chair alarm activated;All needs within reach   Nurse Communication Nurse aware of consult     Pt will achieve the following goals within 10 days.    *Pt will complete UB bathing and dressing with S.    *Pt will complete LB bathing and dressing with Mod A & DME PRN.    *Pt will complete toileting w/ Mod A w/ G hygiene/thoroughness using DME PRN    *Pt will complete bed mobility with Min Ax1, with HOB elevated & use of side rails PRN to prep for purposeful tasks    *Pt will perform functional transfers with on/off all surfaces with Min Ax1 using DME as needed w/ G balance/safety.    *Pt will improve functional mobility during ADL/IADL/leisure tasks to Min Ax1 using DME as needed w/ G  balance/safety.     *Pt will improve functional activity tolerance to 10 minutes of sustained functional tasks to increase participation in basic self-care and decrease assistance level.      Lucy Hi, OTR/L

## 2024-02-09 NOTE — INCIDENTAL FINDINGS
The following findings require follow up:  Radiographic finding   Finding: 3. A 6 mm pancreatic head cystic lesion. For simple cyst(s) less than 1.5 cm, recommend follow-up every 2 years for 5 times or to age 80, whichever comes first. Follow-up can stop at age 80 or can switch over to 80 year or older algorithm. Recommend next    follow-up in 2 years. Preferred imaging modality: abdomen MRI and MRCP with and without IV contrast, or triple phase abdomen CT with IV contrast, or abdomen MRI and MRCP without IV contrast.      Follow up required: Repeat imaging in 2 years   Follow up should be done within 2 years

## 2024-02-09 NOTE — CASE MANAGEMENT
Case Management Discharge Planning Note    Patient name Lo Talley  Location /-01 MRN [id number]  : 1945 Date 2024       Current Admission Date: 2024  Current Admission Diagnosis:Altered mental status   Patient Active Problem List    Diagnosis Date Noted    Altered mental status 2024    Pancreatic lesion 2024    Moderate protein-calorie malnutrition (HCC) 2024    Acute blood loss anemia 2024    HTN (hypertension) 2024    Cerebral amyloid angiopathy (CODE) 2023    History of multiple strokes 06/15/2023    Atrial fibrillation (HCC) 06/15/2023    Hypertensive urgency 2023    Elevated troponin 2023      LOS (days): 0  Geometric Mean LOS (GMLOS) (days):   Days to GMLOS:     OBJECTIVE:            Current admission status: Observation   Preferred Pharmacy:   [facility] [place] - [address]  [facility] [address] [place] [address]  Phone: [phone] Fax: [phone]    Primary Care Provider: No primary care provider on file.    Primary Insurance: MEDICARE  Secondary Insurance: AETNA    DISCHARGE DETAILS:    Additional Comments: [facility] [place] can accept Pt back today. [facility] to transport Pt to [facility] [place]. Pickup is 4:30pm. Pt's nurse(Thu), Pt's (Jeremiah) and Lizzie at [facility] [place] informed of transport time. All in agreement.

## 2024-02-09 NOTE — PLAN OF CARE
Problem: OCCUPATIONAL THERAPY ADULT  Goal: Performs self-care activities at highest level of function for planned discharge setting.  See evaluation for individualized goals.  Description: Treatment Interventions: ADL retraining, Functional transfer training, Patient/family training, Equipment evaluation/education, Compensatory technique education, Continued evaluation, UE strengthening/ROM, Endurance training          See flowsheet documentation for full assessment, interventions and recommendations.   Note: Limitation: Decreased ADL status, Decreased cognition, Decreased endurance, Decreased self-care trans, Decreased high-level ADLs, Decreased Safe judgement during ADL  Prognosis: Good  Assessment: Pt is a 78 y.o. female seen for OT evaluation at [facility], admitted 2/8/2024 w/ Altered mental status.  OT completed extensive review of pt's medical and social history. Comorbidities affecting pt's functional performance at time of assessment include: a-fib, HTN, malnutrition, pancreatic lesion, h/o multiple strokes. Personal factors affecting pt at time of IE include: steps to enter environment, limited home support, behavioral pattern, difficulty performing ADLS, difficulty performing IADLS , limited insight into deficits, and environment. Prior to admission, pt was living with spouse mumtaz 2SH. Pt with recent hospital admission at [facility] where she was Dced on 2/7/24 to STR. Pt readmitted 2/8/24.  Pt was A w/  ADLS and w/ IADLS, (-) drove, & required use of RW PTA. Upon evaluation: Pt requires Mo dAx1 for bed mobility, Mod-Max Ax1 for functional mobility/transfers, Mod A for UB ADLs and Max/Total A for LB ADLS 2* the following deficits impacting occupational performance: weakness, decreased strength, decreased balance, decreased tolerance, impaired memory, impaired sequencing, impaired problem solving, decreased safety awareness, increased pain, and orthopedic restrictions. Full objective findings from OT  assessment regarding body systems outlined above. Pt to benefit from continued skilled OT tx while in the hospital to address deficits as defined above and maximize level of functional independence w/ ADL's and functional mobility. Occupational Performance areas to address include: bathing/shower, toilet hygiene, dressing, functional mobility, and clothing management. Based on findings, pt is of high complexity. The patient's raw score on the AM-PAC Daily Activity inpatient short form is 14, standardized score is 33.39, less than 39.4. Patients at this level are likely to benefit from DC to post-acute rehabilitation services. However, please refer to therapist recommendation for discharge planning given other factors that may influence destination. At this time, OT recommendations at time of discharge are DC with Level II resources.     Rehab Resource Intensity Level, OT: II (Moderate Resource Intensity)

## 2024-02-10 LAB
ATRIAL RATE: 93 BPM
QRS AXIS: 54 DEGREES
QRSD INTERVAL: 64 MS
QT INTERVAL: 344 MS
QTC INTERVAL: 467 MS
T WAVE AXIS: 46 DEGREES
VENTRICULAR RATE: 111 BPM

## 2024-02-22 ENCOUNTER — TELEPHONE (OUTPATIENT)
Age: 79
End: 2024-02-22

## 2024-02-22 NOTE — TELEPHONE ENCOUNTER
Hello,  Please advise if the following patient can be forced onto the schedule:    Patient: Lo Talley    : 1945    MRN: [id number]    Call back #: [phone] [facility]    Insurance: Medicare/Aetna    Reason for appointment: 1st post op - r hip sx 24    Requested doctor/location: [facility].      Thank you.

## 2024-02-22 NOTE — TELEPHONE ENCOUNTER
Left message with nursing home regarding appointment that was scheduled on Friday with Dr. Goldman at 10:00am

## 2024-02-23 ENCOUNTER — OFFICE VISIT (OUTPATIENT)
Dept: OBGYN CLINIC | Facility: CLINIC | Age: 79
End: 2024-02-23

## 2024-02-23 ENCOUNTER — APPOINTMENT (OUTPATIENT)
Dept: RADIOLOGY | Facility: CLINIC | Age: 79
End: 2024-02-23
Payer: MEDICARE

## 2024-02-23 VITALS
WEIGHT: 123 LBS | HEART RATE: 62 BPM | HEIGHT: 66 IN | SYSTOLIC BLOOD PRESSURE: 103 MMHG | BODY MASS INDEX: 19.77 KG/M2 | DIASTOLIC BLOOD PRESSURE: 68 MMHG

## 2024-02-23 DIAGNOSIS — Z47.1 AFTERCARE FOLLOWING RIGHT HIP JOINT REPLACEMENT SURGERY: Primary | ICD-10-CM

## 2024-02-23 DIAGNOSIS — Z47.1 AFTERCARE FOLLOWING RIGHT HIP JOINT REPLACEMENT SURGERY: ICD-10-CM

## 2024-02-23 DIAGNOSIS — Z96.641 AFTERCARE FOLLOWING RIGHT HIP JOINT REPLACEMENT SURGERY: Primary | ICD-10-CM

## 2024-02-23 DIAGNOSIS — S72.009D CLOSED FRACTURE OF HIP WITH ROUTINE HEALING, UNSPECIFIED LATERALITY, SUBSEQUENT ENCOUNTER: ICD-10-CM

## 2024-02-23 DIAGNOSIS — Z96.641 AFTERCARE FOLLOWING RIGHT HIP JOINT REPLACEMENT SURGERY: ICD-10-CM

## 2024-02-23 PROCEDURE — 73502 X-RAY EXAM HIP UNI 2-3 VIEWS: CPT

## 2024-02-23 PROCEDURE — 99024 POSTOP FOLLOW-UP VISIT: CPT | Performed by: STUDENT IN AN ORGANIZED HEALTH CARE EDUCATION/TRAINING PROGRAM

## 2024-02-23 NOTE — PROGRESS NOTES
Hip Follow up Office Note    Assessment:     1. Closed fracture of hip with routine healing, unspecified laterality, subsequent encounter    2. Aftercare following right hip joint replacement surgery        Plan:     Problem List Items Addressed This Visit    None  Visit Diagnoses       Closed fracture of hip with routine healing, unspecified laterality, subsequent encounter    -  Primary    Aftercare following right hip joint replacement surgery                 Plan:  78 y.o. female doing well s/p right MARY from 2/4/24 for femoral neck fracture. X-rays of the right hip reviewed today showing prosthesis in good position with no signs of loosening. She will continue OT/PT at [facility]. WBAT with walker. Incision well healing with no evidence of infection. Avoid standing water until incision completely healed. See back in 4 weeks.       Subjective:     Patient ID: Lo Talley is a 78 y.o. female.    Chief Complaint:  HPI:  Patient is a 78 y.o. female here today s/p right lateral total hip replacement 2/4/24. Presents in wheelchair from [facility]. Pain is well controlled. She is getting OT/PT. Post op dressing intact. Denies any fever,chills.     Allergy:  Allergies   Allergen Reactions    Penicillins Other (See Comments)     Childhood reaction     Medications:  all current active meds have been reviewed  Past Medical History:  Past Medical History:   Diagnosis Date    Acute posthemorrhagic anemia     Cerebral amyloid angiopathy      Encounter for current long-term use of anticoagulants     History of falling     History of stroke 06/14/2023    Hyperlipemia     Hypertension     Intracapsular fracture of right femur (HCC)     Moderate protein-calorie malnutrition (HCC)     Paroxysmal atrial fibrillation (HCC)     Personal history of transient ischemic attack (TIA), and cerebral infarction without residual deficits     TIA (transient ischemic attack)     Urinary tract infection      Past Surgical History:  Past Surgical  "History:   Procedure Laterality Date    HIP ARTHROPLASTY Right 2/4/2024    Procedure: ARTHROPLASTY HIP TOTAL;  Surgeon: Kevon Goldman DO;  Location:  MAIN OR;  Service: Orthopedics     Family History:  Family History   Family history unknown: Yes     Social History:  Social History     Substance and Sexual Activity   Alcohol Use Not Currently    Alcohol/week: 1.0 standard drink of alcohol    Types: 1 Glasses of wine per week    Comment: stopped since hospitalization     Social History     Substance and Sexual Activity   Drug Use Not Currently     Social History     Tobacco Use   Smoking Status Never   Smokeless Tobacco Never           ROS:  General: Per HPI  Skin: Negative, except if noted below  HEENT: Negative  Respiratory: Negative  Cardiovascular: Negative  Gastrointestinal: Negative  Urinary: Negative  Vascular: Negative  Musculoskeletal: Positive per HPI   Neurologic: Positive per HPI  Endocrine: Negative    Objective:  BP Readings from Last 1 Encounters:   02/09/24 128/76      Wt Readings from Last 1 Encounters:   02/08/24 56 kg (123 lb 7.3 oz)        Respiratory:   non-labored respirations    Lymphatics:  no palpable lymph nodes    Gait and Station:   In wheelchair      Neurologic:   Alert and oriented times x3  Patient with normal sensation except as noted below  Deep tendon reflexes 2+ except as noted in MSK exam    Bilateral Lower Extremity:  right Hip     Inspection: well healing incision, no evidence of drainage or infection    Range of Motion: as expected, no pain with range of motion    Motor: 4/5 Q/HS/TA/GS/P    Pulses: 2+ DP / 2+ PT    SILT DP/SP/S/S/TN    Imaging:  My interpretation XR AP pelvis/ right hip: s/p hybrid total hip arthroplasty, components in good position. No fracture or dislocation       BMI:   Estimated body mass index is 19.93 kg/m² as calculated from the following:    Height as of 2/8/24: 5' 6\" (1.676 m).    Weight as of 2/8/24: 56 kg (123 lb 7.3 oz).  BSA:   Estimated " "body surface area is 1.63 meters squared as calculated from the following:    Height as of 2/8/24: 5' 6\" (1.676 m).    Weight as of 2/8/24: 56 kg (123 lb 7.3 oz).           Scribe Attestation      I,:  Arnold Meza am acting as a scribe while in the presence of the attending physician.:       I,:  Kevon Goldman, DO personally performed the services described in this documentation    as scribed in my presence.:            "

## 2024-03-22 ENCOUNTER — OFFICE VISIT (OUTPATIENT)
Dept: OBGYN CLINIC | Facility: CLINIC | Age: 79
End: 2024-03-22

## 2024-03-22 VITALS
DIASTOLIC BLOOD PRESSURE: 70 MMHG | HEIGHT: 66 IN | WEIGHT: 123 LBS | BODY MASS INDEX: 19.77 KG/M2 | HEART RATE: 80 BPM | SYSTOLIC BLOOD PRESSURE: 111 MMHG

## 2024-03-22 DIAGNOSIS — Z96.641 AFTERCARE FOLLOWING RIGHT HIP JOINT REPLACEMENT SURGERY: Primary | ICD-10-CM

## 2024-03-22 DIAGNOSIS — Z47.1 AFTERCARE FOLLOWING RIGHT HIP JOINT REPLACEMENT SURGERY: Primary | ICD-10-CM

## 2024-03-22 PROCEDURE — 99024 POSTOP FOLLOW-UP VISIT: CPT | Performed by: STUDENT IN AN ORGANIZED HEALTH CARE EDUCATION/TRAINING PROGRAM

## 2024-03-22 NOTE — PROGRESS NOTES
Subjective: 78 y.o. female presents to the office 6 weeks s/p right total hip arthroplasty performed on 02/04/2024 for femoral neck fracture. She is not experiencing any pain. Patient seen and examined. Progressing well. Incision without drainage. Denies fevers or chills. She is accompanied by her  today in the office.    Physical Exam:  Incision: well healed  ROM: as expected without pain  5/5 IP/Q/HS/TA/GS, 2+ DP/PT, SILT DP/SP/S/S/TN    No new imaging obtained today    Assessment/Plan:  6 weeks s/p right total hip arthroplasty performed on 02/04/2024 for femoral neck fracture    - continue multi-modal pain control   - Weight bearing status: as tolerated  - DVT ppx: completed  - Continue PT/OT  - F/U in 6 weeks    Scribe Attestation      I,:  Analisa Bucio am acting as a scribe while in the presence of the attending physician.:       I,:  Kevon Goldman DO personally performed the services described in this documentation    as scribed in my presence.:

## 2024-04-10 ENCOUNTER — HOME HEALTH ADMISSION (OUTPATIENT)
Dept: HOME HEALTH SERVICES | Facility: HOME HEALTHCARE | Age: 79
End: 2024-04-10
Payer: MEDICARE

## 2024-04-11 ENCOUNTER — HOME CARE VISIT (OUTPATIENT)
Dept: HOME HEALTH SERVICES | Facility: HOME HEALTHCARE | Age: 79
End: 2024-04-11

## 2024-04-13 ENCOUNTER — HOME CARE VISIT (OUTPATIENT)
Dept: HOME HEALTH SERVICES | Facility: HOME HEALTHCARE | Age: 79
End: 2024-04-13
Payer: MEDICARE

## 2024-04-13 VITALS
HEART RATE: 100 BPM | WEIGHT: 116 LBS | SYSTOLIC BLOOD PRESSURE: 120 MMHG | OXYGEN SATURATION: 96 % | DIASTOLIC BLOOD PRESSURE: 70 MMHG | HEIGHT: 66 IN | TEMPERATURE: 97.6 F | RESPIRATION RATE: 18 BRPM | BODY MASS INDEX: 18.64 KG/M2

## 2024-04-13 PROCEDURE — 10330081 VN NO-PAY CLAIM PROCEDURE

## 2024-04-13 PROCEDURE — G0299 HHS/HOSPICE OF RN EA 15 MIN: HCPCS

## 2024-04-15 ENCOUNTER — HOME CARE VISIT (OUTPATIENT)
Dept: HOME HEALTH SERVICES | Facility: HOME HEALTHCARE | Age: 79
End: 2024-04-15
Payer: MEDICARE

## 2024-04-15 PROCEDURE — G0151 HHCP-SERV OF PT,EA 15 MIN: HCPCS

## 2024-04-16 ENCOUNTER — HOME CARE VISIT (OUTPATIENT)
Dept: HOME HEALTH SERVICES | Facility: HOME HEALTHCARE | Age: 79
End: 2024-04-16
Payer: MEDICARE

## 2024-04-16 VITALS
TEMPERATURE: 97.8 F | RESPIRATION RATE: 18 BRPM | HEART RATE: 80 BPM | SYSTOLIC BLOOD PRESSURE: 118 MMHG | DIASTOLIC BLOOD PRESSURE: 70 MMHG | OXYGEN SATURATION: 99 %

## 2024-04-16 VITALS — OXYGEN SATURATION: 98 % | HEART RATE: 91 BPM | SYSTOLIC BLOOD PRESSURE: 110 MMHG | DIASTOLIC BLOOD PRESSURE: 70 MMHG

## 2024-04-16 VITALS — HEART RATE: 80 BPM | OXYGEN SATURATION: 99 % | SYSTOLIC BLOOD PRESSURE: 118 MMHG | DIASTOLIC BLOOD PRESSURE: 70 MMHG

## 2024-04-16 PROCEDURE — G0299 HHS/HOSPICE OF RN EA 15 MIN: HCPCS

## 2024-04-16 PROCEDURE — G0152 HHCP-SERV OF OT,EA 15 MIN: HCPCS | Performed by: OCCUPATIONAL THERAPIST

## 2024-04-17 ENCOUNTER — HOME CARE VISIT (OUTPATIENT)
Dept: HOME HEALTH SERVICES | Facility: HOME HEALTHCARE | Age: 79
End: 2024-04-17
Payer: MEDICARE

## 2024-04-17 VITALS — SYSTOLIC BLOOD PRESSURE: 114 MMHG | OXYGEN SATURATION: 98 % | HEART RATE: 66 BPM | DIASTOLIC BLOOD PRESSURE: 64 MMHG

## 2024-04-17 PROCEDURE — G0151 HHCP-SERV OF PT,EA 15 MIN: HCPCS

## 2024-04-17 PROCEDURE — G0155 HHCP-SVS OF CSW,EA 15 MIN: HCPCS

## 2024-04-18 ENCOUNTER — HOME CARE VISIT (OUTPATIENT)
Dept: HOME HEALTH SERVICES | Facility: HOME HEALTHCARE | Age: 79
End: 2024-04-18
Payer: MEDICARE

## 2024-04-18 VITALS
RESPIRATION RATE: 19 BRPM | TEMPERATURE: 96.6 F | OXYGEN SATURATION: 99 % | HEART RATE: 71 BPM | DIASTOLIC BLOOD PRESSURE: 73 MMHG | SYSTOLIC BLOOD PRESSURE: 112 MMHG

## 2024-04-18 PROCEDURE — G0157 HHC PT ASSISTANT EA 15: HCPCS

## 2024-04-19 ENCOUNTER — HOME CARE VISIT (OUTPATIENT)
Dept: HOME HEALTH SERVICES | Facility: HOME HEALTHCARE | Age: 79
End: 2024-04-19
Payer: MEDICARE

## 2024-04-19 VITALS
SYSTOLIC BLOOD PRESSURE: 110 MMHG | RESPIRATION RATE: 18 BRPM | OXYGEN SATURATION: 98 % | HEART RATE: 80 BPM | TEMPERATURE: 97.9 F | DIASTOLIC BLOOD PRESSURE: 60 MMHG

## 2024-04-19 VITALS — SYSTOLIC BLOOD PRESSURE: 100 MMHG | DIASTOLIC BLOOD PRESSURE: 50 MMHG | OXYGEN SATURATION: 97 % | HEART RATE: 64 BPM

## 2024-04-19 PROCEDURE — G0299 HHS/HOSPICE OF RN EA 15 MIN: HCPCS

## 2024-04-19 PROCEDURE — G0152 HHCP-SERV OF OT,EA 15 MIN: HCPCS | Performed by: OCCUPATIONAL THERAPIST

## 2024-04-22 ENCOUNTER — HOME CARE VISIT (OUTPATIENT)
Dept: HOME HEALTH SERVICES | Facility: HOME HEALTHCARE | Age: 79
End: 2024-04-22
Payer: MEDICARE

## 2024-04-22 VITALS
RESPIRATION RATE: 18 BRPM | TEMPERATURE: 97.5 F | HEART RATE: 60 BPM | DIASTOLIC BLOOD PRESSURE: 60 MMHG | SYSTOLIC BLOOD PRESSURE: 120 MMHG | OXYGEN SATURATION: 98 %

## 2024-04-22 PROCEDURE — G0299 HHS/HOSPICE OF RN EA 15 MIN: HCPCS

## 2024-04-23 ENCOUNTER — HOME CARE VISIT (OUTPATIENT)
Dept: HOME HEALTH SERVICES | Facility: HOME HEALTHCARE | Age: 79
End: 2024-04-23
Payer: MEDICARE

## 2024-04-25 ENCOUNTER — TELEPHONE (OUTPATIENT)
Age: 79
End: 2024-04-25

## 2024-04-25 ENCOUNTER — HOME CARE VISIT (OUTPATIENT)
Dept: HOME HEALTH SERVICES | Facility: HOME HEALTHCARE | Age: 79
End: 2024-04-25
Payer: MEDICARE

## 2024-04-25 VITALS — HEART RATE: 83 BPM | OXYGEN SATURATION: 95 % | SYSTOLIC BLOOD PRESSURE: 108 MMHG | DIASTOLIC BLOOD PRESSURE: 58 MMHG

## 2024-04-25 VITALS
OXYGEN SATURATION: 95 % | HEART RATE: 75 BPM | RESPIRATION RATE: 16 BRPM | DIASTOLIC BLOOD PRESSURE: 71 MMHG | TEMPERATURE: 96.8 F | SYSTOLIC BLOOD PRESSURE: 112 MMHG

## 2024-04-25 PROCEDURE — G0152 HHCP-SERV OF OT,EA 15 MIN: HCPCS | Performed by: OCCUPATIONAL THERAPIST

## 2024-04-25 PROCEDURE — G0299 HHS/HOSPICE OF RN EA 15 MIN: HCPCS

## 2024-04-25 PROCEDURE — G0157 HHC PT ASSISTANT EA 15: HCPCS

## 2024-04-25 NOTE — TELEPHONE ENCOUNTER
Dr. Dey, can you please advise? This was initially sent to Michael Ruiz, our PA,  however he has not seen this pt since July 2023 and you most recently saw pt in consult in the hospital.

## 2024-04-25 NOTE — TELEPHONE ENCOUNTER
Received call from Delilah Florian from [facility], pt recently discharged home after hip fracture repair. Pt discharged home on Eliqui, has not been taking it, but  has not allowed pt to take it; he is good with Plavix and baby Aspirin (she is currently taking the baby Aspirin).      [facility] working on arranging transportation for pt to be seen in office due to fractured hip.    Order for Plavix if acceptable?  Please advise.

## 2024-04-26 ENCOUNTER — HOME CARE VISIT (OUTPATIENT)
Dept: HOME HEALTH SERVICES | Facility: HOME HEALTHCARE | Age: 79
End: 2024-04-26
Payer: MEDICARE

## 2024-04-26 VITALS — OXYGEN SATURATION: 100 % | HEART RATE: 85 BPM | DIASTOLIC BLOOD PRESSURE: 70 MMHG | SYSTOLIC BLOOD PRESSURE: 132 MMHG

## 2024-04-26 VITALS
DIASTOLIC BLOOD PRESSURE: 60 MMHG | HEART RATE: 72 BPM | TEMPERATURE: 97.8 F | SYSTOLIC BLOOD PRESSURE: 104 MMHG | RESPIRATION RATE: 18 BRPM | OXYGEN SATURATION: 98 %

## 2024-04-26 PROCEDURE — G0152 HHCP-SERV OF OT,EA 15 MIN: HCPCS | Performed by: OCCUPATIONAL THERAPIST

## 2024-04-29 ENCOUNTER — HOME CARE VISIT (OUTPATIENT)
Dept: HOME HEALTH SERVICES | Facility: HOME HEALTHCARE | Age: 79
End: 2024-04-29
Payer: MEDICARE

## 2024-04-29 VITALS — SYSTOLIC BLOOD PRESSURE: 112 MMHG | HEART RATE: 70 BPM | DIASTOLIC BLOOD PRESSURE: 60 MMHG

## 2024-04-29 PROCEDURE — G0153 HHCP-SVS OF S/L PATH,EA 15MN: HCPCS

## 2024-04-29 PROCEDURE — G0151 HHCP-SERV OF PT,EA 15 MIN: HCPCS

## 2024-04-30 ENCOUNTER — HOME CARE VISIT (OUTPATIENT)
Dept: HOME HEALTH SERVICES | Facility: HOME HEALTHCARE | Age: 79
End: 2024-04-30
Payer: MEDICARE

## 2024-04-30 NOTE — CASE COMMUNICATION
Swallowing evaluation initiated 4/29/24 but limited by pt's minimal po intake.  Basic swallowing safety reviewed and pt/ agreeable to additional visit at meal to further assess swallow function and provide appropriate recommendations/strategies.

## 2024-05-01 ENCOUNTER — HOME CARE VISIT (OUTPATIENT)
Dept: HOME HEALTH SERVICES | Facility: HOME HEALTHCARE | Age: 79
End: 2024-05-01
Payer: MEDICARE

## 2024-05-01 ENCOUNTER — TELEPHONE (OUTPATIENT)
Age: 79
End: 2024-05-01

## 2024-05-01 VITALS
OXYGEN SATURATION: 97 % | HEART RATE: 76 BPM | DIASTOLIC BLOOD PRESSURE: 72 MMHG | RESPIRATION RATE: 17 BRPM | TEMPERATURE: 96.9 F | SYSTOLIC BLOOD PRESSURE: 118 MMHG

## 2024-05-01 VITALS — OXYGEN SATURATION: 98 % | DIASTOLIC BLOOD PRESSURE: 70 MMHG | SYSTOLIC BLOOD PRESSURE: 120 MMHG | HEART RATE: 71 BPM

## 2024-05-01 PROCEDURE — G0152 HHCP-SERV OF OT,EA 15 MIN: HCPCS | Performed by: OCCUPATIONAL THERAPIST

## 2024-05-01 PROCEDURE — G0157 HHC PT ASSISTANT EA 15: HCPCS

## 2024-05-01 NOTE — TELEPHONE ENCOUNTER
Discussed with Dr. Goldman, as long as she is doing well, she does not need to be seen at this visit and we will see her in 9 months at her 1 year follow up with xrays.  We can see her sooner if needed.

## 2024-05-01 NOTE — TELEPHONE ENCOUNTER
Caller: Jeremiah - Patient Spouse    Doctor: Jones    Reason for call: Calling to request if patient would be able to do a virtual visit for her upcoming visit on Friday 5/3/24.  Advised I was unsure but could forward request to the provider's team.    They asked me to forward that she was seen about 6 weeks ago and there are no significant changes.    Please call back to advise if a virtual visit is possible.    Call back#: [phone]

## 2024-05-02 ENCOUNTER — HOME CARE VISIT (OUTPATIENT)
Dept: HOME HEALTH SERVICES | Facility: HOME HEALTHCARE | Age: 79
End: 2024-05-02
Payer: MEDICARE

## 2024-05-02 VITALS — SYSTOLIC BLOOD PRESSURE: 102 MMHG | DIASTOLIC BLOOD PRESSURE: 60 MMHG | OXYGEN SATURATION: 98 % | HEART RATE: 62 BPM

## 2024-05-02 PROCEDURE — G0152 HHCP-SERV OF OT,EA 15 MIN: HCPCS | Performed by: OCCUPATIONAL THERAPIST

## 2024-05-02 PROCEDURE — G0299 HHS/HOSPICE OF RN EA 15 MIN: HCPCS

## 2024-05-03 ENCOUNTER — HOME CARE VISIT (OUTPATIENT)
Dept: HOME HEALTH SERVICES | Facility: HOME HEALTHCARE | Age: 79
End: 2024-05-03
Payer: MEDICARE

## 2024-05-03 VITALS
DIASTOLIC BLOOD PRESSURE: 60 MMHG | RESPIRATION RATE: 18 BRPM | SYSTOLIC BLOOD PRESSURE: 110 MMHG | HEART RATE: 76 BPM | OXYGEN SATURATION: 98 % | TEMPERATURE: 97.8 F

## 2024-05-03 PROCEDURE — G0153 HHCP-SVS OF S/L PATH,EA 15MN: HCPCS

## 2024-05-06 ENCOUNTER — HOME CARE VISIT (OUTPATIENT)
Dept: HOME HEALTH SERVICES | Facility: HOME HEALTHCARE | Age: 79
End: 2024-05-06
Payer: MEDICARE

## 2024-05-06 VITALS
DIASTOLIC BLOOD PRESSURE: 70 MMHG | HEART RATE: 80 BPM | RESPIRATION RATE: 18 BRPM | OXYGEN SATURATION: 96 % | SYSTOLIC BLOOD PRESSURE: 118 MMHG | TEMPERATURE: 97.1 F

## 2024-05-06 VITALS — SYSTOLIC BLOOD PRESSURE: 128 MMHG | HEART RATE: 75 BPM | OXYGEN SATURATION: 100 % | DIASTOLIC BLOOD PRESSURE: 70 MMHG

## 2024-05-06 PROCEDURE — G0299 HHS/HOSPICE OF RN EA 15 MIN: HCPCS

## 2024-05-06 PROCEDURE — G0151 HHCP-SERV OF PT,EA 15 MIN: HCPCS

## 2024-05-06 NOTE — CASE COMMUNICATION
Spoke with pt's  via TC regarding dysphagia and further evaluation at meal on Friday, May 3rd.  Reviewed previous VFSS in June of 2023 and indication of moderate to severe pharyngeal dysphagia with aspiration of thin and nectar thick liquids.  Of note, at that time pt/ were thoroughly educated re: diet modification recommendations, aspiration, and risks. Further education provided on risks vs benefits of liquid/solid textu re modifications (reduced aspiration risk and subsequent medical implications, increased retention, potential dehydration, etc.) vs continuing thin liquids w/ strategies to optimize swallow safety/minimize risks. Pt made decision to resume thin liquids at that time using small straw sips and chin tuck which decreased but did not eliminate s/s of aspiration.  Since then,  reports that pt has had no respiratory infections that he i s aware of.  Pt continues to decline diet modifications at this time and  is in agreement.  reports that his greatest concern is pt's poor po intake which would likely be further affected by diet modifications,   will continue to offer dietary supplements and is open to consultation with nutritional services for further recommendations.    Educated pt/ (as well as aide) regarding the importance of frequent, t horough oral care to decrease infection risk, as well as reminders for swallowing strategies particularly for thin liquids (upright for po, small, single sips, straws ok, chin tuck).    Speech therapy will f/u x 1 for swallowing strategies/further education of CGs for appropriate support to decrease aspiration risk moving forward,

## 2024-05-08 ENCOUNTER — HOME CARE VISIT (OUTPATIENT)
Dept: HOME HEALTH SERVICES | Facility: HOME HEALTHCARE | Age: 79
End: 2024-05-08
Payer: MEDICARE

## 2024-05-08 PROCEDURE — G0153 HHCP-SVS OF S/L PATH,EA 15MN: HCPCS

## 2024-05-09 ENCOUNTER — HOME CARE VISIT (OUTPATIENT)
Dept: HOME HEALTH SERVICES | Facility: HOME HEALTHCARE | Age: 79
End: 2024-05-09
Payer: MEDICARE

## 2024-05-09 VITALS
OXYGEN SATURATION: 98 % | HEART RATE: 82 BPM | DIASTOLIC BLOOD PRESSURE: 78 MMHG | RESPIRATION RATE: 16 BRPM | SYSTOLIC BLOOD PRESSURE: 112 MMHG | TEMPERATURE: 96.2 F

## 2024-05-09 PROCEDURE — G0157 HHC PT ASSISTANT EA 15: HCPCS

## 2024-05-09 NOTE — TELEPHONE ENCOUNTER
Please reach out and schedule pt for a hospital f/u - she saw Dr. Greer in consult last year in hosp and [facility] for hosp f/u last year. Preferably she should see the physician if possible but the AP if nothing is soon. [facility] has medication questions and pt needs to be seen. Thank you.

## 2024-05-09 NOTE — TELEPHONE ENCOUNTER
Probably not good enough. Since she has had Afib and there by risk of CVA,  had taken her off of it before as well and the same now as  well.    I did a preop consult for her but had been taken to the OR and hence could not see her, so I never met her as well      Needs an office appt to clarify this further    Jaz       Called - left the above message for [facility]. Before forwarding a request to scheduling, need to clarify if pt is able to come to office for appt?

## 2024-05-10 ENCOUNTER — HOME CARE VISIT (OUTPATIENT)
Dept: HOME HEALTH SERVICES | Facility: HOME HEALTHCARE | Age: 79
End: 2024-05-10
Payer: MEDICARE

## 2024-05-10 VITALS
SYSTOLIC BLOOD PRESSURE: 116 MMHG | RESPIRATION RATE: 18 BRPM | TEMPERATURE: 97.6 F | OXYGEN SATURATION: 98 % | DIASTOLIC BLOOD PRESSURE: 60 MMHG | HEART RATE: 80 BPM

## 2024-05-10 PROCEDURE — G0299 HHS/HOSPICE OF RN EA 15 MIN: HCPCS

## 2024-05-13 ENCOUNTER — HOME CARE VISIT (OUTPATIENT)
Dept: HOME HEALTH SERVICES | Facility: HOME HEALTHCARE | Age: 79
End: 2024-05-13
Payer: MEDICARE

## 2024-05-13 ENCOUNTER — TELEMEDICINE (OUTPATIENT)
Dept: CARDIOLOGY CLINIC | Facility: CLINIC | Age: 79
End: 2024-05-13
Payer: MEDICARE

## 2024-05-13 VITALS — HEIGHT: 66 IN | BODY MASS INDEX: 18.64 KG/M2 | WEIGHT: 116 LBS

## 2024-05-13 DIAGNOSIS — I48.91 ATRIAL FIBRILLATION, UNSPECIFIED TYPE (HCC): ICD-10-CM

## 2024-05-13 PROCEDURE — G0153 HHCP-SVS OF S/L PATH,EA 15MN: HCPCS

## 2024-05-13 PROCEDURE — 99443 PR PHYS/QHP TELEPHONE EVALUATION 21-30 MIN: CPT | Performed by: INTERNAL MEDICINE

## 2024-05-13 RX ORDER — LISINOPRIL 40 MG/1
TABLET ORAL EVERY 24 HOURS
COMMUNITY

## 2024-05-13 NOTE — CASE COMMUNICATION
Pt was discharged from home based speech therapy today.  Pt has risk for aspiration per VFSS last summer but opted/opts to continue soft diet/thin liquids (no modifications) using swallowing strategies.  With use of small sip/chin tuck strategy, s/s aspiration are significantly decreased/eliminated.  Pt's aide and  have been educated re: strategy in order to provide cues as well as the importance of frequent, THOROUGH oral care d aily.

## 2024-05-14 ENCOUNTER — HOME CARE VISIT (OUTPATIENT)
Dept: HOME HEALTH SERVICES | Facility: HOME HEALTHCARE | Age: 79
End: 2024-05-14
Payer: MEDICARE

## 2024-05-14 PROCEDURE — G0157 HHC PT ASSISTANT EA 15: HCPCS

## 2024-05-14 NOTE — ASSESSMENT & PLAN NOTE
Patient with subcapital fracture of right femur  Patient underwent right hip arthroplasty by Dr. Goldman 2/4 - POD 3  Pain management as needed  Encourage incentive spirometry use  DVT prophylaxis - initially on ASA 81 mg BID however given atrial fibrillation, discussed with both cardiology and orthopedics 2/6 - will transition back to [facility] and MD ASA/sq heparin  PT/OT consult - recommending rehab.  CM following.  For discharge to [facility]   Due to Chemo-Induced Neuropathy/mild impairment

## 2024-05-15 ENCOUNTER — HOME CARE VISIT (OUTPATIENT)
Dept: HOME HEALTH SERVICES | Facility: HOME HEALTHCARE | Age: 79
End: 2024-05-15
Payer: MEDICARE

## 2024-05-15 VITALS
SYSTOLIC BLOOD PRESSURE: 116 MMHG | RESPIRATION RATE: 18 BRPM | TEMPERATURE: 97.2 F | HEART RATE: 64 BPM | DIASTOLIC BLOOD PRESSURE: 70 MMHG | OXYGEN SATURATION: 99 %

## 2024-05-15 PROCEDURE — G0299 HHS/HOSPICE OF RN EA 15 MIN: HCPCS

## 2024-05-16 ENCOUNTER — HOME CARE VISIT (OUTPATIENT)
Dept: HOME HEALTH SERVICES | Facility: HOME HEALTHCARE | Age: 79
End: 2024-05-16
Payer: MEDICARE

## 2024-05-16 VITALS
RESPIRATION RATE: 16 BRPM | OXYGEN SATURATION: 99 % | TEMPERATURE: 97.6 F | HEART RATE: 65 BPM | DIASTOLIC BLOOD PRESSURE: 79 MMHG | SYSTOLIC BLOOD PRESSURE: 107 MMHG

## 2024-05-16 PROCEDURE — G0157 HHC PT ASSISTANT EA 15: HCPCS

## 2024-05-20 ENCOUNTER — HOME CARE VISIT (OUTPATIENT)
Dept: HOME HEALTH SERVICES | Facility: HOME HEALTHCARE | Age: 79
End: 2024-05-20
Payer: MEDICARE

## 2024-05-20 PROCEDURE — G0151 HHCP-SERV OF PT,EA 15 MIN: HCPCS

## 2024-05-22 ENCOUNTER — HOME CARE VISIT (OUTPATIENT)
Dept: HOME HEALTH SERVICES | Facility: HOME HEALTHCARE | Age: 79
End: 2024-05-22
Payer: MEDICARE

## 2024-05-22 VITALS — HEART RATE: 78 BPM | SYSTOLIC BLOOD PRESSURE: 128 MMHG | OXYGEN SATURATION: 96 % | DIASTOLIC BLOOD PRESSURE: 70 MMHG

## 2024-05-22 VITALS
RESPIRATION RATE: 18 BRPM | HEART RATE: 68 BPM | SYSTOLIC BLOOD PRESSURE: 110 MMHG | DIASTOLIC BLOOD PRESSURE: 56 MMHG | TEMPERATURE: 97.8 F | OXYGEN SATURATION: 98 %

## 2024-05-22 PROCEDURE — G0151 HHCP-SERV OF PT,EA 15 MIN: HCPCS

## 2024-05-22 PROCEDURE — G0299 HHS/HOSPICE OF RN EA 15 MIN: HCPCS

## 2024-05-28 ENCOUNTER — HOME CARE VISIT (OUTPATIENT)
Dept: HOME HEALTH SERVICES | Facility: HOME HEALTHCARE | Age: 79
End: 2024-05-28
Payer: MEDICARE

## 2024-05-28 VITALS
TEMPERATURE: 96.1 F | SYSTOLIC BLOOD PRESSURE: 109 MMHG | RESPIRATION RATE: 16 BRPM | HEART RATE: 72 BPM | OXYGEN SATURATION: 96 % | DIASTOLIC BLOOD PRESSURE: 72 MMHG

## 2024-05-28 PROCEDURE — G0157 HHC PT ASSISTANT EA 15: HCPCS

## 2024-05-30 ENCOUNTER — HOME CARE VISIT (OUTPATIENT)
Dept: HOME HEALTH SERVICES | Facility: HOME HEALTHCARE | Age: 79
End: 2024-05-30
Payer: MEDICARE

## 2024-05-30 PROCEDURE — G0157 HHC PT ASSISTANT EA 15: HCPCS

## 2024-06-03 ENCOUNTER — HOME CARE VISIT (OUTPATIENT)
Dept: HOME HEALTH SERVICES | Facility: HOME HEALTHCARE | Age: 79
End: 2024-06-03
Payer: MEDICARE

## 2024-06-03 VITALS — OXYGEN SATURATION: 97 % | HEART RATE: 78 BPM | SYSTOLIC BLOOD PRESSURE: 130 MMHG | DIASTOLIC BLOOD PRESSURE: 80 MMHG

## 2024-06-03 PROCEDURE — G0151 HHCP-SERV OF PT,EA 15 MIN: HCPCS

## 2024-06-05 ENCOUNTER — HOSPITAL ENCOUNTER (INPATIENT)
Facility: HOSPITAL | Age: 79
LOS: 6 days | Discharge: HOME HEALTH | DRG: 176 | End: 2024-06-11
Attending: EMERGENCY MEDICINE | Admitting: INTERNAL MEDICINE
Payer: MEDICARE

## 2024-06-05 ENCOUNTER — APPOINTMENT (EMERGENCY)
Dept: CT IMAGING | Facility: HOSPITAL | Age: 79
DRG: 176 | End: 2024-06-05
Payer: MEDICARE

## 2024-06-05 ENCOUNTER — APPOINTMENT (EMERGENCY)
Dept: RADIOLOGY | Facility: HOSPITAL | Age: 79
DRG: 176 | End: 2024-06-05
Payer: MEDICARE

## 2024-06-05 ENCOUNTER — HOME CARE VISIT (OUTPATIENT)
Dept: HOME HEALTH SERVICES | Facility: HOME HEALTHCARE | Age: 79
End: 2024-06-05
Payer: MEDICARE

## 2024-06-05 ENCOUNTER — APPOINTMENT (EMERGENCY)
Dept: NON INVASIVE DIAGNOSTICS | Facility: HOSPITAL | Age: 79
DRG: 176 | End: 2024-06-05
Payer: MEDICARE

## 2024-06-05 DIAGNOSIS — N39.0 UTI (URINARY TRACT INFECTION): ICD-10-CM

## 2024-06-05 DIAGNOSIS — R42 LIGHTHEADEDNESS: Primary | ICD-10-CM

## 2024-06-05 DIAGNOSIS — I26.99 PULMONARY EMBOLISM (HCC): ICD-10-CM

## 2024-06-05 DIAGNOSIS — R55 NEAR SYNCOPE: ICD-10-CM

## 2024-06-05 LAB
2HR DELTA HS TROPONIN: 0 NG/L
ALBUMIN SERPL BCP-MCNC: 4.1 G/DL (ref 3.5–5)
ALP SERPL-CCNC: 73 U/L (ref 34–104)
ALT SERPL W P-5'-P-CCNC: 13 U/L (ref 7–52)
ANION GAP SERPL CALCULATED.3IONS-SCNC: 8 MMOL/L (ref 4–13)
AORTIC VALVE MEAN VELOCITY: 6.4 M/S
APTT PPP: 27 SECONDS (ref 23–37)
AST SERPL W P-5'-P-CCNC: 12 U/L (ref 13–39)
ATRIAL RATE: 66 BPM
ATRIAL RATE: 69 BPM
AV LVOT MEAN GRADIENT: 1 MMHG
AV LVOT PEAK GRADIENT: 1 MMHG
AV MEAN GRADIENT: 2 MMHG
AV PEAK GRADIENT: 3 MMHG
AV VELOCITY RATIO: 0.63
BACTERIA UR QL AUTO: ABNORMAL /HPF
BASOPHILS # BLD AUTO: 0.06 THOUSANDS/ÂΜL (ref 0–0.1)
BASOPHILS NFR BLD AUTO: 0 % (ref 0–1)
BILIRUB SERPL-MCNC: 0.7 MG/DL (ref 0.2–1)
BILIRUB UR QL STRIP: NEGATIVE
BNP SERPL-MCNC: 249 PG/ML (ref 0–100)
BSA FOR ECHO PROCEDURE: 1.59 M2
BUN SERPL-MCNC: 18 MG/DL (ref 5–25)
CALCIUM SERPL-MCNC: 10.1 MG/DL (ref 8.4–10.2)
CARDIAC TROPONIN I PNL SERPL HS: 7 NG/L
CARDIAC TROPONIN I PNL SERPL HS: 7 NG/L
CHLORIDE SERPL-SCNC: 105 MMOL/L (ref 96–108)
CLARITY UR: ABNORMAL
CO2 SERPL-SCNC: 27 MMOL/L (ref 21–32)
COLOR UR: YELLOW
CREAT SERPL-MCNC: 0.77 MG/DL (ref 0.6–1.3)
D DIMER PPP FEU-MCNC: 0.89 UG/ML FEU
DOP CALC AO PEAK VEL: 0.93 M/S
DOP CALC AO VTI: 15.79 CM
DOP CALC LVOT PEAK VEL VTI: 10.5 CM
DOP CALC LVOT PEAK VEL: 0.59 M/S
DOP CALC MV VTI: 18.7 CM
EOSINOPHIL # BLD AUTO: 0.25 THOUSAND/ÂΜL (ref 0–0.61)
EOSINOPHIL NFR BLD AUTO: 2 % (ref 0–6)
ERYTHROCYTE [DISTWIDTH] IN BLOOD BY AUTOMATED COUNT: 13.6 % (ref 11.6–15.1)
GFR SERPL CREATININE-BSD FRML MDRD: 75 ML/MIN/1.73SQ M
GLUCOSE SERPL-MCNC: 133 MG/DL (ref 65–140)
GLUCOSE UR STRIP-MCNC: NEGATIVE MG/DL
HCT VFR BLD AUTO: 43.4 % (ref 34.8–46.1)
HGB BLD-MCNC: 14 G/DL (ref 11.5–15.4)
HGB UR QL STRIP.AUTO: NEGATIVE
IMM GRANULOCYTES # BLD AUTO: 0.07 THOUSAND/UL (ref 0–0.2)
IMM GRANULOCYTES NFR BLD AUTO: 1 % (ref 0–2)
INR PPP: 1.1 (ref 0.84–1.19)
KETONES UR STRIP-MCNC: NEGATIVE MG/DL
LAAS-AP2: 20.6 CM2
LAAS-AP4: 22.2 CM2
LACTATE SERPL-SCNC: 2 MMOL/L (ref 0.5–2)
LEFT ATRIUM VOLUME (MOD BIPLANE): 73 ML
LEFT ATRIUM VOLUME INDEX (MOD BIPLANE): 43 ML/M2
LEUKOCYTE ESTERASE UR QL STRIP: ABNORMAL
LYMPHOCYTES # BLD AUTO: 2.25 THOUSANDS/ÂΜL (ref 0.6–4.47)
LYMPHOCYTES NFR BLD AUTO: 16 % (ref 14–44)
MCH RBC QN AUTO: 29.9 PG (ref 26.8–34.3)
MCHC RBC AUTO-ENTMCNC: 32.3 G/DL (ref 31.4–37.4)
MCV RBC AUTO: 93 FL (ref 82–98)
MONOCYTES # BLD AUTO: 0.72 THOUSAND/ÂΜL (ref 0.17–1.22)
MONOCYTES NFR BLD AUTO: 5 % (ref 4–12)
MV MEAN GRADIENT: 1 MMHG
MV PEAK GRADIENT: 5 MMHG
NEUTROPHILS # BLD AUTO: 10.62 THOUSANDS/ÂΜL (ref 1.85–7.62)
NEUTS SEG NFR BLD AUTO: 76 % (ref 43–75)
NITRITE UR QL STRIP: POSITIVE
NON-SQ EPI CELLS URNS QL MICRO: ABNORMAL /HPF
NRBC BLD AUTO-RTO: 0 /100 WBCS
PH UR STRIP.AUTO: 6 [PH]
PLATELET # BLD AUTO: 478 THOUSANDS/UL (ref 149–390)
PMV BLD AUTO: 9.4 FL (ref 8.9–12.7)
POTASSIUM SERPL-SCNC: 3.8 MMOL/L (ref 3.5–5.3)
PROCALCITONIN SERPL-MCNC: 0.06 NG/ML
PROT SERPL-MCNC: 7.3 G/DL (ref 6.4–8.4)
PROT UR STRIP-MCNC: ABNORMAL MG/DL
PROTHROMBIN TIME: 14.7 SECONDS (ref 11.6–14.5)
QRS AXIS: 56 DEGREES
QRS AXIS: 61 DEGREES
QRSD INTERVAL: 76 MS
QRSD INTERVAL: 80 MS
QT INTERVAL: 432 MS
QT INTERVAL: 438 MS
QTC INTERVAL: 462 MS
QTC INTERVAL: 462 MS
RA PRESSURE ESTIMATED: 3 MMHG
RBC # BLD AUTO: 4.69 MILLION/UL (ref 3.81–5.12)
RBC #/AREA URNS AUTO: ABNORMAL /HPF
RIGHT ATRIAL 2D VOLUME: 53 ML
RIGHT ATRIUM AREA SYSTOLE A4C: 20.4 CM2
RV PSP: 30 MMHG
SL CV LEFT ATRIUM LENGTH A2C: 5.1 CM
SL CV LV EF: 60
SODIUM SERPL-SCNC: 140 MMOL/L (ref 135–147)
SP GR UR STRIP.AUTO: 1.02 (ref 1–1.03)
T WAVE AXIS: 44 DEGREES
T WAVE AXIS: 56 DEGREES
TR MAX PG: 27 MMHG
TR PEAK VELOCITY: 2.6 M/S
TRICUSPID ANNULAR PLANE SYSTOLIC EXCURSION: 2 CM
UROBILINOGEN UR STRIP-ACNC: <2 MG/DL
VENTRICULAR RATE: 67 BPM
VENTRICULAR RATE: 69 BPM
WBC # BLD AUTO: 13.97 THOUSAND/UL (ref 4.31–10.16)
WBC #/AREA URNS AUTO: ABNORMAL /HPF

## 2024-06-05 PROCEDURE — 93321 DOPPLER ECHO F-UP/LMTD STD: CPT

## 2024-06-05 PROCEDURE — 93325 DOPPLER ECHO COLOR FLOW MAPG: CPT | Performed by: INTERNAL MEDICINE

## 2024-06-05 PROCEDURE — 96361 HYDRATE IV INFUSION ADD-ON: CPT

## 2024-06-05 PROCEDURE — 93005 ELECTROCARDIOGRAM TRACING: CPT

## 2024-06-05 PROCEDURE — 84145 PROCALCITONIN (PCT): CPT | Performed by: PHYSICIAN ASSISTANT

## 2024-06-05 PROCEDURE — 99223 1ST HOSP IP/OBS HIGH 75: CPT | Performed by: INTERNAL MEDICINE

## 2024-06-05 PROCEDURE — 87086 URINE CULTURE/COLONY COUNT: CPT | Performed by: PHYSICIAN ASSISTANT

## 2024-06-05 PROCEDURE — 71275 CT ANGIOGRAPHY CHEST: CPT

## 2024-06-05 PROCEDURE — 81001 URINALYSIS AUTO W/SCOPE: CPT | Performed by: PHYSICIAN ASSISTANT

## 2024-06-05 PROCEDURE — 93308 TTE F-UP OR LMTD: CPT | Performed by: INTERNAL MEDICINE

## 2024-06-05 PROCEDURE — 87077 CULTURE AEROBIC IDENTIFY: CPT | Performed by: PHYSICIAN ASSISTANT

## 2024-06-05 PROCEDURE — 93308 TTE F-UP OR LMTD: CPT

## 2024-06-05 PROCEDURE — 93321 DOPPLER ECHO F-UP/LMTD STD: CPT | Performed by: INTERNAL MEDICINE

## 2024-06-05 PROCEDURE — 36415 COLL VENOUS BLD VENIPUNCTURE: CPT | Performed by: PHYSICIAN ASSISTANT

## 2024-06-05 PROCEDURE — 85610 PROTHROMBIN TIME: CPT | Performed by: PHYSICIAN ASSISTANT

## 2024-06-05 PROCEDURE — 96365 THER/PROPH/DIAG IV INF INIT: CPT

## 2024-06-05 PROCEDURE — 87040 BLOOD CULTURE FOR BACTERIA: CPT | Performed by: PHYSICIAN ASSISTANT

## 2024-06-05 PROCEDURE — 83880 ASSAY OF NATRIURETIC PEPTIDE: CPT | Performed by: PHYSICIAN ASSISTANT

## 2024-06-05 PROCEDURE — 93325 DOPPLER ECHO COLOR FLOW MAPG: CPT

## 2024-06-05 PROCEDURE — 99285 EMERGENCY DEPT VISIT HI MDM: CPT | Performed by: PHYSICIAN ASSISTANT

## 2024-06-05 PROCEDURE — 80053 COMPREHEN METABOLIC PANEL: CPT | Performed by: PHYSICIAN ASSISTANT

## 2024-06-05 PROCEDURE — 99285 EMERGENCY DEPT VISIT HI MDM: CPT

## 2024-06-05 PROCEDURE — 87186 SC STD MICRODIL/AGAR DIL: CPT | Performed by: PHYSICIAN ASSISTANT

## 2024-06-05 PROCEDURE — 83605 ASSAY OF LACTIC ACID: CPT | Performed by: PHYSICIAN ASSISTANT

## 2024-06-05 PROCEDURE — 71045 X-RAY EXAM CHEST 1 VIEW: CPT

## 2024-06-05 PROCEDURE — 85025 COMPLETE CBC W/AUTO DIFF WBC: CPT | Performed by: PHYSICIAN ASSISTANT

## 2024-06-05 PROCEDURE — 85730 THROMBOPLASTIN TIME PARTIAL: CPT | Performed by: INTERNAL MEDICINE

## 2024-06-05 PROCEDURE — 85379 FIBRIN DEGRADATION QUANT: CPT | Performed by: PHYSICIAN ASSISTANT

## 2024-06-05 PROCEDURE — 93010 ELECTROCARDIOGRAM REPORT: CPT | Performed by: INTERNAL MEDICINE

## 2024-06-05 PROCEDURE — 84484 ASSAY OF TROPONIN QUANT: CPT | Performed by: PHYSICIAN ASSISTANT

## 2024-06-05 PROCEDURE — 85730 THROMBOPLASTIN TIME PARTIAL: CPT | Performed by: PHYSICIAN ASSISTANT

## 2024-06-05 RX ORDER — AMLODIPINE BESYLATE 5 MG/1
5 TABLET ORAL DAILY
Status: DISCONTINUED | OUTPATIENT
Start: 2024-06-06 | End: 2024-06-11 | Stop reason: HOSPADM

## 2024-06-05 RX ORDER — ASPIRIN 81 MG/1
81 TABLET, CHEWABLE ORAL ONCE
Status: COMPLETED | OUTPATIENT
Start: 2024-06-05 | End: 2024-06-05

## 2024-06-05 RX ORDER — ACETAMINOPHEN 325 MG/1
650 TABLET ORAL EVERY 6 HOURS PRN
Status: DISCONTINUED | OUTPATIENT
Start: 2024-06-05 | End: 2024-06-11 | Stop reason: HOSPADM

## 2024-06-05 RX ORDER — CARVEDILOL 3.12 MG/1
6.25 TABLET ORAL 2 TIMES DAILY WITH MEALS
Status: DISCONTINUED | OUTPATIENT
Start: 2024-06-05 | End: 2024-06-11 | Stop reason: HOSPADM

## 2024-06-05 RX ORDER — LISINOPRIL 20 MG/1
40 TABLET ORAL DAILY
Status: DISCONTINUED | OUTPATIENT
Start: 2024-06-06 | End: 2024-06-11 | Stop reason: HOSPADM

## 2024-06-05 RX ORDER — HEPARIN SODIUM 10000 [USP'U]/100ML
3-30 INJECTION, SOLUTION INTRAVENOUS
Status: DISCONTINUED | OUTPATIENT
Start: 2024-06-05 | End: 2024-06-11

## 2024-06-05 RX ORDER — CEFTRIAXONE 2 G/50ML
2000 INJECTION, SOLUTION INTRAVENOUS ONCE
Status: COMPLETED | OUTPATIENT
Start: 2024-06-05 | End: 2024-06-05

## 2024-06-05 RX ORDER — ATORVASTATIN CALCIUM 40 MG/1
40 TABLET, FILM COATED ORAL
Status: DISCONTINUED | OUTPATIENT
Start: 2024-06-05 | End: 2024-06-11 | Stop reason: HOSPADM

## 2024-06-05 RX ADMIN — HEPARIN SODIUM 18 UNITS/KG/HR: 10000 INJECTION, SOLUTION INTRAVENOUS at 17:24

## 2024-06-05 RX ADMIN — CARVEDILOL 6.25 MG: 3.12 TABLET, FILM COATED ORAL at 18:38

## 2024-06-05 RX ADMIN — CEFTRIAXONE 2000 MG: 2 INJECTION, SOLUTION INTRAVENOUS at 14:16

## 2024-06-05 RX ADMIN — ATORVASTATIN CALCIUM 40 MG: 40 TABLET, FILM COATED ORAL at 18:38

## 2024-06-05 RX ADMIN — SODIUM CHLORIDE 1000 ML: 0.9 INJECTION, SOLUTION INTRAVENOUS at 12:13

## 2024-06-05 RX ADMIN — ASPIRIN 81 MG CHEWABLE TABLET 81 MG: 81 TABLET CHEWABLE at 18:38

## 2024-06-05 RX ADMIN — IOHEXOL 60 ML: 350 INJECTION, SOLUTION INTRAVENOUS at 15:31

## 2024-06-05 NOTE — ED PROVIDER NOTES
History  Chief Complaint   Patient presents with    Dizziness     Pt to er via ems from home with reports from caretaker that patient had a pre syncope event earlier. States that she got dizzy and light headed.      Patient is a 77 y/o F with h/o HTN, afib, cerebral amyloid angiopathy that presents to the ED after near syncopal episode.  Patient has been feeling lightheaded.  NO chest pain or SOB.  No abdominal pain, nausea or vomiting.  No recent falls.       History provided by:  Patient  Dizziness  Associated symptoms: weakness (generalized)    Associated symptoms: no chest pain, no diarrhea, no shortness of breath and no vomiting        Prior to Admission Medications   Prescriptions Last Dose Informant Patient Reported? Taking?   acetaminophen (TYLENOL) 325 mg tablet  Family Member No No   Sig: Take 2 tablets (650 mg total) by mouth every 6 (six) hours as needed for mild pain   amLODIPine (NORVASC) 5 mg tablet  Family Member Yes No   Sig: Take 5 mg by mouth daily. Indications: High Blood Pressure Disorder   apixaban (ELIQUIS) 5 mg   No No   Sig: Take 1 tablet (5 mg total) by mouth 2 (two) times a day   aspirin 81 mg chewable tablet  Family Member Yes No   Sig: Chew 81 mg once. Indications: blood thinner   atorvastatin (LIPITOR) 40 mg tablet  Family Member No No   Sig: Take 1 tablet (40 mg total) by mouth daily with dinner   carvedilol (COREG) 6.25 mg tablet  Family Member No No   Sig: Take 1 tablet (6.25 mg total) by mouth 2 (two) times a day with meals   docusate sodium (COLACE) 100 mg capsule  Family Member No No   Sig: Take 1 capsule (100 mg total) by mouth 2 (two) times a day   ferrous sulfate 325 (65 Fe) mg tablet  Family Member No No   Sig: Take 1 tablet (325 mg total) by mouth daily with breakfast   gabapentin (NEURONTIN) 300 mg capsule  Family Member No No   Sig: Take 1 capsule (300 mg total) by mouth daily at bedtime   lisinopril (ZESTRIL) 40 mg tablet  Family Member Yes No   Sig: every 24 hours   senna  (SENOKOT) 8.6 mg  Family Member No No   Sig: Take 1 tablet (8.6 mg total) by mouth daily      Facility-Administered Medications: None       Past Medical History:   Diagnosis Date    Acute posthemorrhagic anemia     Cerebral amyloid angiopathy  (HCC)     Encounter for current long-term use of anticoagulants     History of falling     History of stroke 06/14/2023    Hyperlipemia     Hypertension     Intracapsular fracture of right femur (HCC)     Moderate protein-calorie malnutrition (HCC)     Paroxysmal atrial fibrillation (HCC)     Personal history of transient ischemic attack (TIA), and cerebral infarction without residual deficits     TIA (transient ischemic attack)     Urinary tract infection        Past Surgical History:   Procedure Laterality Date    HIP ARTHROPLASTY Right 2/4/2024    Procedure: ARTHROPLASTY HIP TOTAL;  Surgeon: Kevon Goldman DO;  Location:  MAIN OR;  Service: Orthopedics       Family History   Family history unknown: Yes     I have reviewed and agree with the history as documented.    E-Cigarette/Vaping    E-Cigarette Use Never User      E-Cigarette/Vaping Substances    Nicotine No     THC No     CBD No     Flavoring No     Other No     Unknown No      Social History     Tobacco Use    Smoking status: Never    Smokeless tobacco: Never   Vaping Use    Vaping status: Never Used   Substance Use Topics    Alcohol use: Not Currently     Alcohol/week: 1.0 standard drink of alcohol     Types: 1 Glasses of wine per week     Comment: stopped since hospitalization    Drug use: Not Currently       Review of Systems   Constitutional:  Negative for chills and fever.   Respiratory:  Negative for cough and shortness of breath.    Cardiovascular:  Negative for chest pain.   Gastrointestinal:  Negative for diarrhea and vomiting.   Musculoskeletal:  Negative for back pain and neck pain.   Skin:  Negative for color change and rash.   Neurological:  Positive for dizziness, syncope, weakness  (generalized) and light-headedness.   Psychiatric/Behavioral:  Negative for confusion.    All other systems reviewed and are negative.      Physical Exam  Physical Exam  Vitals and nursing note reviewed.   Constitutional:       General: She is not in acute distress.     Appearance: Normal appearance. She is well-developed and well-groomed. She is not ill-appearing.   HENT:      Head: Normocephalic and atraumatic.      Mouth/Throat:      Mouth: Mucous membranes are moist.   Eyes:      Conjunctiva/sclera: Conjunctivae normal.   Cardiovascular:      Rate and Rhythm: Normal rate and regular rhythm.   Pulmonary:      Breath sounds: Decreased breath sounds (due to decreased respiratory effort.) present.   Abdominal:      General: Abdomen is flat. Bowel sounds are normal.      Palpations: Abdomen is soft.      Tenderness: There is no abdominal tenderness.   Musculoskeletal:         General: Normal range of motion.      Cervical back: Normal range of motion.   Skin:     General: Skin is warm and dry.      Coloration: Skin is not jaundiced or pale.      Findings: No rash.   Neurological:      General: No focal deficit present.      Mental Status: She is alert. Mental status is at baseline.   Psychiatric:         Behavior: Behavior is cooperative.         Vital Signs  ED Triage Vitals   Temperature Pulse Respirations Blood Pressure SpO2   06/05/24 1158 06/05/24 1156 06/05/24 1156 06/05/24 1157 06/05/24 1156   97.6 °F (36.4 °C) 65 18 126/70 99 %      Temp Source Heart Rate Source Patient Position - Orthostatic VS BP Location FiO2 (%)   06/05/24 1158 06/05/24 1156 06/05/24 1231 06/05/24 1231 --   Oral Monitor Lying - Orthostatic VS Right arm       Pain Score       06/05/24 1557       No Pain           Vitals:    06/05/24 1400 06/05/24 1430 06/05/24 1500 06/05/24 1600   BP: 132/66 167/68 136/78 (!) 195/90   Pulse: 80 82 83 79   Patient Position - Orthostatic VS: Sitting  Sitting Sitting         Visual Acuity      ED  Medications  Medications   heparin (porcine) 25,000 units in 0.45% NaCl 250 mL infusion (premix) (has no administration in time range)   sodium chloride 0.9 % bolus 1,000 mL (0 mL Intravenous Stopped 6/5/24 1313)   cefTRIAXone (ROCEPHIN) IVPB (premix in dextrose) 2,000 mg 50 mL (0 mg Intravenous Stopped 6/5/24 1446)   iohexol (OMNIPAQUE) 350 MG/ML injection (MULTI-DOSE) 60 mL (60 mL Intravenous Given 6/5/24 1531)       Diagnostic Studies  Results Reviewed       Procedure Component Value Units Date/Time    B-Type Natriuretic Peptide(BNP) [[id number]]  (Abnormal) Collected: 06/05/24 1210    Lab Status: Final result Specimen: Blood from Arm, Left Updated: 06/05/24 1704      pg/mL     APTT [[id number]]     Lab Status: No result Specimen: Blood     Lactic acid, plasma (w/reflex if result > 2.0) [[id number]]  (Normal) Collected: 06/05/24 1413    Lab Status: Final result Specimen: Blood from Arm, Left Updated: 06/05/24 1452     LACTIC ACID 2.0 mmol/L     Narrative:      Result may be elevated if tourniquet was used during collection.    HS Troponin I 2hr [[id number]]  (Normal) Collected: 06/05/24 1413    Lab Status: Final result Specimen: Blood from Arm, Left Updated: 06/05/24 1444     hs TnI 2hr 7 ng/L      Delta 2hr hsTnI 0 ng/L     D-Dimer [[id number]]  (Abnormal) Collected: 06/05/24 1210    Lab Status: Final result Specimen: Blood from Arm, Left Updated: 06/05/24 1433     D-Dimer, Quant 0.89 ug/ml FEU     Narrative:      In the evaluation for possible pulmonary embolism, in the appropriate (Well's Score of 4 or less) patient, the age adjusted d-dimer cutoff for this patient can be calculated as:    Age x 0.01 (in ug/mL) for Age-adjusted D-dimer exclusion threshold for a patient over 50 years.    Blood culture #1 [[id number]] Collected: 06/05/24 1413    Lab Status: In process Specimen: Blood from Arm, Left Updated: 06/05/24 1420    Blood culture #2 [[id number]] Collected: 06/05/24 1413    Lab Status: In process  Specimen: Blood from Arm, Right Updated: 06/05/24 1420    Procalcitonin [[id number]]  (Normal) Collected: 06/05/24 1210    Lab Status: Final result Specimen: Blood from Arm, Left Updated: 06/05/24 1357     Procalcitonin 0.06 ng/ml     Urine Microscopic [[id number]]  (Abnormal) Collected: 06/05/24 1316    Lab Status: Final result Specimen: Urine, Clean Catch Updated: 06/05/24 1332     RBC, UA 0-1 /hpf      WBC, UA 30-50 /hpf      Epithelial Cells Occasional /hpf      Bacteria, UA Innumerable /hpf     Urine culture [[id number]] Collected: 06/05/24 1316    Lab Status: In process Specimen: Urine, Clean Catch Updated: 06/05/24 1332    UA w Reflex to Microscopic w Reflex to Culture [[id number]]  (Abnormal) Collected: 06/05/24 1316    Lab Status: Final result Specimen: Urine, Clean Catch Updated: 06/05/24 1332     Color, UA Yellow     Clarity, UA Cloudy     Specific Gravity, UA 1.025     pH, UA 6.0     Leukocytes, UA Large     Nitrite, UA Positive     Protein, UA 30 (1+) mg/dl      Glucose, UA Negative mg/dl      Ketones, UA Negative mg/dl      Urobilinogen, UA <2.0 mg/dl      Bilirubin, UA Negative     Occult Blood, UA Negative    HS Troponin 0hr (reflex protocol) [[id number]]  (Normal) Collected: 06/05/24 1210    Lab Status: Final result Specimen: Blood from Arm, Left Updated: 06/05/24 1248     hs TnI 0hr 7 ng/L     Comprehensive metabolic panel [[id number]]  (Abnormal) Collected: 06/05/24 1210    Lab Status: Final result Specimen: Blood from Arm, Left Updated: 06/05/24 1244     Sodium 140 mmol/L      Potassium 3.8 mmol/L      Chloride 105 mmol/L      CO2 27 mmol/L      ANION GAP 8 mmol/L      BUN 18 mg/dL      Creatinine 0.77 mg/dL      Glucose 133 mg/dL      Calcium 10.1 mg/dL      AST 12 U/L      ALT 13 U/L      Alkaline Phosphatase 73 U/L      Total Protein 7.3 g/dL      Albumin 4.1 g/dL      Total Bilirubin 0.70 mg/dL      eGFR 75 ml/min/1.73sq m     Narrative:      National Kid[employer] guidelines for  Chronic Kidney Disease (CKD):     Stage 1 with normal or high GFR (GFR > 90 mL/min/1.73 square meters)    Stage 2 Mild CKD (GFR = 60-89 mL/min/1.73 square meters)    Stage 3A Moderate CKD (GFR = 45-59 mL/min/1.73 square meters)    Stage 3B Moderate CKD (GFR = 30-44 mL/min/1.73 square meters)    Stage 4 Severe CKD (GFR = 15-29 mL/min/1.73 square meters)    Stage 5 End Stage CKD (GFR <15 mL/min/1.73 square meters)  Note: GFR calculation is accurate only with a steady state creatinine    Protime-INR [[id number]]  (Abnormal) Collected: 06/05/24 1210    Lab Status: Final result Specimen: Blood from Arm, Left Updated: 06/05/24 1241     Protime 14.7 seconds      INR 1.10    APTT [[id number]]  (Normal) Collected: 06/05/24 1210    Lab Status: Final result Specimen: Blood from Arm, Left Updated: 06/05/24 1241     PTT 27 seconds     CBC and differential [[id number]]  (Abnormal) Collected: 06/05/24 1210    Lab Status: Final result Specimen: Blood from Arm, Left Updated: 06/05/24 1224     WBC 13.97 Thousand/uL      RBC 4.69 Million/uL      Hemoglobin 14.0 g/dL      Hematocrit 43.4 %      MCV 93 fL      MCH 29.9 pg      MCHC 32.3 g/dL      RDW 13.6 %      MPV 9.4 fL      Platelets 478 Thousands/uL      nRBC 0 /100 WBCs      Segmented % 76 %      Immature Grans % 1 %      Lymphocytes % 16 %      Monocytes % 5 %      Eosinophils Relative 2 %      Basophils Relative 0 %      Absolute Neutrophils 10.62 Thousands/µL      Absolute Immature Grans 0.07 Thousand/uL      Absolute Lymphocytes 2.25 Thousands/µL      Absolute Monocytes 0.72 Thousand/µL      Eosinophils Absolute 0.25 Thousand/µL      Basophils Absolute 0.06 Thousands/µL                    CTA ED chest PE study   Final Result by Jaime Moeller MD (06/05 1629)      Small quantity of acute segmental right middle lobe and right lower lobe PE.         The calculated ratio of right ventricular to left ventricular diameter (RV/LV ratio) is 1.2      There is a critical  "finding of acute PE with RV/LV ratio >0.9. Based on PERT algorithm recommendations:    \"  Order STAT biomarkers including troponin, NT-BNP or BNP    \"  Calculate PESI score:   o  If PESI score falls in I-III, with negative biomarkers, please order a PERT priority ECHO.   o  If PESI score falls in IV-V or with positive biomarkers, please order STAT ECHO and alert the campus PERT via PAC at [phone].            I personally discussed this study with JULIEN APONTE on 6/5/2024 4:29 PM.            Workstation performed: [id number]         XR chest 1 view portable    (Results Pending)              Procedures  ECG 12 Lead Documentation Only    Date/Time: 6/5/2024 12:24 PM    Performed by: Julien Aponte PA-C  Authorized by: Julien Aponte PA-C    Indications / Diagnosis:  Near syncope  Patient location:  ED  Previous ECG:     Previous ECG:  Compared to current    Similarity:  No change  Rate:     ECG rate:  69  Rhythm:     Rhythm: atrial fibrillation    ST segments:     ST segments:  Normal  T waves:     T waves: normal             ED Course  ED Course as of 06/05/24 1712   Wed Jun 05, 2024   1657 Discussed with critical care, patient stable for med/surg tele.    1659  states patient has been off Eliquis since February.                        PERC Rule for PE      Flowsheet Row Most Recent Value   PERC Rule for PE    Age >=50 1 Filed at: 06/05/2024 1631   HR >=100 0 Filed at: 06/05/2024 1631   O2 Sat on room air < 95% 0 Filed at: 06/05/2024 1631   History of PE or DVT 0 Filed at: 06/05/2024 1631   Recent trauma or surgery 1 Filed at: 06/05/2024 1631   Hemoptysis 0 Filed at: 06/05/2024 1631   Exogenous estrogen 0 Filed at: 06/05/2024 1631   Unilateral leg swelling 0 Filed at: 06/05/2024 1631   PERC Rule for PE Results 2 Filed at: 06/05/2024 1631                SBIRT 22yo+      Flowsheet Row Most Recent Value   Initial Alcohol Screen: US AUDIT-C     1. How often do you have a drink " containing alcohol? 0 Filed at: 06/05/2024 1157   2. How many drinks containing alcohol do you have on a typical day you are drinking?  0 Filed at: 06/05/2024 1157   3a. Male UNDER 65: How often do you have five or more drinks on one occasion? 0 Filed at: 06/05/2024 1157   3b. FEMALE Any Age, or MALE 65+: How often do you have 4 or more drinks on one occassion? 0 Filed at: 06/05/2024 1157   Audit-C Score 0 Filed at: 06/05/2024 1157   ANAM: How many times in the past year have you...    Used an illegal drug or used a prescription medication for non-medical reasons? Never Filed at: 06/05/2024 1157            Wells' Criteria for PE      Flowsheet Row Most Recent Value   Wells' Criteria for PE    Clinical signs and symptoms of DVT 0 Filed at: 06/05/2024 1434   PE is primary diagnosis or equally likely 0 Filed at: 06/05/2024 1434   HR >100 0 Filed at: 06/05/2024 1434   Immobilization at least 3 days or Surgery in the previous 4 weeks 1.5 Filed at: 06/05/2024 1434   Previous, objectively diagnosed PE or DVT 0 Filed at: 06/05/2024 1434   Hemoptysis 0 Filed at: 06/05/2024 1434   Malignancy with treatment within 6 months or palliative 0 Filed at: 06/05/2024 1434   Wells' Criteria Total 1.5 Filed at: 06/05/2024 1434                  Medical Decision Making  Patient with near syncope, will order labs, EKG, UA, CXR to r/o cardiac arrhythmia, ACS, electrolyte abnormality, pneumonia, UTI.  Patient with UTI, will start rocephin.  Patient with elevated ddimer, surgery in February, will order CT scan to r/o PE.  Patient with right sided PE, right heart strain, vitals stable, pesi score 76, will start heparin and admit to med/surg.     Amount and/or Complexity of Data Reviewed  Independent Historian: spouse and EMS  External Data Reviewed: labs, ECG and notes.  Labs: ordered.  Radiology: ordered.  ECG/medicine tests: ordered and independent interpretation performed.  Discussion of management or test interpretation with external  provider(s): Critical care.     Risk  Prescription drug management.  Decision regarding hospitalization.        PESI  Age: 78 years old  Sex: 0  History of Cancer: 0  History of Heart Failure: 0  History of Chronic Lung Disease: 0  Heart rate greater than or equal to 110: 0  Systolic BP < 100 mmH  Respiratory rate greater than or equal to 30: 0  Temperature <36°C/96.8°F: 0  Altered Mental Status (Disorientation, lethargy, stupor, or coma): 0  O2 saturation <90%: 0  PESI Score Results: 76        Disposition  Final diagnoses:   Lightheadedness   Near syncope   UTI (urinary tract infection)   Pulmonary embolism (HCC)     Time reflects when diagnosis was documented in both MDM as applicable and the Disposition within this note       Time User Action Codes Description Comment    2024  1:39 PM Gabi Aponte [R42] Lightheadedness     2024  1:39 PM Gabi Aponte [R55] Near syncope     2024  1:39 PM Gabi Aponte [N39.0] UTI (urinary tract infection)     2024  4:50 PM Gabi Aponte [I26.99] Pulmonary embolism (HCC)           ED Disposition       ED Disposition   Admit    Condition   Stable    Date/Time    1700    Comment   Case was discussed with Dr. Doan and the patient's admission status was agreed to be Admission Status: inpatient status to the service of Dr. Doan .               Follow-up Information    None         Patient's Medications   Discharge Prescriptions    No medications on file       No discharge procedures on file.    PDMP Review         Value Time User    PDMP Reviewed  Yes 2024 10:33 AM Ana Hughes PA-C            ED Provider  Electronically Signed by             Gabi Aponte PA-C  [phone]

## 2024-06-05 NOTE — ASSESSMENT & PLAN NOTE
On carvedilol 6.25 twice daily  Patient follows with cardiology outpatient.  Not on Eliquis even though he was recommended per cardiology.   reported he does not like Eliquis and also is expensive and he needs to pay out-of-pocket  Stable, continue carvedilol

## 2024-06-05 NOTE — ASSESSMENT & PLAN NOTE
Had lightheadedness today  D-dimer 0.89 on presentation  CTA chest showed Small quantity of acute segmental right middle lobe and right lower lobe PE.  RV/LV ratio 1.2  BNP 49, troponin negative  ER discussed with critical care who considered patient stable for MedSurg..  Echo stat was ordered  was started on heparin drip in emergency department.  Patient's blood pressure stable, at room air    Plan:  Continue heparin drip for now.  Will need to price check Eliquis.  If expensive will likely start warfarin and bridge with heparin if  is agreeable.  Monitor on telemetry  Follow-up echo results

## 2024-06-05 NOTE — ASSESSMENT & PLAN NOTE
Continue aspirin, atorvastatin  Patient also has cerebral amyloid angiopathy with neurology outpatient  At baseline she is oriented to place however has cognitive impairment per

## 2024-06-05 NOTE — H&P
[facility]  H&P  Name: Lo Talley 78 y.o. female I MRN: [id number]  Unit/Bed#: ED 05 I Date of Admission: 6/5/2024   Date of Service: 6/5/2024 I Hospital Day: 0      Assessment & Plan   * Pulmonary embolism (HCC)  Assessment & Plan  Had lightheadedness today  D-dimer 0.89 on presentation  CTA chest showed Small quantity of acute segmental right middle lobe and right lower lobe PE.  RV/LV ratio 1.2  BNP 49, troponin negative  ER discussed with critical care who considered patient stable for MedSurg..  Echo stat was ordered  was started on heparin drip in emergency department.  Patient's blood pressure stable, at room air    Plan:  Continue heparin drip for now.  Will need to price check Eliquis.  If expensive will likely start warfarin and bridge with heparin if  is agreeable.  Monitor on telemetry  Follow-up echo results      HTN (hypertension)  Assessment & Plan  On Norvasc 5 mg daily, carvedilol 6.25 and lisinopril  Continue medication  Monitor blood pressure    Atrial fibrillation (HCC)  Assessment & Plan  On carvedilol 6.25 twice daily  Patient follows with cardiology outpatient.  Not on Eliquis even though he was recommended per cardiology.   reported he does not like Eliquis and also is expensive and he needs to pay out-of-pocket  Stable, continue carvedilol    History of multiple strokes  Assessment & Plan  Continue aspirin, atorvastatin  Patient also has cerebral amyloid angiopathy with neurology outpatient  At baseline she is oriented to place however has cognitive impairment per            VTE Pharmacologic Prophylaxis: VTE Score: 8 High Risk (Score >/= 5) - Pharmacological DVT Prophylaxis Ordered: heparin drip. Sequential Compression Devices Ordered.  Code Status: Level 3 - DNAR and DNI   Discussion with family: Updated  () at bedside.    Anticipated Length of Stay: Patient will be admitted on an inpatient basis with an anticipated  length of stay of greater than 2 midnights secondary to pe.    Total Time Spent on Date of Encounter in care of patient: 75 mins. This time was spent on one or more of the following: performing physical exam; counseling and coordination of care; obtaining or reviewing history; documenting in the medical record; reviewing/ordering tests, medications or procedures; communicating with other healthcare professionals and discussing with patient's family/caregivers.    Chief Complaint: Lightheadedness    History of Present Illness:  Lo Talley is a 78 y.o. female with a PMH of hypertension, A-fib, strokes, cerebral amyloid angiopathy, who presents with lightheadedness.  Patient has cognitive impairment at baseline is a very poor historian however per  at bedside he reported she complained of lightheadedness/dizziness when she stood up today.  She has a caregiver and per caregiver she was pale.  CTA on presentation showed small PE, patient had a stat echo.    was admitted for further management.    Review of Systems:  Review of Systems   Constitutional:  Negative for chills and fever.   Respiratory:  Negative for choking, chest tightness and shortness of breath.    Cardiovascular:  Negative for chest pain and palpitations.   Gastrointestinal:  Negative for abdominal pain.   All other systems reviewed and are negative.      Past Medical and Surgical History:   Past Medical History:   Diagnosis Date    Acute posthemorrhagic anemia     Cerebral amyloid angiopathy  (HCC)     Encounter for current long-term use of anticoagulants     History of falling     History of stroke 06/14/2023    Hyperlipemia     Hypertension     Intracapsular fracture of right femur (HCC)     Moderate protein-calorie malnutrition (HCC)     Paroxysmal atrial fibrillation (HCC)     Personal history of transient ischemic attack (TIA), and cerebral infarction without residual deficits     TIA (transient ischemic attack)     Urinary tract infection         Past Surgical History:   Procedure Laterality Date    HIP ARTHROPLASTY Right 2/4/2024    Procedure: ARTHROPLASTY HIP TOTAL;  Surgeon: Kevon Goldman DO;  Location:  MAIN OR;  Service: Orthopedics       Meds/Allergies:  Prior to Admission medications    Medication Sig Start Date End Date Taking? Authorizing Provider   amLODIPine (NORVASC) 5 mg tablet Take 5 mg by mouth daily. Indications: High Blood Pressure Disorder   Yes Historical Provider, MD   aspirin 81 mg chewable tablet Chew 81 mg once. Indications: blood thinner 4/19/24  Yes Historical Provider, MD   carvedilol (COREG) 6.25 mg tablet Take 1 tablet (6.25 mg total) by mouth 2 (two) times a day with meals 2/9/24  Yes Scott Hooper DO   lisinopril (ZESTRIL) 40 mg tablet every 24 hours   Yes Historical Provider, MD   acetaminophen (TYLENOL) 325 mg tablet Take 2 tablets (650 mg total) by mouth every 6 (six) hours as needed for mild pain 2/7/24   Landy Gould PA-C   apixaban (ELIQUIS) 5 mg Take 1 tablet (5 mg total) by mouth 2 (two) times a day  Patient not taking: Reported on 6/5/2024 5/13/24   Javier Greer MD   atorvastatin (LIPITOR) 40 mg tablet Take 1 tablet (40 mg total) by mouth daily with dinner  Patient not taking: Reported on 6/5/2024 6/18/23   Maryjo Velázquez MD   docusate sodium (COLACE) 100 mg capsule Take 1 capsule (100 mg total) by mouth 2 (two) times a day 2/7/24   Landy Gould PA-C   ferrous sulfate 325 (65 Fe) mg tablet Take 1 tablet (325 mg total) by mouth daily with breakfast 2/8/24   Landy Gould PA-C   senna (SENOKOT) 8.6 mg Take 1 tablet (8.6 mg total) by mouth daily 2/8/24   Landy Gould PA-C   gabapentin (NEURONTIN) 300 mg capsule Take 1 capsule (300 mg total) by mouth daily at bedtime  Patient not taking: Reported on 6/5/2024 2/7/24 6/5/24  Landy Gould PA-C     I have reviewed home medications with patient family member.    Allergies:   Allergies   Allergen Reactions    Penicillins Other  "(See Comments)     Childhood reaction       Social History:  Marital Status: /Civil Union   Occupation: Retired  Patient Pre-hospital Living Situation: Home  Patient Pre-hospital Level of Mobility:  Not a mobile mostly in bed per   Patient Pre-hospital Diet Restrictions: none  Substance Use History:   Social History     Substance and Sexual Activity   Alcohol Use Not Currently    Alcohol/week: 1.0 standard drink of alcohol    Types: 1 Glasses of wine per week    Comment: stopped since hospitalization     Social History     Tobacco Use   Smoking Status Never   Smokeless Tobacco Never     Social History     Substance and Sexual Activity   Drug Use Not Currently       Family History:  Family History   Family history unknown: Yes       Physical Exam:     Vitals:   Blood Pressure: (!) 195/90 (06/05/24 1646)  Pulse: 83 (06/05/24 1646)  Temperature: 97.6 °F (36.4 °C) (06/05/24 1158)  Temp Source: Oral (06/05/24 1158)  Respirations: 18 (06/05/24 1600)  Height: 5' 6\" (167.6 cm) (06/05/24 1646)  Weight - Scale: 52.6 kg (116 lb) (06/05/24 1646)  SpO2: 96 % (06/05/24 1600)    Physical Exam  Vitals and nursing note reviewed.   Constitutional:       General: She is not in acute distress.     Appearance: She is not diaphoretic.   HENT:      Head: Normocephalic.   Eyes:      General: No scleral icterus.        Right eye: No discharge.         Left eye: No discharge.   Cardiovascular:      Rate and Rhythm: Normal rate and regular rhythm.   Pulmonary:      Effort: Pulmonary effort is normal. No respiratory distress.      Breath sounds: Normal breath sounds. No wheezing, rhonchi or rales.   Abdominal:      General: There is no distension.      Palpations: Abdomen is soft.      Tenderness: There is no abdominal tenderness. There is no guarding or rebound.   Musculoskeletal:      Cervical back: Normal range of motion.      Right lower leg: No edema.      Left lower leg: No edema.   Skin:     General: Skin is warm. " "  Neurological:      Mental Status: She is alert.   Psychiatric:         Mood and Affect: Mood normal.         Behavior: Behavior normal.          Additional Data:     Lab Results:  Results from last 7 days   Lab Units 06/05/24  1210   WBC Thousand/uL 13.97*   HEMOGLOBIN g/dL 14.0   HEMATOCRIT % 43.4   PLATELETS Thousands/uL 478*   SEGS PCT % 76*   LYMPHO PCT % 16   MONO PCT % 5   EOS PCT % 2     Results from last 7 days   Lab Units 06/05/24  1210   SODIUM mmol/L 140   POTASSIUM mmol/L 3.8   CHLORIDE mmol/L 105   CO2 mmol/L 27   BUN mg/dL 18   CREATININE mg/dL 0.77   ANION GAP mmol/L 8   CALCIUM mg/dL 10.1   ALBUMIN g/dL 4.1   TOTAL BILIRUBIN mg/dL 0.70   ALK PHOS U/L 73   ALT U/L 13   AST U/L 12*   GLUCOSE RANDOM mg/dL 133     Results from last 7 days   Lab Units 06/05/24  1210   INR  1.10             Results from last 7 days   Lab Units 06/05/24  1413 06/05/24  1210   LACTIC ACID mmol/L 2.0  --    PROCALCITONIN ng/ml  --  0.06       Lines/Drains:  Invasive Devices       Peripheral Intravenous Line  Duration             Peripheral IV 06/05/24 Dorsal (posterior);Left Forearm <1 day                        Imaging: Reviewed radiology reports from this admission including: chest CT scan  CTA ED chest PE study   Final Result by Jaime Moeller MD (06/05 1629)      Small quantity of acute segmental right middle lobe and right lower lobe PE.         The calculated ratio of right ventricular to left ventricular diameter (RV/LV ratio) is 1.2      There is a critical finding of acute PE with RV/LV ratio >0.9. Based on PERT algorithm recommendations:    \"  Order STAT biomarkers including troponin, NT-BNP or BNP    \"  Calculate PESI score:   o  If PESI score falls in I-III, with negative biomarkers, please order a PERT priority ECHO.   o  If PESI score falls in IV-V or with positive biomarkers, please order STAT ECHO and alert the [place] PERT via PAC at [phone].            I personally discussed this study " with JULIEN DERAS on 6/5/2024 4:29 PM.            Workstation performed: [id number]         XR chest 1 view portable    (Results Pending)       EKG and Other Studies Reviewed on Admission:   EKG: Atrial fibrillation. HR 69.    ** Please Note: This note has been constructed using a voice recognition system. **

## 2024-06-05 NOTE — ED NOTES
Pt unable to stand for 3 min, for orthostatic VS, will notify MD Megan Manjarrez, RN  06/05/24 6568

## 2024-06-05 NOTE — ASSESSMENT & PLAN NOTE
On Norvasc 5 mg daily, carvedilol 6.25 and lisinopril  Continue medication  Monitor blood pressure

## 2024-06-06 ENCOUNTER — HOME CARE VISIT (OUTPATIENT)
Dept: HOME HEALTH SERVICES | Facility: HOME HEALTHCARE | Age: 79
End: 2024-06-06
Payer: MEDICARE

## 2024-06-06 PROBLEM — R82.71 ASYMPTOMATIC BACTERIURIA: Status: ACTIVE | Noted: 2024-06-06

## 2024-06-06 LAB
ANION GAP SERPL CALCULATED.3IONS-SCNC: 9 MMOL/L (ref 4–13)
APTT PPP: 69 SECONDS (ref 23–37)
APTT PPP: 80 SECONDS (ref 23–37)
BUN SERPL-MCNC: 15 MG/DL (ref 5–25)
CALCIUM SERPL-MCNC: 9.2 MG/DL (ref 8.4–10.2)
CHLORIDE SERPL-SCNC: 106 MMOL/L (ref 96–108)
CO2 SERPL-SCNC: 26 MMOL/L (ref 21–32)
CREAT SERPL-MCNC: 0.48 MG/DL (ref 0.6–1.3)
ERYTHROCYTE [DISTWIDTH] IN BLOOD BY AUTOMATED COUNT: 13.4 % (ref 11.6–15.1)
GFR SERPL CREATININE-BSD FRML MDRD: 95 ML/MIN/1.73SQ M
GLUCOSE SERPL-MCNC: 90 MG/DL (ref 65–140)
HCT VFR BLD AUTO: 37.5 % (ref 34.8–46.1)
HGB BLD-MCNC: 12.3 G/DL (ref 11.5–15.4)
MCH RBC QN AUTO: 29.9 PG (ref 26.8–34.3)
MCHC RBC AUTO-ENTMCNC: 32.8 G/DL (ref 31.4–37.4)
MCV RBC AUTO: 91 FL (ref 82–98)
PLATELET # BLD AUTO: 333 THOUSANDS/UL (ref 149–390)
PMV BLD AUTO: 9.1 FL (ref 8.9–12.7)
POTASSIUM SERPL-SCNC: 3.5 MMOL/L (ref 3.5–5.3)
RBC # BLD AUTO: 4.11 MILLION/UL (ref 3.81–5.12)
SODIUM SERPL-SCNC: 141 MMOL/L (ref 135–147)
WBC # BLD AUTO: 10.39 THOUSAND/UL (ref 4.31–10.16)

## 2024-06-06 PROCEDURE — 80048 BASIC METABOLIC PNL TOTAL CA: CPT | Performed by: INTERNAL MEDICINE

## 2024-06-06 PROCEDURE — 85730 THROMBOPLASTIN TIME PARTIAL: CPT | Performed by: INTERNAL MEDICINE

## 2024-06-06 PROCEDURE — 99232 SBSQ HOSP IP/OBS MODERATE 35: CPT

## 2024-06-06 PROCEDURE — 85027 COMPLETE CBC AUTOMATED: CPT | Performed by: INTERNAL MEDICINE

## 2024-06-06 RX ORDER — ENOXAPARIN SODIUM 300 MG/3ML
1 INJECTION INTRAVENOUS; SUBCUTANEOUS EVERY 12 HOURS
Status: CANCELLED | OUTPATIENT
Start: 2024-06-06 | End: 2024-06-16

## 2024-06-06 RX ORDER — WARFARIN SODIUM 2.5 MG/1
2.5 TABLET ORAL
Status: DISCONTINUED | OUTPATIENT
Start: 2024-06-06 | End: 2024-06-07

## 2024-06-06 RX ADMIN — CARVEDILOL 6.25 MG: 3.12 TABLET, FILM COATED ORAL at 09:13

## 2024-06-06 RX ADMIN — HEPARIN SODIUM 18 UNITS/KG/HR: 10000 INJECTION, SOLUTION INTRAVENOUS at 22:47

## 2024-06-06 RX ADMIN — LISINOPRIL 40 MG: 20 TABLET ORAL at 09:13

## 2024-06-06 RX ADMIN — WARFARIN SODIUM 2.5 MG: 2.5 TABLET ORAL at 17:41

## 2024-06-06 RX ADMIN — ATORVASTATIN CALCIUM 40 MG: 40 TABLET, FILM COATED ORAL at 17:41

## 2024-06-06 RX ADMIN — AMLODIPINE BESYLATE 5 MG: 5 TABLET ORAL at 09:13

## 2024-06-06 RX ADMIN — CARVEDILOL 6.25 MG: 3.12 TABLET, FILM COATED ORAL at 17:41

## 2024-06-06 NOTE — PLAN OF CARE
Problem: Prexisting or High Potential for Compromised Skin Integrity  Goal: Skin integrity is maintained or improved  Description: INTERVENTIONS:  - Identify patients at risk for skin breakdown  - Assess and monitor skin integrity  - Assess and monitor nutrition and hydration status  - Monitor labs   - Assess for incontinence   - Turn and reposition patient  - Assist with mobility/ambulation  - Relieve pressure over bony prominences  - Avoid friction and shearing  - Provide appropriate hygiene as needed including keeping skin clean and dry  - Evaluate need for skin moisturizer/barrier cream  - Collaborate with interdisciplinary team   - Patient/family teaching  - Consider wound care consult   Outcome: Progressing     Problem: PAIN - ADULT  Goal: Verbalizes/displays adequate comfort level or baseline comfort level  Description: Interventions:  - Encourage patient to monitor pain and request assistance  - Assess pain using appropriate pain scale  - Administer analgesics based on type and severity of pain and evaluate response  - Implement non-pharmacological measures as appropriate and evaluate response  - Consider cultural and social influences on pain and pain management  - Notify physician/advanced practitioner if interventions unsuccessful or patient reports new pain  Outcome: Progressing     Problem: INFECTION - ADULT  Goal: Absence or prevention of progression during hospitalization  Description: INTERVENTIONS:  - Assess and monitor for signs and symptoms of infection  - Monitor lab/diagnostic results  - Monitor all insertion sites, i.e. indwelling lines, tubes, and drains  - Monitor endotracheal if appropriate and nasal secretions for changes in amount and color  - [place] appropriate cooling/warming therapies per order  - Administer medications as ordered  - Instruct and encourage patient and family to use good hand hygiene technique  - Identify and instruct in appropriate isolation precautions for  identified infection/condition  Outcome: Progressing  Goal: Absence of fever/infection during neutropenic period  Description: INTERVENTIONS:  - Monitor WBC    Outcome: Progressing     Problem: SAFETY ADULT  Goal: Patient will remain free of falls  Description: INTERVENTIONS:  - Educate patient/family on patient safety including physical limitations  - Instruct patient to call for assistance with activity   - Consult OT/PT to assist with strengthening/mobility   - Keep Call bell within reach  - Keep bed low and locked with side rails adjusted as appropriate  - Keep care items and personal belongings within reach  - Initiate and maintain comfort rounds  - Make Fall Risk Sign visible to staff  - Offer Toileting every 3 Hours, in advance of need  - Initiate/Maintain 3alarm  - Obtain necessary fall risk management equipment: 2  - Apply yellow socks and bracelet for high fall risk patients  - Consider moving patient to room near nurses station  Outcome: Progressing  Goal: Maintain or return to baseline ADL function  Description: INTERVENTIONS:  -  Assess patient's ability to carry out ADLs; assess patient's baseline for ADL function and identify physical deficits which impact ability to perform ADLs (bathing, care of mouth/teeth, toileting, grooming, dressing, etc.)  - Assess/evaluate cause of self-care deficits   - Assess range of motion  - Assess patient's mobility; develop plan if impaired  - Assess patient's need for assistive devices and provide as appropriate  - Encourage maximum independence but intervene and supervise when necessary  - Involve family in performance of ADLs  - Assess for home care needs following discharge   - Consider OT consult to assist with ADL evaluation and planning for discharge  - Provide patient education as appropriate  Outcome: Progressing  Goal: Maintains/Returns to pre admission functional level  Description: INTERVENTIONS:  - Perform AM-PAC 6 Click Basic Mobility/ Daily Activity  assessment daily.  - Set and communicate daily mobility goal to care team and patient/family/caregiver.   - Collaborate with rehabilitation services on mobility goals if consulted  - Perform Range of Motion 3 times a day.  - Reposition patient every 3 hours.  - Dangle patient 3 times a day  - Stand patient 3 times a day  - Ambulate patient 3 times a day  - Out of bed to chair 3 times a day   - Out of bed for meals 3 times a day  - Out of bed for toileting  - Record patient progress and toleration of activity level   Outcome: Progressing     Problem: DISCHARGE PLANNING  Goal: Discharge to home or other facility with appropriate resources  Description: INTERVENTIONS:  - Identify barriers to discharge w/patient and caregiver  - Arrange for needed discharge resources and transportation as appropriate  - Identify discharge learning needs (meds, wound care, etc.)  - Arrange for interpretive services to assist at discharge as needed  - Refer to Case Management Department for coordinating discharge planning if the patient needs post-hospital services based on physician/advanced practitioner order or complex needs related to functional status, cognitive ability, or social support system  Outcome: Progressing     Problem: Knowledge Deficit  Goal: Patient/family/caregiver demonstrates understanding of disease process, treatment plan, medications, and discharge instructions  Description: Complete learning assessment and assess knowledge base.  Interventions:  - Provide teaching at level of understanding  - Provide teaching via preferred learning methods  Outcome: Progressing

## 2024-06-06 NOTE — ASSESSMENT & PLAN NOTE
Had lightheadedness today  D-dimer 0.89 on presentation  CTA chest showed Small quantity of acute segmental right middle lobe and right lower lobe PE.  RV/LV ratio 1.2  BNP 49, troponin negative  ER discussed with critical care who considered patient stable for MedSurg.  ECHO: EF 60%. Systolic function normal, R ventricular cavity size at upper limit of normal within limitations of study.   Started on heparin drip in ED, continue   Patient's blood pressure stable, at room air    Plan:  After discussion with  today, they are leaning more toward the heparin bridge to warfarin over the eliquis  Will start warfarin 2.5 dose tonight and adjust dose as needed  Continue to monitor PT/INR daily goal 2-3  Monitor on telemetry

## 2024-06-06 NOTE — CASE MANAGEMENT
Case Management Discharge Planning Note    Patient name Lo Talley  Location /-01 MRN [id number]  : 1945 Date 2024       Current Admission Date: 2024  Current Admission Diagnosis:Pulmonary embolism (HCC)   Patient Active Problem List    Diagnosis Date Noted Date Diagnosed    Pulmonary embolism (HCC) 2024     Altered mental status 2024     Pancreatic lesion 2024     Moderate protein-calorie malnutrition (HCC) 2024     Acute blood loss anemia 2024     HTN (hypertension) 2024     Cerebral amyloid angiopathy (CODE) 2023     History of multiple strokes 06/15/2023     Atrial fibrillation (HCC) 06/15/2023     Hypertensive urgency 2023     Elevated troponin 2023       LOS (days): 1  Geometric Mean LOS (GMLOS) (days):   Days to GMLOS:     OBJECTIVE:  Risk of Unplanned Readmission Score: 11.47         Current admission status: Inpatient   Preferred Pharmacy:   [facility] [place] [address] [place] [address]  Phone: [phone] Fax: [phone]    Primary Care Provider: RACHELLE Ruiz    Primary Insurance: MEDICARE  Secondary Insurance: AETNA    DISCHARGE DETAILS:    Additional Comments: CARMEN was consulted for an Eliquis price check. CARMEN called pt's [facility] who informed that they do not have prescription insurance on file, that they typically use [facility], and that Eliquis would be about $600 with the [facility]. CARMEN confirmed with pt that she does not have prescription insurance. CARMEN informed Marc Aceves of same who reports that warfarin would be a less expensive alternative.

## 2024-06-06 NOTE — PROGRESS NOTES
[facility]  Progress Note  Name: Lo Talley I  MRN: [id number]  Unit/Bed#: -01 I Date of Admission: 6/5/2024   Date of Service: 6/6/2024 I Hospital Day: 1    Assessment & Plan   * Pulmonary embolism (HCC)  Assessment & Plan  Had lightheadedness today  D-dimer 0.89 on presentation  CTA chest showed Small quantity of acute segmental right middle lobe and right lower lobe PE.  RV/LV ratio 1.2  BNP 49, troponin negative  ER discussed with critical care who considered patient stable for MedSurg.  ECHO: EF 60%. Systolic function normal, R ventricular cavity size at upper limit of normal within limitations of study.   Started on heparin drip in ED, continue   Patient's blood pressure stable, at room air    Plan:  After discussion with  today, they are leaning more toward the heparin bridge to warfarin over the eliquis  Will start warfarin 2.5 dose tonight and adjust dose as needed  Continue to monitor PT/INR daily goal 2-3  Monitor on telemetry      Asymptomatic bacteriuria  Assessment & Plan  Urine culture resulting >100,000 klebsiella oxytoca   Currently asymptomatic   Consider possible colonization   Afebrile with mild leukocytosis today   Continue to monitor off abx at this time     HTN (hypertension)  Assessment & Plan  On Norvasc 5 mg daily, carvedilol 6.25 and lisinopril  Continue medication  Monitor blood pressure    Atrial fibrillation (HCC)  Assessment & Plan  On carvedilol 6.25 twice daily   Patient follows with cardiology outpatient.  Not on Eliquis even though he was recommended per cardiology.   reported she does not like Eliquis and also is expensive and he needs to pay out-of-pocket  Stable, continue carvedilol  Should continue outpatient follow up with cardiology     History of multiple strokes  Assessment & Plan  Continue aspirin, atorvastatin  Patient also has cerebral amyloid angiopathy with neurology outpatient  At baseline she is oriented to place  "however has cognitive impairment per            VTE Pharmacologic Prophylaxis: VTE Score: 8 High Risk (Score >/= 5) - Pharmacological DVT Prophylaxis Ordered: heparin drip. Sequential Compression Devices Ordered.    Mobility:   Basic Mobility Inpatient Raw Score: 12  JH-HLM Goal: 4: Move to chair/commode  JH-HLM Achieved: 4: Move to chair/commode  JH-HLM Goal NOT achieved. Continue with multidisciplinary rounding and encourage appropriate mobility to improve upon JH-HLM goals.    Patient Centered Rounds: I performed bedside rounds with nursing staff today.   Discussions with Specialists or Other Care Team Provider: None    Education and Discussions with Family / Patient: Updated  () via phone.    Total Time Spent on Date of Encounter in care of patient: This time was spent on one or more of the following: performing physical exam; counseling and coordination of care; obtaining or reviewing history; documenting in the medical record; reviewing/ordering tests, medications or procedures; communicating with other healthcare professionals and discussing with patient's family/caregivers.    Current Length of Stay: 1 day(s)  Current Patient Status: Inpatient   Certification Statement: The patient will continue to require additional inpatient hospital stay due to heparin bridge to warfarin  Discharge Plan: Anticipate discharge in 48 hrs to discharge location to be determined pending rehab evaluations.    Code Status: Level 3 - DNAR and DNI    Subjective:   Seen and examined. No acute events overnight. States she is feeling \"alright.\" She is unsure of why she has not been taking eliquis. Discussed warfarin with patient however she would like me to discuss with her  before making decision. No additional complaints     Objective:     Vitals:   Temp (24hrs), Av.2 °F (36.8 °C), Min:97.8 °F (36.6 °C), Max:98.5 °F (36.9 °C)    Temp:  [97.8 °F (36.6 °C)-98.5 °F (36.9 °C)] 98 °F (36.7 °C)  HR:  " [68-85] 68  Resp:  [18] 18  BP: (117-195)/(72-95) 121/90  SpO2:  [95 %-98 %] 96 %  Body mass index is 18.72 kg/m².     Input and Output Summary (last 24 hours):     Intake/Output Summary (Last 24 hours) at 6/6/2024 1637  Last data filed at 6/6/2024 1413  Gross per 24 hour   Intake 835 ml   Output 553 ml   Net 282 ml       Physical Exam:   Physical Exam  Constitutional:       General: She is not in acute distress.     Appearance: She is underweight. She is ill-appearing.   HENT:      Mouth/Throat:      Mouth: Mucous membranes are moist.   Eyes:      Conjunctiva/sclera: Conjunctivae normal.   Cardiovascular:      Heart sounds: Normal heart sounds.   Pulmonary:      Breath sounds: Normal breath sounds.   Abdominal:      Palpations: Abdomen is soft.   Musculoskeletal:      Right lower leg: No edema.      Left lower leg: No edema.   Skin:     General: Skin is warm and dry.   Neurological:      General: No focal deficit present.          Additional Data:     Labs:  Results from last 7 days   Lab Units 06/06/24  0545 06/05/24  1210   WBC Thousand/uL 10.39* 13.97*   HEMOGLOBIN g/dL 12.3 14.0   HEMATOCRIT % 37.5 43.4   PLATELETS Thousands/uL 333 478*   SEGS PCT %  --  76*   LYMPHO PCT %  --  16   MONO PCT %  --  5   EOS PCT %  --  2     Results from last 7 days   Lab Units 06/06/24  0545 06/05/24  1210   SODIUM mmol/L 141 140   POTASSIUM mmol/L 3.5 3.8   CHLORIDE mmol/L 106 105   CO2 mmol/L 26 27   BUN mg/dL 15 18   CREATININE mg/dL 0.48* 0.77   ANION GAP mmol/L 9 8   CALCIUM mg/dL 9.2 10.1   ALBUMIN g/dL  --  4.1   TOTAL BILIRUBIN mg/dL  --  0.70   ALK PHOS U/L  --  73   ALT U/L  --  13   AST U/L  --  12*   GLUCOSE RANDOM mg/dL 90 133     Results from last 7 days   Lab Units 06/05/24  1210   INR  1.10             Results from last 7 days   Lab Units 06/05/24  1413 06/05/24  1210   LACTIC ACID mmol/L 2.0  --    PROCALCITONIN ng/ml  --  0.06       Lines/Drains:  Invasive Devices       Peripheral Intravenous Line  Duration              Peripheral IV 06/05/24 Dorsal (posterior);Left Forearm 1 day                      Telemetry:  Telemetry Orders (From admission, onward)               24 Hour Telemetry Monitoring  (ED Bridging Orders Panel)  Continuous x 24 Hours (Telem)        Question:  Reason for 24 Hour Telemetry  Answer:  Pulmonary Embolism                     Telemetry Reviewed: Atrial fibrillation. HR averaging 72  Indication for Continued Telemetry Use: PE             Imaging: Reviewed radiology reports from this admission including: chest CT scan    Recent Cultures (last 7 days):   Results from last 7 days   Lab Units 06/05/24  1413 06/05/24  1316   BLOOD CULTURE  Received in Microbiology Lab. Culture in Progress.  Received in Microbiology Lab. Culture in Progress.  --    URINE CULTURE   --  >100,000 cfu/ml Klebsiella oxytoca*       Last 24 Hours Medication List:   Current Facility-Administered Medications   Medication Dose Route Frequency Provider Last Rate    acetaminophen  650 mg Oral Q6H PRN Sherine Doan MD      amLODIPine  5 mg Oral Daily Sherine Doan MD      atorvastatin  40 mg Oral Daily With Dinner Sherine Doan MD      carvedilol  6.25 mg Oral BID With Meals Sherine Doan MD      heparin (porcine)  3-30 Units/kg/hr (Order-Specific) Intravenous Titrated Sherine Doan MD 18 Units/kg/hr (06/05/24 1724)    lisinopril  40 mg Oral Daily Sherine Doan MD      warfarin  2.5 mg Oral Daily (warfarin) Amalia Aceves PA-C          Today, Patient Was Seen By: Amalia Aceves PA-C    **Please Note: This note may have been constructed using a voice recognition system.**

## 2024-06-06 NOTE — ASSESSMENT & PLAN NOTE
On carvedilol 6.25 twice daily   Patient follows with cardiology outpatient.  Not on Eliquis even though he was recommended per cardiology.   reported she does not like Eliquis and also is expensive and he needs to pay out-of-pocket  Stable, continue carvedilol  Should continue outpatient follow up with cardiology

## 2024-06-07 LAB
ANION GAP SERPL CALCULATED.3IONS-SCNC: 6 MMOL/L (ref 4–13)
APTT PPP: 88 SECONDS (ref 23–37)
BACTERIA UR CULT: ABNORMAL
BACTERIA UR CULT: ABNORMAL
BUN SERPL-MCNC: 23 MG/DL (ref 5–25)
CALCIUM SERPL-MCNC: 9.3 MG/DL (ref 8.4–10.2)
CHLORIDE SERPL-SCNC: 107 MMOL/L (ref 96–108)
CO2 SERPL-SCNC: 27 MMOL/L (ref 21–32)
CREAT SERPL-MCNC: 0.56 MG/DL (ref 0.6–1.3)
ERYTHROCYTE [DISTWIDTH] IN BLOOD BY AUTOMATED COUNT: 13.5 % (ref 11.6–15.1)
GFR SERPL CREATININE-BSD FRML MDRD: 90 ML/MIN/1.73SQ M
GLUCOSE SERPL-MCNC: 88 MG/DL (ref 65–140)
HCT VFR BLD AUTO: 37.3 % (ref 34.8–46.1)
HGB BLD-MCNC: 12.4 G/DL (ref 11.5–15.4)
INR PPP: 1.17 (ref 0.84–1.19)
MCH RBC QN AUTO: 30.4 PG (ref 26.8–34.3)
MCHC RBC AUTO-ENTMCNC: 33.2 G/DL (ref 31.4–37.4)
MCV RBC AUTO: 91 FL (ref 82–98)
PLATELET # BLD AUTO: 315 THOUSANDS/UL (ref 149–390)
PMV BLD AUTO: 9.2 FL (ref 8.9–12.7)
POTASSIUM SERPL-SCNC: 3.5 MMOL/L (ref 3.5–5.3)
PROTHROMBIN TIME: 15.3 SECONDS (ref 11.6–14.5)
RBC # BLD AUTO: 4.08 MILLION/UL (ref 3.81–5.12)
SODIUM SERPL-SCNC: 140 MMOL/L (ref 135–147)
WBC # BLD AUTO: 8.88 THOUSAND/UL (ref 4.31–10.16)

## 2024-06-07 PROCEDURE — 80048 BASIC METABOLIC PNL TOTAL CA: CPT | Performed by: INTERNAL MEDICINE

## 2024-06-07 PROCEDURE — 85730 THROMBOPLASTIN TIME PARTIAL: CPT | Performed by: INTERNAL MEDICINE

## 2024-06-07 PROCEDURE — 85027 COMPLETE CBC AUTOMATED: CPT | Performed by: INTERNAL MEDICINE

## 2024-06-07 PROCEDURE — 85610 PROTHROMBIN TIME: CPT | Performed by: INTERNAL MEDICINE

## 2024-06-07 PROCEDURE — 99232 SBSQ HOSP IP/OBS MODERATE 35: CPT

## 2024-06-07 RX ORDER — WARFARIN SODIUM 5 MG/1
5 TABLET ORAL
Status: DISCONTINUED | OUTPATIENT
Start: 2024-06-07 | End: 2024-06-07

## 2024-06-07 RX ORDER — WARFARIN SODIUM 2.5 MG/1
2.5 TABLET ORAL
Status: DISCONTINUED | OUTPATIENT
Start: 2024-06-07 | End: 2024-06-09

## 2024-06-07 RX ADMIN — ATORVASTATIN CALCIUM 40 MG: 40 TABLET, FILM COATED ORAL at 17:21

## 2024-06-07 RX ADMIN — CARVEDILOL 6.25 MG: 3.12 TABLET, FILM COATED ORAL at 08:18

## 2024-06-07 RX ADMIN — WARFARIN SODIUM 2.5 MG: 2.5 TABLET ORAL at 17:21

## 2024-06-07 RX ADMIN — AMLODIPINE BESYLATE 5 MG: 5 TABLET ORAL at 08:19

## 2024-06-07 RX ADMIN — LISINOPRIL 40 MG: 20 TABLET ORAL at 08:19

## 2024-06-07 RX ADMIN — CARVEDILOL 6.25 MG: 3.12 TABLET, FILM COATED ORAL at 17:20

## 2024-06-07 NOTE — PLAN OF CARE
Problem: PAIN - ADULT  Goal: Verbalizes/displays adequate comfort level or baseline comfort level  Description: Interventions:  - Encourage patient to monitor pain and request assistance  - Assess pain using appropriate pain scale  - Administer analgesics based on type and severity of pain and evaluate response  - Implement non-pharmacological measures as appropriate and evaluate response  - Consider cultural and social influences on pain and pain management  - Notify physician/advanced practitioner if interventions unsuccessful or patient reports new pain  Outcome: Progressing     Problem: INFECTION - ADULT  Goal: Absence or prevention of progression during hospitalization  Description: INTERVENTIONS:  - Assess and monitor for signs and symptoms of infection  - Monitor lab/diagnostic results  - Monitor all insertion sites, i.e. indwelling lines, tubes, and drains  - Monitor endotracheal if appropriate and nasal secretions for changes in amount and color  - [place] appropriate cooling/warming therapies per order  - Administer medications as ordered  - Instruct and encourage patient and family to use good hand hygiene technique  - Identify and instruct in appropriate isolation precautions for identified infection/condition  Outcome: Progressing  Goal: Absence of fever/infection during neutropenic period  Description: INTERVENTIONS:  - Monitor WBC    Outcome: Progressing     Problem: DISCHARGE PLANNING  Goal: Discharge to home or other facility with appropriate resources  Description: INTERVENTIONS:  - Identify barriers to discharge w/patient and caregiver  - Arrange for needed discharge resources and transportation as appropriate  - Identify discharge learning needs (meds, wound care, etc.)  - Arrange for interpretive services to assist at discharge as needed  - Refer to Case Management Department for coordinating discharge planning if the patient needs post-hospital services based on physician/advanced  practitioner order or complex needs related to functional status, cognitive ability, or social support system  Outcome: Progressing     Problem: Knowledge Deficit  Goal: Patient/family/caregiver demonstrates understanding of disease process, treatment plan, medications, and discharge instructions  Description: Complete learning assessment and assess knowledge base.  Interventions:  - Provide teaching at level of understanding  - Provide teaching via preferred learning methods  Outcome: Progressing

## 2024-06-07 NOTE — ASSESSMENT & PLAN NOTE
Had lightheadedness today  D-dimer 0.89 on presentation  CTA chest showed Small quantity of acute segmental right middle lobe and right lower lobe PE.  RV/LV ratio 1.2  BNP 49, troponin negative  ER discussed with critical care who considered patient stable for MedSurg.  ECHO: EF 60%. Systolic function normal, R ventricular cavity size at upper limit of normal within limitations of study.   Started on heparin drip in ED, continue   Patient's blood pressure stable, at room air    Plan:  After discussion with  today, they are leaning more toward the heparin bridge to warfarin over the eliquis  Continue warfarin 2.5mg tonight with plan to give 5mg tomorrow if INR without change   Continue to monitor PT/INR daily goal 2-3  Monitor on telemetry

## 2024-06-07 NOTE — ASSESSMENT & PLAN NOTE
On carvedilol 6.25 twice daily   Patient follows with cardiology outpatient.  Not on Eliquis even though she was recommended per cardiology.   reported she does not like Eliquis and also is expensive and he needs to pay out-of-pocket  Stable, continue carvedilol  Should continue outpatient follow up with cardiology

## 2024-06-07 NOTE — ASSESSMENT & PLAN NOTE
Urine culture resulting >100,000 klebsiella oxytoca   Asymptomatic   Consider possible colonization   Afebrile with mild leukocytosis - has since resolved   Continue to monitor off abx at this time

## 2024-06-07 NOTE — PLAN OF CARE
Problem: Prexisting or High Potential for Compromised Skin Integrity  Goal: Skin integrity is maintained or improved  Description: INTERVENTIONS:  - Identify patients at risk for skin breakdown  - Assess and monitor skin integrity  - Assess and monitor nutrition and hydration status  - Monitor labs   - Assess for incontinence   - Turn and reposition patient  - Assist with mobility/ambulation  - Relieve pressure over bony prominences  - Avoid friction and shearing  - Provide appropriate hygiene as needed including keeping skin clean and dry  - Evaluate need for skin moisturizer/barrier cream  - Collaborate with interdisciplinary team   - Patient/family teaching  - Consider wound care consult   Outcome: Progressing     Problem: PAIN - ADULT  Goal: Verbalizes/displays adequate comfort level or baseline comfort level  Description: Interventions:  - Encourage patient to monitor pain and request assistance  - Assess pain using appropriate pain scale  - Administer analgesics based on type and severity of pain and evaluate response  - Implement non-pharmacological measures as appropriate and evaluate response  - Consider cultural and social influences on pain and pain management  - Notify physician/advanced practitioner if interventions unsuccessful or patient reports new pain  Outcome: Progressing     Problem: INFECTION - ADULT  Goal: Absence or prevention of progression during hospitalization  Description: INTERVENTIONS:  - Assess and monitor for signs and symptoms of infection  - Monitor lab/diagnostic results  - Monitor all insertion sites, i.e. indwelling lines, tubes, and drains  - Monitor endotracheal if appropriate and nasal secretions for changes in amount and color  - [place] appropriate cooling/warming therapies per order  - Administer medications as ordered  - Instruct and encourage patient and family to use good hand hygiene technique  - Identify and instruct in appropriate isolation precautions for  identified infection/condition  Outcome: Progressing  Goal: Absence of fever/infection during neutropenic period  Description: INTERVENTIONS:  - Monitor WBC    Outcome: Progressing     Problem: SAFETY ADULT  Goal: Patient will remain free of falls  Description: INTERVENTIONS:  - Educate patient/family on patient safety including physical limitations  - Instruct patient to call for assistance with activity   - Consult OT/PT to assist with strengthening/mobility   - Keep Call bell within reach  - Keep bed low and locked with side rails adjusted as appropriate  - Keep care items and personal belongings within reach  - Initiate and maintain comfort rounds  - Make Fall Risk Sign visible to staff  - Offer Toileting every 3 Hours, in advance of need  - Initiate/Maintain 3alarm  - Obtain necessary fall risk management equipment: 2  - Apply yellow socks and bracelet for high fall risk patients  - Consider moving patient to room near nurses station  Outcome: Progressing  Goal: Maintain or return to baseline ADL function  Description: INTERVENTIONS:  -  Assess patient's ability to carry out ADLs; assess patient's baseline for ADL function and identify physical deficits which impact ability to perform ADLs (bathing, care of mouth/teeth, toileting, grooming, dressing, etc.)  - Assess/evaluate cause of self-care deficits   - Assess range of motion  - Assess patient's mobility; develop plan if impaired  - Assess patient's need for assistive devices and provide as appropriate  - Encourage maximum independence but intervene and supervise when necessary  - Involve family in performance of ADLs  - Assess for home care needs following discharge   - Consider OT consult to assist with ADL evaluation and planning for discharge  - Provide patient education as appropriate  Outcome: Progressing  Goal: Maintains/Returns to pre admission functional level  Description: INTERVENTIONS:  - Perform AM-PAC 6 Click Basic Mobility/ Daily Activity  assessment daily.  - Set and communicate daily mobility goal to care team and patient/family/caregiver.   - Collaborate with rehabilitation services on mobility goals if consulted  - Perform Range of Motion 3 times a day.  - Reposition patient every 3 hours.  - Dangle patient 3 times a day  - Stand patient 3 times a day  - Ambulate patient 3 times a day  - Out of bed to chair 3 times a day   - Out of bed for meals 3 times a day  - Out of bed for toileting  - Record patient progress and toleration of activity level   Outcome: Progressing     Problem: DISCHARGE PLANNING  Goal: Discharge to home or other facility with appropriate resources  Description: INTERVENTIONS:  - Identify barriers to discharge w/patient and caregiver  - Arrange for needed discharge resources and transportation as appropriate  - Identify discharge learning needs (meds, wound care, etc.)  - Arrange for interpretive services to assist at discharge as needed  - Refer to Case Management Department for coordinating discharge planning if the patient needs post-hospital services based on physician/advanced practitioner order or complex needs related to functional status, cognitive ability, or social support system  Outcome: Progressing     Problem: Knowledge Deficit  Goal: Patient/family/caregiver demonstrates understanding of disease process, treatment plan, medications, and discharge instructions  Description: Complete learning assessment and assess knowledge base.  Interventions:  - Provide teaching at level of understanding  - Provide teaching via preferred learning methods  Outcome: Progressing

## 2024-06-07 NOTE — PROGRESS NOTES
[facility]  Progress Note  Name: oL Talley I  MRN: [id number]  Unit/Bed#: -01 I Date of Admission: 6/5/2024   Date of Service: 6/7/2024 I Hospital Day: 2    Assessment & Plan   * Pulmonary embolism (HCC)  Assessment & Plan  Had lightheadedness today  D-dimer 0.89 on presentation  CTA chest showed Small quantity of acute segmental right middle lobe and right lower lobe PE.  RV/LV ratio 1.2  BNP 49, troponin negative  ER discussed with critical care who considered patient stable for MedSurg.  ECHO: EF 60%. Systolic function normal, R ventricular cavity size at upper limit of normal within limitations of study.   Started on heparin drip in ED, continue   Patient's blood pressure stable, at room air    Plan:  After discussion with  today, they are leaning more toward the heparin bridge to warfarin over the eliquis  Continue warfarin 2.5mg tonight with plan to give 5mg tomorrow if INR without change   Continue to monitor PT/INR daily goal 2-3  Monitor on telemetry      Asymptomatic bacteriuria  Assessment & Plan  Urine culture resulting >100,000 klebsiella oxytoca   Asymptomatic   Consider possible colonization   Afebrile with mild leukocytosis - has since resolved   Continue to monitor off abx at this time     HTN (hypertension)  Assessment & Plan  On Norvasc 5 mg daily, carvedilol 6.25 and lisinopril  Continue medication  Monitor blood pressure    Atrial fibrillation (HCC)  Assessment & Plan  On carvedilol 6.25 twice daily   Patient follows with cardiology outpatient.  Not on Eliquis even though she was recommended per cardiology.   reported she does not like Eliquis and also is expensive and he needs to pay out-of-pocket  Stable, continue carvedilol  Should continue outpatient follow up with cardiology     History of multiple strokes  Assessment & Plan  Continue aspirin, atorvastatin  Patient also has cerebral amyloid angiopathy with neurology outpatient  At baseline  she is oriented to place however has cognitive impairment per            VTE Pharmacologic Prophylaxis: VTE Score: 8 High Risk (Score >/= 5) - Pharmacological DVT Prophylaxis Ordered: heparin drip. Sequential Compression Devices Ordered. Currently on heparin drip warfarin for bridge     Mobility:   Basic Mobility Inpatient Raw Score: 12  JH-HLM Goal: 4: Move to chair/commode  JH-HLM Achieved: 4: Move to chair/commode  JH-HLM Goal achieved. Continue to encourage appropriate mobility.    Patient Centered Rounds: I performed bedside rounds with nursing staff today.   Discussions with Specialists or Other Care Team Provider: None    Education and Discussions with Family / Patient:  Will update .     Total Time Spent on Date of Encounter in care of patient: This time was spent on one or more of the following: performing physical exam; counseling and coordination of care; obtaining or reviewing history; documenting in the medical record; reviewing/ordering tests, medications or procedures; communicating with other healthcare professionals and discussing with patient's family/caregivers.    Current Length of Stay: 2 day(s)  Current Patient Status: Inpatient   Certification Statement: The patient will continue to require additional inpatient hospital stay due to continue warfarin bridge with heparin  Discharge Plan: Anticipate discharge in 48-72 hrs to home.    Code Status: Level 3 - DNAR and DNI    Subjective:   Seen and examined. No acute events overnight. Patient tearful on exam stating she heard it was fathers day. Provided supportive care. Eating and drinking without difficulty. Urinating and having BM as normal. Denies hematuria, dysuria, or frequency symptoms.     Objective:     Vitals:   Temp (24hrs), Av.3 °F (36.8 °C), Min:98 °F (36.7 °C), Max:98.6 °F (37 °C)    Temp:  [98 °F (36.7 °C)-98.6 °F (37 °C)] 98.2 °F (36.8 °C)  HR:  [68-82] 71  Resp:  [15-18] 18  BP: (110-141)/(63-90) 141/74  SpO2:  [95  %-97 %] 96 %  Body mass index is 18.72 kg/m².     Input and Output Summary (last 24 hours):     Intake/Output Summary (Last 24 hours) at 6/7/2024 0910  Last data filed at 6/7/2024 0111  Gross per 24 hour   Intake 555 ml   Output 245 ml   Net 310 ml       Physical Exam:   Physical Exam  Constitutional:       General: She is in acute distress (tearful).      Appearance: She is ill-appearing (chronically).   HENT:      Mouth/Throat:      Mouth: Mucous membranes are moist.   Eyes:      Conjunctiva/sclera: Conjunctivae normal.   Cardiovascular:      Heart sounds: Normal heart sounds.   Pulmonary:      Breath sounds: Normal breath sounds. No wheezing, rhonchi or rales.   Abdominal:      Palpations: Abdomen is soft.   Musculoskeletal:      Right lower leg: No edema.      Left lower leg: No edema.   Skin:     General: Skin is warm and dry.   Neurological:      Comments: A&O to person and place     Psychiatric:      Comments: Tearful on exam             Additional Data:     Labs:  Results from last 7 days   Lab Units 06/07/24  0556 06/06/24  0545 06/05/24  1210   WBC Thousand/uL 8.88   < > 13.97*   HEMOGLOBIN g/dL 12.4   < > 14.0   HEMATOCRIT % 37.3   < > 43.4   PLATELETS Thousands/uL 315   < > 478*   SEGS PCT %  --   --  76*   LYMPHO PCT %  --   --  16   MONO PCT %  --   --  5   EOS PCT %  --   --  2    < > = values in this interval not displayed.     Results from last 7 days   Lab Units 06/07/24  0556 06/06/24  0545 06/05/24  1210   SODIUM mmol/L 140   < > 140   POTASSIUM mmol/L 3.5   < > 3.8   CHLORIDE mmol/L 107   < > 105   CO2 mmol/L 27   < > 27   BUN mg/dL 23   < > 18   CREATININE mg/dL 0.56*   < > 0.77   ANION GAP mmol/L 6   < > 8   CALCIUM mg/dL 9.3   < > 10.1   ALBUMIN g/dL  --   --  4.1   TOTAL BILIRUBIN mg/dL  --   --  0.70   ALK PHOS U/L  --   --  73   ALT U/L  --   --  13   AST U/L  --   --  12*   GLUCOSE RANDOM mg/dL 88   < > 133    < > = values in this interval not displayed.     Results from last 7 days   Lab  Units 06/07/24  0556   INR  1.17             Results from last 7 days   Lab Units 06/05/24  1413 06/05/24  1210   LACTIC ACID mmol/L 2.0  --    PROCALCITONIN ng/ml  --  0.06       Lines/Drains:  Invasive Devices       Peripheral Intravenous Line  Duration             Peripheral IV 06/05/24 Dorsal (posterior);Left Forearm 1 day                      Telemetry:  Telemetry Orders (From admission, onward)               24 Hour Telemetry Monitoring  (ED Bridging Orders Panel)  Continuous x 24 Hours (Telem)        Question:  Reason for 24 Hour Telemetry  Answer:  Pulmonary Embolism                     Telemetry Reviewed: Atrial fibrillation. HR averaging 75  Indication for Continued Telemetry Use: PE             Imaging: No pertinent imaging reviewed.    Recent Cultures (last 7 days):   Results from last 7 days   Lab Units 06/05/24  1413 06/05/24  1316   BLOOD CULTURE  No Growth at 24 hrs.  No Growth at 24 hrs.  --    URINE CULTURE   --  >100,000 cfu/ml Klebsiella oxytoca*       Last 24 Hours Medication List:   Current Facility-Administered Medications   Medication Dose Route Frequency Provider Last Rate    acetaminophen  650 mg Oral Q6H PRN Sherine Doan MD      amLODIPine  5 mg Oral Daily Sherine Doan MD      atorvastatin  40 mg Oral Daily With Dinner Sherine Doan MD      carvedilol  6.25 mg Oral BID With Meals Sherine Doan MD      heparin (porcine)  3-30 Units/kg/hr (Order-Specific) Intravenous Titrated Sherine Doan MD 18 Units/kg/hr (06/06/24 2247)    lisinopril  40 mg Oral Daily Sherine Doan MD      warfarin  2.5 mg Oral Daily (warfarin) Amalia Aceves PA-C          Today, Patient Was Seen By: Amalia Aceves PA-C    **Please Note: This note may have been constructed using a voice recognition system.**

## 2024-06-08 LAB
ANION GAP SERPL CALCULATED.3IONS-SCNC: 7 MMOL/L (ref 4–13)
APTT PPP: 66 SECONDS (ref 23–37)
BUN SERPL-MCNC: 20 MG/DL (ref 5–25)
CALCIUM SERPL-MCNC: 9.3 MG/DL (ref 8.4–10.2)
CHLORIDE SERPL-SCNC: 106 MMOL/L (ref 96–108)
CO2 SERPL-SCNC: 26 MMOL/L (ref 21–32)
CREAT SERPL-MCNC: 0.58 MG/DL (ref 0.6–1.3)
ERYTHROCYTE [DISTWIDTH] IN BLOOD BY AUTOMATED COUNT: 13.3 % (ref 11.6–15.1)
GFR SERPL CREATININE-BSD FRML MDRD: 89 ML/MIN/1.73SQ M
GLUCOSE SERPL-MCNC: 88 MG/DL (ref 65–140)
HCT VFR BLD AUTO: 37.6 % (ref 34.8–46.1)
HGB BLD-MCNC: 12.1 G/DL (ref 11.5–15.4)
INR PPP: 1.03 (ref 0.84–1.19)
MCH RBC QN AUTO: 29.4 PG (ref 26.8–34.3)
MCHC RBC AUTO-ENTMCNC: 32.2 G/DL (ref 31.4–37.4)
MCV RBC AUTO: 92 FL (ref 82–98)
PLATELET # BLD AUTO: 334 THOUSANDS/UL (ref 149–390)
PMV BLD AUTO: 10.2 FL (ref 8.9–12.7)
POTASSIUM SERPL-SCNC: 3.5 MMOL/L (ref 3.5–5.3)
PROTHROMBIN TIME: 13.9 SECONDS (ref 11.6–14.5)
RBC # BLD AUTO: 4.11 MILLION/UL (ref 3.81–5.12)
SODIUM SERPL-SCNC: 139 MMOL/L (ref 135–147)
WBC # BLD AUTO: 9.19 THOUSAND/UL (ref 4.31–10.16)

## 2024-06-08 PROCEDURE — 85027 COMPLETE CBC AUTOMATED: CPT | Performed by: INTERNAL MEDICINE

## 2024-06-08 PROCEDURE — 85610 PROTHROMBIN TIME: CPT | Performed by: INTERNAL MEDICINE

## 2024-06-08 PROCEDURE — 85730 THROMBOPLASTIN TIME PARTIAL: CPT | Performed by: INTERNAL MEDICINE

## 2024-06-08 PROCEDURE — 80048 BASIC METABOLIC PNL TOTAL CA: CPT | Performed by: INTERNAL MEDICINE

## 2024-06-08 PROCEDURE — 99233 SBSQ HOSP IP/OBS HIGH 50: CPT | Performed by: PHYSICIAN ASSISTANT

## 2024-06-08 RX ORDER — WARFARIN SODIUM 5 MG/1
5 TABLET ORAL
Status: COMPLETED | OUTPATIENT
Start: 2024-06-08 | End: 2024-06-08

## 2024-06-08 RX ADMIN — ATORVASTATIN CALCIUM 40 MG: 40 TABLET, FILM COATED ORAL at 18:06

## 2024-06-08 RX ADMIN — WARFARIN SODIUM 5 MG: 5 TABLET ORAL at 18:06

## 2024-06-08 RX ADMIN — LISINOPRIL 40 MG: 20 TABLET ORAL at 08:35

## 2024-06-08 RX ADMIN — HEPARIN SODIUM 18 UNITS/KG/HR: 10000 INJECTION, SOLUTION INTRAVENOUS at 02:45

## 2024-06-08 RX ADMIN — CARVEDILOL 6.25 MG: 3.12 TABLET, FILM COATED ORAL at 08:35

## 2024-06-08 RX ADMIN — WARFARIN SODIUM 2.5 MG: 2.5 TABLET ORAL at 18:07

## 2024-06-08 RX ADMIN — AMLODIPINE BESYLATE 5 MG: 5 TABLET ORAL at 08:35

## 2024-06-08 RX ADMIN — CARVEDILOL 6.25 MG: 3.12 TABLET, FILM COATED ORAL at 18:09

## 2024-06-08 NOTE — ASSESSMENT & PLAN NOTE
Continue aspirin, atorvastatin  Patient also has cerebral amyloid angiopathy and follows with neurology outpatient  At baseline she is oriented to place however has cognitive impairment per

## 2024-06-08 NOTE — PROGRESS NOTES
[facility]  Progress Note  Name: Lo Talley I  MRN: [id number]  Unit/Bed#: -01 I Date of Admission: 6/5/2024   Date of Service: 6/8/2024 I Hospital Day: 3    Assessment & Plan   Asymptomatic bacteriuria  Assessment & Plan  Urine culture resulting >100,000 klebsiella oxytoca   Asymptomatic   Consider possible colonization   Afebrile with mild leukocytosis - has since resolved   Continue to monitor off abx at this time     HTN (hypertension)  Assessment & Plan  On Norvasc 5 mg daily, carvedilol 6.25 and lisinopril  Continue medication  Monitor blood pressure    Atrial fibrillation (HCC)  Assessment & Plan  On carvedilol 6.25 twice daily   Patient follows with cardiology outpatient.  Has not been on Eliquis even though she was recommended per cardiology.   reported she does not like Eliquis and it was too expensive and he needs to pay out-of-pocket  Stable, continue carvedilol  Should continue outpatient follow up with cardiology   Currently on heparin drip with plans to bridge to coumadin    History of multiple strokes  Assessment & Plan  Continue aspirin, atorvastatin  Patient also has cerebral amyloid angiopathy and follows with neurology outpatient  At baseline she is oriented to place however has cognitive impairment per       * Pulmonary embolism (HCC)  Assessment & Plan  Denies CP/SOB  D-dimer 0.89 on presentation  CTA chest showed Small quantity of acute segmental right middle lobe and right lower lobe PE.  RV/LV ratio 1.2  BNP 49, troponin negative  ER discussed with critical care who considered patient stable for MedSurg.  ECHO: EF 60%. Systolic function normal, R ventricular cavity size at upper limit of normal within limitations of study.   Started on heparin drip in ED, continue until INR therapeutic  Patient's blood pressure stable, at room air    Plan:  After discussion with  yesterday, they are leaning more toward the heparin bridge to warfarin  over the eliquis. Eliquis was prescribed in the past but she wasn't taking it due to cost  Has been on warfarin 2.5mg but INR still subtherapeutic so will order 5 mg tonight  Continue to monitor PT/INR daily goal 2-3  Monitor on telemetry               VTE Pharmacologic Prophylaxis:   Pharmacologic: Heparin Drip      Patient Centered Rounds: I have performed bedside rounds with nursing staff today.      Education and Discussions with Family / Patient:  updated over the phone    Time Spent for Care: 30 minutes.  More than 50% of total time spent on counseling and coordination of care as described above.    Current Length of Stay: 3 day(s)    Current Patient Status: Inpatient   Certification Statement: The patient will continue to require additional inpatient hospital stay due to remains on heparin drip until INR therapeutic    Discharge Plan: anticipate discharge in next 24-48 hours    Code Status: Level 3 - DNAR and DNI      Subjective:   Patient sleeping comfortably in bed. Denies CP/SOB. Pulse ox stable on room air    Objective:     Vitals:   Temp (24hrs), Av °F (36.7 °C), Min:97.4 °F (36.3 °C), Max:98.4 °F (36.9 °C)    Temp:  [97.4 °F (36.3 °C)-98.4 °F (36.9 °C)] 97.4 °F (36.3 °C)  HR:  [66-79] 66  Resp:  [12-20] 18  BP: (116-139)/(70-78) 139/78  SpO2:  [94 %-98 %] 98 %  Body mass index is 18.72 kg/m².     Input and Output Summary (last 24 hours):       Intake/Output Summary (Last 24 hours) at 2024 1125  Last data filed at 2024 1023  Gross per 24 hour   Intake 630 ml   Output 524 ml   Net 106 ml       Physical Exam:     Physical Exam  Vitals reviewed.   Constitutional:       General: She is not in acute distress.     Appearance: She is not ill-appearing or diaphoretic.   HENT:      Head: Normocephalic and atraumatic.      Nose: Nose normal.      Mouth/Throat:      Pharynx: Oropharynx is clear.   Cardiovascular:      Rate and Rhythm: Normal rate.   Pulmonary:      Effort: Pulmonary effort is  normal. No respiratory distress.      Breath sounds: Normal breath sounds.   Abdominal:      General: Bowel sounds are normal. There is no distension.      Palpations: Abdomen is soft.      Tenderness: There is no abdominal tenderness.   Musculoskeletal:         General: No deformity or signs of injury.   Skin:     General: Skin is warm and dry.   Neurological:      Mental Status: She is alert. Mental status is at baseline.           Additional Data:     Labs:    Results from last 7 days   Lab Units 06/08/24  0251 06/06/24  0545 06/05/24  1210   WBC Thousand/uL 9.19   < > 13.97*   HEMOGLOBIN g/dL 12.1   < > 14.0   HEMATOCRIT % 37.6   < > 43.4   PLATELETS Thousands/uL 334   < > 478*   SEGS PCT %  --   --  76*   LYMPHO PCT %  --   --  16   MONO PCT %  --   --  5   EOS PCT %  --   --  2    < > = values in this interval not displayed.     Results from last 7 days   Lab Units 06/08/24  0251 06/06/24  0545 06/05/24  1210   SODIUM mmol/L 139   < > 140   POTASSIUM mmol/L 3.5   < > 3.8   CHLORIDE mmol/L 106   < > 105   CO2 mmol/L 26   < > 27   BUN mg/dL 20   < > 18   CREATININE mg/dL 0.58*   < > 0.77   ANION GAP mmol/L 7   < > 8   CALCIUM mg/dL 9.3   < > 10.1   ALBUMIN g/dL  --   --  4.1   TOTAL BILIRUBIN mg/dL  --   --  0.70   ALK PHOS U/L  --   --  73   ALT U/L  --   --  13   AST U/L  --   --  12*   GLUCOSE RANDOM mg/dL 88   < > 133    < > = values in this interval not displayed.     Results from last 7 days   Lab Units 06/08/24  0251   INR  1.03             Results from last 7 days   Lab Units 06/05/24  1413 06/05/24  1210   LACTIC ACID mmol/L 2.0  --    PROCALCITONIN ng/ml  --  0.06           * I Have Reviewed All Lab Data Listed Above.  * Additional Pertinent Lab Tests Reviewed: All Labs For Current Hospital Admission Reviewed        Recent Cultures (last 7 days):     Results from last 7 days   Lab Units 06/05/24  1413 06/05/24  1316   BLOOD CULTURE  No Growth at 48 hrs.  No Growth at 48 hrs.  --    URINE CULTURE   --   >100,000 cfu/ml Klebsiella oxytoca*  10,000-19,000 cfu/ml Aerococcus urinae*       Last 24 Hours Medication List:   Current Facility-Administered Medications   Medication Dose Route Frequency Provider Last Rate    acetaminophen  650 mg Oral Q6H PRN Sherine Doan MD      amLODIPine  5 mg Oral Daily Sherine Doan MD      atorvastatin  40 mg Oral Daily With Dinner Sherine Doan MD      carvedilol  6.25 mg Oral BID With Meals Sherine Doan MD      heparin (porcine)  3-30 Units/kg/hr (Order-Specific) Intravenous Titrated Sherine Doan MD 18 Units/kg/hr (06/08/24 0245)    lisinopril  40 mg Oral Daily Sherine Doan MD      warfarin  2.5 mg Oral Daily (warfarin) Amalia Aceves PA-C      warfarin  5 mg Oral Once (warfarin) Candida Ambrocio PA-C          Today, Patient Was Seen By: Candida Ambrocoi PA-C    ** Please Note: Dictation voice to text software may have been used in the creation of this document. **

## 2024-06-08 NOTE — PLAN OF CARE
Problem: Prexisting or High Potential for Compromised Skin Integrity  Goal: Skin integrity is maintained or improved  Description: INTERVENTIONS:  - Identify patients at risk for skin breakdown  - Assess and monitor skin integrity  - Assess and monitor nutrition and hydration status  - Monitor labs   - Assess for incontinence   - Turn and reposition patient  - Assist with mobility/ambulation  - Relieve pressure over bony prominences  - Avoid friction and shearing  - Provide appropriate hygiene as needed including keeping skin clean and dry  - Evaluate need for skin moisturizer/barrier cream  - Collaborate with interdisciplinary team   - Patient/family teaching  - Consider wound care consult   Outcome: Progressing     Problem: PAIN - ADULT  Goal: Verbalizes/displays adequate comfort level or baseline comfort level  Description: Interventions:  - Encourage patient to monitor pain and request assistance  - Assess pain using appropriate pain scale  - Administer analgesics based on type and severity of pain and evaluate response  - Implement non-pharmacological measures as appropriate and evaluate response  - Consider cultural and social influences on pain and pain management  - Notify physician/advanced practitioner if interventions unsuccessful or patient reports new pain  Outcome: Progressing     Problem: INFECTION - ADULT  Goal: Absence or prevention of progression during hospitalization  Description: INTERVENTIONS:  - Assess and monitor for signs and symptoms of infection  - Monitor lab/diagnostic results  - Monitor all insertion sites, i.e. indwelling lines, tubes, and drains  - Monitor endotracheal if appropriate and nasal secretions for changes in amount and color  - [place] appropriate cooling/warming therapies per order  - Administer medications as ordered  - Instruct and encourage patient and family to use good hand hygiene technique  - Identify and instruct in appropriate isolation precautions for  identified infection/condition  Outcome: Progressing  Goal: Absence of fever/infection during neutropenic period  Description: INTERVENTIONS:  - Monitor WBC    Outcome: Progressing     Problem: SAFETY ADULT  Goal: Patient will remain free of falls  Description: INTERVENTIONS:  - Educate patient/family on patient safety including physical limitations  - Instruct patient to call for assistance with activity   - Consult OT/PT to assist with strengthening/mobility   - Keep Call bell within reach  - Keep bed low and locked with side rails adjusted as appropriate  - Keep care items and personal belongings within reach  - Initiate and maintain comfort rounds  - Make Fall Risk Sign visible to staff  - Offer Toileting every 3 Hours, in advance of need  - Initiate/Maintain 3alarm  - Obtain necessary fall risk management equipment: 2  - Apply yellow socks and bracelet for high fall risk patients  - Consider moving patient to room near nurses station  Outcome: Progressing  Goal: Maintain or return to baseline ADL function  Description: INTERVENTIONS:  -  Assess patient's ability to carry out ADLs; assess patient's baseline for ADL function and identify physical deficits which impact ability to perform ADLs (bathing, care of mouth/teeth, toileting, grooming, dressing, etc.)  - Assess/evaluate cause of self-care deficits   - Assess range of motion  - Assess patient's mobility; develop plan if impaired  - Assess patient's need for assistive devices and provide as appropriate  - Encourage maximum independence but intervene and supervise when necessary  - Involve family in performance of ADLs  - Assess for home care needs following discharge   - Consider OT consult to assist with ADL evaluation and planning for discharge  - Provide patient education as appropriate  Outcome: Progressing  Goal: Maintains/Returns to pre admission functional level  Description: INTERVENTIONS:  - Perform AM-PAC 6 Click Basic Mobility/ Daily Activity  assessment daily.  - Set and communicate daily mobility goal to care team and patient/family/caregiver.   - Collaborate with rehabilitation services on mobility goals if consulted  - Perform Range of Motion 3 times a day.  - Reposition patient every 3 hours.  - Dangle patient 3 times a day  - Stand patient 3 times a day  - Ambulate patient 3 times a day  - Out of bed to chair 3 times a day   - Out of bed for meals 3 times a day  - Out of bed for toileting  - Record patient progress and toleration of activity level   Outcome: Progressing     Problem: DISCHARGE PLANNING  Goal: Discharge to home or other facility with appropriate resources  Description: INTERVENTIONS:  - Identify barriers to discharge w/patient and caregiver  - Arrange for needed discharge resources and transportation as appropriate  - Identify discharge learning needs (meds, wound care, etc.)  - Arrange for interpretive services to assist at discharge as needed  - Refer to Case Management Department for coordinating discharge planning if the patient needs post-hospital services based on physician/advanced practitioner order or complex needs related to functional status, cognitive ability, or social support system  Outcome: Progressing     Problem: Knowledge Deficit  Goal: Patient/family/caregiver demonstrates understanding of disease process, treatment plan, medications, and discharge instructions  Description: Complete learning assessment and assess knowledge base.  Interventions:  - Provide teaching at level of understanding  - Provide teaching via preferred learning methods  Outcome: Progressing

## 2024-06-08 NOTE — ASSESSMENT & PLAN NOTE
Denies CP/SOB  D-dimer 0.89 on presentation  CTA chest showed Small quantity of acute segmental right middle lobe and right lower lobe PE.  RV/LV ratio 1.2  BNP 49, troponin negative  ER discussed with critical care who considered patient stable for MedSurg.  ECHO: EF 60%. Systolic function normal, R ventricular cavity size at upper limit of normal within limitations of study.   Started on heparin drip in ED, continue until INR therapeutic  Patient's blood pressure stable, at room air    Plan:  After discussion with  yesterday, they are leaning more toward the heparin bridge to warfarin over the eliquis. Eliquis was prescribed in the past but she wasn't taking it due to cost  Has been on warfarin 2.5mg but INR still subtherapeutic so will order 5 mg tonight  Continue to monitor PT/INR daily goal 2-3  Monitor on telemetry

## 2024-06-08 NOTE — ASSESSMENT & PLAN NOTE
On carvedilol 6.25 twice daily   Patient follows with cardiology outpatient.  Has not been on Eliquis even though she was recommended per cardiology.   reported she does not like Eliquis and it was too expensive and he needs to pay out-of-pocket  Stable, continue carvedilol  Should continue outpatient follow up with cardiology   Currently on heparin drip with plans to bridge to coumadin

## 2024-06-08 NOTE — PLAN OF CARE
Problem: PAIN - ADULT  Goal: Verbalizes/displays adequate comfort level or baseline comfort level  Description: Interventions:  - Encourage patient to monitor pain and request assistance  - Assess pain using appropriate pain scale  - Administer analgesics based on type and severity of pain and evaluate response  - Implement non-pharmacological measures as appropriate and evaluate response  - Consider cultural and social influences on pain and pain management  - Notify physician/advanced practitioner if interventions unsuccessful or patient reports new pain  Outcome: Progressing     Problem: Prexisting or High Potential for Compromised Skin Integrity  Goal: Skin integrity is maintained or improved  Description: INTERVENTIONS:  - Identify patients at risk for skin breakdown  - Assess and monitor skin integrity  - Assess and monitor nutrition and hydration status  - Monitor labs   - Assess for incontinence   - Turn and reposition patient  - Assist with mobility/ambulation  - Relieve pressure over bony prominences  - Avoid friction and shearing  - Provide appropriate hygiene as needed including keeping skin clean and dry  - Evaluate need for skin moisturizer/barrier cream  - Collaborate with interdisciplinary team   - Patient/family teaching  - Consider wound care consult   Outcome: Progressing     Problem: INFECTION - ADULT  Goal: Absence or prevention of progression during hospitalization  Description: INTERVENTIONS:  - Assess and monitor for signs and symptoms of infection  - Monitor lab/diagnostic results  - Monitor all insertion sites, i.e. indwelling lines, tubes, and drains  - Monitor endotracheal if appropriate and nasal secretions for changes in amount and color  - [place] appropriate cooling/warming therapies per order  - Administer medications as ordered  - Instruct and encourage patient and family to use good hand hygiene technique  - Identify and instruct in appropriate isolation precautions for  identified infection/condition  Outcome: Progressing  Goal: Absence of fever/infection during neutropenic period  Description: INTERVENTIONS:  - Monitor WBC    Outcome: Progressing     Problem: Knowledge Deficit  Goal: Patient/family/caregiver demonstrates understanding of disease process, treatment plan, medications, and discharge instructions  Description: Complete learning assessment and assess knowledge base.  Interventions:  - Provide teaching at level of understanding  - Provide teaching via preferred learning methods  Outcome: Progressing     Problem: DISCHARGE PLANNING  Goal: Discharge to home or other facility with appropriate resources  Description: INTERVENTIONS:  - Identify barriers to discharge w/patient and caregiver  - Arrange for needed discharge resources and transportation as appropriate  - Identify discharge learning needs (meds, wound care, etc.)  - Arrange for interpretive services to assist at discharge as needed  - Refer to Case Management Department for coordinating discharge planning if the patient needs post-hospital services based on physician/advanced practitioner order or complex needs related to functional status, cognitive ability, or social support system  Outcome: Progressing

## 2024-06-09 LAB
APTT PPP: 102 SECONDS (ref 23–37)
APTT PPP: 81 SECONDS (ref 23–37)
APTT PPP: 89 SECONDS (ref 23–37)
INR PPP: 1.19 (ref 0.84–1.19)
PROTHROMBIN TIME: 15.5 SECONDS (ref 11.6–14.5)

## 2024-06-09 PROCEDURE — 85730 THROMBOPLASTIN TIME PARTIAL: CPT

## 2024-06-09 PROCEDURE — 85610 PROTHROMBIN TIME: CPT

## 2024-06-09 PROCEDURE — 85730 THROMBOPLASTIN TIME PARTIAL: CPT | Performed by: INTERNAL MEDICINE

## 2024-06-09 PROCEDURE — 99232 SBSQ HOSP IP/OBS MODERATE 35: CPT | Performed by: INTERNAL MEDICINE

## 2024-06-09 RX ORDER — WARFARIN SODIUM 5 MG/1
5 TABLET ORAL
Status: DISCONTINUED | OUTPATIENT
Start: 2024-06-09 | End: 2024-06-11 | Stop reason: HOSPADM

## 2024-06-09 RX ADMIN — WARFARIN SODIUM 5 MG: 5 TABLET ORAL at 18:51

## 2024-06-09 RX ADMIN — CARVEDILOL 6.25 MG: 3.12 TABLET, FILM COATED ORAL at 09:51

## 2024-06-09 RX ADMIN — AMLODIPINE BESYLATE 5 MG: 5 TABLET ORAL at 09:51

## 2024-06-09 RX ADMIN — LISINOPRIL 40 MG: 20 TABLET ORAL at 09:51

## 2024-06-09 RX ADMIN — CARVEDILOL 6.25 MG: 3.12 TABLET, FILM COATED ORAL at 16:52

## 2024-06-09 RX ADMIN — HEPARIN SODIUM 18 UNITS/KG/HR: 10000 INJECTION, SOLUTION INTRAVENOUS at 04:10

## 2024-06-09 RX ADMIN — ATORVASTATIN CALCIUM 40 MG: 40 TABLET, FILM COATED ORAL at 16:52

## 2024-06-09 NOTE — PLAN OF CARE
Problem: Prexisting or High Potential for Compromised Skin Integrity  Goal: Skin integrity is maintained or improved  Description: INTERVENTIONS:  - Identify patients at risk for skin breakdown  - Assess and monitor skin integrity  - Assess and monitor nutrition and hydration status  - Monitor labs   - Assess for incontinence   - Turn and reposition patient  - Assist with mobility/ambulation  - Relieve pressure over bony prominences  - Avoid friction and shearing  - Provide appropriate hygiene as needed including keeping skin clean and dry  - Evaluate need for skin moisturizer/barrier cream  - Collaborate with interdisciplinary team   - Patient/family teaching  - Consider wound care consult   Outcome: Progressing     Problem: PAIN - ADULT  Goal: Verbalizes/displays adequate comfort level or baseline comfort level  Description: Interventions:  - Encourage patient to monitor pain and request assistance  - Assess pain using appropriate pain scale  - Administer analgesics based on type and severity of pain and evaluate response  - Implement non-pharmacological measures as appropriate and evaluate response  - Consider cultural and social influences on pain and pain management  - Notify physician/advanced practitioner if interventions unsuccessful or patient reports new pain  Outcome: Progressing     Problem: INFECTION - ADULT  Goal: Absence or prevention of progression during hospitalization  Description: INTERVENTIONS:  - Assess and monitor for signs and symptoms of infection  - Monitor lab/diagnostic results  - Monitor all insertion sites, i.e. indwelling lines, tubes, and drains  - Monitor endotracheal if appropriate and nasal secretions for changes in amount and color  - [place] appropriate cooling/warming therapies per order  - Administer medications as ordered  - Instruct and encourage patient and family to use good hand hygiene technique  - Identify and instruct in appropriate isolation precautions for  identified infection/condition  Outcome: Progressing  Goal: Absence of fever/infection during neutropenic period  Description: INTERVENTIONS:  - Monitor WBC    Outcome: Progressing     Problem: SAFETY ADULT  Goal: Patient will remain free of falls  Description: INTERVENTIONS:  - Educate patient/family on patient safety including physical limitations  - Instruct patient to call for assistance with activity   - Consult OT/PT to assist with strengthening/mobility   - Keep Call bell within reach  - Keep bed low and locked with side rails adjusted as appropriate  - Keep care items and personal belongings within reach  - Initiate and maintain comfort rounds  - Make Fall Risk Sign visible to staff  - Offer Toileting every 3 Hours, in advance of need  - Initiate/Maintain 3alarm  - Obtain necessary fall risk management equipment: 2  - Apply yellow socks and bracelet for high fall risk patients  - Consider moving patient to room near nurses station  Outcome: Progressing  Goal: Maintain or return to baseline ADL function  Description: INTERVENTIONS:  -  Assess patient's ability to carry out ADLs; assess patient's baseline for ADL function and identify physical deficits which impact ability to perform ADLs (bathing, care of mouth/teeth, toileting, grooming, dressing, etc.)  - Assess/evaluate cause of self-care deficits   - Assess range of motion  - Assess patient's mobility; develop plan if impaired  - Assess patient's need for assistive devices and provide as appropriate  - Encourage maximum independence but intervene and supervise when necessary  - Involve family in performance of ADLs  - Assess for home care needs following discharge   - Consider OT consult to assist with ADL evaluation and planning for discharge  - Provide patient education as appropriate  Outcome: Progressing  Goal: Maintains/Returns to pre admission functional level  Description: INTERVENTIONS:  - Perform AM-PAC 6 Click Basic Mobility/ Daily Activity  assessment daily.  - Set and communicate daily mobility goal to care team and patient/family/caregiver.   - Collaborate with rehabilitation services on mobility goals if consulted  - Perform Range of Motion 3 times a day.  - Reposition patient every 3 hours.  - Dangle patient 3 times a day  - Stand patient 3 times a day  - Ambulate patient 3 times a day  - Out of bed to chair 3 times a day   - Out of bed for meals 3 times a day  - Out of bed for toileting  - Record patient progress and toleration of activity level   Outcome: Progressing     Problem: DISCHARGE PLANNING  Goal: Discharge to home or other facility with appropriate resources  Description: INTERVENTIONS:  - Identify barriers to discharge w/patient and caregiver  - Arrange for needed discharge resources and transportation as appropriate  - Identify discharge learning needs (meds, wound care, etc.)  - Arrange for interpretive services to assist at discharge as needed  - Refer to Case Management Department for coordinating discharge planning if the patient needs post-hospital services based on physician/advanced practitioner order or complex needs related to functional status, cognitive ability, or social support system  Outcome: Progressing     Problem: Knowledge Deficit  Goal: Patient/family/caregiver demonstrates understanding of disease process, treatment plan, medications, and discharge instructions  Description: Complete learning assessment and assess knowledge base.  Interventions:  - Provide teaching at level of understanding  - Provide teaching via preferred learning methods  Outcome: Progressing

## 2024-06-09 NOTE — ASSESSMENT & PLAN NOTE
Denies CP/SOB  D-dimer 0.89 on presentation  CTA chest showed Small quantity of acute segmental right middle lobe and right lower lobe PE.  RV/LV ratio 1.2  BNP 49, troponin negative  ER discussed with critical care who considered patient stable for MedSurg.  ECHO: EF 60%. Systolic function normal, R ventricular cavity size at upper limit of normal within limitations of study.   Started on heparin drip in ED, continue until INR therapeutic  Patient's blood pressure stable, at room air    Plan:  Increase warfarin to 5 mg while continuing the heparin drip, INR 1.1   Continue to monitor PT/INR daily goal 2-3  Ne need for telemetry

## 2024-06-09 NOTE — ASSESSMENT & PLAN NOTE
On carvedilol 6.25 twice daily   Patient follows with cardiology outpatient.  Has not been on Eliquis even though she was recommended per cardiology.   reported she does not like Eliquis and it was too expensive and he needs to pay out-of-pocket  Stable, continue carvedilol  Should continue outpatient follow up with cardiology   On warfarin, INR normal

## 2024-06-09 NOTE — PLAN OF CARE
Problem: Prexisting or High Potential for Compromised Skin Integrity  Goal: Skin integrity is maintained or improved  Description: INTERVENTIONS:  - Identify patients at risk for skin breakdown  - Assess and monitor skin integrity  - Assess and monitor nutrition and hydration status  - Monitor labs   - Assess for incontinence   - Turn and reposition patient  - Assist with mobility/ambulation  - Relieve pressure over bony prominences  - Avoid friction and shearing  - Provide appropriate hygiene as needed including keeping skin clean and dry  - Evaluate need for skin moisturizer/barrier cream  - Collaborate with interdisciplinary team   - Patient/family teaching  - Consider wound care consult   Outcome: Progressing     Problem: PAIN - ADULT  Goal: Verbalizes/displays adequate comfort level or baseline comfort level  Description: Interventions:  - Encourage patient to monitor pain and request assistance  - Assess pain using appropriate pain scale  - Administer analgesics based on type and severity of pain and evaluate response  - Implement non-pharmacological measures as appropriate and evaluate response  - Consider cultural and social influences on pain and pain management  - Notify physician/advanced practitioner if interventions unsuccessful or patient reports new pain  Outcome: Progressing     Problem: INFECTION - ADULT  Goal: Absence or prevention of progression during hospitalization  Description: INTERVENTIONS:  - Assess and monitor for signs and symptoms of infection  - Monitor lab/diagnostic results  - Monitor all insertion sites, i.e. indwelling lines, tubes, and drains  - Monitor endotracheal if appropriate and nasal secretions for changes in amount and color  - [place] appropriate cooling/warming therapies per order  - Administer medications as ordered  - Instruct and encourage patient and family to use good hand hygiene technique  - Identify and instruct in appropriate isolation precautions for  identified infection/condition  Outcome: Progressing  Goal: Absence of fever/infection during neutropenic period  Description: INTERVENTIONS:  - Monitor WBC    Outcome: Progressing     Problem: SAFETY ADULT  Goal: Patient will remain free of falls  Description: INTERVENTIONS:  - Educate patient/family on patient safety including physical limitations  - Instruct patient to call for assistance with activity   - Consult OT/PT to assist with strengthening/mobility   - Keep Call bell within reach  - Keep bed low and locked with side rails adjusted as appropriate  - Keep care items and personal belongings within reach  - Initiate and maintain comfort rounds  - Make Fall Risk Sign visible to staff  - Offer Toileting every 3 Hours, in advance of need  - Initiate/Maintain 3alarm  - Obtain necessary fall risk management equipment: 2  - Apply yellow socks and bracelet for high fall risk patients  - Consider moving patient to room near nurses station  Outcome: Progressing  Goal: Maintain or return to baseline ADL function  Description: INTERVENTIONS:  -  Assess patient's ability to carry out ADLs; assess patient's baseline for ADL function and identify physical deficits which impact ability to perform ADLs (bathing, care of mouth/teeth, toileting, grooming, dressing, etc.)  - Assess/evaluate cause of self-care deficits   - Assess range of motion  - Assess patient's mobility; develop plan if impaired  - Assess patient's need for assistive devices and provide as appropriate  - Encourage maximum independence but intervene and supervise when necessary  - Involve family in performance of ADLs  - Assess for home care needs following discharge   - Consider OT consult to assist with ADL evaluation and planning for discharge  - Provide patient education as appropriate  Outcome: Progressing  Goal: Maintains/Returns to pre admission functional level  Description: INTERVENTIONS:  - Perform AM-PAC 6 Click Basic Mobility/ Daily Activity  assessment daily.  - Set and communicate daily mobility goal to care team and patient/family/caregiver.   - Collaborate with rehabilitation services on mobility goals if consulted  - Perform Range of Motion 3 times a day.  - Reposition patient every 3 hours.  - Dangle patient 3 times a day  - Stand patient 3 times a day  - Ambulate patient 3 times a day  - Out of bed to chair 3 times a day   - Out of bed for meals 3 times a day  - Out of bed for toileting  - Record patient progress and toleration of activity level   Outcome: Progressing     Problem: DISCHARGE PLANNING  Goal: Discharge to home or other facility with appropriate resources  Description: INTERVENTIONS:  - Identify barriers to discharge w/patient and caregiver  - Arrange for needed discharge resources and transportation as appropriate  - Identify discharge learning needs (meds, wound care, etc.)  - Arrange for interpretive services to assist at discharge as needed  - Refer to Case Management Department for coordinating discharge planning if the patient needs post-hospital services based on physician/advanced practitioner order or complex needs related to functional status, cognitive ability, or social support system  Outcome: Progressing     Problem: Knowledge Deficit  Goal: Patient/family/caregiver demonstrates understanding of disease process, treatment plan, medications, and discharge instructions  Description: Complete learning assessment and assess knowledge base.  Interventions:  - Provide teaching at level of understanding  - Provide teaching via preferred learning methods  Outcome: Progressing

## 2024-06-09 NOTE — ASSESSMENT & PLAN NOTE
On Norvasc 5 mg daily, carvedilol 6.25 and lisinopril  Continue medication  Bp reviewed and stable   Monitor

## 2024-06-09 NOTE — PROGRESS NOTES
[facility]  Progress Note  Name: Lo Talley I  MRN: [id number]  Unit/Bed#: -01 I Date of Admission: 6/5/2024   Date of Service: 6/9/2024 I Hospital Day: 4    Assessment & Plan   * Pulmonary embolism (HCC)  Assessment & Plan  Denies CP/SOB  D-dimer 0.89 on presentation  CTA chest showed Small quantity of acute segmental right middle lobe and right lower lobe PE.  RV/LV ratio 1.2  BNP 49, troponin negative  ER discussed with critical care who considered patient stable for MedSurg.  ECHO: EF 60%. Systolic function normal, R ventricular cavity size at upper limit of normal within limitations of study.   Started on heparin drip in ED, continue until INR therapeutic  Patient's blood pressure stable, at room air    Plan:  Increase warfarin to 5 mg while continuing the heparin drip, INR 1.1   Continue to monitor PT/INR daily goal 2-3  Ne need for telemetry       Asymptomatic bacteriuria  Assessment & Plan  Urine culture resulting >100,000 klebsiella oxytoca   Asymptomatic   Consider possible colonization   Afebrile with mild leukocytosis - has since resolved   Continue to monitor off abx at this time     HTN (hypertension)  Assessment & Plan  On Norvasc 5 mg daily, carvedilol 6.25 and lisinopril  Continue medication  Bp reviewed and stable   Monitor     Atrial fibrillation (HCC)  Assessment & Plan  On carvedilol 6.25 twice daily   Patient follows with cardiology outpatient.  Has not been on Eliquis even though she was recommended per cardiology.   reported she does not like Eliquis and it was too expensive and he needs to pay out-of-pocket  Stable, continue carvedilol  Should continue outpatient follow up with cardiology   On warfarin, INR normal     History of multiple strokes  Assessment & Plan  Continue aspirin, atorvastatin  Patient also has cerebral amyloid angiopathy and follows with neurology outpatient  At baseline she is oriented to place however has cognitive impairment  per              VTE Pharmacologic Prophylaxis: VTE Score: 8 High Risk (Score >/= 5) - Pharmacological DVT Prophylaxis Ordered: heparin. Sequential Compression Devices Ordered.    Mobility:   Basic Mobility Inpatient Raw Score: 18  JH-HLM Goal: 6: Walk 10 steps or more  JH-HLM Achieved: 4: Move to chair/commode  JH-HLM Goal NOT achieved. Continue with multidisciplinary rounding and encourage appropriate mobility to improve upon JH-HLM goals.    Patient Centered Rounds: I performed bedside rounds with nursing staff today.   Discussions with Specialists or Other Care Team Provider:     Education and Discussions with Family / Patient: Updated  () via phone.    Total Time Spent on Date of Encounter in care of patient: 35 mins. This time was spent on one or more of the following: performing physical exam; counseling and coordination of care; obtaining or reviewing history; documenting in the medical record; reviewing/ordering tests, medications or procedures; communicating with other healthcare professionals and discussing with patient's family/caregivers.    Current Length of Stay: 4 day(s)  Current Patient Status: Inpatient   Certification Statement: The patient will continue to require additional inpatient hospital stay due to inr still low   Discharge Plan: Anticipate discharge in 24-48 hrs to home.    Code Status: Level 3 - DNAR and DNI    Subjective:     No complaints     Objective:     Vitals:   Temp (24hrs), Av.4 °F (36.9 °C), Min:98.1 °F (36.7 °C), Max:98.9 °F (37.2 °C)    Temp:  [98.1 °F (36.7 °C)-98.9 °F (37.2 °C)] 98.9 °F (37.2 °C)  HR:  [60-69] 69  Resp:  [16-18] 18  BP: (120-139)/(64-79) 120/64  SpO2:  [95 %-98 %] 97 %  Body mass index is 18.72 kg/m².     Input and Output Summary (last 24 hours):     Intake/Output Summary (Last 24 hours) at 2024 1532  Last data filed at 2024 0943  Gross per 24 hour   Intake 1030 ml   Output 70 ml   Net 960 ml       Physical Exam:    Physical Exam  Vitals and nursing note reviewed.   Constitutional:       General: She is not in acute distress.     Appearance: She is not diaphoretic.   HENT:      Head: Normocephalic.   Eyes:      General: No scleral icterus.        Right eye: No discharge.         Left eye: No discharge.   Cardiovascular:      Rate and Rhythm: Normal rate and regular rhythm.   Pulmonary:      Effort: Pulmonary effort is normal. No respiratory distress.      Breath sounds: Normal breath sounds. No wheezing, rhonchi or rales.   Abdominal:      General: There is no distension.      Palpations: Abdomen is soft.      Tenderness: There is no abdominal tenderness. There is no guarding or rebound.   Musculoskeletal:      Cervical back: Normal range of motion.      Right lower leg: No edema.      Left lower leg: No edema.   Skin:     General: Skin is warm.   Neurological:      Mental Status: She is alert.   Psychiatric:         Mood and Affect: Mood normal.         Behavior: Behavior normal.         Thought Content: Thought content normal.         Judgment: Judgment normal.          Additional Data:     Labs:  Results from last 7 days   Lab Units 06/08/24  0251 06/06/24  0545 06/05/24  1210   WBC Thousand/uL 9.19   < > 13.97*   HEMOGLOBIN g/dL 12.1   < > 14.0   HEMATOCRIT % 37.6   < > 43.4   PLATELETS Thousands/uL 334   < > 478*   SEGS PCT %  --   --  76*   LYMPHO PCT %  --   --  16   MONO PCT %  --   --  5   EOS PCT %  --   --  2    < > = values in this interval not displayed.     Results from last 7 days   Lab Units 06/08/24  0251 06/06/24  0545 06/05/24  1210   SODIUM mmol/L 139   < > 140   POTASSIUM mmol/L 3.5   < > 3.8   CHLORIDE mmol/L 106   < > 105   CO2 mmol/L 26   < > 27   BUN mg/dL 20   < > 18   CREATININE mg/dL 0.58*   < > 0.77   ANION GAP mmol/L 7   < > 8   CALCIUM mg/dL 9.3   < > 10.1   ALBUMIN g/dL  --   --  4.1   TOTAL BILIRUBIN mg/dL  --   --  0.70   ALK PHOS U/L  --   --  73   ALT U/L  --   --  13   AST U/L  --   --   12*   GLUCOSE RANDOM mg/dL 88   < > 133    < > = values in this interval not displayed.     Results from last 7 days   Lab Units 06/09/24  0425   INR  1.19             Results from last 7 days   Lab Units 06/05/24  1413 06/05/24  1210   LACTIC ACID mmol/L 2.0  --    PROCALCITONIN ng/ml  --  0.06       Lines/Drains:  Invasive Devices       Peripheral Intravenous Line  Duration             Peripheral IV 06/07/24 Dorsal (posterior);Left Forearm 1 day                          Imaging: No pertinent imaging reviewed.    Recent Cultures (last 7 days):   Results from last 7 days   Lab Units 06/05/24  1413 06/05/24  1316   BLOOD CULTURE  No Growth at 72 hrs.  No Growth at 72 hrs.  --    URINE CULTURE   --  >100,000 cfu/ml Klebsiella oxytoca*  10,000-19,000 cfu/ml Aerococcus urinae*       Last 24 Hours Medication List:   Current Facility-Administered Medications   Medication Dose Route Frequency Provider Last Rate    acetaminophen  650 mg Oral Q6H PRN Sherine Doan MD      amLODIPine  5 mg Oral Daily Sherine Doan MD      atorvastatin  40 mg Oral Daily With Dinner Sherine Doan MD      carvedilol  6.25 mg Oral BID With Meals Sherine Doan MD      heparin (porcine)  3-30 Units/kg/hr (Order-Specific) Intravenous Titrated Sherine Doan MD 16 Units/kg/hr (06/09/24 1341)    lisinopril  40 mg Oral Daily Sherine Doan MD      warfarin  5 mg Oral Daily (warfarin) Sherine Doan MD          Today, Patient Was Seen By: Sherine Doan MD    **Please Note: This note may have been constructed using a voice recognition system.**

## 2024-06-10 LAB
ANION GAP SERPL CALCULATED.3IONS-SCNC: 7 MMOL/L (ref 4–13)
APTT PPP: 57 SECONDS (ref 23–37)
APTT PPP: 97 SECONDS (ref 23–37)
BACTERIA BLD CULT: NORMAL
BACTERIA BLD CULT: NORMAL
BUN SERPL-MCNC: 20 MG/DL (ref 5–25)
CALCIUM SERPL-MCNC: 9.1 MG/DL (ref 8.4–10.2)
CHLORIDE SERPL-SCNC: 108 MMOL/L (ref 96–108)
CO2 SERPL-SCNC: 25 MMOL/L (ref 21–32)
CREAT SERPL-MCNC: 0.51 MG/DL (ref 0.6–1.3)
ERYTHROCYTE [DISTWIDTH] IN BLOOD BY AUTOMATED COUNT: 13.8 % (ref 11.6–15.1)
GFR SERPL CREATININE-BSD FRML MDRD: 93 ML/MIN/1.73SQ M
GLUCOSE SERPL-MCNC: 91 MG/DL (ref 65–140)
HCT VFR BLD AUTO: 35.9 % (ref 34.8–46.1)
HGB BLD-MCNC: 12 G/DL (ref 11.5–15.4)
INR PPP: 1.51 (ref 0.84–1.19)
MCH RBC QN AUTO: 30.4 PG (ref 26.8–34.3)
MCHC RBC AUTO-ENTMCNC: 33.4 G/DL (ref 31.4–37.4)
MCV RBC AUTO: 91 FL (ref 82–98)
PLATELET # BLD AUTO: 314 THOUSANDS/UL (ref 149–390)
PMV BLD AUTO: 9.9 FL (ref 8.9–12.7)
POTASSIUM SERPL-SCNC: 3.6 MMOL/L (ref 3.5–5.3)
PROTHROMBIN TIME: 18.7 SECONDS (ref 11.6–14.5)
RBC # BLD AUTO: 3.95 MILLION/UL (ref 3.81–5.12)
SODIUM SERPL-SCNC: 140 MMOL/L (ref 135–147)
WBC # BLD AUTO: 8.62 THOUSAND/UL (ref 4.31–10.16)

## 2024-06-10 PROCEDURE — 80048 BASIC METABOLIC PNL TOTAL CA: CPT | Performed by: INTERNAL MEDICINE

## 2024-06-10 PROCEDURE — 85730 THROMBOPLASTIN TIME PARTIAL: CPT | Performed by: INTERNAL MEDICINE

## 2024-06-10 PROCEDURE — 85027 COMPLETE CBC AUTOMATED: CPT | Performed by: INTERNAL MEDICINE

## 2024-06-10 PROCEDURE — 99232 SBSQ HOSP IP/OBS MODERATE 35: CPT | Performed by: PHYSICIAN ASSISTANT

## 2024-06-10 PROCEDURE — 85610 PROTHROMBIN TIME: CPT | Performed by: INTERNAL MEDICINE

## 2024-06-10 RX ADMIN — ATORVASTATIN CALCIUM 40 MG: 40 TABLET, FILM COATED ORAL at 17:16

## 2024-06-10 RX ADMIN — CARVEDILOL 6.25 MG: 3.12 TABLET, FILM COATED ORAL at 17:16

## 2024-06-10 RX ADMIN — HEPARIN SODIUM 14 UNITS/KG/HR: 10000 INJECTION, SOLUTION INTRAVENOUS at 13:40

## 2024-06-10 RX ADMIN — AMLODIPINE BESYLATE 5 MG: 5 TABLET ORAL at 09:24

## 2024-06-10 RX ADMIN — LISINOPRIL 40 MG: 20 TABLET ORAL at 09:24

## 2024-06-10 RX ADMIN — WARFARIN SODIUM 5 MG: 5 TABLET ORAL at 17:46

## 2024-06-10 RX ADMIN — CARVEDILOL 6.25 MG: 3.12 TABLET, FILM COATED ORAL at 09:24

## 2024-06-10 NOTE — ASSESSMENT & PLAN NOTE
On carvedilol 6.25 twice daily, continue  Patient follows with cardiology outpatient.  Has not been on Eliquis even though she was recommended per cardiology.   reported she does not like Eliquis and it was too expensive and he needs to pay out-of-pocket  Stable, continue carvedilol  Should continue outpatient follow up with Cardiology   On warfarin, trend INR daily

## 2024-06-10 NOTE — ASSESSMENT & PLAN NOTE
Denies CP/SOB  D-dimer 0.89 on presentation  CTA chest showed Small quantity of acute segmental right middle lobe and right lower lobe PE.  RV/LV ratio 1.2  BNP 49, troponin negative  ECHO: EF 60%. Systolic function normal, R ventricular cavity size at upper limit of normal within limitations of study.   Started on heparin drip in ED, continue until INR therapeutic  Unable to afford NOAC previously  Patient's blood pressure stable, at room air  Increased warfarin to 5 mg while continuing the heparin drip, INR 1.5  Continue to monitor PT/INR daily goal 2-3  Ne need for telemetry

## 2024-06-10 NOTE — PROGRESS NOTES
[facility]  Progress Note  Name: Lo Talley I  MRN: [id number]  Unit/Bed#: -01 I Date of Admission: 6/5/2024   Date of Service: 6/10/2024 I Hospital Day: 5    Assessment & Plan   * Pulmonary embolism (HCC)  Assessment & Plan  Denies CP/SOB  D-dimer 0.89 on presentation  CTA chest showed Small quantity of acute segmental right middle lobe and right lower lobe PE.  RV/LV ratio 1.2  BNP 49, troponin negative  ECHO: EF 60%. Systolic function normal, R ventricular cavity size at upper limit of normal within limitations of study.   Started on heparin drip in ED, continue until INR therapeutic  Unable to afford NOAC previously  Patient's blood pressure stable, at room air  Increased warfarin to 5 mg while continuing the heparin drip, INR 1.5  Continue to monitor PT/INR daily goal 2-3  Ne need for telemetry       Asymptomatic bacteriuria  Assessment & Plan  Urine culture resulting >100,000 klebsiella oxytoca   Asymptomatic   Consider possible colonization   Afebrile with mild leukocytosis - has since resolved   Continue to monitor off abx at this time     HTN (hypertension)  Assessment & Plan  On Norvasc 5 mg daily, carvedilol 6.25 and lisinopril  Continue medication  BP reviewed and stable   Monitor     Atrial fibrillation (HCC)  Assessment & Plan  On carvedilol 6.25 twice daily, continue  Patient follows with cardiology outpatient.  Has not been on Eliquis even though she was recommended per cardiology.   reported she does not like Eliquis and it was too expensive and he needs to pay out-of-pocket  Stable, continue carvedilol  Should continue outpatient follow up with Cardiology   On warfarin, trend INR daily    History of multiple strokes  Assessment & Plan  Continue aspirin, atorvastatin  Patient also has cerebral amyloid angiopathy and follows with neurology outpatient  At baseline she is oriented to place however has cognitive impairment per                VTE  Pharmacologic Prophylaxis: VTE Score: 8 High Risk (Score >/= 5) - Pharmacological DVT Prophylaxis Ordered: heparin drip. Sequential Compression Devices Ordered. - Coumadin    Mobility:   Basic Mobility Inpatient Raw Score: 18  JH-HLM Goal: 6: Walk 10 steps or more  JH-HLM Achieved: 4: Move to chair/commode  JH-HLM Goal NOT achieved. Continue with multidisciplinary rounding and encourage appropriate mobility to improve upon JH-HLM goals.    Patient Centered Rounds: I performed bedside rounds with nursing staff today.   Discussions with Specialists or Other Care Team Provider: Will review with case management rounds    Education and Discussions with Family / Patient: Patient declined call to .     Total Time Spent on Date of Encounter in care of patient: 35 mins. This time was spent on one or more of the following: performing physical exam; counseling and coordination of care; obtaining or reviewing history; documenting in the medical record; reviewing/ordering tests, medications or procedures; communicating with other healthcare professionals and discussing with patient's family/caregivers.    Current Length of Stay: 5 day(s)  Current Patient Status: Inpatient   Certification Statement: The patient will continue to require additional inpatient hospital stay due to heparin - coumadin bridge  Discharge Plan: Anticipate discharge in 24-48 hrs to home.    Code Status: Level 3 - DNAR and DNI    Subjective:   Patient feels well today, denies any shortness of breath.  Offers no additional questions.    Objective:     Vitals:   Temp (24hrs), Av.6 °F (37 °C), Min:98.2 °F (36.8 °C), Max:98.9 °F (37.2 °C)    Temp:  [98.2 °F (36.8 °C)-98.9 °F (37.2 °C)] 98.2 °F (36.8 °C)  HR:  [66-69] 66  Resp:  [18-20] 20  BP: (120-140)/(64-82) 140/82  SpO2:  [95 %-97 %] 95 %  Body mass index is 18.72 kg/m².     Input and Output Summary (last 24 hours):     Intake/Output Summary (Last 24 hours) at 6/10/2024 0908  Last data  filed at 6/9/2024 2301  Gross per 24 hour   Intake 1450 ml   Output --   Net 1450 ml       Physical Exam:   Physical Exam  Vitals and nursing note reviewed.   Constitutional:       General: She is not in acute distress.     Appearance: Normal appearance. She is well-developed.   HENT:      Head: Normocephalic and atraumatic.   Eyes:      General: No scleral icterus.     Conjunctiva/sclera: Conjunctivae normal.   Cardiovascular:      Rate and Rhythm: Normal rate and regular rhythm.      Heart sounds: No murmur heard.  Pulmonary:      Effort: Pulmonary effort is normal.      Breath sounds: No wheezing, rhonchi or rales.   Abdominal:      General: There is no distension.      Palpations: Abdomen is soft.   Skin:     General: Skin is warm and dry.   Neurological:      Mental Status: She is alert. Mental status is at baseline.   Psychiatric:         Mood and Affect: Mood normal.          Additional Data:     Labs:  Results from last 7 days   Lab Units 06/10/24  0348 06/06/24  0545 06/05/24  1210   WBC Thousand/uL 8.62   < > 13.97*   HEMOGLOBIN g/dL 12.0   < > 14.0   HEMATOCRIT % 35.9   < > 43.4   PLATELETS Thousands/uL 314   < > 478*   SEGS PCT %  --   --  76*   LYMPHO PCT %  --   --  16   MONO PCT %  --   --  5   EOS PCT %  --   --  2    < > = values in this interval not displayed.     Results from last 7 days   Lab Units 06/10/24  0348 06/06/24  0545 06/05/24  1210   SODIUM mmol/L 140   < > 140   POTASSIUM mmol/L 3.6   < > 3.8   CHLORIDE mmol/L 108   < > 105   CO2 mmol/L 25   < > 27   BUN mg/dL 20   < > 18   CREATININE mg/dL 0.51*   < > 0.77   ANION GAP mmol/L 7   < > 8   CALCIUM mg/dL 9.1   < > 10.1   ALBUMIN g/dL  --   --  4.1   TOTAL BILIRUBIN mg/dL  --   --  0.70   ALK PHOS U/L  --   --  73   ALT U/L  --   --  13   AST U/L  --   --  12*   GLUCOSE RANDOM mg/dL 91   < > 133    < > = values in this interval not displayed.     Results from last 7 days   Lab Units 06/10/24  0348   INR  1.51*             Results from  last 7 days   Lab Units 06/05/24  1413 06/05/24  1210   LACTIC ACID mmol/L 2.0  --    PROCALCITONIN ng/ml  --  0.06       Lines/Drains:  Invasive Devices       Peripheral Intravenous Line  Duration             Peripheral IV 06/10/24 Right;Ventral (anterior) Forearm <1 day                          Imaging: Reviewed radiology reports from this admission including: chest CT scan    Recent Cultures (last 7 days):   Results from last 7 days   Lab Units 06/05/24  1413 06/05/24  1316   BLOOD CULTURE  No Growth After 4 Days.  No Growth After 4 Days.  --    URINE CULTURE   --  >100,000 cfu/ml Klebsiella oxytoca*  10,000-19,000 cfu/ml Aerococcus urinae*       Last 24 Hours Medication List:   Current Facility-Administered Medications   Medication Dose Route Frequency Provider Last Rate    acetaminophen  650 mg Oral Q6H PRN Shernie Doan MD      amLODIPine  5 mg Oral Daily Sherine Doan MD      atorvastatin  40 mg Oral Daily With Dinner Sherine Doan MD      carvedilol  6.25 mg Oral BID With Meals Sherine Doan MD      heparin (porcine)  3-30 Units/kg/hr (Order-Specific) Intravenous Titrated Sherine Doan MD 16 Units/kg/hr (06/09/24 2219)    lisinopril  40 mg Oral Daily Sherine Doan MD      warfarin  5 mg Oral Daily (warfarin) Sherine Doan MD          Today, Patient Was Seen By: Lizzie Greer PA-C    **Please Note: This note may have been constructed using a voice recognition system.**

## 2024-06-10 NOTE — CASE MANAGEMENT
Case Management Discharge Planning Note    Patient name Lo Talley  Location /-01 MRN [id number]  : 1945 Date 6/10/2024       Current Admission Date: 2024  Current Admission Diagnosis:Pulmonary embolism (HCC)   Patient Active Problem List    Diagnosis Date Noted Date Diagnosed    Asymptomatic bacteriuria 2024     Pulmonary embolism (HCC) 2024     Altered mental status 2024     Pancreatic lesion 2024     Moderate protein-calorie malnutrition (HCC) 2024     Acute blood loss anemia 2024     HTN (hypertension) 2024     Cerebral amyloid angiopathy (CODE) 2023     History of multiple strokes 06/15/2023     Atrial fibrillation (HCC) 06/15/2023     Hypertensive urgency 2023     Elevated troponin 2023       LOS (days): 5  Geometric Mean LOS (GMLOS) (days): 2.5  Days to GMLOS:-2.4     OBJECTIVE:  Risk of Unplanned Readmission Score: 13.28         Current admission status: Inpatient   Preferred Pharmacy:   [facility]  [place] - [address]  [address] [facility]  [place] [address]  Phone: [phone] Fax: [phone]    Primary Care Provider: RACHELLE Ruiz    Primary Insurance: MEDICARE  Secondary Insurance: [employer]ET[employer]    DISCHARGE DETAILS:    Additional Comments: Patient reviewed during care coordination rounds, pt not yet medically stable for discharge. INR currently 1.51, needs to be therapeutically between 2-3 prior to discharge. CM department to remain available for discharge concerns or needs.

## 2024-06-10 NOTE — PLAN OF CARE
Problem: Prexisting or High Potential for Compromised Skin Integrity  Goal: Skin integrity is maintained or improved  Description: INTERVENTIONS:  - Identify patients at risk for skin breakdown  - Assess and monitor skin integrity  - Assess and monitor nutrition and hydration status  - Monitor labs   - Assess for incontinence   - Turn and reposition patient  - Assist with mobility/ambulation  - Relieve pressure over bony prominences  - Avoid friction and shearing  - Provide appropriate hygiene as needed including keeping skin clean and dry  - Evaluate need for skin moisturizer/barrier cream  - Collaborate with interdisciplinary team   - Patient/family teaching  - Consider wound care consult   Outcome: Progressing     Problem: PAIN - ADULT  Goal: Verbalizes/displays adequate comfort level or baseline comfort level  Description: Interventions:  - Encourage patient to monitor pain and request assistance  - Assess pain using appropriate pain scale  - Administer analgesics based on type and severity of pain and evaluate response  - Implement non-pharmacological measures as appropriate and evaluate response  - Consider cultural and social influences on pain and pain management  - Notify physician/advanced practitioner if interventions unsuccessful or patient reports new pain  Outcome: Progressing     Problem: INFECTION - ADULT  Goal: Absence or prevention of progression during hospitalization  Description: INTERVENTIONS:  - Assess and monitor for signs and symptoms of infection  - Monitor lab/diagnostic results  - Monitor all insertion sites, i.e. indwelling lines, tubes, and drains  - Monitor endotracheal if appropriate and nasal secretions for changes in amount and color  - [place] appropriate cooling/warming therapies per order  - Administer medications as ordered  - Instruct and encourage patient and family to use good hand hygiene technique  - Identify and instruct in appropriate isolation precautions for  identified infection/condition  Outcome: Progressing  Goal: Absence of fever/infection during neutropenic period  Description: INTERVENTIONS:  - Monitor WBC    Outcome: Progressing     Problem: SAFETY ADULT  Goal: Patient will remain free of falls  Description: INTERVENTIONS:  - Educate patient/family on patient safety including physical limitations  - Instruct patient to call for assistance with activity   - Consult OT/PT to assist with strengthening/mobility   - Keep Call bell within reach  - Keep bed low and locked with side rails adjusted as appropriate  - Keep care items and personal belongings within reach  - Initiate and maintain comfort rounds  - Make Fall Risk Sign visible to staff  - Offer Toileting every 3 Hours, in advance of need  - Initiate/Maintain 3alarm  - Obtain necessary fall risk management equipment: 2  - Apply yellow socks and bracelet for high fall risk patients  - Consider moving patient to room near nurses station  Outcome: Progressing  Goal: Maintain or return to baseline ADL function  Description: INTERVENTIONS:  -  Assess patient's ability to carry out ADLs; assess patient's baseline for ADL function and identify physical deficits which impact ability to perform ADLs (bathing, care of mouth/teeth, toileting, grooming, dressing, etc.)  - Assess/evaluate cause of self-care deficits   - Assess range of motion  - Assess patient's mobility; develop plan if impaired  - Assess patient's need for assistive devices and provide as appropriate  - Encourage maximum independence but intervene and supervise when necessary  - Involve family in performance of ADLs  - Assess for home care needs following discharge   - Consider OT consult to assist with ADL evaluation and planning for discharge  - Provide patient education as appropriate  Outcome: Progressing  Goal: Maintains/Returns to pre admission functional level  Description: INTERVENTIONS:  - Perform AM-PAC 6 Click Basic Mobility/ Daily Activity  assessment daily.  - Set and communicate daily mobility goal to care team and patient/family/caregiver.   - Collaborate with rehabilitation services on mobility goals if consulted  - Perform Range of Motion 3 times a day.  - Reposition patient every 3 hours.  - Dangle patient 3 times a day  - Stand patient 3 times a day  - Ambulate patient 3 times a day  - Out of bed to chair 3 times a day   - Out of bed for meals 3 times a day  - Out of bed for toileting  - Record patient progress and toleration of activity level   Outcome: Progressing     Problem: DISCHARGE PLANNING  Goal: Discharge to home or other facility with appropriate resources  Description: INTERVENTIONS:  - Identify barriers to discharge w/patient and caregiver  - Arrange for needed discharge resources and transportation as appropriate  - Identify discharge learning needs (meds, wound care, etc.)  - Arrange for interpretive services to assist at discharge as needed  - Refer to Case Management Department for coordinating discharge planning if the patient needs post-hospital services based on physician/advanced practitioner order or complex needs related to functional status, cognitive ability, or social support system  Outcome: Progressing     Problem: Knowledge Deficit  Goal: Patient/family/caregiver demonstrates understanding of disease process, treatment plan, medications, and discharge instructions  Description: Complete learning assessment and assess knowledge base.  Interventions:  - Provide teaching at level of understanding  - Provide teaching via preferred learning methods  Outcome: Progressing

## 2024-06-11 ENCOUNTER — HOME HEALTH ADMISSION (OUTPATIENT)
Dept: HOME HEALTH SERVICES | Facility: HOME HEALTHCARE | Age: 79
End: 2024-06-11
Payer: MEDICARE

## 2024-06-11 ENCOUNTER — HOME CARE VISIT (OUTPATIENT)
Dept: HOME HEALTH SERVICES | Facility: HOME HEALTHCARE | Age: 79
End: 2024-06-11
Payer: MEDICARE

## 2024-06-11 VITALS
HEART RATE: 62 BPM | RESPIRATION RATE: 18 BRPM | TEMPERATURE: 97.6 F | HEIGHT: 66 IN | SYSTOLIC BLOOD PRESSURE: 116 MMHG | BODY MASS INDEX: 18.64 KG/M2 | WEIGHT: 116 LBS | OXYGEN SATURATION: 97 % | DIASTOLIC BLOOD PRESSURE: 60 MMHG

## 2024-06-11 PROBLEM — R82.71 ASYMPTOMATIC BACTERIURIA: Status: RESOLVED | Noted: 2024-06-06 | Resolved: 2024-06-11

## 2024-06-11 LAB
APTT PPP: 82 SECONDS (ref 23–37)
APTT PPP: 95 SECONDS (ref 23–37)
INR PPP: 2.18 (ref 0.84–1.19)
PROTHROMBIN TIME: 24.8 SECONDS (ref 11.6–14.5)

## 2024-06-11 PROCEDURE — 85730 THROMBOPLASTIN TIME PARTIAL: CPT | Performed by: INTERNAL MEDICINE

## 2024-06-11 PROCEDURE — 99239 HOSP IP/OBS DSCHRG MGMT >30: CPT | Performed by: INTERNAL MEDICINE

## 2024-06-11 PROCEDURE — 85610 PROTHROMBIN TIME: CPT | Performed by: INTERNAL MEDICINE

## 2024-06-11 RX ORDER — WARFARIN SODIUM 5 MG/1
5 TABLET ORAL
Qty: 30 TABLET | Refills: 0 | Status: SHIPPED | OUTPATIENT
Start: 2024-06-11 | End: 2024-07-11

## 2024-06-11 RX ADMIN — ATORVASTATIN CALCIUM 40 MG: 40 TABLET, FILM COATED ORAL at 17:05

## 2024-06-11 RX ADMIN — AMLODIPINE BESYLATE 5 MG: 5 TABLET ORAL at 09:21

## 2024-06-11 RX ADMIN — LISINOPRIL 40 MG: 20 TABLET ORAL at 09:21

## 2024-06-11 RX ADMIN — CARVEDILOL 6.25 MG: 3.12 TABLET, FILM COATED ORAL at 09:21

## 2024-06-11 RX ADMIN — WARFARIN SODIUM 5 MG: 5 TABLET ORAL at 17:09

## 2024-06-11 RX ADMIN — CARVEDILOL 6.25 MG: 3.12 TABLET, FILM COATED ORAL at 17:05

## 2024-06-11 NOTE — ASSESSMENT & PLAN NOTE
Denies CP/SOB  D-dimer 0.89 on presentation  CTA chest showed Small quantity of acute segmental right middle lobe and right lower lobe PE.  RV/LV ratio 1.2  BNP 49, troponin negative  ECHO: EF 60%. Systolic function normal, R ventricular cavity size at upper limit of normal within limitations of study.   Patient's blood pressure stable, at room air  INR 2.1 today.  Patient is stable for discharge.  She will follow-up with family doctor and have a INR check in 3 days.  Will be discharged on warfarin 5 mg daily until she will have repeat INRs.  Patient has cerebral amyloid angiopathy so messaged patient's neurologist letting him know that the patient was started on warfarin.

## 2024-06-11 NOTE — ASSESSMENT & PLAN NOTE
On carvedilol 6.25 twice daily, continue  Patient follows with cardiology outpatient.  Has not been on Eliquis even though she was recommended per cardiology.   reported she does not like Eliquis and it was too expensive and he needs to pay out-of-pocket  Stable, continue carvedilol  Should continue outpatient follow up with Cardiology   On warfarin

## 2024-06-11 NOTE — DISCHARGE SUMMARY
[facility]  Discharge- Lo Talley 1945, 78 y.o. female MRN: [id number]  Unit/Bed#: -01 Encounter: [id number]  Primary Care Provider: RACHELLE Ruiz   Date and time admitted to hospital: 6/5/2024 11:55 AM    * Pulmonary embolism (HCC)  Assessment & Plan  Denies CP/SOB  D-dimer 0.89 on presentation  CTA chest showed Small quantity of acute segmental right middle lobe and right lower lobe PE.  RV/LV ratio 1.2  BNP 49, troponin negative  ECHO: EF 60%. Systolic function normal, R ventricular cavity size at upper limit of normal within limitations of study.   Patient's blood pressure stable, at room air  INR 2.1 today.  Patient is stable for discharge.  She will follow-up with family doctor and have a INR check in 3 days.  Will be discharged on warfarin 5 mg daily until she will have repeat INRs.  Patient has cerebral amyloid angiopathy so messaged patient's neurologist letting him know that the patient was started on warfarin.       HTN (hypertension)  Assessment & Plan  On Norvasc 5 mg daily, carvedilol 6.25 and lisinopril  Continue medication  BP reviewed and stable   Monitor     Atrial fibrillation (HCC)  Assessment & Plan  On carvedilol 6.25 twice daily, continue  Patient follows with cardiology outpatient.  Has not been on Eliquis even though she was recommended per cardiology.   reported she does not like Eliquis and it was too expensive and he needs to pay out-of-pocket  Stable, continue carvedilol  Should continue outpatient follow up with Cardiology   On warfarin    History of multiple strokes  Assessment & Plan  Continue aspirin, atorvastatin  Patient also has cerebral amyloid angiopathy and follows with neurology outpatient  At baseline she is oriented to place however has cognitive impairment per       Asymptomatic bacteriuria-resolved as of 6/11/2024  Assessment & Plan  Urine culture resulting >100,000 klebsiella oxytoca   Asymptomatic   Consider  "possible colonization   Afebrile with mild leukocytosis - has since resolved   Continue to monitor off abx at this time         Medical Problems       Resolved Problems  Date Reviewed: 6/10/2024            Resolved    Asymptomatic bacteriuria 6/11/2024     Resolved by  Sherine Doan MD        Discharging Physician / Practitioner: Sherine Doan MD  PCP: RACHELLE Ruiz  Admission Date:   Admission Orders (From admission, onward)       Ordered        06/05/24 1701  INPATIENT ADMISSION  Once                          Discharge Date: 06/11/24    Consultations During Hospital Stay:  None    Procedures Performed:   none    Significant Findings / Test Results:   XR chest 1 view portable  Result Date: 6/6/2024  Impression: No acute cardiopulmonary disease.      CTA ED chest PE study  Result Date: 6/5/2024  Impression: Small quantity of acute segmental right middle lobe and right lower lobe PE. The calculated ratio of right ventricular to left ventricular diameter (RV/LV ratio) is 1.2 There is a critical finding of acute PE with RV/LV ratio >0.9. Based on PERT algorithm recommendations:  \"  Order STAT biomarkers including troponin, NT-BNP or BNP  \"  Calculate PESI score: o  If PESI score falls in I-III, with negative biomarkers, please order a PERT priority ECHO. o  If PESI score falls in IV-V or with positive biomarkers, please order STAT ECHO and alert the [place] PERT via PAC at [phone].    Incidental Findings:   none     Test Results Pending at Discharge (will require follow up):   none     Outpatient Tests Requested:  Inr     Complications:  none    Reason for Admission: Lightheadedness.    Hospital Course:   Lo Talley is a 78 y.o. female patient with past medical history of hypertension, A-fib, strokes, cerebral amyloid angiopathy who originally presented to the hospital on 6/5/2024 due to lightheadedness.  CTA on presentation showed small PE with RV/LV ratio of 1.2.  Echo showed LVEF 60% with " "normal systolic function, right ventricle cavity size appeared at the upper limit of normal, mild to moderate tricuspid regurgitation right ventricular systolic pressure 30 mmHg, which is unchanged compared to prior.    Eliquis is not covered by insurance and  cannot afford.  Patient was started on warfarin while bridged with heparin IV.  INR increased to 2.1 and patient stable for discharge.    She will need to follow-up with PCP and have INR checked in a few days.      Please see above list of diagnoses and related plan for additional information.     Condition at Discharge: good    Discharge Day Visit / Exam:   Subjective:  no compliant s  Vitals: Blood Pressure: 116/60 (06/11/24 1445)  Pulse: 62 (06/11/24 1445)  Temperature: 97.6 °F (36.4 °C) (06/11/24 1445)  Temp Source: Oral (06/11/24 1445)  Respirations: 18 (06/11/24 1445)  Height: 5' 6\" (167.6 cm) (06/05/24 1646)  Weight - Scale: 52.6 kg (116 lb) (06/05/24 1646)  SpO2: 97 % (06/11/24 1445)  Exam:   Physical Exam  Vitals and nursing note reviewed.   Constitutional:       General: She is not in acute distress.     Appearance: She is not diaphoretic.   HENT:      Head: Normocephalic.   Eyes:      General: No scleral icterus.        Right eye: No discharge.         Left eye: No discharge.   Cardiovascular:      Rate and Rhythm: Normal rate and regular rhythm.   Pulmonary:      Effort: Pulmonary effort is normal. No respiratory distress.      Breath sounds: Normal breath sounds. No wheezing, rhonchi or rales.   Abdominal:      General: There is no distension.      Palpations: Abdomen is soft.      Tenderness: There is no abdominal tenderness. There is no guarding or rebound.   Musculoskeletal:      Cervical back: Normal range of motion.      Right lower leg: No edema.      Left lower leg: No edema.   Skin:     General: Skin is warm.   Neurological:      Mental Status: She is alert.   Psychiatric:         Mood and Affect: Mood normal.         Behavior: " Behavior normal.          Discussion with Family: Updated  () via phone.    Discharge instructions/Information to patient and family:   See after visit summary for information provided to patient and family.      Provisions for Follow-Up Care:  See after visit summary for information related to follow-up care and any pertinent home health orders.      Mobility at time of Discharge:   Basic Mobility Inpatient Raw Score: 18  JH-HLM Goal: 6: Walk 10 steps or more  JH-HLM Achieved: 1: Laying in bed  HLM Goal achieved. Continue to encourage appropriate mobility.     Disposition:   Home with VNA Services (Reminder: Complete face to face encounter)    Planned Readmission: no     Discharge Statement:  I spent 40 minutes discharging the patient. This time was spent on the day of discharge. I had direct contact with the patient on the day of discharge. Greater than 50% of the total time was spent examining patient, answering all patient questions, arranging and discussing plan of care with patient as well as directly providing post-discharge instructions.  Additional time then spent on discharge activities.    Discharge Medications:  See after visit summary for reconciled discharge medications provided to patient and/or family.      **Please Note: This note may have been constructed using a voice recognition system**

## 2024-06-11 NOTE — PLAN OF CARE
Problem: Prexisting or High Potential for Compromised Skin Integrity  Goal: Skin integrity is maintained or improved  Description: INTERVENTIONS:  - Identify patients at risk for skin breakdown  - Assess and monitor skin integrity  - Assess and monitor nutrition and hydration status  - Monitor labs   - Assess for incontinence   - Turn and reposition patient  - Assist with mobility/ambulation  - Relieve pressure over bony prominences  - Avoid friction and shearing  - Provide appropriate hygiene as needed including keeping skin clean and dry  - Evaluate need for skin moisturizer/barrier cream  - Collaborate with interdisciplinary team   - Patient/family teaching  - Consider wound care consult   Outcome: Progressing     Problem: PAIN - ADULT  Goal: Verbalizes/displays adequate comfort level or baseline comfort level  Description: Interventions:  - Encourage patient to monitor pain and request assistance  - Assess pain using appropriate pain scale  - Administer analgesics based on type and severity of pain and evaluate response  - Implement non-pharmacological measures as appropriate and evaluate response  - Consider cultural and social influences on pain and pain management  - Notify physician/advanced practitioner if interventions unsuccessful or patient reports new pain  Outcome: Progressing     Problem: SAFETY ADULT  Goal: Patient will remain free of falls  Description: INTERVENTIONS:  - Educate patient/family on patient safety including physical limitations  - Instruct patient to call for assistance with activity   - Consult OT/PT to assist with strengthening/mobility   - Keep Call bell within reach  - Keep bed low and locked with side rails adjusted as appropriate  - Keep care items and personal belongings within reach  - Initiate and maintain comfort rounds  - Make Fall Risk Sign visible to staff  - Offer Toileting every 3 Hours, in advance of need  - Initiate/Maintain 3alarm  - Obtain necessary fall risk  management equipment: 2  - Apply yellow socks and bracelet for high fall risk patients  - Consider moving patient to room near nurses station  Outcome: Progressing     Problem: Prexisting or High Potential for Compromised Skin Integrity  Goal: Skin integrity is maintained or improved  Description: INTERVENTIONS:  - Identify patients at risk for skin breakdown  - Assess and monitor skin integrity  - Assess and monitor nutrition and hydration status  - Monitor labs   - Assess for incontinence   - Turn and reposition patient  - Assist with mobility/ambulation  - Relieve pressure over bony prominences  - Avoid friction and shearing  - Provide appropriate hygiene as needed including keeping skin clean and dry  - Evaluate need for skin moisturizer/barrier cream  - Collaborate with interdisciplinary team   - Patient/family teaching  - Consider wound care consult   Outcome: Progressing     Problem: DISCHARGE PLANNING  Goal: Discharge to home or other facility with appropriate resources  Description: INTERVENTIONS:  - Identify barriers to discharge w/patient and caregiver  - Arrange for needed discharge resources and transportation as appropriate  - Identify discharge learning needs (meds, wound care, etc.)  - Arrange for interpretive services to assist at discharge as needed  - Refer to Case Management Department for coordinating discharge planning if the patient needs post-hospital services based on physician/advanced practitioner order or complex needs related to functional status, cognitive ability, or social support system  Outcome: Progressing     Problem: Knowledge Deficit  Goal: Patient/family/caregiver demonstrates understanding of disease process, treatment plan, medications, and discharge instructions  Description: Complete learning assessment and assess knowledge base.  Interventions:  - Provide teaching at level of understanding  - Provide teaching via preferred learning methods  Outcome: Progressing

## 2024-06-11 NOTE — CASE MANAGEMENT
Case Management Discharge Planning Note    Patient name Lo Talley  Location /-01 MRN [id number]  : 1945 Date 2024       Current Admission Date: 2024  Current Admission Diagnosis:Pulmonary embolism (HCC)   Patient Active Problem List    Diagnosis Date Noted Date Diagnosed    Asymptomatic bacteriuria 2024     Pulmonary embolism (HCC) 2024     Altered mental status 2024     Pancreatic lesion 2024     Moderate protein-calorie malnutrition (HCC) 2024     Acute blood loss anemia 2024     HTN (hypertension) 2024     Cerebral amyloid angiopathy (CODE) 2023     History of multiple strokes 06/15/2023     Atrial fibrillation (HCC) 06/15/2023     Hypertensive urgency 2023     Elevated troponin 2023       LOS (days): 6  Geometric Mean LOS (GMLOS) (days): 2.5  Days to GMLOS:-3.3     OBJECTIVE:  Risk of Unplanned Readmission Score: 14.94         Current admission status: Inpatient   Preferred Pharmacy:   [facility]  [place] - [address]  [address]  [facility] [place] [address]  Phone: [phone] Fax: [phone]    Primary Care Provider: RACHELLE Ruiz    Primary Insurance: MEDICARE  Secondary Insurance: AETNA    DISCHARGE DETAILS:    IMM Given (Date):: 24  IMM Given to:: Patient  Family notified:: Pt's     Additional Comments: Patient reviewed during care coordination rounds, pt's INR is therapeutic and pt is medically stable for discharge today. IMM reviewed with pt at bedside and  via phone. IMM in scan bin to be entered into pt's EHR.     Reports housing in interview, undomociled per chart.

## 2024-06-11 NOTE — CASE MANAGEMENT
Case Management Discharge Planning Note    Patient name Lo Talley  Location /-01 MRN [id number]  : 1945 Date 2024       Current Admission Date: 2024  Current Admission Diagnosis:Pulmonary embolism (HCC)   Patient Active Problem List    Diagnosis Date Noted Date Diagnosed    Asymptomatic bacteriuria 2024     Pulmonary embolism (HCC) 2024     Altered mental status 2024     Pancreatic lesion 2024     Moderate protein-calorie malnutrition (HCC) 2024     Acute blood loss anemia 2024     HTN (hypertension) 2024     Cerebral amyloid angiopathy (CODE) 2023     History of multiple strokes 06/15/2023     Atrial fibrillation (HCC) 06/15/2023     Hypertensive urgency 2023     Elevated troponin 2023       LOS (days): 6  Geometric Mean LOS (GMLOS) (days): 2.5  Days to GMLOS:-3.4     OBJECTIVE:  Risk of Unplanned Readmission Score: 14.94         Current admission status: Inpatient   Preferred Pharmacy:   [id number]  [place] - [address]  [address]  [place] [address]  Phone: [phone] Fax: [phone]    Primary Care Provider: RACHELLE Ruiz    Primary Insurance: MEDICARE  Secondary Insurance: AETNA    DISCHARGE DETAILS:    Requested Home Health Care         Is the patient interested in HHC at discharge?: Yes  Home Health Discipline requested:: Nursing, Occupational Therapy, Physical Therapy  Home Health Agency Name:: [facility]  Home Health Follow-Up Provider:: PCP  Home Health Services Needed:: Evaluate Functional Status and Safety, Gait/ADL Training, Strengthening/Theraputic Exercises to Improve Function, Other (comment) (Coumadin blood work)  Homebound Criteria Met:: Requires the Assistance of Another Person for Safe Ambulation or to Leave the Home, Uses an Assist Device (i.e. cane, walker, etc)  Supporting Clincal Findings:: Fatigues Easliy in Short Distances, Limited Endurance    Other  Referral/Resources/Interventions Provided:  Interventions: HHC  Referral Comments: [facility] sent to  [facility] for [facility]PT/OT who confirmed that they are able to resume services.     Transport at Discharge : Wheelchair van  Dispatcher Contacted: Yes  Number/Name of Dispatcher: [employer]  Transported by (Company and Unit #): [employer]  ETA of Transport (Date): 06/11/24  ETA of Transport (Time): 1410    Additional Comments: Pt's  requesting [facility] transportation at time of discharge to assist in pt getting up the 2 steps to the home, aware and agreeable to OOP cost associated. [facility] request sent via [employer], awaiting confirmation of transportation time.    UPDATE:    Per Roundtrip, pt to be transported home via [employer] [facility] at 5:30 PM, pt's , RN, and Dr. Dobrovolischi aware of same.

## 2024-06-11 NOTE — PLAN OF CARE
Problem: Prexisting or High Potential for Compromised Skin Integrity  Goal: Skin integrity is maintained or improved  Description: INTERVENTIONS:  - Identify patients at risk for skin breakdown  - Assess and monitor skin integrity  - Assess and monitor nutrition and hydration status  - Monitor labs   - Assess for incontinence   - Turn and reposition patient  - Assist with mobility/ambulation  - Relieve pressure over bony prominences  - Avoid friction and shearing  - Provide appropriate hygiene as needed including keeping skin clean and dry  - Evaluate need for skin moisturizer/barrier cream  - Collaborate with interdisciplinary team   - Patient/family teaching  - Consider wound care consult   Outcome: Progressing     Problem: INFECTION - ADULT  Goal: Absence of fever/infection during neutropenic period  Description: INTERVENTIONS:  - Monitor WBC    Outcome: Progressing     Problem: SAFETY ADULT  Goal: Maintain or return to baseline ADL function  Description: INTERVENTIONS:  -  Assess patient's ability to carry out ADLs; assess patient's baseline for ADL function and identify physical deficits which impact ability to perform ADLs (bathing, care of mouth/teeth, toileting, grooming, dressing, etc.)  - Assess/evaluate cause of self-care deficits   - Assess range of motion  - Assess patient's mobility; develop plan if impaired  - Assess patient's need for assistive devices and provide as appropriate  - Encourage maximum independence but intervene and supervise when necessary  - Involve family in performance of ADLs  - Assess for home care needs following discharge   - Consider OT consult to assist with ADL evaluation and planning for discharge  - Provide patient education as appropriate  Outcome: Progressing

## 2024-06-12 ENCOUNTER — HOME CARE VISIT (OUTPATIENT)
Dept: HOME HEALTH SERVICES | Facility: HOME HEALTHCARE | Age: 79
End: 2024-06-12
Payer: MEDICARE

## 2024-06-12 ENCOUNTER — TRANSITIONAL CARE MANAGEMENT (OUTPATIENT)
Dept: FAMILY MEDICINE CLINIC | Facility: CLINIC | Age: 79
End: 2024-06-12

## 2024-06-12 VITALS
TEMPERATURE: 97.1 F | RESPIRATION RATE: 156 BRPM | OXYGEN SATURATION: 96 % | SYSTOLIC BLOOD PRESSURE: 116 MMHG | DIASTOLIC BLOOD PRESSURE: 62 MMHG | HEART RATE: 71 BPM

## 2024-06-12 PROCEDURE — 10330081 VN NO-PAY CLAIM PROCEDURE

## 2024-06-12 PROCEDURE — 400013 VN SOC

## 2024-06-12 PROCEDURE — G0299 HHS/HOSPICE OF RN EA 15 MIN: HCPCS

## 2024-06-13 ENCOUNTER — HOME CARE VISIT (OUTPATIENT)
Dept: HOME HEALTH SERVICES | Facility: HOME HEALTHCARE | Age: 79
End: 2024-06-13
Payer: MEDICARE

## 2024-06-13 VITALS — OXYGEN SATURATION: 96 % | DIASTOLIC BLOOD PRESSURE: 62 MMHG | HEART RATE: 70 BPM | SYSTOLIC BLOOD PRESSURE: 120 MMHG

## 2024-06-13 PROCEDURE — G0151 HHCP-SERV OF PT,EA 15 MIN: HCPCS

## 2024-06-14 ENCOUNTER — TELEPHONE (OUTPATIENT)
Dept: CARDIOLOGY CLINIC | Facility: CLINIC | Age: 79
End: 2024-06-14

## 2024-06-14 ENCOUNTER — ANTICOAG VISIT (OUTPATIENT)
Dept: CARDIOLOGY CLINIC | Facility: CLINIC | Age: 79
End: 2024-06-14

## 2024-06-14 ENCOUNTER — HOME CARE VISIT (OUTPATIENT)
Dept: HOME HEALTH SERVICES | Facility: HOME HEALTHCARE | Age: 79
End: 2024-06-14
Payer: MEDICARE

## 2024-06-14 VITALS
OXYGEN SATURATION: 98 % | SYSTOLIC BLOOD PRESSURE: 120 MMHG | HEART RATE: 68 BPM | DIASTOLIC BLOOD PRESSURE: 60 MMHG | RESPIRATION RATE: 18 BRPM | TEMPERATURE: 97.8 F

## 2024-06-14 LAB — INR PPP: 2.6 (ref 0.84–1.19)

## 2024-06-14 PROCEDURE — G0299 HHS/HOSPICE OF RN EA 15 MIN: HCPCS

## 2024-06-14 NOTE — PROGRESS NOTES
Dr Greer states we will follow Spoke to Delilah Florian and pt will take meds in AM due to situation at home....Called  reviewed dosing for the weekend and he understands no warfarin tomorrow and  only 1/2 tablet on Sunday with a recheck on Monday.

## 2024-06-14 NOTE — TELEPHONE ENCOUNTER
"Call from Delilah BRISENO asking if we are following patient warfarin....there was not request for this or notification to us that she was placed on warfarin.Eye exams & tests to be performed upon return: Upon reviewing chart patient was admitted to B due to PE because  never gave her Eliquis as directed.  She was switched to warfarin by internal medicine Cardiology unaware of this change.  Please advise if we are to follow.....INR/Prothrombin Time today was 2.6 she has 5 mg tablets The reason eliquis was not given has nothing to due with cost the medication was in the house the  just didn't want her on \"that type\" of medication Please advise.   "

## 2024-06-16 ENCOUNTER — HOME CARE VISIT (OUTPATIENT)
Dept: HOME HEALTH SERVICES | Facility: HOME HEALTHCARE | Age: 79
End: 2024-06-16
Payer: MEDICARE

## 2024-06-17 ENCOUNTER — ANTICOAG VISIT (OUTPATIENT)
Dept: CARDIOLOGY CLINIC | Facility: CLINIC | Age: 79
End: 2024-06-17

## 2024-06-17 ENCOUNTER — HOME CARE VISIT (OUTPATIENT)
Dept: HOME HEALTH SERVICES | Facility: HOME HEALTHCARE | Age: 79
End: 2024-06-17
Payer: MEDICARE

## 2024-06-17 VITALS
HEART RATE: 64 BPM | SYSTOLIC BLOOD PRESSURE: 110 MMHG | OXYGEN SATURATION: 95 % | DIASTOLIC BLOOD PRESSURE: 60 MMHG | TEMPERATURE: 96.4 F

## 2024-06-17 VITALS
BODY MASS INDEX: 18.88 KG/M2 | HEART RATE: 64 BPM | TEMPERATURE: 96.5 F | RESPIRATION RATE: 18 BRPM | OXYGEN SATURATION: 99 % | DIASTOLIC BLOOD PRESSURE: 60 MMHG | WEIGHT: 117 LBS | SYSTOLIC BLOOD PRESSURE: 110 MMHG

## 2024-06-17 LAB — INR PPP: 2.4 (ref 0.84–1.19)

## 2024-06-17 PROCEDURE — G0299 HHS/HOSPICE OF RN EA 15 MIN: HCPCS

## 2024-06-17 PROCEDURE — G0152 HHCP-SERV OF OT,EA 15 MIN: HCPCS | Performed by: OCCUPATIONAL THERAPIST

## 2024-06-17 NOTE — PROGRESS NOTES
Vanda Florian called with INT of 2/4 for today pt already had 5 mg today due to taking meds in AM   will alt 2.5/5 and recheck on 6/20/24

## 2024-06-18 PROCEDURE — G0180 MD CERTIFICATION HHA PATIENT: HCPCS | Performed by: INTERNAL MEDICINE

## 2024-06-19 ENCOUNTER — HOME CARE VISIT (OUTPATIENT)
Dept: HOME HEALTH SERVICES | Facility: HOME HEALTHCARE | Age: 79
End: 2024-06-19
Payer: MEDICARE

## 2024-06-19 VITALS — DIASTOLIC BLOOD PRESSURE: 60 MMHG | HEART RATE: 66 BPM | OXYGEN SATURATION: 97 % | SYSTOLIC BLOOD PRESSURE: 104 MMHG

## 2024-06-19 PROCEDURE — G0152 HHCP-SERV OF OT,EA 15 MIN: HCPCS | Performed by: OCCUPATIONAL THERAPIST

## 2024-06-20 ENCOUNTER — HOME CARE VISIT (OUTPATIENT)
Dept: HOME HEALTH SERVICES | Facility: HOME HEALTHCARE | Age: 79
End: 2024-06-20
Payer: MEDICARE

## 2024-06-20 ENCOUNTER — ANTICOAG VISIT (OUTPATIENT)
Dept: CARDIOLOGY CLINIC | Facility: CLINIC | Age: 79
End: 2024-06-20

## 2024-06-20 ENCOUNTER — TELEPHONE (OUTPATIENT)
Dept: NEUROLOGY | Facility: CLINIC | Age: 79
End: 2024-06-20

## 2024-06-20 VITALS
TEMPERATURE: 97.8 F | WEIGHT: 118.25 LBS | DIASTOLIC BLOOD PRESSURE: 60 MMHG | BODY MASS INDEX: 19.09 KG/M2 | OXYGEN SATURATION: 99 % | SYSTOLIC BLOOD PRESSURE: 100 MMHG | RESPIRATION RATE: 18 BRPM | HEART RATE: 80 BPM

## 2024-06-20 DIAGNOSIS — I68.0 CEREBRAL AMYLOID ANGIOPATHY (CODE): Primary | ICD-10-CM

## 2024-06-20 LAB — INR PPP: 2.6 (ref 0.84–1.19)

## 2024-06-20 PROCEDURE — G0299 HHS/HOSPICE OF RN EA 15 MIN: HCPCS

## 2024-06-20 NOTE — TELEPHONE ENCOUNTER
----- Message from Jonatan Garcia MD sent at 6/19/2024 12:47 PM EDT -----  Please give Lo and her  to call,    I got a message from the hospital indicating that she was started on warfarin as result of her recent pulmonary embolism.  Because she does have increased bleeding risk due to possible CAA, remaining on anticoagulation with either warfarin or Coumadin of course represents some increased risk for bleeding complications, but between the A-fib and the pulmonary emboli it seems likely that the benefit of being anticoagulated would outweigh the risks at this point    I last saw her about year ago.  I know that she is going through a lot of medical challenges right now so if they prefer not to follow-up with us in the office for the moment that is understandable however I would recommend a repeat MRI of the brain to see if she is actually having an increase in the number of hemorrhages.  Showing that the MRI is stable at this point would be helpful, and if there are areas of increased bleeding that may help clarify the risk of remaining on the anticoagulation.    If she is willing to see us in the office that is fine, if she is willing to get the MRI either way I would go ahead and order that now whether she wants to come back to the office right now or not.    Please let me know.  ----- Message -----  From: Sherine Doan MD  Sent: 6/11/2024   2:56 PM EDT  To: Jonatan Garcia MD    Hellodr. Garcia     Just wanted to let you know that Lo was diagnosed with pulmonary embolism and was started on Warfarin per her  preference ( eliquis is not covered and it is expensive). Wanted to make you aware given the amyloid angiopathy.       Thank you,   Sherine Doan MD

## 2024-06-21 ENCOUNTER — HOME CARE VISIT (OUTPATIENT)
Dept: HOME HEALTH SERVICES | Facility: HOME HEALTHCARE | Age: 79
End: 2024-06-21
Payer: MEDICARE

## 2024-06-21 PROCEDURE — G0157 HHC PT ASSISTANT EA 15: HCPCS

## 2024-06-23 VITALS
OXYGEN SATURATION: 95 % | HEART RATE: 75 BPM | RESPIRATION RATE: 15 BRPM | SYSTOLIC BLOOD PRESSURE: 112 MMHG | DIASTOLIC BLOOD PRESSURE: 68 MMHG | TEMPERATURE: 96.9 F

## 2024-06-24 ENCOUNTER — HOME CARE VISIT (OUTPATIENT)
Dept: HOME HEALTH SERVICES | Facility: HOME HEALTHCARE | Age: 79
End: 2024-06-24
Payer: MEDICARE

## 2024-06-24 ENCOUNTER — ANTICOAG VISIT (OUTPATIENT)
Dept: CARDIOLOGY CLINIC | Facility: CLINIC | Age: 79
End: 2024-06-24

## 2024-06-24 VITALS
WEIGHT: 116.5 LBS | TEMPERATURE: 97.9 F | BODY MASS INDEX: 18.8 KG/M2 | SYSTOLIC BLOOD PRESSURE: 100 MMHG | OXYGEN SATURATION: 98 % | DIASTOLIC BLOOD PRESSURE: 60 MMHG | HEART RATE: 68 BPM | RESPIRATION RATE: 18 BRPM

## 2024-06-24 LAB — INR PPP: 2.3 (ref 0.84–1.19)

## 2024-06-24 PROCEDURE — G0299 HHS/HOSPICE OF RN EA 15 MIN: HCPCS

## 2024-06-25 ENCOUNTER — HOME CARE VISIT (OUTPATIENT)
Dept: HOME HEALTH SERVICES | Facility: HOME HEALTHCARE | Age: 79
End: 2024-06-25
Payer: MEDICARE

## 2024-06-25 VITALS
SYSTOLIC BLOOD PRESSURE: 108 MMHG | WEIGHT: 117.25 LBS | DIASTOLIC BLOOD PRESSURE: 58 MMHG | BODY MASS INDEX: 18.92 KG/M2 | OXYGEN SATURATION: 95 % | HEART RATE: 78 BPM

## 2024-06-25 VITALS — DIASTOLIC BLOOD PRESSURE: 64 MMHG | SYSTOLIC BLOOD PRESSURE: 118 MMHG

## 2024-06-25 PROCEDURE — G0151 HHCP-SERV OF PT,EA 15 MIN: HCPCS

## 2024-06-25 PROCEDURE — G0152 HHCP-SERV OF OT,EA 15 MIN: HCPCS | Performed by: OCCUPATIONAL THERAPIST

## 2024-06-26 NOTE — TELEPHONE ENCOUNTER
Patient's , fauzia aware of results and recommendations (on consent) and verbalizes understanding. He is receptive to MRI; please place order (current order will  soon).  He will call CS to arrange;  prefers to hold off on f/u visit with neurology at present time due to patient condition and difficult to transport.  Thank you.

## 2024-06-26 NOTE — TELEPHONE ENCOUNTER
Called patient, reached vm, left message tcb to discuss blood thinner medications and Dr. Garcia recommendation.  (Consent was on file but due to length of recommendation, did not leave message but rather requested she cb.

## 2024-06-27 ENCOUNTER — HOME CARE VISIT (OUTPATIENT)
Dept: HOME HEALTH SERVICES | Facility: HOME HEALTHCARE | Age: 79
End: 2024-06-27
Payer: MEDICARE

## 2024-06-27 ENCOUNTER — ANTICOAG VISIT (OUTPATIENT)
Dept: CARDIOLOGY CLINIC | Facility: CLINIC | Age: 79
End: 2024-06-27

## 2024-06-27 VITALS
SYSTOLIC BLOOD PRESSURE: 100 MMHG | TEMPERATURE: 97.8 F | OXYGEN SATURATION: 98 % | DIASTOLIC BLOOD PRESSURE: 60 MMHG | HEART RATE: 72 BPM | WEIGHT: 117 LBS | RESPIRATION RATE: 18 BRPM | BODY MASS INDEX: 18.88 KG/M2

## 2024-06-27 VITALS
DIASTOLIC BLOOD PRESSURE: 60 MMHG | SYSTOLIC BLOOD PRESSURE: 118 MMHG | WEIGHT: 117 LBS | OXYGEN SATURATION: 98 % | HEART RATE: 77 BPM | BODY MASS INDEX: 18.88 KG/M2

## 2024-06-27 LAB — INR PPP: 2.1 (ref 0.84–1.19)

## 2024-06-27 PROCEDURE — G0151 HHCP-SERV OF PT,EA 15 MIN: HCPCS

## 2024-06-27 PROCEDURE — G0299 HHS/HOSPICE OF RN EA 15 MIN: HCPCS

## 2024-06-27 PROCEDURE — G0152 HHCP-SERV OF OT,EA 15 MIN: HCPCS | Performed by: OCCUPATIONAL THERAPIST

## 2024-06-27 NOTE — PROGRESS NOTES
Pt will take 5 mg today then 5 mg MWF 2.5 others recheck on 7/3/24 Delilah PEREZ called and will probably be d/c patient next week.

## 2024-06-28 ENCOUNTER — TELEPHONE (OUTPATIENT)
Dept: LAB | Facility: HOSPITAL | Age: 79
End: 2024-06-28

## 2024-06-28 NOTE — TELEPHONE ENCOUNTER
Spoke with pt's  and made him aware the MRI was ordered. Provided the number for Central Scheduling to schedule the appointment.

## 2024-06-28 NOTE — TELEPHONE ENCOUNTER
Jonatan Garcia MD  Ne[facility] [place] Clinical Team 41 minute ago (3:56 PM)       Ok, MRI ordered.

## 2024-07-01 ENCOUNTER — APPOINTMENT (OUTPATIENT)
Dept: LAB | Facility: HOSPITAL | Age: 79
End: 2024-07-01
Payer: MEDICARE

## 2024-07-01 DIAGNOSIS — I26.99 PULMONARY EMBOLISM (HCC): ICD-10-CM

## 2024-07-01 LAB
INR PPP: 2.34 (ref 0.84–1.19)
PROTHROMBIN TIME: 25.2 SECONDS (ref 11.6–14.5)

## 2024-07-01 PROCEDURE — 85610 PROTHROMBIN TIME: CPT

## 2024-07-01 PROCEDURE — 36415 COLL VENOUS BLD VENIPUNCTURE: CPT

## 2024-07-03 ENCOUNTER — HOME CARE VISIT (OUTPATIENT)
Dept: HOME HEALTH SERVICES | Facility: HOME HEALTHCARE | Age: 79
End: 2024-07-03
Payer: MEDICARE

## 2024-07-03 ENCOUNTER — TELEPHONE (OUTPATIENT)
Dept: CARDIOLOGY CLINIC | Facility: CLINIC | Age: 79
End: 2024-07-03

## 2024-07-03 ENCOUNTER — ANTICOAG VISIT (OUTPATIENT)
Dept: CARDIOLOGY CLINIC | Facility: CLINIC | Age: 79
End: 2024-07-03

## 2024-07-03 VITALS
RESPIRATION RATE: 18 BRPM | OXYGEN SATURATION: 98 % | DIASTOLIC BLOOD PRESSURE: 78 MMHG | HEART RATE: 66 BPM | SYSTOLIC BLOOD PRESSURE: 120 MMHG | TEMPERATURE: 97.5 F

## 2024-07-03 PROCEDURE — G0299 HHS/HOSPICE OF RN EA 15 MIN: HCPCS

## 2024-07-03 NOTE — PROGRESS NOTES
Pt will now be having [facility] homedraw coming to do Draws will be calling  with directions and reminding him he needs to call for next draw date.next date to be 7/15/24  called and verbalized understanding.

## 2024-07-17 DIAGNOSIS — I26.99 PULMONARY EMBOLISM (HCC): ICD-10-CM

## 2024-07-17 RX ORDER — WARFARIN SODIUM 5 MG/1
5 TABLET ORAL
Qty: 30 TABLET | Refills: 0 | Status: SHIPPED | OUTPATIENT
Start: 2024-07-17 | End: 2024-08-16

## 2024-07-31 ENCOUNTER — TELEPHONE (OUTPATIENT)
Dept: CARDIOLOGY CLINIC | Facility: CLINIC | Age: 79
End: 2024-07-31

## 2024-08-01 ENCOUNTER — TELEPHONE (OUTPATIENT)
Dept: LAB | Facility: HOSPITAL | Age: 79
End: 2024-08-01

## 2024-08-02 ENCOUNTER — TELEPHONE (OUTPATIENT)
Dept: LAB | Facility: HOSPITAL | Age: 79
End: 2024-08-02

## 2024-08-02 DIAGNOSIS — I48.91 ATRIAL FIBRILLATION, UNSPECIFIED TYPE (HCC): Primary | ICD-10-CM

## 2024-08-02 NOTE — TELEPHONE ENCOUNTER
8/2 - LM @ 228 PM Mammogram done 3/16/2020 at [facility], in [facility].  DEXA scan shows osteopenia but not osteoporosis, recheck 2 years (5/2022).  Flu vaccine - Flu vaccine.    Pneumovax - received 6/3/2016, booster at 68 y/o.  May need Pneumovax booster since just had transplant.    + H/o Prevnar 13.    Shingrix - received both doses.   Tdap - received 12/2020.  Pap - had it done 2 yrs at [facility], was told she didn't need anymore.   Colonoscopy - had it done 12/2019 (+ Polyps), Dr. Álvarez in [place], report received.   Smoker - repeat Low dose CT 3/2022. 40 pk yr smoker, quit smoking 2/2021.    Addendum 9/10/2021: Hepatitis C screening negative.

## 2024-08-02 NOTE — TELEPHONE ENCOUNTER
of patient called back in because he called to schedule the home draw but they told him there isn't an order in her chart for the PT with INR .

## 2024-08-05 ENCOUNTER — TELEPHONE (OUTPATIENT)
Dept: LAB | Facility: HOSPITAL | Age: 79
End: 2024-08-05

## 2024-08-12 ENCOUNTER — ANTICOAG VISIT (OUTPATIENT)
Dept: CARDIOLOGY CLINIC | Facility: CLINIC | Age: 79
End: 2024-08-12

## 2024-08-12 ENCOUNTER — APPOINTMENT (OUTPATIENT)
Dept: LAB | Facility: HOSPITAL | Age: 79
End: 2024-08-12
Attending: INTERNAL MEDICINE
Payer: MEDICARE

## 2024-08-12 DIAGNOSIS — I26.99 PULMONARY EMBOLISM (HCC): ICD-10-CM

## 2024-08-12 DIAGNOSIS — I48.91 ATRIAL FIBRILLATION, UNSPECIFIED TYPE (HCC): ICD-10-CM

## 2024-08-12 LAB
INR PPP: 1.16 (ref 0.85–1.19)
PROTHROMBIN TIME: 15.1 SECONDS (ref 12.3–15)

## 2024-08-12 PROCEDURE — 85610 PROTHROMBIN TIME: CPT

## 2024-08-12 PROCEDURE — 36415 COLL VENOUS BLD VENIPUNCTURE: CPT

## 2024-08-12 RX ORDER — WARFARIN SODIUM 5 MG/1
5 TABLET ORAL DAILY
Qty: 30 TABLET | Refills: 0 | OUTPATIENT
Start: 2024-08-12

## 2024-08-12 RX ORDER — WARFARIN SODIUM 5 MG/1
TABLET ORAL
Qty: 30 TABLET | Refills: 3 | Status: SHIPPED | OUTPATIENT
Start: 2024-08-12

## 2024-08-12 NOTE — PROGRESS NOTES
Called   will take 2.5 mg MF 5 others recheck in 1 week.spoke with  told INR low he understood and increased dose.  Will recheck in one week he will call to have lab drawn.

## 2024-08-14 ENCOUNTER — TELEPHONE (OUTPATIENT)
Dept: LAB | Facility: HOSPITAL | Age: 79
End: 2024-08-14

## 2024-08-19 ENCOUNTER — APPOINTMENT (OUTPATIENT)
Dept: LAB | Facility: HOSPITAL | Age: 79
End: 2024-08-19
Attending: INTERNAL MEDICINE
Payer: MEDICARE

## 2024-08-19 DIAGNOSIS — I48.91 ATRIAL FIBRILLATION, UNSPECIFIED TYPE (HCC): ICD-10-CM

## 2024-08-19 LAB
INR PPP: 1.23 (ref 0.85–1.19)
PROTHROMBIN TIME: 15.8 SECONDS (ref 12.3–15)

## 2024-08-19 PROCEDURE — 36415 COLL VENOUS BLD VENIPUNCTURE: CPT

## 2024-08-19 PROCEDURE — 85610 PROTHROMBIN TIME: CPT

## 2024-08-20 ENCOUNTER — ANTICOAG VISIT (OUTPATIENT)
Dept: CARDIOLOGY CLINIC | Facility: CLINIC | Age: 79
End: 2024-08-20

## 2024-08-20 NOTE — PROGRESS NOTES
Called  INR still low will take 2 tablets (10 )tomorrow  (always takes in AM) then 1 tablet (5) daily recheck in 2 weeks.  9/3/24 Reminded to call home draw lab now to get appt .LM to call if any questions.

## 2024-10-14 ENCOUNTER — TELEPHONE (OUTPATIENT)
Dept: LAB | Facility: HOSPITAL | Age: 79
End: 2024-10-14

## 2024-10-15 ENCOUNTER — TELEPHONE (OUTPATIENT)
Dept: CARDIOLOGY CLINIC | Facility: CLINIC | Age: 79
End: 2024-10-15

## 2024-10-16 ENCOUNTER — APPOINTMENT (OUTPATIENT)
Dept: LAB | Facility: HOSPITAL | Age: 79
End: 2024-10-16
Payer: MEDICARE

## 2024-10-16 ENCOUNTER — ANTICOAG VISIT (OUTPATIENT)
Dept: CARDIOLOGY CLINIC | Facility: CLINIC | Age: 79
End: 2024-10-16

## 2024-10-16 DIAGNOSIS — I48.91 ATRIAL FIBRILLATION, UNSPECIFIED TYPE (HCC): ICD-10-CM

## 2024-10-16 LAB
INR PPP: 5.69 (ref 0.85–1.19)
PROTHROMBIN TIME: 49.9 SECONDS (ref 12.3–15)

## 2024-10-16 PROCEDURE — 36415 COLL VENOUS BLD VENIPUNCTURE: CPT

## 2024-10-16 PROCEDURE — 85610 PROTHROMBIN TIME: CPT

## 2024-10-16 NOTE — PROGRESS NOTES
PC to patient leaving message on answering machine that INR is way too high at 5.69.  I advised  her not to take warfarin for two days at least and to call our office tomorrow for further dosing and conversation about what she has been taking up to this point.  Our records show 5 mgs daily but patient has not tested for two months, since Aug.

## 2024-10-17 NOTE — PROGRESS NOTES
PC again to  who takes care of Lo.  He did not get the message I left last night to hold warfarin. He already gave her the warfarin for today in the morning.     I am asking him to hold warfarin Fri, Sat and Sun and retest Monday.  agrees.

## 2024-10-23 ENCOUNTER — TELEPHONE (OUTPATIENT)
Dept: LAB | Facility: HOSPITAL | Age: 79
End: 2024-10-23

## 2024-10-28 ENCOUNTER — APPOINTMENT (OUTPATIENT)
Dept: LAB | Facility: HOSPITAL | Age: 79
End: 2024-10-28
Payer: MEDICARE

## 2024-10-28 ENCOUNTER — ANTICOAG VISIT (OUTPATIENT)
Dept: CARDIOLOGY CLINIC | Facility: CLINIC | Age: 79
End: 2024-10-28

## 2024-10-28 DIAGNOSIS — I48.91 ATRIAL FIBRILLATION, UNSPECIFIED TYPE (HCC): ICD-10-CM

## 2024-10-28 LAB
INR PPP: 1.05 (ref 0.85–1.19)
PROTHROMBIN TIME: 14 SECONDS (ref 12.3–15)

## 2024-10-28 PROCEDURE — 85610 PROTHROMBIN TIME: CPT

## 2024-10-28 PROCEDURE — 36415 COLL VENOUS BLD VENIPUNCTURE: CPT

## 2024-10-28 NOTE — PROGRESS NOTES
Patient had INR of 5.69.  Asked patient to hold for three days and retest on Mon 10/21/24.  They were going to call mobile lab with [facility] which  said he did, and they did not show.  Therefore, instead of calling us for dosing, he just held it until today. Lab came out today and INR is 1.05.      I asked  to call us, next time if they couldn't or didn't get blood drawn at time asked, so we can dose. He was unaware he should to this.      Advise 10 mgs tonight and then 5 mgs daily.  Retest on Fri Nov 1st.  They are going to use  mobile lab again.

## 2024-11-20 ENCOUNTER — TELEPHONE (OUTPATIENT)
Dept: CARDIOLOGY CLINIC | Facility: CLINIC | Age: 79
End: 2024-11-20

## 2024-11-20 NOTE — TELEPHONE ENCOUNTER
Called reminded  pt overdue for INR....states he thought he called but no results located he will call to make appt. ASAP

## 2024-12-02 ENCOUNTER — APPOINTMENT (EMERGENCY)
Dept: CT IMAGING | Facility: HOSPITAL | Age: 79
End: 2024-12-02
Payer: MEDICARE

## 2024-12-02 ENCOUNTER — APPOINTMENT (EMERGENCY)
Dept: RADIOLOGY | Facility: HOSPITAL | Age: 79
End: 2024-12-02
Payer: MEDICARE

## 2024-12-02 ENCOUNTER — HOSPITAL ENCOUNTER (EMERGENCY)
Facility: HOSPITAL | Age: 79
Discharge: HOME | End: 2024-12-02
Attending: EMERGENCY MEDICINE
Payer: MEDICARE

## 2024-12-02 VITALS
TEMPERATURE: 97 F | OXYGEN SATURATION: 98 % | DIASTOLIC BLOOD PRESSURE: 82 MMHG | WEIGHT: 140.87 LBS | SYSTOLIC BLOOD PRESSURE: 144 MMHG | BODY MASS INDEX: 22.74 KG/M2 | RESPIRATION RATE: 18 BRPM | HEART RATE: 89 BPM

## 2024-12-02 DIAGNOSIS — T14.8XXA CONTUSION: ICD-10-CM
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
## 2024-12-02 LAB
2HR DELTA HS TROPONIN: 1 NG/L
ABO GROUP BLD: NORMAL
ALBUMIN SERPL BCG-MCNC: 4.3 G/DL (ref 3.5–5)
ALP SERPL-CCNC: 63 U/L (ref 34–104)
ALT SERPL W P-5'-P-CCNC: 12 U/L (ref 7–52)
ANION GAP SERPL CALCULATED.3IONS-SCNC: 11 MMOL/L (ref 4–13)
AST SERPL W P-5'-P-CCNC: 13 U/L (ref 13–39)
ATRIAL RATE: 92 BPM
BASOPHILS # BLD AUTO: 0.03 THOUSANDS/ΜL (ref 0–0.1)
BASOPHILS NFR BLD AUTO: 0 % (ref 0–1)
BILIRUB SERPL-MCNC: 0.74 MG/DL (ref 0.2–1)
BLD GP AB SCN SERPL QL: NEGATIVE
BNP SERPL-MCNC: 181 PG/ML (ref 0–100)
BUN SERPL-MCNC: 17 MG/DL (ref 5–25)
CALCIUM SERPL-MCNC: 9.7 MG/DL (ref 8.4–10.2)
CARDIAC TROPONIN I PNL SERPL HS: 6 NG/L (ref ?–50)
CARDIAC TROPONIN I PNL SERPL HS: 7 NG/L (ref ?–50)
CHLORIDE SERPL-SCNC: 105 MMOL/L (ref 96–108)
CK SERPL-CCNC: 15 U/L (ref 26–192)
CO2 SERPL-SCNC: 25 MMOL/L (ref 21–32)
CREAT SERPL-MCNC: 0.37 MG/DL (ref 0.6–1.3)
EOSINOPHIL # BLD AUTO: 0.07 THOUSAND/ΜL (ref 0–0.61)
EOSINOPHIL NFR BLD AUTO: 0 % (ref 0–6)
ERYTHROCYTE [DISTWIDTH] IN BLOOD BY AUTOMATED COUNT: 13.5 % (ref 11.6–15.1)
GFR SERPL CREATININE-BSD FRML MDRD: 103 ML/MIN/1.73SQ M
GLUCOSE SERPL-MCNC: 124 MG/DL (ref 65–140)
HCT VFR BLD AUTO: 43.4 % (ref 34.8–46.1)
HGB BLD-MCNC: 14.2 G/DL (ref 11.5–15.4)
IMM GRANULOCYTES # BLD AUTO: 0.12 THOUSAND/UL (ref 0–0.2)
IMM GRANULOCYTES NFR BLD AUTO: 1 % (ref 0–2)
INR PPP: 1.05 (ref 0.85–1.19)
LYMPHOCYTES # BLD AUTO: 1.63 THOUSANDS/ΜL (ref 0.6–4.47)
LYMPHOCYTES NFR BLD AUTO: 10 % (ref 14–44)
MCH RBC QN AUTO: 31.2 PG (ref 26.8–34.3)
MCHC RBC AUTO-ENTMCNC: 32.7 G/DL (ref 31.4–37.4)
MCV RBC AUTO: 95 FL (ref 82–98)
MONOCYTES # BLD AUTO: 0.55 THOUSAND/ΜL (ref 0.17–1.22)
MONOCYTES NFR BLD AUTO: 4 % (ref 4–12)
NEUTROPHILS # BLD AUTO: 13.43 THOUSANDS/ΜL (ref 1.85–7.62)
NEUTS SEG NFR BLD AUTO: 85 % (ref 43–75)
NRBC BLD AUTO-RTO: 0 /100 WBCS
PLATELET # BLD AUTO: 445 THOUSANDS/UL (ref 149–390)
PMV BLD AUTO: 8.9 FL (ref 8.9–12.7)
POTASSIUM SERPL-SCNC: 3.7 MMOL/L (ref 3.5–5.3)
PROT SERPL-MCNC: 7.5 G/DL (ref 6.4–8.4)
PROTHROMBIN TIME: 14.2 SECONDS (ref 12.3–15)
QRS AXIS: 29 DEGREES
QRSD INTERVAL: 68 MS
QT INTERVAL: 340 MS
QTC INTERVAL: 406 MS
RBC # BLD AUTO: 4.55 MILLION/UL (ref 3.81–5.12)
RH BLD: NEGATIVE
SODIUM SERPL-SCNC: 141 MMOL/L (ref 135–147)
SPECIMEN EXPIRATION DATE: NORMAL
T WAVE AXIS: -53 DEGREES
VENTRICULAR RATE: 86 BPM
WBC # BLD AUTO: 15.83 THOUSAND/UL (ref 4.31–10.16)

## 2024-12-02 PROCEDURE — 36415 COLL VENOUS BLD VENIPUNCTURE: CPT | Performed by: EMERGENCY MEDICINE

## 2024-12-02 PROCEDURE — 74177 CT ABD & PELVIS W/CONTRAST: CPT

## 2024-12-02 PROCEDURE — 86850 RBC ANTIBODY SCREEN: CPT | Performed by: EMERGENCY MEDICINE

## 2024-12-02 PROCEDURE — 71260 CT THORAX DX C+: CPT

## 2024-12-02 PROCEDURE — 99285 EMERGENCY DEPT VISIT HI MDM: CPT | Performed by: EMERGENCY MEDICINE

## 2024-12-02 PROCEDURE — 85610 PROTHROMBIN TIME: CPT | Performed by: EMERGENCY MEDICINE

## 2024-12-02 PROCEDURE — 96374 THER/PROPH/DIAG INJ IV PUSH: CPT

## 2024-12-02 PROCEDURE — 96361 HYDRATE IV INFUSION ADD-ON: CPT

## 2024-12-02 PROCEDURE — 85025 COMPLETE CBC W/AUTO DIFF WBC: CPT | Performed by: EMERGENCY MEDICINE

## 2024-12-02 PROCEDURE — 99284 EMERGENCY DEPT VISIT MOD MDM: CPT

## 2024-12-02 PROCEDURE — 86900 BLOOD TYPING SEROLOGIC ABO: CPT | Performed by: EMERGENCY MEDICINE

## 2024-12-02 PROCEDURE — 93010 ELECTROCARDIOGRAM REPORT: CPT | Performed by: INTERNAL MEDICINE

## 2024-12-02 PROCEDURE — 83880 ASSAY OF NATRIURETIC PEPTIDE: CPT | Performed by: EMERGENCY MEDICINE

## 2024-12-02 PROCEDURE — 80053 COMPREHEN METABOLIC PANEL: CPT | Performed by: EMERGENCY MEDICINE

## 2024-12-02 PROCEDURE — 82550 ASSAY OF CK (CPK): CPT | Performed by: EMERGENCY MEDICINE

## 2024-12-02 PROCEDURE — 70450 CT HEAD/BRAIN W/O DYE: CPT

## 2024-12-02 PROCEDURE — 71045 X-RAY EXAM CHEST 1 VIEW: CPT

## 2024-12-02 PROCEDURE — 86901 BLOOD TYPING SEROLOGIC RH(D): CPT | Performed by: EMERGENCY MEDICINE

## 2024-12-02 PROCEDURE — 93005 ELECTROCARDIOGRAM TRACING: CPT

## 2024-12-02 PROCEDURE — 72125 CT NECK SPINE W/O DYE: CPT

## 2024-12-02 PROCEDURE — 84484 ASSAY OF TROPONIN QUANT: CPT | Performed by: EMERGENCY MEDICINE

## 2024-12-02 RX ORDER — ONDANSETRON 2 MG/ML
4 INJECTION INTRAMUSCULAR; INTRAVENOUS ONCE
Status: COMPLETED | OUTPATIENT
Start: 2024-12-02 | End: 2024-12-02

## 2024-12-02 RX ADMIN — IOHEXOL 100 ML: 350 INJECTION, SOLUTION INTRAVENOUS at 07:39

## 2024-12-02 RX ADMIN — ONDANSETRON 4 MG: 2 INJECTION, SOLUTION INTRAMUSCULAR; INTRAVENOUS at 07:17

## 2024-12-02 RX ADMIN — SODIUM CHLORIDE 500 ML: 0.9 INJECTION, SOLUTION INTRAVENOUS at 07:17

## 2024-12-02 NOTE — ED NOTES
Patient  updated for dishcharge/follow up and transport time     Ada Garcia, ANTONIO  12/02/24 1038

## 2024-12-02 NOTE — ED PROVIDER NOTES
Emergency Department Trauma Note  Lo Talley 79 y.o. female MRN: [id number]  Unit/Bed#: ED 04/ED 04 Encounter: [id number]      Trauma Alert: Trauma Acuity: Trauma Evaluation  Model of Arrival: Mode of Arrival: BLS via    Trauma Team: Current Providers  Attending Provider: Mike Heard DO  Registered Nurse: Sanjana Kennedy, RN  Registered Nurse: Ada Garcia, RN  Consultants:     None      History of Present Illness     Chief Complaint:   Chief Complaint   Patient presents with    Fall     Ems report spt is baseline, ems reports pt rolled out of bed at some point her  found her on the floor, ems report she is reporting neck pain      HPI:  Lo Talley is a 79 y.o. female who presents with .  Mechanism:Details of Incident: pt rolled out of bed found on floor by           79-year-old female, arrives via EMS for possible fall.  Possibly on warfarin for history of PE, on 81 mg aspirin daily per medical records.  Patient is on a low level bed, per EMS and family patient was found lying on the floor.  Unknown how long they were down for.      Review of Systems   Constitutional: Negative.  Negative for chills and fever.   HENT: Negative.  Negative for rhinorrhea, sore throat, trouble swallowing and voice change.    Eyes: Negative.  Negative for pain and visual disturbance.   Respiratory: Negative.  Negative for cough, shortness of breath and wheezing.    Cardiovascular: Negative.  Negative for chest pain and palpitations.   Gastrointestinal:  Negative for abdominal pain, diarrhea, nausea and vomiting.   Genitourinary: Negative.  Negative for dysuria and frequency.   Musculoskeletal: Negative.  Negative for neck pain and neck stiffness.   Skin: Negative.  Negative for rash.   Neurological: Negative.  Negative for dizziness, speech difficulty, weakness, light-headedness and numbness.       Historical Information     Immunizations:   Immunization History   Administered Date(s) Administered    COVID-19  PFIZER VACCINE 0.3 ML IM 04/17/2021, 05/08/2021       Past Medical History:   Diagnosis Date    Acute posthemorrhagic anemia     Cerebral amyloid angiopathy  (HCC)     Encounter for current long-term use of anticoagulants     History of falling     History of stroke 06/14/2023    Hyperlipemia     Hypertension     Intracapsular fracture of right femur (HCC)     Moderate protein-calorie malnutrition (HCC)     Paroxysmal atrial fibrillation (HCC)     Personal history of transient ischemic attack (TIA), and cerebral infarction without residual deficits     TIA (transient ischemic attack)     Urinary tract infection        Family History   Family history unknown: Yes     Past Surgical History:   Procedure Laterality Date    HIP ARTHROPLASTY Right 2/4/2024    Procedure: ARTHROPLASTY HIP TOTAL;  Surgeon: Kevon Goldman DO;  Location:  MAIN OR;  Service: Orthopedics     Social History     Tobacco Use    Smoking status: Never    Smokeless tobacco: Never   Vaping Use    Vaping status: Never Used   Substance Use Topics    Alcohol use: Not Currently     Alcohol/week: 1.0 standard drink of alcohol     Types: 1 Glasses of wine per week     Comment: stopped since hospitalization    Drug use: Not Currently     E-Cigarette/Vaping    E-Cigarette Use Never User      E-Cigarette/Vaping Substances    Nicotine No     THC No     CBD No     Flavoring No     Other No     Unknown No        Family History: non-contributory    Meds/Allergies   Prior to Admission Medications   Prescriptions Last Dose Informant Patient Reported? Taking?   acetaminophen (TYLENOL) 325 mg tablet  Family Member No No   Sig: Take 2 tablets (650 mg total) by mouth every 6 (six) hours as needed for mild pain   amLODIPine (NORVASC) 5 mg tablet  Family Member Yes No   Sig: Take 5 mg by mouth daily. Indications: High Blood Pressure Disorder   aspirin 81 mg chewable tablet  Family Member Yes No   Sig: Chew 81 mg once. Indications: blood thinner   atorvastatin  (LIPITOR) 40 mg tablet  Family Member No No   Sig: Take 1 tablet (40 mg total) by mouth daily with dinner   Patient not taking: Reported on 6/5/2024   carvedilol (COREG) 6.25 mg tablet  Family Member No No   Sig: Take 1 tablet (6.25 mg total) by mouth 2 (two) times a day with meals   docusate sodium (COLACE) 100 mg capsule  Family Member No No   Sig: Take 1 capsule (100 mg total) by mouth 2 (two) times a day   Patient not taking: Reported on 6/5/2024   ferrous sulfate 325 (65 Fe) mg tablet  Family Member No No   Sig: Take 1 tablet (325 mg total) by mouth daily with breakfast   Patient not taking: Reported on 6/5/2024   lisinopril (ZESTRIL) 40 mg tablet  Family Member Yes No   Sig: Take 40 mg by mouth every 24 hours   senna (SENOKOT) 8.6 mg  Family Member No No   Sig: Take 1 tablet (8.6 mg total) by mouth daily   Patient not taking: Reported on 6/5/2024   warfarin (COUMADIN) 5 mg tablet   No No   Sig: Take one tablet daily or as directed by MD.      Facility-Administered Medications: None       Allergies   Allergen Reactions    Penicillins Other (See Comments)     Childhood reaction       PHYSICAL EXAM    PE limited by: baseline poor neurologic function    Objective   Vitals:   First set: Temperature: (!) 97 °F (36.1 °C) (12/02/24 0700)  Pulse: 90 (12/02/24 0700)  Respirations: 18 (12/02/24 0700)  Blood Pressure: (!) 178/85 (12/02/24 0700)  SpO2: 99 % (12/02/24 0700)    Primary Survey:   (A) Airway: intact  (B) Breathing: symmetric  (C) Circulation: Pulses:   normal  (D) Disabliity:  Eye Opening:   Spontaneous = 4, Verbal 5 (baseline), Motor 6  (E) Expose:  Completed    Secondary Survey: (Click on Physical Exam tab above)  Physical Exam  Vitals and nursing note reviewed.   Constitutional:       General: She is not in acute distress.     Appearance: She is underweight. She is not ill-appearing, toxic-appearing or diaphoretic.   HENT:      Head: Normocephalic and atraumatic.      Mouth/Throat:      Lips: Pink.       Mouth: Mucous membranes are dry. No oral lesions.   Eyes:      Conjunctiva/sclera: Conjunctivae normal.      Pupils: Pupils are equal, round, and reactive to light.   Neck:      Trachea: No tracheal deviation.      Comments: Cervical collar in place.  Cardiovascular:      Rate and Rhythm: Normal rate and regular rhythm.   Pulmonary:      Effort: Pulmonary effort is normal. No respiratory distress.      Breath sounds: Normal breath sounds. No wheezing or rales.   Abdominal:      General: Bowel sounds are normal. There is no distension.      Palpations: Abdomen is soft.      Tenderness: There is no abdominal tenderness. There is no guarding or rebound.   Musculoskeletal:         General: No tenderness or deformity. Normal range of motion.      Cervical back: Normal range of motion and neck supple.   Skin:     General: Skin is warm and dry.      Capillary Refill: Capillary refill takes less than 2 seconds.      Findings: No rash.   Neurological:      Mental Status: Mental status is at baseline.      GCS: GCS eye subscore is 4. GCS verbal subscore is 4. GCS motor subscore is 6.      Comments: Patient apparently is mildly confused at baseline per EMS.  Follows commands,   Psychiatric:         Behavior: Behavior normal.         Cervical spine cleared by clinical criteria? No (imaging required)      Invasive Devices       None                   Lab Results:   Results Reviewed       Procedure Component Value Units Date/Time    HS Troponin I 2hr [[id number]]  (Normal) Collected: 12/02/24 0914    Lab Status: Final result Specimen: Blood from Arm, Right Updated: 12/02/24 0948     hs TnI 2hr 7 ng/L      Delta 2hr hsTnI 1 ng/L     HS Troponin I 4hr [[id number]]     Lab Status: No result Specimen: Blood     B-Type Natriuretic Peptide(BNP) [[id number]]  (Abnormal) Collected: 12/02/24 0716    Lab Status: Final result Specimen: Blood from Arm, Right Updated: 12/02/24 0751      pg/mL     Comprehensive metabolic panel [[id number]]   (Abnormal) Collected: 12/02/24 0716    Lab Status: Final result Specimen: Blood from Arm, Right Updated: 12/02/24 0750     Sodium 141 mmol/L      Potassium 3.7 mmol/L      Chloride 105 mmol/L      CO2 25 mmol/L      ANION GAP 11 mmol/L      BUN 17 mg/dL      Creatinine 0.37 mg/dL      Glucose 124 mg/dL      Calcium 9.7 mg/dL      AST 13 U/L      ALT 12 U/L      Alkaline Phosphatase 63 U/L      Total Protein 7.5 g/dL      Albumin 4.3 g/dL      Total Bilirubin 0.74 mg/dL      eGFR 103 ml/min/1.73sq m     Narrative:      [employer] guidelines for Chronic Kidney Disease (CKD):     Stage 1 with normal or high GFR (GFR > 90 mL/min/1.73 square meters)    Stage 2 Mild CKD (GFR = 60-89 mL/min/1.73 square meters)    Stage 3A Moderate CKD (GFR = 45-59 mL/min/1.73 square meters)    Stage 3B Moderate CKD (GFR = 30-44 mL/min/1.73 square meters)    Stage 4 Severe CKD (GFR = 15-29 mL/min/1.73 square meters)    Stage 5 End Stage CKD (GFR <15 mL/min/1.73 square meters)  Note: GFR calculation is accurate only with a steady state creatinine    CK [[id number]]  (Abnormal) Collected: 12/02/24 0716    Lab Status: Final result Specimen: Blood from Arm, Right Updated: 12/02/24 0750     Total CK 15 U/L     HS Troponin 0hr (reflex protocol) [[id number]]  (Normal) Collected: 12/02/24 0716    Lab Status: Final result Specimen: Blood from Arm, Right Updated: 12/02/24 0747     hs TnI 0hr 6 ng/L     Protime-INR [[id number]]  (Normal) Collected: 12/02/24 0716    Lab Status: Final result Specimen: Blood from Arm, Right Updated: 12/02/24 0735     Protime 14.2 seconds      INR 1.05    Narrative:      INR Therapeutic Range    Indication                                             INR Range      Atrial Fibrillation                                               2.0-3.0  Hypercoagulable State                                    2.0.2.3  Left Ventricular Asist Device                            2.0-3.0  Mechanical Heart Valve                                   -    Aortic(with afib, MI, embolism, HF, LA enlargement,    and/or coagulopathy)                                     2.0-3.0 (2.5-3.5)     Mitral                                                             2.5-3.5  Prosthetic/Bioprosthetic Heart Valve               2.0-3.0  Venous thromboembolism (VTE: VT, PE        2.0-3.0    CBC and differential [[id number]]  (Abnormal) Collected: 12/02/24 0716    Lab Status: Final result Specimen: Blood from Arm, Right Updated: 12/02/24 0723     WBC 15.83 Thousand/uL      RBC 4.55 Million/uL      Hemoglobin 14.2 g/dL      Hematocrit 43.4 %      MCV 95 fL      MCH 31.2 pg      MCHC 32.7 g/dL      RDW 13.5 %      MPV 8.9 fL      Platelets 445 Thousands/uL      nRBC 0 /100 WBCs      Segmented % 85 %      Immature Grans % 1 %      Lymphocytes % 10 %      Monocytes % 4 %      Eosinophils Relative 0 %      Basophils Relative 0 %      Absolute Neutrophils 13.43 Thousands/µL      Absolute Immature Grans 0.12 Thousand/uL      Absolute Lymphocytes 1.63 Thousands/µL      Absolute Monocytes 0.55 Thousand/µL      Eosinophils Absolute 0.07 Thousand/µL      Basophils Absolute 0.03 Thousands/µL                    Imaging Studies:   Direct to CT: Yes  TRAUMA - CT head wo contrast   Final Result by Jonatan Bowers MD (12/02 0800)      No acute intracranial abnormality.  Chronic microangiopathic changes.                  Workstation performed: [id number]         TRAUMA - CT spine cervical wo contrast   Final Result by Jonatan Bowers MD (12/02 0800)      No cervical spine fracture or traumatic malalignment.                  Workstation performed: [id number]         TRAUMA - CT chest abdomen pelvis w contrast   Final Result by Jonatan Bowers MD (12/02 0800)      No acute thoracic or abdominopelvic injury.         The study was marked in EPIC for immediate notification.      Workstation performed: [id number]         XR Trauma chest portable   Final Result by Nichol Guzman  MD Tonya (12/02 0719)      No acute cardiopulmonary disease.      No acute displaced fractures.      Workstation performed: [id number]               Procedures  Procedures         ED Course  ED Course as of 12/02/24 1301   Mon Dec 02, 2024   0713 Procedure Note: EKG  Date/Time: 12/02/24 7:13 AM   Performed by: Tyler Heard  Authorized by: Tyler Heard  ECG interpreted by me, the ED Provider: yes   The EKG demonstrates:  Rate 86  Rhythm afib  QTc 406  No ST elevations/depressions       0722 Cxr reviewed, no ptx or pna appreciated.   0737 Spoke with patient . Not on any anticoagulants besides 81mg baby ASA. Not given today due to finding patient on the floor this AM next to hospital bed which is approximately 1ft off of floor. She has never fallen out of bed before. Eating and drinking at baseline per , at neuro baseline per .    0804 Total CK(!): 15   0804 BNP(!): 181   0831 Needs repeat trop and can be d/c back home.  aware, okay with plan, would like phone call at time of dispo.   0950 hs TnI 2hr: 7   0953 Trop stable, patient family called and LVM about discharge. Will need transport and caregiver at home for discharge.           Medical Decision Making  CT imaging negative for acute fractures dislocations intracranial hemorrhage.  Patient's repeat troponin was stable.  Updated .  Feels comfortable taking patient home.    Amount and/or Complexity of Data Reviewed  Labs: ordered. Decision-making details documented in ED Course.  Radiology: ordered.    Risk  Prescription drug management.                Disposition  [facility] Transfer: No  Final diagnoses:   Fall     Time reflects when diagnosis was documented in both MDM as applicable and the Disposition within this note       Time User Action Codes Description Comment    12/2/2024  9:50 AM Mike Heard Add [W19.XXXA] Fall           ED Disposition       ED Disposition   Discharge    Condition   Stable    Date/Time   Mon  Dec 2, 2024  9:50 AM    Comment   Lo Talley discharge to home/self care.                   Follow-up Information       Follow up With Specialties Details Why Contact Info    RACHELLE Ruiz Nurse Practitioner   [address]  [place] [address] [phone]            Discharge Medication List as of 12/2/2024  9:50 AM        CONTINUE these medications which have NOT CHANGED    Details   acetaminophen (TYLENOL) 325 mg tablet Take 2 tablets (650 mg total) by mouth every 6 (six) hours as needed for mild pain, Starting Wed 2/7/2024, No Print      amLODIPine (NORVASC) 5 mg tablet Take 5 mg by mouth daily. Indications: High Blood Pressure Disorder, Historical Med      aspirin 81 mg chewable tablet Chew 81 mg once. Indications: blood thinner, Starting Fri 4/19/2024, Historical Med      atorvastatin (LIPITOR) 40 mg tablet Take 1 tablet (40 mg total) by mouth daily with dinner, Starting Sun 6/18/2023, Normal      carvedilol (COREG) 6.25 mg tablet Take 1 tablet (6.25 mg total) by mouth 2 (two) times a day with meals, Starting Fri 2/9/2024, No Print      docusate sodium (COLACE) 100 mg capsule Take 1 capsule (100 mg total) by mouth 2 (two) times a day, Starting Wed 2/7/2024, No Print      ferrous sulfate 325 (65 Fe) mg tablet Take 1 tablet (325 mg total) by mouth daily with breakfast, Starting Thu 2/8/2024, No Print      lisinopril (ZESTRIL) 40 mg tablet Take 40 mg by mouth every 24 hours, Historical Med      senna (SENOKOT) 8.6 mg Take 1 tablet (8.6 mg total) by mouth daily, Starting Thu 2/8/2024, No Print      warfarin (COUMADIN) 5 mg tablet Take one tablet daily or as directed by MD., Normal           No discharge procedures on file.    PDMP Review         Value Time User    PDMP Reviewed  Yes 2/4/2024 10:33 AM Ana Hughes PA-C            ED Provider  Electronically Signed by           Mike Heard DO  12/02/24 5883

## 2024-12-03 ENCOUNTER — TELEPHONE (OUTPATIENT)
Dept: CARDIOLOGY CLINIC | Facility: CLINIC | Age: 79
End: 2024-12-03

## 2024-12-03 ENCOUNTER — ANTICOAG VISIT (OUTPATIENT)
Dept: CARDIOLOGY CLINIC | Facility: CLINIC | Age: 79
End: 2024-12-03

## 2024-12-03 DIAGNOSIS — I26.99 PULMONARY EMBOLISM (HCC): ICD-10-CM

## 2024-12-03 RX ORDER — WARFARIN SODIUM 5 MG/1
5 TABLET ORAL DAILY
Qty: 90 TABLET | Refills: 1 | Status: SHIPPED | OUTPATIENT
Start: 2024-12-03

## 2024-12-03 NOTE — PROGRESS NOTES
Called  pt was in ED yesterday INR obtained at that time (patient was very overdue for INR had spoke to  to schedule homedraw which was not done). When I spoke to  states she was not taking warfarin because she had bleeding gums.  Explained again the importance of taking medication and labs as needed.  She will take 10 mg x 2 days then 5 daily and recheck 12/12-12/16  when home draw can come...instructed  to call to day to schedule.

## 2024-12-03 NOTE — TELEPHONE ENCOUNTER
Called  with results of INR obtained in ED.  Was very low and with talking to  pt has not been taking because of bleeding gums.  He has been noncompliant with have labs draw as requested.  Dosed  and reminded of importance of medication and of getting routine labs done so we know if too high or too low.   INR 8/20/24 1.23, INR 10/16 was 5.69, 10/28 was 1.05 one at ED yesterday was 1.05 also...he states will dose as ask and will get INR done 12/16 recommended he call home draw lab today to schedule in advance. He states has number in his phone and will do.  [place]

## 2024-12-17 ENCOUNTER — TELEPHONE (OUTPATIENT)
Dept: CARDIOLOGY CLINIC | Facility: CLINIC | Age: 79
End: 2024-12-17

## 2024-12-17 NOTE — TELEPHONE ENCOUNTER
Again called spouse and told was looking for INR result on his wife and didn't even see he called the lab.  He states yeah we talked a week ago and I havent gotten around to calling.  Told him we last spoke on 12/3 and it is very important he gets her lab checked ASAP.  Explained that if INR too high could have bleeding and if too low increased chance of stroke.  He states he understands and thanked me for calling. Message sent to Dr Greer to notify of situation.

## 2025-01-07 ENCOUNTER — TELEPHONE (OUTPATIENT)
Dept: CARDIOLOGY CLINIC | Facility: CLINIC | Age: 80
End: 2025-01-07

## 2025-01-07 NOTE — TELEPHONE ENCOUNTER
Called spouse again pt overdue for INR/LM  reminded overdue for INR had requested on Dec 3 to have INR checked on Dec 16, no labs done(actually spoke to )  Called on 12/17 no INR done spoke to  again , upon checking no communication with home draw seen. Reached out to  again went directly to .

## (undated) DEVICE — YELLOW BOAT

## (undated) DEVICE — HEAVY DUTY TABLE COVER: Brand: CONVERTORS

## (undated) DEVICE — GLOVE SRG BIOGEL 8.5

## (undated) DEVICE — ASTOUND STANDARD SURGICAL GOWN, XXL: Brand: CONVERTORS

## (undated) DEVICE — GLOVE INDICATOR PI UNDERGLOVE SZ 8.5 BLUE

## (undated) DEVICE — SUT STRATAFIX SPIRAL 3-0 PGA/PCL 30 X 30 CM SXMD2B408

## (undated) DEVICE — SUT VICRYL 1 CP-1 27 IN J268H

## (undated) DEVICE — SUT VICRYL 0 CT-1 27 IN J260H

## (undated) DEVICE — GLOVE INDICATOR PI UNDERGLOVE SZ 6.5 BLUE

## (undated) DEVICE — DRESSING MEPILEX AG BORDER POST-OP 4 X 12 IN

## (undated) DEVICE — PAD GROUNDING ADULT

## (undated) DEVICE — HOOD: Brand: T7PLUS

## (undated) DEVICE — CAPIT UPCHRG EMPHASYS ACETABULAR

## (undated) DEVICE — GLOVE SRG BIOGEL ORTHOPEDIC 8.5

## (undated) DEVICE — SCD SEQUENTIAL COMPRESSION COMFORT SLEEVE MEDIUM KNEE LENGTH: Brand: KENDALL SCD

## (undated) DEVICE — SYRINGE 3ML LL

## (undated) DEVICE — IMPERVIOUS STOCKINETTE: Brand: DEROYAL

## (undated) DEVICE — SPONGE SCRUB 4 PCT CHLORHEXIDINE

## (undated) DEVICE — ELECTRODE BLADE MOD  E-Z CLEAN 6.5IN -0014M

## (undated) DEVICE — BLADE INTREX LRG BONE OSCILLATING

## (undated) DEVICE — SUT STRATAFIX SPIRAL 1-0 PDO 36 X 36 CM SXPD2B405

## (undated) DEVICE — TIBURON HIP DRAPE WITH POUCHES: Brand: CONVERTORS

## (undated) DEVICE — SUT VICRYL 2-0 CT-1 27 IN J259H

## (undated) DEVICE — PLUMEPEN PRO 10FT

## (undated) DEVICE — ADHESIVE SKIN HIGH VISCOSITY EXOFIN 1ML

## (undated) DEVICE — HANDPIECE SET WITH RETRACTABLE COAXIAL FAN SPRAY TIP AND SUCTION TUBE: Brand: INTERPULSE

## (undated) DEVICE — CAPIT HIP COP -CERAMIC ON POLY

## (undated) DEVICE — GLOVE SRG BIOGEL 6.5

## (undated) DEVICE — SYRINGE 30ML LL

## (undated) DEVICE — 3M™ STERI-DRAPE™ U-DRAPE 1015: Brand: STERI-DRAPE™

## (undated) DEVICE — 450 ML BOTTLE OF 0.05% CHLORHEXIDINE GLUCONATE IN 99.95% STERILE WATER FOR IRRIGATION, USP AND APPLICATOR.: Brand: IRRISEPT ANTIMICROBIAL WOUND LAVAGE

## (undated) DEVICE — CHLORAPREP HI-LITE 26ML ORANGE

## (undated) DEVICE — INTENDED FOR TISSUE SEPARATION, AND OTHER PROCEDURES THAT REQUIRE A SHARP SURGICAL BLADE TO PUNCTURE OR CUT.: Brand: BARD-PARKER SAFETY BLADES SIZE 10, STERILE

## (undated) DEVICE — ANTIBACTERIAL VIOLET BRAIDED (POLYGLACTIN 910), SYNTHETIC ABSORBABLE SURGICAL SUTURE: Brand: COATED VICRYL

## (undated) DEVICE — COBAN 6 IN STERILE

## (undated) DEVICE — PACK MAJOR ORTHO W/SPLITS PBDS

## (undated) DEVICE — NEEDLE 18 G X 1 1/2

## (undated) DEVICE — 3M™ IOBAN™ 2 ANTIMICROBIAL INCISE DRAPE 6650EZ: Brand: IOBAN™ 2

## (undated) DEVICE — HOOD WITH PEEL AWAY FACE SHIELD: Brand: T7PLUS

## (undated) DEVICE — DRESSING MEPILEX AG BORDER 4 X 12 IN